# Patient Record
Sex: MALE | Race: BLACK OR AFRICAN AMERICAN | NOT HISPANIC OR LATINO | Employment: OTHER | URBAN - METROPOLITAN AREA
[De-identification: names, ages, dates, MRNs, and addresses within clinical notes are randomized per-mention and may not be internally consistent; named-entity substitution may affect disease eponyms.]

---

## 2023-03-31 LAB
ALBUMIN (G/DL) IN SER/PLAS: 3.5 G/DL (ref 3.4–5)
ANION GAP IN SER/PLAS: 13 MMOL/L (ref 10–20)
CALCIUM (MG/DL) IN SER/PLAS: 8.7 MG/DL (ref 8.6–10.6)
CARBON DIOXIDE, TOTAL (MMOL/L) IN SER/PLAS: 24 MMOL/L (ref 21–32)
CHLORIDE (MMOL/L) IN SER/PLAS: 112 MMOL/L (ref 98–107)
CREATININE (MG/DL) IN SER/PLAS: 2.95 MG/DL (ref 0.5–1.3)
CREATININE (MG/DL) IN URINE: NORMAL
GFR MALE: 21 ML/MIN/1.73M2
GLUCOSE (MG/DL) IN SER/PLAS: 97 MG/DL (ref 74–99)
PARATHYRIN INTACT (PG/ML) IN SER/PLAS: 214.4 PG/ML (ref 18.5–88)
PHOSPHATE (MG/DL) IN SER/PLAS: 3.4 MG/DL (ref 2.5–4.9)
POTASSIUM (MMOL/L) IN SER/PLAS: 4.7 MMOL/L (ref 3.5–5.3)
PROTEIN (MG/DL) IN URINE: NORMAL
PROTEIN/CREATININE (MG/MG) IN URINE: NORMAL
SODIUM (MMOL/L) IN SER/PLAS: 144 MMOL/L (ref 136–145)
UREA NITROGEN (MG/DL) IN SER/PLAS: 31 MG/DL (ref 6–23)

## 2023-04-12 DIAGNOSIS — I10 PRIMARY HYPERTENSION: Primary | ICD-10-CM

## 2023-04-12 RX ORDER — METOPROLOL TARTRATE 25 MG/1
1 TABLET, FILM COATED ORAL DAILY
COMMUNITY
Start: 2020-06-01 | End: 2023-04-12 | Stop reason: SDUPTHER

## 2023-04-13 RX ORDER — METOPROLOL TARTRATE 25 MG/1
25 TABLET, FILM COATED ORAL DAILY
Qty: 90 TABLET | Refills: 3 | Status: ON HOLD | OUTPATIENT
Start: 2023-04-13 | End: 2024-05-01

## 2023-04-25 ENCOUNTER — OFFICE VISIT (OUTPATIENT)
Dept: PRIMARY CARE | Facility: CLINIC | Age: 78
End: 2023-04-25
Payer: MEDICARE

## 2023-04-25 VITALS
RESPIRATION RATE: 16 BRPM | DIASTOLIC BLOOD PRESSURE: 70 MMHG | BODY MASS INDEX: 21.09 KG/M2 | WEIGHT: 147 LBS | HEART RATE: 70 BPM | SYSTOLIC BLOOD PRESSURE: 120 MMHG | TEMPERATURE: 98.1 F

## 2023-04-25 DIAGNOSIS — Z95.1 S/P CABG X 3: ICD-10-CM

## 2023-04-25 DIAGNOSIS — I10 PRIMARY HYPERTENSION: ICD-10-CM

## 2023-04-25 DIAGNOSIS — N18.4 CHRONIC KIDNEY DISEASE, STAGE 4 (SEVERE) (MULTI): ICD-10-CM

## 2023-04-25 DIAGNOSIS — M1A.0790 CHRONIC GOUT OF ANKLE, UNSPECIFIED CAUSE, UNSPECIFIED LATERALITY: ICD-10-CM

## 2023-04-25 DIAGNOSIS — N40.1 BENIGN PROSTATIC HYPERPLASIA WITH URINARY FREQUENCY: ICD-10-CM

## 2023-04-25 DIAGNOSIS — E78.49 OTHER HYPERLIPIDEMIA: ICD-10-CM

## 2023-04-25 DIAGNOSIS — R41.3 MEMORY LOSS: ICD-10-CM

## 2023-04-25 DIAGNOSIS — R26.89 BALANCE PROBLEMS: ICD-10-CM

## 2023-04-25 DIAGNOSIS — N25.81 HYPERPARATHYROIDISM, SECONDARY (MULTI): ICD-10-CM

## 2023-04-25 DIAGNOSIS — R35.0 BENIGN PROSTATIC HYPERPLASIA WITH URINARY FREQUENCY: ICD-10-CM

## 2023-04-25 DIAGNOSIS — G20.A1 PARKINSON'S DISEASE (MULTI): Primary | ICD-10-CM

## 2023-04-25 DIAGNOSIS — E13.69 OTHER SPECIFIED DIABETES MELLITUS WITH OTHER SPECIFIED COMPLICATION, UNSPECIFIED WHETHER LONG TERM INSULIN USE (MULTI): ICD-10-CM

## 2023-04-25 PROBLEM — R13.11 ORAL PHASE DYSPHAGIA: Status: ACTIVE | Noted: 2023-04-25

## 2023-04-25 PROBLEM — R13.10 DYSPHAGIA: Status: ACTIVE | Noted: 2023-04-25

## 2023-04-25 PROBLEM — R06.83 SNORING: Status: ACTIVE | Noted: 2023-04-25

## 2023-04-25 PROBLEM — G20.C PARKINSONISM (MULTI): Status: ACTIVE | Noted: 2023-04-25

## 2023-04-25 PROBLEM — R40.0 INTERMITTENT DROWSINESS: Status: ACTIVE | Noted: 2023-04-25

## 2023-04-25 PROBLEM — D12.6 TUBULAR ADENOMA OF COLON: Status: ACTIVE | Noted: 2023-04-25

## 2023-04-25 PROBLEM — M77.41 METATARSALGIA OF BOTH FEET: Status: ACTIVE | Noted: 2023-04-25

## 2023-04-25 PROBLEM — R53.83 FATIGUE: Status: ACTIVE | Noted: 2023-04-25

## 2023-04-25 PROBLEM — H53.8 BLURRY VISION: Status: ACTIVE | Noted: 2023-04-25

## 2023-04-25 PROBLEM — M10.9 GOUT: Status: ACTIVE | Noted: 2023-04-25

## 2023-04-25 PROBLEM — F09 COGNITIVE DISORDER: Status: ACTIVE | Noted: 2023-04-25

## 2023-04-25 PROBLEM — R63.4 UNEXPLAINED WEIGHT LOSS: Status: ACTIVE | Noted: 2023-04-25

## 2023-04-25 PROBLEM — M54.12 CERVICAL RADICULOPATHY: Status: ACTIVE | Noted: 2023-04-25

## 2023-04-25 PROBLEM — R26.2 UNABLE TO WALK: Status: ACTIVE | Noted: 2023-04-25

## 2023-04-25 PROBLEM — R19.09 INGUINAL MASS: Status: ACTIVE | Noted: 2023-04-25

## 2023-04-25 PROBLEM — G47.10 ACUTE HYPERSOMNOLENCE DISORDER: Status: ACTIVE | Noted: 2023-04-25

## 2023-04-25 PROBLEM — L89.152 SACRAL DECUBITUS ULCER, STAGE II (MULTI): Status: ACTIVE | Noted: 2023-04-25

## 2023-04-25 PROBLEM — L05.91 INFECTED PILONIDAL CYST: Status: ACTIVE | Noted: 2023-04-25

## 2023-04-25 PROBLEM — R41.81 AGE-RELATED COGNITIVE DECLINE: Status: ACTIVE | Noted: 2023-04-25

## 2023-04-25 PROBLEM — M72.2 PLANTAR FASCIITIS: Status: ACTIVE | Noted: 2023-04-25

## 2023-04-25 PROBLEM — N39.0 UTI (URINARY TRACT INFECTION): Status: ACTIVE | Noted: 2023-04-25

## 2023-04-25 PROBLEM — E61.1 IRON DEFICIENCY: Status: ACTIVE | Noted: 2023-04-25

## 2023-04-25 PROBLEM — R79.89 LOW VITAMIN D LEVEL: Status: ACTIVE | Noted: 2023-04-25

## 2023-04-25 PROBLEM — N28.1 RENAL CYSTS, ACQUIRED, BILATERAL: Status: ACTIVE | Noted: 2023-04-25

## 2023-04-25 PROBLEM — E11.9 DIABETES MELLITUS (MULTI): Status: ACTIVE | Noted: 2023-04-25

## 2023-04-25 PROBLEM — G62.9 PERIPHERAL NEUROPATHY: Status: ACTIVE | Noted: 2023-04-25

## 2023-04-25 PROBLEM — E78.5 HYPERLIPIDEMIA: Status: ACTIVE | Noted: 2023-04-25

## 2023-04-25 PROBLEM — Z95.5 S/P DRUG ELUTING CORONARY STENT PLACEMENT: Status: ACTIVE | Noted: 2023-04-25

## 2023-04-25 PROBLEM — Q53.9 UNDESCENDED TESTICLE: Status: ACTIVE | Noted: 2023-04-25

## 2023-04-25 PROBLEM — R55 SYNCOPE: Status: ACTIVE | Noted: 2023-04-25

## 2023-04-25 PROBLEM — R51.9 CHRONIC HEADACHE: Status: ACTIVE | Noted: 2023-04-25

## 2023-04-25 PROBLEM — E66.9 OBESITY: Status: ACTIVE | Noted: 2023-04-25

## 2023-04-25 PROBLEM — I25.10 CAD (CORONARY ARTERY DISEASE): Status: ACTIVE | Noted: 2023-04-25

## 2023-04-25 PROBLEM — M77.42 METATARSALGIA OF BOTH FEET: Status: ACTIVE | Noted: 2023-04-25

## 2023-04-25 PROBLEM — R29.818 SUSPECTED SLEEP APNEA: Status: ACTIVE | Noted: 2023-04-25

## 2023-04-25 PROBLEM — R32 INCONTINENCE OF URINE: Status: ACTIVE | Noted: 2023-04-25

## 2023-04-25 PROBLEM — R97.20 ELEVATED PROSTATE SPECIFIC ANTIGEN (PSA): Status: ACTIVE | Noted: 2023-04-25

## 2023-04-25 PROBLEM — R40.4 EPISODES OF STARING: Status: ACTIVE | Noted: 2023-04-25

## 2023-04-25 PROBLEM — M54.9 CHRONIC BACK PAIN: Status: ACTIVE | Noted: 2023-04-25

## 2023-04-25 PROBLEM — H53.2 DIPLOPIA: Status: ACTIVE | Noted: 2023-04-25

## 2023-04-25 PROBLEM — N52.9 MALE ERECTILE DISORDER: Status: ACTIVE | Noted: 2023-04-25

## 2023-04-25 PROBLEM — N40.0 BPH (BENIGN PROSTATIC HYPERPLASIA): Status: ACTIVE | Noted: 2023-04-25

## 2023-04-25 PROBLEM — I21.9 MYOCARDIAL INFARCTION (MULTI): Status: ACTIVE | Noted: 2023-04-25

## 2023-04-25 PROBLEM — R77.8 ABNORMAL SPEP: Status: ACTIVE | Noted: 2023-04-25

## 2023-04-25 PROBLEM — R29.3 POSTURAL IMBALANCE: Status: ACTIVE | Noted: 2023-04-25

## 2023-04-25 PROBLEM — R11.10 VOMITING: Status: ACTIVE | Noted: 2023-04-25

## 2023-04-25 PROBLEM — R30.0 DYSURIA: Status: ACTIVE | Noted: 2023-04-25

## 2023-04-25 PROBLEM — G89.29 CHRONIC BACK PAIN: Status: ACTIVE | Noted: 2023-04-25

## 2023-04-25 PROBLEM — G89.29 CHRONIC HEADACHE: Status: ACTIVE | Noted: 2023-04-25

## 2023-04-25 LAB
POC FINGERSTICK BLOOD GLUCOSE: 155 MG/DL (ref 70–100)
POC HEMOGLOBIN A1C: 6 % (ref 4.2–6.5)

## 2023-04-25 PROCEDURE — 82962 GLUCOSE BLOOD TEST: CPT | Performed by: INTERNAL MEDICINE

## 2023-04-25 PROCEDURE — 1036F TOBACCO NON-USER: CPT | Performed by: INTERNAL MEDICINE

## 2023-04-25 PROCEDURE — 83036 HEMOGLOBIN GLYCOSYLATED A1C: CPT | Performed by: INTERNAL MEDICINE

## 2023-04-25 PROCEDURE — 99214 OFFICE O/P EST MOD 30 MIN: CPT | Performed by: INTERNAL MEDICINE

## 2023-04-25 PROCEDURE — 1160F RVW MEDS BY RX/DR IN RCRD: CPT | Performed by: INTERNAL MEDICINE

## 2023-04-25 PROCEDURE — 3078F DIAST BP <80 MM HG: CPT | Performed by: INTERNAL MEDICINE

## 2023-04-25 PROCEDURE — 3074F SYST BP LT 130 MM HG: CPT | Performed by: INTERNAL MEDICINE

## 2023-04-25 PROCEDURE — 1159F MED LIST DOCD IN RCRD: CPT | Performed by: INTERNAL MEDICINE

## 2023-04-25 RX ORDER — COLCHICINE 0.6 MG/1
1 TABLET ORAL DAILY PRN
COMMUNITY
Start: 2016-07-26 | End: 2023-04-25 | Stop reason: SDUPTHER

## 2023-04-25 RX ORDER — ATORVASTATIN CALCIUM 80 MG/1
1 TABLET, FILM COATED ORAL DAILY
COMMUNITY
Start: 2016-05-19 | End: 2023-10-18 | Stop reason: SDUPTHER

## 2023-04-25 RX ORDER — COLCHICINE 0.6 MG/1
0.6 TABLET ORAL DAILY PRN
Qty: 30 TABLET | Refills: 0 | Status: SHIPPED | OUTPATIENT
Start: 2023-04-25 | End: 2023-05-16

## 2023-04-25 RX ORDER — TAMSULOSIN HYDROCHLORIDE 0.4 MG/1
1 CAPSULE ORAL DAILY
COMMUNITY
Start: 2022-07-12 | End: 2023-09-01

## 2023-04-25 RX ORDER — AMOXICILLIN AND CLAVULANATE POTASSIUM 500; 125 MG/1; MG/1
1 TABLET, FILM COATED ORAL 2 TIMES DAILY
COMMUNITY
Start: 2023-04-13 | End: 2024-01-24 | Stop reason: ALTCHOICE

## 2023-04-25 RX ORDER — SENNOSIDES 8.6 MG/1
TABLET ORAL 2 TIMES DAILY PRN
Status: ON HOLD | COMMUNITY
Start: 2020-09-15 | End: 2024-04-24 | Stop reason: ALTCHOICE

## 2023-04-25 RX ORDER — NITROGLYCERIN 0.4 MG/1
0.4 TABLET SUBLINGUAL EVERY 5 MIN PRN
COMMUNITY
Start: 2017-08-10

## 2023-04-25 RX ORDER — CARBIDOPA AND LEVODOPA 25; 100 MG/1; MG/1
1.5 TABLET ORAL 4 TIMES DAILY
COMMUNITY
Start: 2024-03-22

## 2023-04-25 RX ORDER — ACETAMINOPHEN 500 MG
1000 TABLET ORAL EVERY 8 HOURS PRN
COMMUNITY
Start: 2020-11-19

## 2023-04-25 RX ORDER — CLOPIDOGREL BISULFATE 75 MG/1
1 TABLET ORAL DAILY
COMMUNITY
Start: 2019-09-05 | End: 2023-05-17 | Stop reason: SDUPTHER

## 2023-04-25 RX ORDER — LIDOCAINE 50 MG/G
PATCH TOPICAL
Status: ON HOLD | COMMUNITY
Start: 2020-06-01 | End: 2024-04-25 | Stop reason: ENTERED-IN-ERROR

## 2023-04-25 RX ORDER — FINASTERIDE 5 MG/1
1 TABLET, FILM COATED ORAL DAILY
COMMUNITY
Start: 2019-02-07

## 2023-04-25 RX ORDER — ASPIRIN 81 MG
1 TABLET, DELAYED RELEASE (ENTERIC COATED) ORAL DAILY
Status: ON HOLD | COMMUNITY
Start: 2023-02-14 | End: 2024-04-25 | Stop reason: ENTERED-IN-ERROR

## 2023-04-25 RX ORDER — ACETAMINOPHEN AND CODEINE PHOSPHATE 300; 30 MG/1; MG/1
TABLET ORAL
COMMUNITY
Start: 2023-02-18 | End: 2024-01-24 | Stop reason: ALTCHOICE

## 2023-04-25 RX ORDER — LANOLIN ALCOHOL/MO/W.PET/CERES
CREAM (GRAM) TOPICAL
Status: ON HOLD | COMMUNITY
End: 2024-04-24 | Stop reason: ALTCHOICE

## 2023-04-25 RX ORDER — METOPROLOL SUCCINATE 25 MG/1
1 TABLET, EXTENDED RELEASE ORAL DAILY
COMMUNITY
Start: 2023-02-02 | End: 2024-01-24 | Stop reason: ALTCHOICE

## 2023-04-25 RX ORDER — ACETAMINOPHEN 500 MG
1 TABLET ORAL DAILY
COMMUNITY
Start: 2022-10-10 | End: 2023-12-04

## 2023-04-25 NOTE — PROGRESS NOTES
Markie Nobles is a 78 y.o. male   Patient with a past medical history of MGUS, CAD,s/p CABG, HTN, CKD III, Anemia, ABEBE, hyperlipidemia, obesity, BPH,and gout, DM, HLD, Parkinsons with dementia, Tubular Adenoma due in 2024    Had a gout flare  Doing well otherwise    Feels fine  No chest pain/  SOB/ dizziness  BM OK  Energy level ok  Appetite OK             Review of Systems     Constitutional: no fever, no chills, not feeling poorly, not feeling tired and no recent weight gain, no recent weight loss.   ENT: no earache, no hearing loss, no nosebleeds, no nasal discharge, no sore throat and no hoarseness.   Cardiovascular: the heart rate was not slow, the heart rate was not fast, no chest pain, no palpitations, no intermittent leg claudication and no lower extremity edema.   Respiratory: no cough, wheezing or shortness of breath at rest or exertion  Gastrointestinal: no abdominal pain, no constipation, no melena, no nausea, no diarrhea, no vomiting and no blood in stools.   Musculoskeletal: extremity weakness , no swelling.   Integumentary: no skin rashes, no skin lesions, no itching, no skin wound and no dry skin.   Neurological: no headache, no confusion, no numbness, no dizziness, no tingling and no fainting.   All other systems have been reviewed and are negative for complaint.       Vitals:    04/25/23 1411   BP: 120/70   Pulse: 70   Resp: 16   Temp: 36.7 °C (98.1 °F)        Physical Exam     Constitutional   General appearance: Alert and in no acute distress.     Pulmonary   Respiratory assessment: No respiratory distress, normal respiratory rhythm and effort.    Auscultation of Lungs: Clear bilateral breath sounds.   Cardiovascular   Auscultation of heart: Apical pulse normal, heart rate and rhythm normal, normal S1 and S2, no murmurs and no pericardial rub.    Exam for edema: No peripheral edema.   Abdomen   Abdominal Exam: No bruits, normal bowel sounds, soft, non-tender, no abdominal mass palpated.     Liver and Spleen exam: No hepato-splenomegaly.   Musculoskeletal   Examination of gait: Normal.    Inspection of digits and nails: No clubbing or cyanosis of the fingernails.    Inspection/palpation of joints, bones and muscles: No joint swelling. Normal movement of all extremities.   Skin   Skin inspection: Normal skin color and pigmentation, normal skin turgor and no visible rash.   Neurologic   Cranial nerves: Nerves 2-12 were intact, no focal neuro defects.     Assessment/Plan          Patient with a past medical history of MGUS, CAD,s/p CABG, HTN, CKD III, Anemia, ABEBE, hyperlipidemia, obesity, BPH,and gout, DM, HLD, Parkinsons with dementia, Tubular Adenoma due in 2024      # Gout  Start Colchicine PRN    # DM  diet controlled    # HTN  condition is stable  continue current medications      # CKD III b  stable    # HLD  condition is stable  continue current medications      # Parkinsons  exercise everyday  going to [facility]    # Urine Incontinence  stable on Flomax

## 2023-05-02 ENCOUNTER — TELEPHONE (OUTPATIENT)
Dept: PRIMARY CARE | Facility: CLINIC | Age: 78
End: 2023-05-02
Payer: MEDICARE

## 2023-05-02 NOTE — TELEPHONE ENCOUNTER
Patient's wife called: patient having UTI sx. Again.   Wife would like something called in the pharmacy. [facility] on [address].     Thank you!

## 2023-05-04 DIAGNOSIS — N30.00 ACUTE CYSTITIS WITHOUT HEMATURIA: Primary | ICD-10-CM

## 2023-05-04 RX ORDER — NITROFURANTOIN 25; 75 MG/1; MG/1
100 CAPSULE ORAL 2 TIMES DAILY
Qty: 14 CAPSULE | Refills: 0 | Status: SHIPPED | OUTPATIENT
Start: 2023-05-04 | End: 2023-05-11

## 2023-05-09 ENCOUNTER — TELEPHONE (OUTPATIENT)
Dept: PRIMARY CARE | Facility: CLINIC | Age: 78
End: 2023-05-09
Payer: MEDICARE

## 2023-05-09 DIAGNOSIS — I25.112: ICD-10-CM

## 2023-05-09 NOTE — TELEPHONE ENCOUNTER
3 days PT has had extreme gas. PT is straining with bowel movement. PT does not want to go to the hospital. PT has no blood in stool. No temperature. PT was taking GAS X  is what the pharmacist recommended he take. PT sister just wanted to give an update      [phone]

## 2023-05-15 DIAGNOSIS — M1A.0790 CHRONIC GOUT OF ANKLE, UNSPECIFIED CAUSE, UNSPECIFIED LATERALITY: ICD-10-CM

## 2023-05-16 RX ORDER — COLCHICINE 0.6 MG/1
TABLET ORAL
Qty: 30 TABLET | Refills: 11 | Status: SHIPPED | OUTPATIENT
Start: 2023-05-16 | End: 2024-01-24 | Stop reason: ALTCHOICE

## 2023-05-18 RX ORDER — CLOPIDOGREL BISULFATE 75 MG/1
75 TABLET ORAL DAILY
Qty: 90 TABLET | Refills: 0 | Status: SHIPPED | OUTPATIENT
Start: 2023-05-18 | End: 2023-10-03

## 2023-05-26 ENCOUNTER — TELEPHONE (OUTPATIENT)
Dept: PRIMARY CARE | Facility: CLINIC | Age: 78
End: 2023-05-26
Payer: MEDICARE

## 2023-05-26 NOTE — TELEPHONE ENCOUNTER
PT wife called in and stated that the patient not eating too well and has sore in the crack of his buttock for two weeks now. Not sure if he needs an antibiotic or a cream for the sore please advise if he should be seen

## 2023-07-15 PROBLEM — G93.40 ENCEPHALOPATHY, UNSPECIFIED: Status: ACTIVE | Noted: 2020-09-18

## 2023-07-15 PROBLEM — R53.81 OTHER MALAISE: Status: ACTIVE | Noted: 2020-09-18

## 2023-07-15 PROBLEM — F03.90 UNSPECIFIED DEMENTIA, UNSPECIFIED SEVERITY, WITHOUT BEHAVIORAL DISTURBANCE, PSYCHOTIC DISTURBANCE, MOOD DISTURBANCE, AND ANXIETY (MULTI): Status: ACTIVE | Noted: 2020-09-18

## 2023-07-15 PROBLEM — I12.0 HYPERTENSIVE CHRONIC KIDNEY DISEASE WITH STAGE 5 CHRONIC KIDNEY DISEASE OR END STAGE RENAL DISEASE (MULTI): Status: ACTIVE | Noted: 2020-09-18

## 2023-07-15 PROBLEM — N18.30 CKD (CHRONIC KIDNEY DISEASE), STAGE III (MULTI): Status: ACTIVE | Noted: 2020-10-01

## 2023-07-15 PROBLEM — L92.1 NECROBIOSIS LIPOIDICA, NOT ELSEWHERE CLASSIFIED: Status: ACTIVE | Noted: 2020-09-18

## 2023-07-15 PROBLEM — K75.81 NONALCOHOLIC STEATOHEPATITIS (NASH): Status: ACTIVE | Noted: 2020-09-18

## 2023-07-15 PROBLEM — I25.10 ATHEROSCLEROTIC HEART DISEASE OF NATIVE CORONARY ARTERY WITHOUT ANGINA PECTORIS: Status: ACTIVE | Noted: 2020-09-18

## 2023-07-15 PROBLEM — E78.5 HYPERLIPIDEMIA, UNSPECIFIED: Status: ACTIVE | Noted: 2020-09-18

## 2023-07-15 PROBLEM — E11.620: Status: ACTIVE | Noted: 2020-09-18

## 2023-07-15 PROBLEM — N40.0 BENIGN PROSTATIC HYPERPLASIA WITHOUT LOWER URINARY TRACT SYMPTOMS: Status: ACTIVE | Noted: 2020-09-18

## 2023-07-15 PROBLEM — M62.81 MUSCLE WEAKNESS (GENERALIZED): Status: ACTIVE | Noted: 2020-09-18

## 2023-07-15 PROBLEM — M25.78 OSTEOPHYTE, VERTEBRAE: Status: ACTIVE | Noted: 2020-09-18

## 2023-07-15 PROBLEM — M10.9 GOUT, UNSPECIFIED: Status: ACTIVE | Noted: 2020-09-18

## 2023-07-15 PROBLEM — R26.2 DIFFICULTY IN WALKING, NOT ELSEWHERE CLASSIFIED: Status: ACTIVE | Noted: 2020-09-18

## 2023-07-15 PROBLEM — D47.2 MONOCLONAL GAMMOPATHY: Status: ACTIVE | Noted: 2020-09-18

## 2023-07-15 PROBLEM — R13.12 DYSPHAGIA, OROPHARYNGEAL PHASE: Status: ACTIVE | Noted: 2020-09-18

## 2023-07-15 PROBLEM — I25.2 OLD MYOCARDIAL INFARCTION: Status: ACTIVE | Noted: 2020-09-18

## 2023-07-15 RX ORDER — DOCUSATE SODIUM 100 MG/1
100 CAPSULE, LIQUID FILLED ORAL DAILY
COMMUNITY
Start: 2023-02-14 | End: 2023-11-28

## 2023-07-15 RX ORDER — LISINOPRIL 20 MG/1
20 TABLET ORAL DAILY
COMMUNITY
Start: 2009-10-11 | End: 2023-10-18 | Stop reason: DRUGHIGH

## 2023-07-15 RX ORDER — NAPROXEN SODIUM 220 MG/1
81 TABLET, FILM COATED ORAL DAILY
COMMUNITY
Start: 2009-10-11 | End: 2024-01-24 | Stop reason: ALTCHOICE

## 2023-07-15 RX ORDER — PROMETHAZINE HYDROCHLORIDE 25 MG/1
25 TABLET ORAL
COMMUNITY
Start: 2009-10-11 | End: 2024-01-24 | Stop reason: WASHOUT

## 2023-07-15 RX ORDER — GLIPIZIDE 5 MG/1
5 TABLET ORAL
COMMUNITY
Start: 2019-12-11 | End: 2023-07-17 | Stop reason: ALTCHOICE

## 2023-07-15 RX ORDER — IRON PS COMPLEX/B12/FOLIC ACID 150-25-1
1 CAPSULE ORAL DAILY
COMMUNITY
End: 2024-01-24 | Stop reason: ALTCHOICE

## 2023-07-15 RX ORDER — AMOXICILLIN 500 MG/1
TABLET, FILM COATED ORAL
COMMUNITY
Start: 2022-11-14 | End: 2024-01-24 | Stop reason: ALTCHOICE

## 2023-07-15 RX ORDER — TRAMADOL HYDROCHLORIDE 50 MG/1
TABLET ORAL
COMMUNITY
Start: 2009-10-11 | End: 2024-01-24 | Stop reason: WASHOUT

## 2023-07-15 RX ORDER — PRAVASTATIN SODIUM 40 MG/1
40 TABLET ORAL NIGHTLY
COMMUNITY
Start: 2009-10-11 | End: 2024-01-24 | Stop reason: ALTCHOICE

## 2023-07-15 RX ORDER — AMOXICILLIN 500 MG/1
500 CAPSULE ORAL 3 TIMES DAILY
COMMUNITY
End: 2024-01-24 | Stop reason: ALTCHOICE

## 2023-07-17 ENCOUNTER — OFFICE VISIT (OUTPATIENT)
Dept: PRIMARY CARE | Facility: CLINIC | Age: 78
End: 2023-07-17
Payer: MEDICARE

## 2023-07-17 VITALS
WEIGHT: 147 LBS | DIASTOLIC BLOOD PRESSURE: 73 MMHG | HEART RATE: 74 BPM | BODY MASS INDEX: 21.09 KG/M2 | SYSTOLIC BLOOD PRESSURE: 134 MMHG | OXYGEN SATURATION: 99 %

## 2023-07-17 DIAGNOSIS — I10 PRIMARY HYPERTENSION: Primary | ICD-10-CM

## 2023-07-17 DIAGNOSIS — G20.A1 PARKINSON'S DISEASE (MULTI): ICD-10-CM

## 2023-07-17 DIAGNOSIS — K59.00 CONSTIPATION, UNSPECIFIED CONSTIPATION TYPE: ICD-10-CM

## 2023-07-17 DIAGNOSIS — E11.22 TYPE 2 DIABETES MELLITUS WITH DIABETIC CHRONIC KIDNEY DISEASE, UNSPECIFIED CKD STAGE, UNSPECIFIED WHETHER LONG TERM INSULIN USE (MULTI): ICD-10-CM

## 2023-07-17 DIAGNOSIS — E78.49 OTHER HYPERLIPIDEMIA: ICD-10-CM

## 2023-07-17 LAB
ALBUMIN (G/DL) IN SER/PLAS: 3.3 G/DL (ref 3.4–5)
ANION GAP IN SER/PLAS: 14 MMOL/L (ref 10–20)
CALCIDIOL (25 OH VITAMIN D3) (NG/ML) IN SER/PLAS: 47 NG/ML
CALCIUM (MG/DL) IN SER/PLAS: 8.7 MG/DL (ref 8.6–10.3)
CARBON DIOXIDE, TOTAL (MMOL/L) IN SER/PLAS: 24 MMOL/L (ref 21–32)
CHLORIDE (MMOL/L) IN SER/PLAS: 109 MMOL/L (ref 98–107)
CREATININE (MG/DL) IN SER/PLAS: 2.86 MG/DL (ref 0.5–1.3)
ERYTHROCYTE DISTRIBUTION WIDTH (RATIO) BY AUTOMATED COUNT: 15.7 % (ref 11.5–14.5)
ERYTHROCYTE MEAN CORPUSCULAR HEMOGLOBIN CONCENTRATION (G/DL) BY AUTOMATED: 31.9 G/DL (ref 32–36)
ERYTHROCYTE MEAN CORPUSCULAR VOLUME (FL) BY AUTOMATED COUNT: 90 FL (ref 80–100)
ERYTHROCYTES (10*6/UL) IN BLOOD BY AUTOMATED COUNT: 3.3 X10E12/L (ref 4.5–5.9)
GFR MALE: 22 ML/MIN/1.73M2
GLUCOSE (MG/DL) IN SER/PLAS: 132 MG/DL (ref 74–99)
HEMATOCRIT (%) IN BLOOD BY AUTOMATED COUNT: 29.8 % (ref 41–52)
HEMOGLOBIN (G/DL) IN BLOOD: 9.5 G/DL (ref 13.5–17.5)
LEUKOCYTES (10*3/UL) IN BLOOD BY AUTOMATED COUNT: 4.5 X10E9/L (ref 4.4–11.3)
PARATHYRIN INTACT (PG/ML) IN SER/PLAS: 169.3 PG/ML (ref 18.5–88)
PHOSPHATE (MG/DL) IN SER/PLAS: 3 MG/DL (ref 2.5–4.9)
PLATELETS (10*3/UL) IN BLOOD AUTOMATED COUNT: 161 X10E9/L (ref 150–450)
POC FINGERSTICK BLOOD GLUCOSE: 180 MG/DL (ref 70–100)
POC HDL CHOLESTEROL: 58 MG/DL (ref 0–40)
POC HEMOGLOBIN A1C: 5.9 % (ref 4.2–6.5)
POC LDL CHOLESTEROL: 48 MG/DL (ref 0–100)
POC NON-HDL CHOLESTEROL: 65 MG/DL (ref 0–130)
POC TOTAL CHOLESTEROL/HDL RATIO: 2.1 (ref 0–4.5)
POC TOTAL CHOLESTEROL: 123 MG/DL (ref 0–199)
POC TRIGLYCERIDES: 83 MG/DL (ref 0–150)
POTASSIUM (MMOL/L) IN SER/PLAS: 5 MMOL/L (ref 3.5–5.3)
SODIUM (MMOL/L) IN SER/PLAS: 142 MMOL/L (ref 136–145)
UREA NITROGEN (MG/DL) IN SER/PLAS: 33 MG/DL (ref 6–23)

## 2023-07-17 PROCEDURE — 1160F RVW MEDS BY RX/DR IN RCRD: CPT | Performed by: INTERNAL MEDICINE

## 2023-07-17 PROCEDURE — 83036 HEMOGLOBIN GLYCOSYLATED A1C: CPT | Performed by: INTERNAL MEDICINE

## 2023-07-17 PROCEDURE — 3078F DIAST BP <80 MM HG: CPT | Performed by: INTERNAL MEDICINE

## 2023-07-17 PROCEDURE — 1126F AMNT PAIN NOTED NONE PRSNT: CPT | Performed by: INTERNAL MEDICINE

## 2023-07-17 PROCEDURE — 1159F MED LIST DOCD IN RCRD: CPT | Performed by: INTERNAL MEDICINE

## 2023-07-17 PROCEDURE — 80061 LIPID PANEL: CPT | Performed by: INTERNAL MEDICINE

## 2023-07-17 PROCEDURE — 82962 GLUCOSE BLOOD TEST: CPT | Performed by: INTERNAL MEDICINE

## 2023-07-17 PROCEDURE — 99214 OFFICE O/P EST MOD 30 MIN: CPT | Performed by: INTERNAL MEDICINE

## 2023-07-17 PROCEDURE — 3075F SYST BP GE 130 - 139MM HG: CPT | Performed by: INTERNAL MEDICINE

## 2023-07-17 PROCEDURE — 1036F TOBACCO NON-USER: CPT | Performed by: INTERNAL MEDICINE

## 2023-07-17 ASSESSMENT — ENCOUNTER SYMPTOMS
OCCASIONAL FEELINGS OF UNSTEADINESS: 0
DEPRESSION: 0
LOSS OF SENSATION IN FEET: 0

## 2023-07-17 NOTE — PROGRESS NOTES
Markie Nobles is a 78 y.o. male   Markie Nobles is a 78 y.o. male with a past medical history of MGUS, CAD,s/p CABG, HTN, CKD III, Anemia, ABEBE, hyperlipidemia, obesity, BPH,and gout, DM, HLD, Parkinsons with dementia, Tubular Adenoma due in 2024    Has had bowel changes x 1 week  Constipated  Not drinking water    Feels fine  No chest pain/  SOB/ dizziness  BM OK  Energy level ok  Appetite OK    Using walker / wheelchair         Review of Systems     Constitutional: no fever, no chills, not feeling poorly, not feeling tired and no recent weight gain, no recent weight loss.   ENT: no earache, no hearing loss, no nosebleeds, no nasal discharge, no sore throat and no hoarseness.   Cardiovascular: the heart rate was not slow, the heart rate was not fast, no chest pain, no palpitations, no intermittent leg claudication and no lower extremity edema.   Respiratory: no cough, wheezing or shortness of breath at rest or exertion  Gastrointestinal: no abdominal pain, no constipation, no melena, no nausea, no diarrhea, no vomiting and no blood in stools.   Musculoskeletal: no arthralgias, no myalgias, no back pain, no joint swelling, no joint stiffness, no limb pain and no limb swelling.   Integumentary: no skin rashes, no skin lesions, no itching, no skin wound and no dry skin.   Neurological: no headache, no confusion, no numbness, no dizziness, no tingling and no fainting.   All other systems have been reviewed and are negative for complaint.       Vitals:    07/17/23 1030   BP: 134/73   Pulse: 74   SpO2: 99%        Physical Exam     Constitutional   General appearance: Alert and in no acute distress.     Pulmonary   Respiratory assessment: No respiratory distress, normal respiratory rhythm and effort.    Auscultation of Lungs: Clear bilateral breath sounds.   Cardiovascular   Auscultation of heart: Apical pulse normal, heart rate and rhythm normal, normal S1 and S2, no murmurs and no pericardial rub.    Exam for edema:  No peripheral edema.   Abdomen   Abdominal Exam: No bruits, normal bowel sounds, soft, non-tender, no abdominal mass palpated.    Liver and Spleen exam: No hepato-splenomegaly.   Musculoskeletal   Examination of gait: Normal.    Inspection of digits and nails: No clubbing or cyanosis of the fingernails.    Inspection/palpation of joints, bones and muscles: No joint swelling. Normal movement of all extremities.   Skin   Skin inspection: Normal skin color and pigmentation, normal skin turgor and no visible rash.   Neurologic   Cranial nerves: Nerves 2-12 were intact, no focal neuro defects.     Assessment/Plan          Markie Nobles is a 78 y.o. male with a past medical history of MGUS, CAD,s/p CABG, HTN, CKD III, Anemia, ABEBE, hyperlipidemia, obesity, BPH,and gout, DM, HLD, Parkinsons with dementia, Tubular Adenoma due in 2024    # Constipation  Increase water intake    # Malnutrition  Increase boost to BID  Has lost weight because teeth removed and dentures not fitting well    # DM  Diet controlled    # HTN  Stable  Continue current medications    # HLD  Stable  Continue current medications    # Parkinson  [facility]  Is helping  Rx for an Up Walker    Total appt time today was 35  minutes. Time included preparing to see the pt, obtaining the hx, performing the medically necessary appropriate physical exam, counseling & educating the pt, ordering tests & procedures, referring & communicating w/other providers, independently interpreting results & communicating the results to the pt, care, coordination & documenting clinical information in the medical record.

## 2023-07-31 ENCOUNTER — TELEPHONE (OUTPATIENT)
Dept: PRIMARY CARE | Facility: CLINIC | Age: 78
End: 2023-07-31
Payer: MEDICARE

## 2023-07-31 NOTE — TELEPHONE ENCOUNTER
PT wife called in and stated that the PT has been unable to pass a stool. PT wife stated that PT tried to have bowel movement yesterday but it got stuck in his rectum. PT is not currently taking any OTC supplements or medication. PT wife stated that she gave the PT water and some soda and he was groaning in pain. Please advise this PT on what to do to relieve

## 2023-08-03 ENCOUNTER — APPOINTMENT (OUTPATIENT)
Dept: PRIMARY CARE | Facility: CLINIC | Age: 78
End: 2023-08-03
Payer: MEDICARE

## 2023-08-15 ENCOUNTER — TELEPHONE (OUTPATIENT)
Dept: PRIMARY CARE | Facility: CLINIC | Age: 78
End: 2023-08-15
Payer: MEDICARE

## 2023-08-15 DIAGNOSIS — M54.50 CHRONIC LOW BACK PAIN WITHOUT SCIATICA, UNSPECIFIED BACK PAIN LATERALITY: Primary | ICD-10-CM

## 2023-08-15 DIAGNOSIS — G89.29 CHRONIC LOW BACK PAIN WITHOUT SCIATICA, UNSPECIFIED BACK PAIN LATERALITY: Primary | ICD-10-CM

## 2023-08-15 RX ORDER — LIDOCAINE 50 MG/G
1 PATCH TOPICAL DAILY
Qty: 30 PATCH | Refills: 11 | Status: SHIPPED | OUTPATIENT
Start: 2023-08-15 | End: 2024-01-29 | Stop reason: ALTCHOICE

## 2023-08-15 NOTE — TELEPHONE ENCOUNTER
PT spouse called in and stated that the PT tailbone has been hurting for 1 week now and extra strength tylenol is not working. Can Dr. Watson recommend something non addictive. Please advise

## 2023-08-24 RX ORDER — ALLOPURINOL 100 MG/1
100 TABLET ORAL DAILY PRN
Status: ON HOLD | COMMUNITY
End: 2024-05-01

## 2023-08-31 DIAGNOSIS — N40.0 BENIGN PROSTATIC HYPERPLASIA, UNSPECIFIED WHETHER LOWER URINARY TRACT SYMPTOMS PRESENT: ICD-10-CM

## 2023-09-01 RX ORDER — TAMSULOSIN HYDROCHLORIDE 0.4 MG/1
CAPSULE ORAL
Qty: 180 CAPSULE | Refills: 0 | Status: SHIPPED | OUTPATIENT
Start: 2023-09-01 | End: 2024-06-07

## 2023-09-28 DIAGNOSIS — I25.112: ICD-10-CM

## 2023-09-30 LAB — URINE CULTURE: ABNORMAL

## 2023-10-03 ENCOUNTER — OFFICE VISIT (OUTPATIENT)
Dept: HEMATOLOGY/ONCOLOGY | Facility: HOSPITAL | Age: 78
End: 2023-10-03
Payer: MEDICARE

## 2023-10-03 ENCOUNTER — DOCUMENTATION (OUTPATIENT)
Dept: HEMATOLOGY/ONCOLOGY | Facility: HOSPITAL | Age: 78
End: 2023-10-03

## 2023-10-03 VITALS
RESPIRATION RATE: 16 BRPM | DIASTOLIC BLOOD PRESSURE: 70 MMHG | BODY MASS INDEX: 23.15 KG/M2 | OXYGEN SATURATION: 99 % | WEIGHT: 147.49 LBS | TEMPERATURE: 97.5 F | HEIGHT: 67 IN | SYSTOLIC BLOOD PRESSURE: 128 MMHG | HEART RATE: 78 BPM

## 2023-10-03 DIAGNOSIS — N18.4 CKD (CHRONIC KIDNEY DISEASE) STAGE 4, GFR 15-29 ML/MIN (MULTI): Primary | ICD-10-CM

## 2023-10-03 PROCEDURE — 1160F RVW MEDS BY RX/DR IN RCRD: CPT | Performed by: NURSE PRACTITIONER

## 2023-10-03 PROCEDURE — 99214 OFFICE O/P EST MOD 30 MIN: CPT | Performed by: NURSE PRACTITIONER

## 2023-10-03 PROCEDURE — 84156 ASSAY OF PROTEIN URINE: CPT | Performed by: NURSE PRACTITIONER

## 2023-10-03 PROCEDURE — 3078F DIAST BP <80 MM HG: CPT | Performed by: NURSE PRACTITIONER

## 2023-10-03 PROCEDURE — 1036F TOBACCO NON-USER: CPT | Performed by: NURSE PRACTITIONER

## 2023-10-03 PROCEDURE — 1159F MED LIST DOCD IN RCRD: CPT | Performed by: NURSE PRACTITIONER

## 2023-10-03 PROCEDURE — 3074F SYST BP LT 130 MM HG: CPT | Performed by: NURSE PRACTITIONER

## 2023-10-03 PROCEDURE — 84166 PROTEIN E-PHORESIS/URINE/CSF: CPT | Performed by: NURSE PRACTITIONER

## 2023-10-03 PROCEDURE — 1126F AMNT PAIN NOTED NONE PRSNT: CPT | Performed by: NURSE PRACTITIONER

## 2023-10-03 RX ORDER — EPINEPHRINE 0.3 MG/.3ML
0.3 INJECTION SUBCUTANEOUS EVERY 5 MIN PRN
Status: CANCELLED | OUTPATIENT
Start: 2023-10-17

## 2023-10-03 RX ORDER — CLOPIDOGREL BISULFATE 75 MG/1
75 TABLET ORAL DAILY
Qty: 90 TABLET | Refills: 3 | Status: SHIPPED | OUTPATIENT
Start: 2023-10-03 | End: 2024-06-07

## 2023-10-03 RX ORDER — DIPHENHYDRAMINE HYDROCHLORIDE 50 MG/ML
50 INJECTION INTRAMUSCULAR; INTRAVENOUS AS NEEDED
Status: CANCELLED | OUTPATIENT
Start: 2023-10-17

## 2023-10-03 RX ORDER — ALBUTEROL SULFATE 0.83 MG/ML
3 SOLUTION RESPIRATORY (INHALATION) AS NEEDED
Status: CANCELLED | OUTPATIENT
Start: 2023-10-17

## 2023-10-03 RX ORDER — FAMOTIDINE 10 MG/ML
20 INJECTION INTRAVENOUS ONCE AS NEEDED
Status: CANCELLED | OUTPATIENT
Start: 2023-10-17

## 2023-10-03 ASSESSMENT — PAIN SCALES - GENERAL: PAINLEVEL: 0-NO PAIN

## 2023-10-03 NOTE — PROGRESS NOTES
Patient ID: Markie Nobles is a 78 y.o. male.  Referring Physician: No referring provider defined for this encounter.  Primary Care Provider: Leslie Watson MD  Visit Type: Follow Up      Subjective    Mr. Nobles is a 79 y/o  male presenting with his wife today for follow-up for positive SPEP/MGUS. He has been noted for having a monoclonal band for free lambda chain on previous SPEP. He is currently being monitored periodically without need for intervention at this time.    Today he reports that he has intermittent early satiety. He denies abdominal pain, nausea, vomiting or any other associated symptom. Patient states he fell a few weeks ago when trying to reach something on the floor while sitting and fell out of the chair. He hit his head and his wife took him to ED. CT scan was normal.     Patient denies weight loss, abnormal bruising and bleeding, hematuria, blood in stool, dark/black stools, epistaxis, oral/gingival bleeding, lymphadenopathy, recurrent infections, recurrent fevers, night sweats, abdominal pain, bone pain, chest pain, palpitations, SOB, ANDREA, fatigue, dizziness, lightheadedness, PICA.    Relevant Hx  Patient was initially referred by Dr. Mercy Plunkett for positive SPEP. He was seen by Dr Castrejon and has transferred care to me this past year.     The patient has multiple medical issues, including diabetes, diabetic neuropathy, CKD, h/o CABG, NSTEMI and hyperlipidemia. He was found to have a positive SPEP on workup of his CKD which showed a monoclonal band for free lambda chain.     He has been diagnosed with CKD for over a decade now, which is slowly worsening. He also has active microalbuminuria which has been attributed t his diabetes and cardiac condition.            Review of Systems   All other systems reviewed and are negative.       Objective   BSA: There is no height or weight on file to calculate BSA.  There were no vitals taken for this visit.     has a past medical  history of Encounter for immunization (09/29/2016), Encounter for screening, unspecified (04/28/2014), Gout, unspecified (10/26/2017), Idiopathic gout, right hand (01/17/2020), Localized edema (07/06/2015), Other conditions influencing health status (08/03/2015), Other symptoms and signs involving cognitive functions and awareness (06/09/2016), Pain in unspecified foot (08/03/2015), Personal history of other diseases of the circulatory system, Personal history of other diseases of the digestive system (08/13/2019), Personal history of other drug therapy, Personal history of other specified conditions (03/17/2016), Personal history of other specified conditions (02/19/2015), and Strain of muscle and tendon of unspecified wall of thorax, initial encounter (12/05/2016).   has a past surgical history that includes Coronary artery bypass graft (12/16/2022); Cholecystectomy (04/20/2018); CT angio head w and wo IV contrast (7/29/2019); and CT angio neck w and wo IV contrast (7/29/2019).  Family History   Problem Relation Name Age of Onset    Other (ESRD) Mother          on dialysis    Hypertension Father      Dementia Other      Diabetes Other      Liver cancer Other      Kidney disease Other          Reported prior     Oncology History    No history exists.       Markie Nobles  reports that he has quit smoking. His smoking use included cigarettes. He has never been exposed to tobacco smoke. He has never used smokeless tobacco.  He  reports that he does not currently use alcohol.  He  reports that he does not currently use drugs.    Physical Exam  HENT:      Head: Normocephalic.      Nose: Nose normal.      Mouth/Throat:      Mouth: Mucous membranes are moist.   Eyes:      Pupils: Pupils are equal, round, and reactive to light.   Cardiovascular:      Rate and Rhythm: Normal rate and regular rhythm.      Pulses: Normal pulses.      Heart sounds: Normal heart sounds.   Pulmonary:      Effort: Pulmonary effort is normal.     "  Breath sounds: Normal breath sounds.   Abdominal:      General: Bowel sounds are normal.      Palpations: Abdomen is soft.   Musculoskeletal:         General: Normal range of motion.   Skin:     General: Skin is warm and dry.   Neurological:      General: No focal deficit present.      Mental Status: He is alert and oriented to person, place, and time.   Psychiatric:         Mood and Affect: Mood normal.         Behavior: Behavior normal.         WBC   Date/Time Value Ref Range Status   09/29/2023 03:59 PM 4.8 4.4 - 11.3 x10E9/L Final   07/17/2023 12:09 PM 4.5 4.4 - 11.3 x10E9/L Final   03/31/2023 04:14 PM 5.4 4.4 - 11.3 x10E9/L Final     nRBC   Date Value Ref Range Status   01/23/2023 0.0 0.0 - 0.0 /100 WBC Final   01/22/2023 0.0 0.0 - 0.0 /100 WBC Final   01/21/2023 0.0 0.0 - 0.0 /100 WBC Final     RBC   Date Value Ref Range Status   09/29/2023 3.44 (L) 4.50 - 5.90 x10E12/L Final   07/17/2023 3.30 (L) 4.50 - 5.90 x10E12/L Final   03/31/2023 3.39 (L) 4.50 - 5.90 x10E12/L Final     Hemoglobin   Date Value Ref Range Status   09/29/2023 9.7 (L) 13.5 - 17.5 g/dL Final   07/17/2023 9.5 (L) 13.5 - 17.5 g/dL Final   03/31/2023 9.9 (L) 13.5 - 17.5 g/dL Final     Hematocrit   Date Value Ref Range Status   09/29/2023 31.2 (L) 41.0 - 52.0 % Final   07/17/2023 29.8 (L) 41.0 - 52.0 % Final   03/31/2023 30.0 (L) 41.0 - 52.0 % Final     MCV   Date/Time Value Ref Range Status   09/29/2023 03:59 PM 91 80 - 100 fL Final   07/17/2023 12:09 PM 90 80 - 100 fL Final   03/31/2023 04:14 PM 88 80 - 100 fL Final     No results found for: \"MCH\"  MCHC   Date/Time Value Ref Range Status   09/29/2023 03:59 PM 31.1 (L) 32.0 - 36.0 g/dL Final   07/17/2023 12:09 PM 31.9 (L) 32.0 - 36.0 g/dL Final   03/31/2023 04:14 PM 33.0 32.0 - 36.0 g/dL Final     RDW   Date/Time Value Ref Range Status   09/29/2023 03:59 PM 15.2 (H) 11.5 - 14.5 % Final   07/17/2023 12:09 PM 15.7 (H) 11.5 - 14.5 % Final   03/31/2023 04:14 PM 14.6 (H) 11.5 - 14.5 % Final " "    Platelets   Date/Time Value Ref Range Status   09/29/2023 03:59  (L) 150 - 450 x10E9/L Final   07/17/2023 12:09  150 - 450 x10E9/L Final   03/31/2023 04:14  (L) 150 - 450 x10E9/L Final     No results found for: \"MPV\"  Neutrophils %   Date/Time Value Ref Range Status   09/29/2023 03:59 PM 54.6 40.0 - 80.0 % Final   03/31/2023 04:14 PM 52.1 40.0 - 80.0 % Final   01/23/2023 11:11 AM 60.2 40.0 - 80.0 % Final     Immature Granulocytes %, Automated   Date/Time Value Ref Range Status   09/29/2023 03:59 PM 0.4 0.0 - 0.9 % Final     Comment:      Immature Granulocyte Count (IG) includes promyelocytes,    myelocytes and metamyelocytes but does not include bands.   Percent differential counts (%) should be interpreted in the   context of the absolute cell counts (cells/L).     03/31/2023 04:14 PM 0.2 0.0 - 0.9 % Final     Comment:      Immature Granulocyte Count (IG) includes promyelocytes,    myelocytes and metamyelocytes but does not include bands.   Percent differential counts (%) should be interpreted in the   context of the absolute cell counts (cells/L).     01/23/2023 11:11 AM 0.4 0.0 - 0.9 % Final     Comment:      Immature Granulocyte Count (IG) includes promyelocytes,    myelocytes and metamyelocytes but does not include bands.   Percent differential counts (%) should be interpreted in the   context of the absolute cell counts (cells/L).       Lymphocytes %   Date/Time Value Ref Range Status   09/29/2023 03:59 PM 32.8 13.0 - 44.0 % Final   03/31/2023 04:14 PM 33.0 13.0 - 44.0 % Final   01/23/2023 11:11 AM 24.1 13.0 - 44.0 % Final     Monocytes %   Date/Time Value Ref Range Status   09/29/2023 03:59 PM 7.6 2.0 - 10.0 % Final   03/31/2023 04:14 PM 8.7 2.0 - 10.0 % Final   01/23/2023 11:11 AM 10.1 2.0 - 10.0 % Final     Eosinophils %   Date/Time Value Ref Range Status   09/29/2023 03:59 PM 4.2 0.0 - 6.0 % Final   03/31/2023 04:14 PM 5.4 0.0 - 6.0 % Final   01/23/2023 11:11 AM 4.7 0.0 - 6.0 % Final " "    Basophils %   Date/Time Value Ref Range Status   09/29/2023 03:59 PM 0.4 0.0 - 2.0 % Final   03/31/2023 04:14 PM 0.6 0.0 - 2.0 % Final   01/23/2023 11:11 AM 0.5 0.0 - 2.0 % Final     Neutrophils Absolute   Date/Time Value Ref Range Status   09/29/2023 03:59 PM 2.60 1.60 - 5.50 x10E9/L Final   03/31/2023 04:14 PM 2.83 1.60 - 5.50 x10E9/L Final   01/23/2023 11:11 AM 3.34 1.60 - 5.50 x10E9/L Final     No results found for: \"IGABSOL\"  Lymphocytes Absolute   Date/Time Value Ref Range Status   09/29/2023 03:59 PM 1.56 0.80 - 3.00 x10E9/L Final   03/31/2023 04:14 PM 1.79 0.80 - 3.00 x10E9/L Final   01/23/2023 11:11 AM 1.34 0.80 - 3.00 x10E9/L Final     Monocytes Absolute   Date/Time Value Ref Range Status   09/29/2023 03:59 PM 0.36 0.05 - 0.80 x10E9/L Final   03/31/2023 04:14 PM 0.47 0.05 - 0.80 x10E9/L Final   01/23/2023 11:11 AM 0.56 0.05 - 0.80 x10E9/L Final     Eosinophils Absolute   Date/Time Value Ref Range Status   09/29/2023 03:59 PM 0.20 0.00 - 0.40 x10E9/L Final   03/31/2023 04:14 PM 0.29 0.00 - 0.40 x10E9/L Final   01/23/2023 11:11 AM 0.26 0.00 - 0.40 x10E9/L Final     Basophils Absolute   Date/Time Value Ref Range Status   09/29/2023 03:59 PM 0.02 0.00 - 0.10 x10E9/L Final   03/31/2023 04:14 PM 0.03 0.00 - 0.10 x10E9/L Final   01/23/2023 11:11 AM 0.03 0.00 - 0.10 x10E9/L Final       No components found for: \"PT\"  aPTT   Date/Time Value Ref Range Status   07/30/2019 02:25 AM 17 (L) 28 - 38 sec Final     Comment:       THE APTT IS NO LONGER USED FOR MONITORING     UNFRACTIONATED HEPARIN THERAPY.    FOR MONITORING HEPARIN THERAPY,     USE THE HEPARIN ASSAY.     05/03/2019 03:20 PM 18 (L) 28 - 38 sec Final     Comment:       THE APTT IS NO LONGER USED FOR MONITORING     UNFRACTIONATED HEPARIN THERAPY.    FOR MONITORING HEPARIN THERAPY,     USE THE HEPARIN ASSAY.     09/10/2018 03:41 PM 28 25 - 36 sec Final     Comment:       THE APTT IS NO LONGER USED FOR MONITORING     UNFRACTIONATED HEPARIN THERAPY.    FOR " MONITORING HEPARIN THERAPY,     USE THE HEPARIN ASSAY.         Assessment/Plan      1) MGUS  -SPEP and INDER are negative. But does have [place] and light chain elevation, Ratio is mildly elevated but stable. 24 hour urine for UPEP results pending.   -RTC in 6 months - will repeat labs and 24 hour UPEP 1 week prior to visit. F/U sooner if needed/urgent    2) Abnormal CMP - F/U w/ PCP and/or nephrology regarding CKD and elevated chloride. Stave 4 to 5 CKD. Discussed EPO support and the patient will start Procrit 66073 units every 2 weeks on 10/17/23. Order submitted for PA. Will return for 3 month visit and see [facility] for follow up for anemia of chronic disease.     3) Sickle cell trait  -Diagnosed in 2007      I had an extensive discussion with the patient regarding the diagnosis and discussed the plan of therapy, including general considerations regarding side effects and outcomes. Pt understood and gave appropriate teach back about the plan of care. All questions were answered to the patient's satisfaction. The patient is instructed to contact us at any time if questions or problems arise. Thank you for the opportunity to participate in the care of this very pleasant patient.    Total time = 30 minutes. 50% or more of this time was spent in counseling and/or coordination of care including reviewing medical history/radiology/labs, examining patient, formulating outlined plan with team, and discussing plan with patient/family.       Rosanne M Casal, APRN-CNP

## 2023-10-04 LAB — PROT UR-ACNC: 99 MG/DL (ref 5–25)

## 2023-10-06 DIAGNOSIS — E78.49 OTHER HYPERLIPIDEMIA: Primary | ICD-10-CM

## 2023-10-06 LAB
ALBUMIN MFR UR ELPH: 75.5 %
ALPHA1 GLOB MFR UR ELPH: 2.6 %
ALPHA2 GLOB MFR UR ELPH: 3.6 %
B-GLOBULIN MFR UR ELPH: 7.8 %
GAMMA GLOB MFR UR ELPH: 10.5 %
PATH REVIEW-URINE PROTEIN ELECTROPHORESIS: NORMAL
URINE ELECTROPHORESIS COMMENT: NORMAL

## 2023-10-06 NOTE — TELEPHONE ENCOUNTER
PATIENT ONCOLOGIST WANT HIM TO GET SHOTS BECAUSE HE'S ANEMIC SHE WANT TO TALK TO YOU ABOUT THAT AND ALSO HE'S STILL HAVING ISSUES WITH MAKING BOWEL MOVEMENTS PLEASE GIVE HER A CALL

## 2023-10-16 ENCOUNTER — TREATMENT (OUTPATIENT)
Dept: PHYSICAL THERAPY | Facility: CLINIC | Age: 78
End: 2023-10-16
Payer: MEDICARE

## 2023-10-16 DIAGNOSIS — G20.C PRIMARY PARKINSONISM (MULTI): ICD-10-CM

## 2023-10-16 DIAGNOSIS — R26.89 IMPAIRED GAIT AND MOBILITY: Primary | ICD-10-CM

## 2023-10-16 DIAGNOSIS — R29.3 POSTURAL IMBALANCE: ICD-10-CM

## 2023-10-16 PROCEDURE — 97750 PHYSICAL PERFORMANCE TEST: CPT | Mod: GP | Performed by: PHYSICAL THERAPIST

## 2023-10-16 PROCEDURE — 97530 THERAPEUTIC ACTIVITIES: CPT | Mod: GP | Performed by: PHYSICAL THERAPIST

## 2023-10-16 NOTE — PROGRESS NOTES
Physical Therapy    Physical Therapy Treatment    Patient Name: Markie Nobles  MRN: [id number]  Today's Date: 10/16/2023  Time Calculation  Start Time: 1500  Stop Time: 1545  Time Calculation (min): 45 min  Visit # : 4    Assessment:  Patient was Re-assessed due to lack of PT attendance for 2 months and expiring Medicare Certification. See updated goals for details. Patient is at very high risk to decline functionally,  become more dependent for mobility/transfers, and increased fall risk if he does not receive skilled PT.  His advanced parkinsonism leads to severe fall risk and immobility.     Plan:  Continue skilled PT to achieve updated goals.     Current Problem  1. Impaired gait and mobility  Follow Up In Physical Therapy      2. Postural imbalance  Follow Up In Physical Therapy      3. Primary parkinsonism  Follow Up In Physical Therapy          Subjective   Pt. Hasn't attended PT since 8/10/23. He missed multiple visits but his wife states that they didn't know they had appts scheduled.   General  Severe postural flexion, kyphosis and forward head in sitting. During gait he becomes severely flexed forward over the walker.   Precautions  High fall risk    Pain  No pain reported  Objective   Sit to stand from arm chair with Mod assist. With multiple attempts and cues transfers improve   Sit to supine with supervision. Supine to sit with mod assist to bring trunk into sitting.   Cognition  Cognition is appropriate and intact in the context of our visit today.   Posture  Severely stooped posture sitting and standing  Outcome Measures:  20 foot walk = 2 minutes 18 seconds with FWW and CGA to min A  TUG = 4:55 minutes      Treatments:  Therapeutic Activities: Sit to stand training from w/c to FWW. Cues for setup, flexing at hips, and pushing forward rather than up to reduce retropulsion.   Ambulated 35 ft with FWW, gait belt and CGA. Cues for posture and bigger steps.   Test and measures performed for Re-Assessment  today. See objective section and outcome measures.     OP EDUCATION:  With patient and his wife: Discussed importance of daily exercise to manage PD. Reviewed attendance policy and need to attend consistently to achieve goals.     Goals:  Balance: Patient will demonstrate 8/16 on Tinetti Balance Test as evidence of improved safety, independence and decreased falls risk during transitions and standing activities at home   Flexibility: Patient will demonstrate independence and compliance with HEP as assigned as evidence of investment in improvement of tight hamstrings, and rigid trunk/head forward posture to maximize safety during functional mobility.   Gait/Locomotion: Patient will demonstrate > 8/12 on Tinetti gait with rolling walker and supervision as evidence of improved safety, independence and decreased risk of falls during gait in the home and community   Posture: Patient will demonstrate self correction of head forward and stooped posture in standing as evidence of improved awareness of connection between good posture, good balance and improved safety during standing and walking.   Transfers: Patient will demonstrate TUG time of 40 seconds with rolling walker and CGA as evidence of improved safety and balance during functional mobility.   Active       PT Problem       Pt. will perform sit to stand with CGA or less consistently with decreased retropulsion and reduced fall risk.        Start:  10/16/23    Expected End:  01/14/24            20 foot ambulation with FWW < 105 seconds (1:45 mins), TUG w/ FWW in < 2.5 minutes (initially 5 mins)       Start:  10/16/23    Expected End:  01/14/24            Pt. will perform supine <> sit without assistance consistently       Start:  10/16/23    Expected End:  01/14/24            Pt. will improve self correction of posture to reduce kyphosis, trunk flexion, and forward head in sitting and standing       Start:  10/16/23    Expected End:  01/14/24            Pt. will  begin exercising daily at home via HEP and peddler device to delay progression of PD symptoms       Start:  10/16/23    Expected End:  01/14/24

## 2023-10-17 ENCOUNTER — INFUSION (OUTPATIENT)
Dept: HEMATOLOGY/ONCOLOGY | Facility: HOSPITAL | Age: 78
End: 2023-10-17
Payer: MEDICARE

## 2023-10-17 VITALS
HEART RATE: 77 BPM | SYSTOLIC BLOOD PRESSURE: 126 MMHG | TEMPERATURE: 97.7 F | BODY MASS INDEX: 22.29 KG/M2 | OXYGEN SATURATION: 100 % | RESPIRATION RATE: 18 BRPM | DIASTOLIC BLOOD PRESSURE: 68 MMHG | WEIGHT: 142.86 LBS

## 2023-10-17 DIAGNOSIS — N18.4 CKD (CHRONIC KIDNEY DISEASE) STAGE 4, GFR 15-29 ML/MIN (MULTI): ICD-10-CM

## 2023-10-17 LAB
BASOPHILS # BLD AUTO: 0.02 X10*3/UL (ref 0–0.1)
BASOPHILS NFR BLD AUTO: 0.5 %
EOSINOPHIL # BLD AUTO: 0.26 X10*3/UL (ref 0–0.4)
EOSINOPHIL NFR BLD AUTO: 6.4 %
ERYTHROCYTE [DISTWIDTH] IN BLOOD BY AUTOMATED COUNT: 14.9 % (ref 11.5–14.5)
FERRITIN SERPL-MCNC: 361 NG/ML (ref 20–300)
HCT VFR BLD AUTO: 31.4 % (ref 41–52)
HGB BLD-MCNC: 9.9 G/DL (ref 13.5–17.5)
IMM GRANULOCYTES # BLD AUTO: 0.01 X10*3/UL (ref 0–0.5)
IMM GRANULOCYTES NFR BLD AUTO: 0.2 % (ref 0–0.9)
IRON SATN MFR SERPL: 34 % (ref 25–45)
IRON SERPL-MCNC: 74 UG/DL (ref 35–150)
LYMPHOCYTES # BLD AUTO: 1.54 X10*3/UL (ref 0.8–3)
LYMPHOCYTES NFR BLD AUTO: 37.8 %
MCH RBC QN AUTO: 28.6 PG (ref 26–34)
MCHC RBC AUTO-ENTMCNC: 31.5 G/DL (ref 32–36)
MCV RBC AUTO: 91 FL (ref 80–100)
MONOCYTES # BLD AUTO: 0.5 X10*3/UL (ref 0.05–0.8)
MONOCYTES NFR BLD AUTO: 12.3 %
NEUTROPHILS # BLD AUTO: 1.74 X10*3/UL (ref 1.6–5.5)
NEUTROPHILS NFR BLD AUTO: 42.8 %
NRBC BLD-RTO: 0 /100 WBCS (ref 0–0)
PLATELET # BLD AUTO: 130 X10*3/UL (ref 150–450)
PMV BLD AUTO: 11.1 FL (ref 7.5–11.5)
RBC # BLD AUTO: 3.46 X10*6/UL (ref 4.5–5.9)
TIBC SERPL-MCNC: 220 UG/DL (ref 240–445)
UIBC SERPL-MCNC: 146 UG/DL (ref 110–370)
WBC # BLD AUTO: 4.1 X10*3/UL (ref 4.4–11.3)

## 2023-10-17 PROCEDURE — 85025 COMPLETE CBC W/AUTO DIFF WBC: CPT

## 2023-10-17 PROCEDURE — 96372 THER/PROPH/DIAG INJ SC/IM: CPT

## 2023-10-17 PROCEDURE — 82728 ASSAY OF FERRITIN: CPT | Mod: CMCLAB

## 2023-10-17 PROCEDURE — 36415 COLL VENOUS BLD VENIPUNCTURE: CPT

## 2023-10-17 PROCEDURE — 6350000001 HC RX 635 EPOETIN >10,000 UNITS: Mod: JZ | Performed by: NURSE PRACTITIONER

## 2023-10-17 PROCEDURE — 83540 ASSAY OF IRON: CPT

## 2023-10-17 PROCEDURE — 83550 IRON BINDING TEST: CPT | Mod: CMCLAB

## 2023-10-17 RX ORDER — ALBUTEROL SULFATE 0.83 MG/ML
3 SOLUTION RESPIRATORY (INHALATION) AS NEEDED
Status: CANCELLED | OUTPATIENT
Start: 2023-10-31

## 2023-10-17 RX ORDER — FAMOTIDINE 10 MG/ML
20 INJECTION INTRAVENOUS ONCE AS NEEDED
Status: CANCELLED | OUTPATIENT
Start: 2023-10-31

## 2023-10-17 RX ORDER — EPINEPHRINE 0.3 MG/.3ML
0.3 INJECTION SUBCUTANEOUS EVERY 5 MIN PRN
Status: CANCELLED | OUTPATIENT
Start: 2023-10-31

## 2023-10-17 RX ORDER — DIPHENHYDRAMINE HYDROCHLORIDE 50 MG/ML
50 INJECTION INTRAMUSCULAR; INTRAVENOUS AS NEEDED
Status: CANCELLED | OUTPATIENT
Start: 2023-10-31

## 2023-10-17 RX ADMIN — EPOETIN ALFA-EPBX 10000 UNITS: 10000 INJECTION, SOLUTION INTRAVENOUS; SUBCUTANEOUS at 12:20

## 2023-10-17 ASSESSMENT — PAIN SCALES - GENERAL: PAINLEVEL: 0-NO PAIN

## 2023-10-18 ENCOUNTER — OFFICE VISIT (OUTPATIENT)
Dept: PRIMARY CARE | Facility: CLINIC | Age: 78
End: 2023-10-18
Payer: MEDICARE

## 2023-10-18 VITALS
SYSTOLIC BLOOD PRESSURE: 120 MMHG | TEMPERATURE: 98.3 F | DIASTOLIC BLOOD PRESSURE: 70 MMHG | WEIGHT: 145.4 LBS | RESPIRATION RATE: 16 BRPM | HEART RATE: 68 BPM | BODY MASS INDEX: 22.69 KG/M2

## 2023-10-18 DIAGNOSIS — E78.49 OTHER HYPERLIPIDEMIA: Primary | ICD-10-CM

## 2023-10-18 DIAGNOSIS — E61.1 IRON DEFICIENCY: ICD-10-CM

## 2023-10-18 DIAGNOSIS — N18.4 CKD (CHRONIC KIDNEY DISEASE) STAGE 4, GFR 15-29 ML/MIN (MULTI): ICD-10-CM

## 2023-10-18 DIAGNOSIS — I10 PRIMARY HYPERTENSION: ICD-10-CM

## 2023-10-18 DIAGNOSIS — D47.2 MONOCLONAL GAMMOPATHY: ICD-10-CM

## 2023-10-18 PROCEDURE — 1126F AMNT PAIN NOTED NONE PRSNT: CPT | Performed by: INTERNAL MEDICINE

## 2023-10-18 PROCEDURE — 1036F TOBACCO NON-USER: CPT | Performed by: INTERNAL MEDICINE

## 2023-10-18 PROCEDURE — 99214 OFFICE O/P EST MOD 30 MIN: CPT | Performed by: INTERNAL MEDICINE

## 2023-10-18 PROCEDURE — 1160F RVW MEDS BY RX/DR IN RCRD: CPT | Performed by: INTERNAL MEDICINE

## 2023-10-18 PROCEDURE — 3078F DIAST BP <80 MM HG: CPT | Performed by: INTERNAL MEDICINE

## 2023-10-18 PROCEDURE — 3074F SYST BP LT 130 MM HG: CPT | Performed by: INTERNAL MEDICINE

## 2023-10-18 PROCEDURE — 1159F MED LIST DOCD IN RCRD: CPT | Performed by: INTERNAL MEDICINE

## 2023-10-18 RX ORDER — ATORVASTATIN CALCIUM 80 MG/1
80 TABLET, FILM COATED ORAL DAILY
Qty: 90 TABLET | Refills: 0 | Status: SHIPPED | OUTPATIENT
Start: 2023-10-18 | End: 2023-11-28

## 2023-10-18 RX ORDER — LISINOPRIL 20 MG/1
10 TABLET ORAL DAILY
Qty: 15 TABLET | Refills: 2 | Status: SHIPPED | OUTPATIENT
Start: 2023-10-18 | End: 2024-01-29 | Stop reason: ALTCHOICE

## 2023-10-18 NOTE — PROGRESS NOTES
Markie Nobles is a 78 y.o. male   Markie Nobles is a 78 y.o. male with a past medical history of MGUS, CAD,s/p CABG, HTN, CKD IV, Anemia, ABEBE, hyperlipidemia, obesity, BPH,and gout, DM, HLD, Parkinsons with dementia, Tubular Adenoma due in 2024    Remains constipated  Drinks water  Add more fiber    Using walker / wheelchair      No chest pain/  SOB/ dizziness  Energy level fair             Review of Systems     Constitutional: no fever, no chills, not feeling poorly, not feeling tired and no recent weight gain, no recent weight loss.   ENT: no earache, no hearing loss, no nosebleeds, no nasal discharge, no sore throat and no hoarseness.   Cardiovascular: the heart rate was not slow, the heart rate was not fast, no chest pain, no palpitations, no intermittent leg claudication and no lower extremity edema.   Respiratory: no cough, wheezing or shortness of breath at rest or exertion  Gastrointestinal: constipation,  no melena, no nausea, no diarrhea, no vomiting and no blood in stools.   Musculoskeletal: no arthralgias, no myalgias, no back pain, no joint swelling, no joint stiffness, no limb pain and no limb swelling.   Integumentary: no skin rashes, no skin lesions, no itching, no skin wound and no dry skin.   Neurological: no headache, no confusion, no numbness, no dizziness, no tingling and no fainting.   All other systems have been reviewed and are negative for complaint.       Vitals:    10/18/23 1505   BP: 120/70   Pulse: 68   Resp: 16   Temp: 36.8 °C (98.3 °F)        Physical Exam     Constitutional   General appearance: Alert and in no acute distress.     Pulmonary   Respiratory assessment: No respiratory distress, normal respiratory rhythm and effort.    Auscultation of Lungs: Clear bilateral breath sounds.   Cardiovascular   Auscultation of heart: Apical pulse normal, heart rate and rhythm normal, normal S1 and S2, no murmurs and no pericardial rub.    Exam for edema: No peripheral edema.   Abdomen    Abdominal Exam: No bruits, normal bowel sounds, soft, non-tender, no abdominal mass palpated.    Liver and Spleen exam: No hepato-splenomegaly.   Musculoskeletal   Examination of gait: wheelchair  Inspection of digits and nails: No clubbing or cyanosis of the fingernails.    Inspection/palpation of joints, bones and muscles: No joint swelling. Normal movement of all extremities.   Skin   Skin inspection: Normal skin color and pigmentation, normal skin turgor and no visible rash.   Neurologic   Cranial nerves: Nerves 2-12 were intact, motor weakness  Alert x 2-3    Assessment/Plan          Markie Nobles is a 78 y.o. male with a past medical history of MGUS, CAD,s/p CABG, HTN, CKD IV, Anemia, ABEBE, hyperlipidemia, obesity, BPH,and gout, DM, HLD, Parkinsons with dementia, Tubular Adenoma due in 2024    # Constipation  Increase water intake  Increase Fiber  Metamucil    # Malnutrition  Boost BID    # DM  Diet controlled    # HTN/ CKD IV  On low end  Decrease Lisinopril to 10 mg    # HLD  Stable  Continue current medications    # Parkinson  [facility] to [facility]  Is helping  Rx for an Up Walker    Total appt time today was 35  minutes. Time included preparing to see the pt, obtaining the hx, performing the medically necessary appropriate physical exam, counseling & educating the pt, ordering tests & procedures, referring & communicating w/other providers, independently interpreting results & communicating the results to the pt, care, coordination & documenting clinical information in the medical record.

## 2023-10-19 ENCOUNTER — APPOINTMENT (OUTPATIENT)
Dept: PRIMARY CARE | Facility: CLINIC | Age: 78
End: 2023-10-19
Payer: MEDICARE

## 2023-10-26 ENCOUNTER — APPOINTMENT (OUTPATIENT)
Dept: NEPHROLOGY | Facility: CLINIC | Age: 78
End: 2023-10-26
Payer: MEDICARE

## 2023-10-30 ENCOUNTER — TREATMENT (OUTPATIENT)
Dept: PHYSICAL THERAPY | Facility: CLINIC | Age: 78
End: 2023-10-30
Payer: MEDICARE

## 2023-10-30 DIAGNOSIS — R26.89 IMPAIRED GAIT AND MOBILITY: ICD-10-CM

## 2023-10-30 DIAGNOSIS — R29.3 POSTURAL IMBALANCE: ICD-10-CM

## 2023-10-30 DIAGNOSIS — G20.C PRIMARY PARKINSONISM (MULTI): ICD-10-CM

## 2023-10-30 PROCEDURE — 97112 NEUROMUSCULAR REEDUCATION: CPT | Mod: GP | Performed by: PHYSICAL THERAPIST

## 2023-10-30 PROCEDURE — 97530 THERAPEUTIC ACTIVITIES: CPT | Mod: GP | Performed by: PHYSICAL THERAPIST

## 2023-10-30 NOTE — PROGRESS NOTES
Physical Therapy    Physical Therapy Treatment    Patient Name: Markie Nobles  MRN: [id number]  Today's Date: 10/30/2023  Time Calculation  Start Time: 1700  Stop Time: 1755  Time Calculation (min): 55 min  Visit # : 5    Assessment:  Pt. fell over onto his right side when he first sat down on the edge of the mat table. He has poor awareness of body position and inability to self correct postural alignment. He is unable to self monitor his movement and positioning in order to self correct. Mirror is very helpful for visual feedback to help him correct his alignment in sitting. Needs encouragement to increase amplitude of all movements. After 10 feet of ambulation he became severely flexed over the walker and could not actively extend his trunk.   His advanced parkinsonism leads to severe fall risk and immobility.     Plan:  Continue with current Plan of Care.     Current Problem  1. Impaired gait and mobility  Follow Up In Physical Therapy      2. Postural imbalance  Follow Up In Physical Therapy      3. Primary parkinsonism  Follow Up In Physical Therapy          Subjective   Pt. Hasn't attended PT since 8/10/23. He missed multiple visits but his wife states that they didn't know they had appts scheduled.   General  Severe postural flexion, kyphosis and forward head in sitting. During gait he becomes severely flexed forward over the walker.   Precautions  High fall risk    Pain  No pain reported  Objective   Sit to stand from arm chair with Mod assist. With multiple attempts and cues transfers improve   Sit to supine with supervision. Supine to sit with mod assist to bring trunk into sitting.   Cognition  Cognition is appropriate and intact in the context of our visit today.   Posture  Severely stooped posture sitting and standing  Outcome Measures:  From 10/16/23:   20 foot walk = 2 minutes 18 seconds with FWW and CGA to min A  TUG = 4:55 minutes      Treatments:  NMR Activities:  - seated postural control  training. Using mirror, verbal and tactile cues to achieve max midline   - Holding max achievable erect posture 1 min x 3 seated  - Supine shoulder flexion with wand for postural stretch  - hooklying opposite knee touches  - Brief anterior hip/trunk stretch supine with legs hanging off mat (stopped due to sacral pain).     Therapeutic Activities:   - Sit to stand training from w/c to FWW and from raised mat to FWW. Cues for setup, flexing at hips, and pushing forward rather than up to reduce retropulsion.   - Bed mobility training, rolling supine to sidelying. Supine > sidelying > sitting  Ambulated 15 ft with FWW, gait belt and min to Mod A for posture and balance. Cues for posture and bigger steps.   Educated patient and wife on positioning to reduce sacral wound pressure as well as which positions cause more sacral pressure.       OP EDUCATION:  With patient and his wife: Discussed importance of daily exercise to manage PD. Reviewed attendance policy and need to attend consistently to achieve goals.     Goals:  Balance: Patient will demonstrate 8/16 on Tinetti Balance Test as evidence of improved safety, independence and decreased falls risk during transitions and standing activities at home   Flexibility: Patient will demonstrate independence and compliance with HEP as assigned as evidence of investment in improvement of tight hamstrings, and rigid trunk/head forward posture to maximize safety during functional mobility.   Gait/Locomotion: Patient will demonstrate > 8/12 on Tinetti gait with rolling walker and supervision as evidence of improved safety, independence and decreased risk of falls during gait in the home and community   Posture: Patient will demonstrate self correction of head forward and stooped posture in standing as evidence of improved awareness of connection between good posture, good balance and improved safety during standing and walking.   Transfers: Patient will demonstrate TUG time of 40  seconds with rolling walker and CGA as evidence of improved safety and balance during functional mobility.   Active       PT Problem       Pt. will perform sit to stand with CGA or less consistently with decreased retropulsion and reduced fall risk.        Start:  10/16/23    Expected End:  01/14/24            20 foot ambulation with FWW < 105 seconds (1:45 mins), TUG w/ FWW in < 2.5 minutes (initially 5 mins)       Start:  10/16/23    Expected End:  01/14/24            Pt. will perform supine <> sit without assistance consistently       Start:  10/16/23    Expected End:  01/14/24            Pt. will improve self correction of posture to reduce kyphosis, trunk flexion, and forward head in sitting and standing       Start:  10/16/23    Expected End:  01/14/24            Pt. will begin exercising daily at home via HEP and peddler device to delay progression of PD symptoms       Start:  10/16/23    Expected End:  01/14/24

## 2023-10-31 ENCOUNTER — INFUSION (OUTPATIENT)
Dept: HEMATOLOGY/ONCOLOGY | Facility: HOSPITAL | Age: 78
End: 2023-10-31
Payer: MEDICARE

## 2023-10-31 VITALS
HEART RATE: 86 BPM | BODY MASS INDEX: 20.71 KG/M2 | WEIGHT: 132.72 LBS | DIASTOLIC BLOOD PRESSURE: 68 MMHG | SYSTOLIC BLOOD PRESSURE: 129 MMHG | RESPIRATION RATE: 18 BRPM | OXYGEN SATURATION: 99 %

## 2023-10-31 DIAGNOSIS — N18.4 CKD (CHRONIC KIDNEY DISEASE) STAGE 4, GFR 15-29 ML/MIN (MULTI): ICD-10-CM

## 2023-10-31 LAB
BASOPHILS # BLD AUTO: 0.03 X10*3/UL (ref 0–0.1)
BASOPHILS NFR BLD AUTO: 0.5 %
EOSINOPHIL # BLD AUTO: 0.3 X10*3/UL (ref 0–0.4)
EOSINOPHIL NFR BLD AUTO: 5.3 %
ERYTHROCYTE [DISTWIDTH] IN BLOOD BY AUTOMATED COUNT: 15.4 % (ref 11.5–14.5)
HCT VFR BLD AUTO: 31.1 % (ref 41–52)
HGB BLD-MCNC: 9.9 G/DL (ref 13.5–17.5)
IMM GRANULOCYTES # BLD AUTO: 0.01 X10*3/UL (ref 0–0.5)
IMM GRANULOCYTES NFR BLD AUTO: 0.2 % (ref 0–0.9)
LYMPHOCYTES # BLD AUTO: 1.34 X10*3/UL (ref 0.8–3)
LYMPHOCYTES NFR BLD AUTO: 23.6 %
MCH RBC QN AUTO: 28.2 PG (ref 26–34)
MCHC RBC AUTO-ENTMCNC: 31.8 G/DL (ref 32–36)
MCV RBC AUTO: 89 FL (ref 80–100)
MONOCYTES # BLD AUTO: 0.48 X10*3/UL (ref 0.05–0.8)
MONOCYTES NFR BLD AUTO: 8.4 %
NEUTROPHILS # BLD AUTO: 3.53 X10*3/UL (ref 1.6–5.5)
NEUTROPHILS NFR BLD AUTO: 62 %
NRBC BLD-RTO: 0 /100 WBCS (ref 0–0)
PLATELET # BLD AUTO: 189 X10*3/UL (ref 150–450)
PMV BLD AUTO: 9.9 FL (ref 7.5–11.5)
RBC # BLD AUTO: 3.51 X10*6/UL (ref 4.5–5.9)
WBC # BLD AUTO: 5.7 X10*3/UL (ref 4.4–11.3)

## 2023-10-31 PROCEDURE — 36415 COLL VENOUS BLD VENIPUNCTURE: CPT

## 2023-10-31 PROCEDURE — 85025 COMPLETE CBC W/AUTO DIFF WBC: CPT

## 2023-10-31 PROCEDURE — 6350000001 HC RX 635 EPOETIN >10,000 UNITS: Mod: JZ | Performed by: NURSE PRACTITIONER

## 2023-10-31 PROCEDURE — 96372 THER/PROPH/DIAG INJ SC/IM: CPT

## 2023-10-31 RX ORDER — ALBUTEROL SULFATE 0.83 MG/ML
3 SOLUTION RESPIRATORY (INHALATION) AS NEEDED
Status: CANCELLED | OUTPATIENT
Start: 2023-11-14

## 2023-10-31 RX ORDER — FAMOTIDINE 10 MG/ML
20 INJECTION INTRAVENOUS ONCE AS NEEDED
Status: CANCELLED | OUTPATIENT
Start: 2023-11-14

## 2023-10-31 RX ORDER — EPINEPHRINE 0.3 MG/.3ML
0.3 INJECTION SUBCUTANEOUS EVERY 5 MIN PRN
Status: CANCELLED | OUTPATIENT
Start: 2023-11-14

## 2023-10-31 RX ORDER — DIPHENHYDRAMINE HYDROCHLORIDE 50 MG/ML
50 INJECTION INTRAMUSCULAR; INTRAVENOUS AS NEEDED
Status: CANCELLED | OUTPATIENT
Start: 2023-11-14

## 2023-10-31 RX ADMIN — ERYTHROPOIETIN 10000 UNITS: 10000 INJECTION, SOLUTION INTRAVENOUS; SUBCUTANEOUS at 12:57

## 2023-10-31 ASSESSMENT — PAIN SCALES - GENERAL: PAINLEVEL: 0-NO PAIN

## 2023-10-31 NOTE — PROGRESS NOTES
Patient presented for labs/ epoetin injection. Labs reviewed, hgb 9.9 and . Patient tolerated Epoetin Apha 66064 units subcutaneous well. Confirmed next apt and given AVS. Discharged with family via wheelchair in stable condition.

## 2023-11-06 ENCOUNTER — APPOINTMENT (OUTPATIENT)
Dept: PHYSICAL THERAPY | Facility: CLINIC | Age: 78
End: 2023-11-06
Payer: MEDICARE

## 2023-11-13 ENCOUNTER — APPOINTMENT (OUTPATIENT)
Dept: PHYSICAL THERAPY | Facility: CLINIC | Age: 78
End: 2023-11-13
Payer: MEDICARE

## 2023-11-13 DIAGNOSIS — E55.9 VITAMIN D DEFICIENCY, UNSPECIFIED: ICD-10-CM

## 2023-11-14 ENCOUNTER — INFUSION (OUTPATIENT)
Dept: HEMATOLOGY/ONCOLOGY | Facility: HOSPITAL | Age: 78
End: 2023-11-14
Payer: MEDICARE

## 2023-11-14 DIAGNOSIS — N18.4 CKD (CHRONIC KIDNEY DISEASE) STAGE 4, GFR 15-29 ML/MIN (MULTI): ICD-10-CM

## 2023-11-14 LAB
BASOPHILS # BLD AUTO: 0.04 X10*3/UL (ref 0–0.1)
BASOPHILS NFR BLD AUTO: 0.9 %
EOSINOPHIL # BLD AUTO: 0.2 X10*3/UL (ref 0–0.4)
EOSINOPHIL NFR BLD AUTO: 4.4 %
ERYTHROCYTE [DISTWIDTH] IN BLOOD BY AUTOMATED COUNT: 16.1 % (ref 11.5–14.5)
HCT VFR BLD AUTO: 35.3 % (ref 41–52)
HGB BLD-MCNC: 10.8 G/DL (ref 13.5–17.5)
IMM GRANULOCYTES # BLD AUTO: 0.01 X10*3/UL (ref 0–0.5)
IMM GRANULOCYTES NFR BLD AUTO: 0.2 % (ref 0–0.9)
LYMPHOCYTES # BLD AUTO: 1.72 X10*3/UL (ref 0.8–3)
LYMPHOCYTES NFR BLD AUTO: 37.5 %
MCH RBC QN AUTO: 28.2 PG (ref 26–34)
MCHC RBC AUTO-ENTMCNC: 30.6 G/DL (ref 32–36)
MCV RBC AUTO: 92 FL (ref 80–100)
MONOCYTES # BLD AUTO: 0.57 X10*3/UL (ref 0.05–0.8)
MONOCYTES NFR BLD AUTO: 12.4 %
NEUTROPHILS # BLD AUTO: 2.05 X10*3/UL (ref 1.6–5.5)
NEUTROPHILS NFR BLD AUTO: 44.6 %
NRBC BLD-RTO: 0 /100 WBCS (ref 0–0)
PLATELET # BLD AUTO: 145 X10*3/UL (ref 150–450)
RBC # BLD AUTO: 3.83 X10*6/UL (ref 4.5–5.9)
WBC # BLD AUTO: 4.6 X10*3/UL (ref 4.4–11.3)

## 2023-11-14 PROCEDURE — 36415 COLL VENOUS BLD VENIPUNCTURE: CPT

## 2023-11-14 PROCEDURE — 85025 COMPLETE CBC W/AUTO DIFF WBC: CPT

## 2023-11-14 PROCEDURE — 96372 THER/PROPH/DIAG INJ SC/IM: CPT

## 2023-11-14 PROCEDURE — 6350000001 HC RX 635 EPOETIN >10,000 UNITS: Mod: JZ | Performed by: NURSE PRACTITIONER

## 2023-11-14 RX ORDER — EPINEPHRINE 0.3 MG/.3ML
0.3 INJECTION SUBCUTANEOUS EVERY 5 MIN PRN
Status: CANCELLED | OUTPATIENT
Start: 2023-11-28

## 2023-11-14 RX ORDER — DIPHENHYDRAMINE HYDROCHLORIDE 50 MG/ML
50 INJECTION INTRAMUSCULAR; INTRAVENOUS AS NEEDED
Status: CANCELLED | OUTPATIENT
Start: 2023-11-28

## 2023-11-14 RX ORDER — ALBUTEROL SULFATE 0.83 MG/ML
3 SOLUTION RESPIRATORY (INHALATION) AS NEEDED
Status: CANCELLED | OUTPATIENT
Start: 2023-11-28

## 2023-11-14 RX ORDER — FAMOTIDINE 10 MG/ML
20 INJECTION INTRAVENOUS ONCE AS NEEDED
Status: CANCELLED | OUTPATIENT
Start: 2023-11-28

## 2023-11-14 RX ADMIN — EPOETIN ALFA-EPBX 10000 UNITS: 10000 INJECTION, SOLUTION INTRAVENOUS; SUBCUTANEOUS at 13:44

## 2023-11-15 ENCOUNTER — APPOINTMENT (OUTPATIENT)
Dept: RADIOLOGY | Facility: HOSPITAL | Age: 78
End: 2023-11-15
Payer: MEDICARE

## 2023-11-15 ENCOUNTER — HOSPITAL ENCOUNTER (EMERGENCY)
Facility: HOSPITAL | Age: 78
Discharge: HOME | End: 2023-11-15
Payer: MEDICARE

## 2023-11-15 VITALS
SYSTOLIC BLOOD PRESSURE: 146 MMHG | HEART RATE: 81 BPM | BODY MASS INDEX: 18.9 KG/M2 | OXYGEN SATURATION: 95 % | DIASTOLIC BLOOD PRESSURE: 89 MMHG | TEMPERATURE: 98 F | HEIGHT: 70 IN | WEIGHT: 132 LBS | RESPIRATION RATE: 16 BRPM

## 2023-11-15 DIAGNOSIS — T14.8XXA HEMATOMA: ICD-10-CM

## 2023-11-15 DIAGNOSIS — S09.90XA HEAD INJURY, INITIAL ENCOUNTER: Primary | ICD-10-CM

## 2023-11-15 DIAGNOSIS — Z79.02 PLATELET INHIBITION DUE TO PLAVIX: ICD-10-CM

## 2023-11-15 PROCEDURE — 70450 CT HEAD/BRAIN W/O DYE: CPT | Performed by: RADIOLOGY

## 2023-11-15 PROCEDURE — 99285 EMERGENCY DEPT VISIT HI MDM: CPT | Mod: 25

## 2023-11-15 PROCEDURE — 99284 EMERGENCY DEPT VISIT MOD MDM: CPT | Mod: 25

## 2023-11-15 PROCEDURE — 70450 CT HEAD/BRAIN W/O DYE: CPT

## 2023-11-15 ASSESSMENT — COLUMBIA-SUICIDE SEVERITY RATING SCALE - C-SSRS
6. HAVE YOU EVER DONE ANYTHING, STARTED TO DO ANYTHING, OR PREPARED TO DO ANYTHING TO END YOUR LIFE?: NO
2. HAVE YOU ACTUALLY HAD ANY THOUGHTS OF KILLING YOURSELF?: NO
1. IN THE PAST MONTH, HAVE YOU WISHED YOU WERE DEAD OR WISHED YOU COULD GO TO SLEEP AND NOT WAKE UP?: NO

## 2023-11-15 NOTE — ED PROVIDER NOTES
HPI   Chief Complaint   Patient presents with    Fall       78y old male presents with a complaint of head injury that occurred pta. He was getting his coat removed and was pulled forward off of his wheelchair striking his head on the ground. + takes plavix. Nothing makes him better or worse. No nausea or vomiting.                           [place] Coma Scale Score: 15                  Patient History   Past Medical History:   Diagnosis Date    Encounter for immunization 09/29/2016    Encounter for administration of vaccine    Encounter for screening, unspecified 04/28/2014    Screening    Gout, unspecified 10/26/2017    Acute gout    Idiopathic gout, right hand 01/17/2020    Acute idiopathic gout of right hand    Localized edema 07/06/2015    Pedal edema    Other conditions influencing health status 08/03/2015    Paronychia    Other symptoms and signs involving cognitive functions and awareness 06/09/2016    Cognitive changes    Pain in unspecified foot 08/03/2015    Foot pain    Personal history of other diseases of the circulatory system     History of hypertension    Personal history of other diseases of the digestive system 08/13/2019    History of acute gastritis    Personal history of other drug therapy     History of influenza vaccination    Personal history of other specified conditions 03/17/2016    History of diarrhea    Personal history of other specified conditions 02/19/2015    History of elevated prostate specific antigen (PSA)    Strain of muscle and tendon of unspecified wall of thorax, initial encounter 12/05/2016    Strain of thoracic region, initial encounter     Past Surgical History:   Procedure Laterality Date    CHOLECYSTECTOMY  04/20/2018    Cholecystectomy    CORONARY ARTERY BYPASS GRAFT  12/16/2022    CABG    CT ANGIO NECK W  7/29/2019    CT NECK ANGIO W AND WO IV CONTRAST 7/29/2019 [facility]ICAL [facility]GAC[facility]    CT HEAD ANGIO W AND WO IV CONTRAST  7/29/2019    CT HEAD ANGIO W AND WO IV CONTRAST  7/29/2019 [facility] CLINICAL LEGAC[facility]     Family History   Problem Relation Name Age of Onset    Other (ESRD) Mother          on dialysis    Hypertension Father      Dementia Other      Diabetes Other      Liver cancer Other      Kidney disease Other          Reported prior     Social History     Tobacco Use    Smoking status: Former     Types: Cigarettes     Passive exposure: Never    Smokeless tobacco: Never   Vaping Use    Vaping Use: Never used   Substance Use Topics    Alcohol use: Not Currently    Drug use: Not Currently       Physical Exam   ED Triage Vitals [11/15/23 1330]   Temp Heart Rate Resp BP   36.7 °C (98 °F) 81 16 146/89      SpO2 Temp src Heart Rate Source Patient Position   95 % -- -- --      BP Location FiO2 (%)     -- --       Physical Exam  Vitals reviewed.   Constitutional:       General: He is not in acute distress.     Appearance: Normal appearance. He is normal weight. He is not ill-appearing, toxic-appearing or diaphoretic.   HENT:      Head: Normocephalic and atraumatic.        Right Ear: Tympanic membrane and external ear normal.      Left Ear: Tympanic membrane and external ear normal.      Nose: Nose normal.      Mouth/Throat:      Mouth: Mucous membranes are moist.   Eyes:      Extraocular Movements: Extraocular movements intact.      Conjunctiva/sclera: Conjunctivae normal.      Pupils: Pupils are equal, round, and reactive to light.   Pulmonary:      Effort: Pulmonary effort is normal. No respiratory distress.      Breath sounds: No stridor.   Abdominal:      General: There is no distension.   Musculoskeletal:         General: No swelling, tenderness or deformity. Normal range of motion.      Cervical back: Normal range of motion. No signs of trauma or torticollis. No pain with movement, spinous process tenderness or muscular tenderness. Normal range of motion.   Skin:     Capillary Refill: Capillary refill takes less than 2 seconds.      Coloration: Skin is not jaundiced.      Findings: No  bruising, erythema or rash.   Neurological:      General: No focal deficit present.      Mental Status: He is alert and oriented to person, place, and time. Mental status is at baseline.   Psychiatric:         Mood and Affect: Mood normal.         Behavior: Behavior normal.         Thought Content: Thought content normal.         Judgment: Judgment normal.         ED Course & MDM   Diagnoses as of 11/15/23 1633   Head injury, initial encounter   Hematoma   Platelet inhibition due to Plavix       Medical Decision Making  Ddx: ich, contusion, skull fracture,   Discharged home after ct negative.    Discussed head injury and return precautions         Procedure  Procedures     Charlie Alcaraz PA-C  11/15/23 6263

## 2023-11-15 NOTE — ED TRIAGE NOTES
Pt arrives to ed with complaints of fall. Pt fell out of chair and hit right side of head with hematoma noted. denying loc/blood thinners. Denies vision changes/n/v

## 2023-11-20 ENCOUNTER — OFFICE VISIT (OUTPATIENT)
Dept: PRIMARY CARE | Facility: CLINIC | Age: 78
End: 2023-11-20
Payer: MEDICARE

## 2023-11-20 VITALS — TEMPERATURE: 97.9 F | BODY MASS INDEX: 19.6 KG/M2 | WEIGHT: 136.6 LBS

## 2023-11-20 DIAGNOSIS — R63.0 ANOREXIA: ICD-10-CM

## 2023-11-20 DIAGNOSIS — E13.69 OTHER SPECIFIED DIABETES MELLITUS WITH OTHER SPECIFIED COMPLICATION, UNSPECIFIED WHETHER LONG TERM INSULIN USE (MULTI): ICD-10-CM

## 2023-11-20 DIAGNOSIS — K59.00 CONSTIPATION, UNSPECIFIED CONSTIPATION TYPE: Primary | ICD-10-CM

## 2023-11-20 DIAGNOSIS — R63.4 WEIGHT LOSS: ICD-10-CM

## 2023-11-20 DIAGNOSIS — K40.90 NON-RECURRENT UNILATERAL INGUINAL HERNIA WITHOUT OBSTRUCTION OR GANGRENE: ICD-10-CM

## 2023-11-20 LAB
POC FINGERSTICK BLOOD GLUCOSE: 210 MG/DL (ref 70–100)
POC HEMOGLOBIN A1C: 5.7 % (ref 4.2–6.5)

## 2023-11-20 PROCEDURE — 1126F AMNT PAIN NOTED NONE PRSNT: CPT | Performed by: INTERNAL MEDICINE

## 2023-11-20 PROCEDURE — 82962 GLUCOSE BLOOD TEST: CPT | Performed by: INTERNAL MEDICINE

## 2023-11-20 PROCEDURE — 1160F RVW MEDS BY RX/DR IN RCRD: CPT | Performed by: INTERNAL MEDICINE

## 2023-11-20 PROCEDURE — 83036 HEMOGLOBIN GLYCOSYLATED A1C: CPT | Performed by: INTERNAL MEDICINE

## 2023-11-20 PROCEDURE — 99496 TRANSJ CARE MGMT HIGH F2F 7D: CPT | Performed by: INTERNAL MEDICINE

## 2023-11-20 PROCEDURE — 1036F TOBACCO NON-USER: CPT | Performed by: INTERNAL MEDICINE

## 2023-11-20 PROCEDURE — 1159F MED LIST DOCD IN RCRD: CPT | Performed by: INTERNAL MEDICINE

## 2023-11-20 RX ORDER — MIRTAZAPINE 7.5 MG/1
7.5 TABLET, FILM COATED ORAL NIGHTLY
Qty: 30 TABLET | Refills: 2 | Status: SHIPPED | OUTPATIENT
Start: 2023-11-20 | End: 2024-01-29 | Stop reason: SINTOL

## 2023-11-20 NOTE — PROGRESS NOTES
Markie Nobles is a 78 y.o. male   Markie Nobles is a 78 y.o. male with a past medical history of MGUS, CAD,s/p CABG, HTN, CKD IV, Anemia, ABEBE, hyperlipidemia, obesity, BPH,and gout, DM, HLD, Parkinsons with dementia, Tubular Adenoma due in 2024    Was seen in the emergency room on November 15 after a fall with a head injury   head injury that occurred pta. He was getting his coat removed and was pulled forward off of his wheelchair striking his head on the ground. + takes plavix. Nothing makes him better or worse. No nausea or vomiting.      CAT scan of the head was negative for any bleeds    Has been losing weight  Appetite is poor  Constipated, having difficulty moving bowels  Might have a hernia         Review of Systems     Constitutional: no fever, no chills, not feeling poorly, not feeling tired and no recent weight gain, no recent weight loss.   ENT: no earache, no hearing loss, no nosebleeds, no nasal discharge, no sore throat and no hoarseness.   Cardiovascular: the heart rate was not slow, the heart rate was not fast, no chest pain, no palpitations, no intermittent leg claudication and no lower extremity edema.   Respiratory: no cough, wheezing or shortness of breath at rest or exertion  Gastrointestinal: constipation,  no melena, no nausea, no diarrhea, no vomiting and no blood in stools.   Musculoskeletal: no arthralgias, no myalgias, no back pain, no joint swelling, no joint stiffness, no limb pain and no limb swelling.   Integumentary: no skin rashes, no skin lesions, no itching, no skin wound and no dry skin.   Neurological: no headache, no confusion, no numbness, no dizziness, no tingling and no fainting.   All other systems have been reviewed and are negative for complaint.       Vitals:    11/20/23 1644   Temp: 36.6 °C (97.9 °F)        Physical Exam     Constitutional   General appearance: Alert and in no acute distress.     Pulmonary   Respiratory assessment: No respiratory distress, normal  respiratory rhythm and effort.    Auscultation of Lungs: Clear bilateral breath sounds.   Cardiovascular   Auscultation of heart: Apical pulse normal, heart rate and rhythm normal, normal S1 and S2, no murmurs and no pericardial rub.    Exam for edema: No peripheral edema.   Abdomen   Abdominal Exam: No bruits, normal bowel sounds, soft, non-tender, no abdominal mass palpated.    Liver and Spleen exam: No hepato-splenomegaly.   Tender right inguinal hernia  Musculoskeletal   Examination of gait: wheelchair  Inspection of digits and nails: No clubbing or cyanosis of the fingernails.    Inspection/palpation of joints, bones and muscles: No joint swelling. Normal movement of all extremities.   Skin   Skin inspection: Normal skin color and pigmentation, normal skin turgor and no visible rash.   Neurologic   Cranial nerves: Nerves 2-12 were intact, motor weakness  Alert x 2-3    Assessment/Plan          Markie Nobles is a 78 y.o. male with a past medical history of MGUS, CAD,s/p CABG, HTN, CKD IV, Anemia, ABEBE, hyperlipidemia, obesity, BPH,and gout, DM, HLD, Parkinsons with dementia, Tubular Adenoma due in 2024    # Constipation  # tender right inguinal hernia reducible  Refer to general surgery    # Malnutrition  Boost BID  Start Remeron    # DM  Diet controlled    # HTN/ CKD IV  On low end  Decrease Lisinopril to 10 mg    # HLD  Stable  Continue current medications    # Parkinson  [facility]  Is helping  Rx for an Up Walker    Total appt time today was 35  minutes. Time included preparing to see the pt, obtaining the hx, performing the medically necessary appropriate physical exam, counseling & educating the pt, ordering tests & procedures, referring & communicating w/other providers, independently interpreting results & communicating the results to the pt, care, coordination & documenting clinical information in the medical record.

## 2023-11-25 DIAGNOSIS — E78.49 OTHER HYPERLIPIDEMIA: ICD-10-CM

## 2023-11-25 DIAGNOSIS — M19.91 PRIMARY OSTEOARTHRITIS, UNSPECIFIED SITE: Primary | ICD-10-CM

## 2023-11-25 DIAGNOSIS — R41.81 AGE-RELATED COGNITIVE DECLINE: ICD-10-CM

## 2023-11-28 ENCOUNTER — TELEPHONE (OUTPATIENT)
Dept: HEMATOLOGY/ONCOLOGY | Facility: HOSPITAL | Age: 78
End: 2023-11-28

## 2023-11-28 ENCOUNTER — APPOINTMENT (OUTPATIENT)
Dept: SURGERY | Facility: CLINIC | Age: 78
End: 2023-11-28
Payer: MEDICARE

## 2023-11-28 ENCOUNTER — APPOINTMENT (OUTPATIENT)
Dept: HEMATOLOGY/ONCOLOGY | Facility: HOSPITAL | Age: 78
End: 2023-11-28
Payer: MEDICARE

## 2023-11-28 RX ORDER — DOCUSATE SODIUM 100 MG/1
100 CAPSULE, LIQUID FILLED ORAL DAILY
Qty: 30 CAPSULE | Refills: 2 | Status: SHIPPED | OUTPATIENT
Start: 2023-11-28 | End: 2024-06-07

## 2023-11-28 RX ORDER — LIDOCAINE 50 MG/G
PATCH TOPICAL
Qty: 30 PATCH | Refills: 3 | Status: SHIPPED | OUTPATIENT
Start: 2023-11-28 | End: 2024-05-01 | Stop reason: HOSPADM

## 2023-11-28 RX ORDER — ATORVASTATIN CALCIUM 80 MG/1
80 TABLET, FILM COATED ORAL DAILY
Qty: 90 TABLET | Refills: 3 | Status: SHIPPED | OUTPATIENT
Start: 2023-11-28 | End: 2023-12-14

## 2023-11-30 ENCOUNTER — TELEPHONE (OUTPATIENT)
Dept: HOME HEALTH SERVICES | Facility: HOME HEALTH | Age: 78
End: 2023-11-30
Payer: MEDICARE

## 2023-11-30 DIAGNOSIS — R26.89 BALANCE PROBLEMS: Primary | ICD-10-CM

## 2023-11-30 NOTE — TELEPHONE ENCOUNTER
Hello, I'm with  Home Care. Please note that due to staffing issues, [facility] is not able to accept referrals which require RN in this zip code area at this time.   If they don't have immediate nursing needs, We can start care with Physical therapy, and  if the identify nursing needs,we can have therapy add nursing after they do a start of care. If you are ok with PT start of care, please remove nursing from the referral. If no response or updates are received by EOB 12/3, we will close the referral. Thank you.

## 2023-12-01 ENCOUNTER — INFUSION (OUTPATIENT)
Dept: HEMATOLOGY/ONCOLOGY | Facility: HOSPITAL | Age: 78
End: 2023-12-01
Payer: MEDICARE

## 2023-12-01 VITALS
DIASTOLIC BLOOD PRESSURE: 79 MMHG | SYSTOLIC BLOOD PRESSURE: 147 MMHG | HEIGHT: 67 IN | OXYGEN SATURATION: 100 % | RESPIRATION RATE: 18 BRPM | TEMPERATURE: 98.4 F | HEART RATE: 86 BPM | BODY MASS INDEX: 21.31 KG/M2

## 2023-12-01 DIAGNOSIS — N18.4 CKD (CHRONIC KIDNEY DISEASE) STAGE 4, GFR 15-29 ML/MIN (MULTI): ICD-10-CM

## 2023-12-01 DIAGNOSIS — G20.C PARKINSONISM, UNSPECIFIED PARKINSONISM TYPE (MULTI): Primary | ICD-10-CM

## 2023-12-01 LAB
BASOPHILS # BLD AUTO: 0.03 X10*3/UL (ref 0–0.1)
BASOPHILS NFR BLD AUTO: 0.7 %
EOSINOPHIL # BLD AUTO: 0.2 X10*3/UL (ref 0–0.4)
EOSINOPHIL NFR BLD AUTO: 4.6 %
ERYTHROCYTE [DISTWIDTH] IN BLOOD BY AUTOMATED COUNT: 16.4 % (ref 11.5–14.5)
HCT VFR BLD AUTO: 32.2 % (ref 41–52)
HGB BLD-MCNC: 10.3 G/DL (ref 13.5–17.5)
IMM GRANULOCYTES # BLD AUTO: 0 X10*3/UL (ref 0–0.5)
IMM GRANULOCYTES NFR BLD AUTO: 0 % (ref 0–0.9)
LYMPHOCYTES # BLD AUTO: 1.55 X10*3/UL (ref 0.8–3)
LYMPHOCYTES NFR BLD AUTO: 35.9 %
MCH RBC QN AUTO: 28.8 PG (ref 26–34)
MCHC RBC AUTO-ENTMCNC: 32 G/DL (ref 32–36)
MCV RBC AUTO: 90 FL (ref 80–100)
MONOCYTES # BLD AUTO: 0.45 X10*3/UL (ref 0.05–0.8)
MONOCYTES NFR BLD AUTO: 10.4 %
NEUTROPHILS # BLD AUTO: 2.09 X10*3/UL (ref 1.6–5.5)
NEUTROPHILS NFR BLD AUTO: 48.4 %
NRBC BLD-RTO: 0 /100 WBCS (ref 0–0)
PLATELET # BLD AUTO: 132 X10*3/UL (ref 150–450)
RBC # BLD AUTO: 3.58 X10*6/UL (ref 4.5–5.9)
WBC # BLD AUTO: 4.3 X10*3/UL (ref 4.4–11.3)

## 2023-12-01 PROCEDURE — 96372 THER/PROPH/DIAG INJ SC/IM: CPT

## 2023-12-01 PROCEDURE — 85025 COMPLETE CBC W/AUTO DIFF WBC: CPT

## 2023-12-01 PROCEDURE — 36415 COLL VENOUS BLD VENIPUNCTURE: CPT

## 2023-12-01 PROCEDURE — 6350000001 HC RX 635 EPOETIN >10,000 UNITS: Mod: JZ | Performed by: NURSE PRACTITIONER

## 2023-12-01 RX ORDER — FAMOTIDINE 10 MG/ML
20 INJECTION INTRAVENOUS ONCE AS NEEDED
Status: CANCELLED | OUTPATIENT
Start: 2023-12-12

## 2023-12-01 RX ORDER — ALBUTEROL SULFATE 0.83 MG/ML
3 SOLUTION RESPIRATORY (INHALATION) AS NEEDED
Status: CANCELLED | OUTPATIENT
Start: 2023-12-12

## 2023-12-01 RX ORDER — EPINEPHRINE 0.3 MG/.3ML
0.3 INJECTION SUBCUTANEOUS EVERY 5 MIN PRN
Status: CANCELLED | OUTPATIENT
Start: 2023-12-12

## 2023-12-01 RX ORDER — DIPHENHYDRAMINE HYDROCHLORIDE 50 MG/ML
50 INJECTION INTRAMUSCULAR; INTRAVENOUS AS NEEDED
Status: CANCELLED | OUTPATIENT
Start: 2023-12-12

## 2023-12-01 RX ADMIN — EPOETIN ALFA-EPBX 10000 UNITS: 10000 INJECTION, SOLUTION INTRAVENOUS; SUBCUTANEOUS at 14:54

## 2023-12-04 DIAGNOSIS — E55.9 VITAMIN D DEFICIENCY, UNSPECIFIED: ICD-10-CM

## 2023-12-04 RX ORDER — NICOTINE 11MG/24HR
PATCH, TRANSDERMAL 24 HOURS TRANSDERMAL DAILY
Qty: 90 CAPSULE | Refills: 3 | Status: SHIPPED | OUTPATIENT
Start: 2023-12-04 | End: 2023-12-04 | Stop reason: SDUPTHER

## 2023-12-04 RX ORDER — ACETAMINOPHEN 500 MG
2000 TABLET ORAL DAILY
Qty: 90 CAPSULE | Refills: 3 | Status: SHIPPED | OUTPATIENT
Start: 2023-12-04 | End: 2024-01-29 | Stop reason: ALTCHOICE

## 2023-12-07 ENCOUNTER — APPOINTMENT (OUTPATIENT)
Dept: SURGERY | Facility: CLINIC | Age: 78
End: 2023-12-07
Payer: MEDICARE

## 2023-12-11 ENCOUNTER — APPOINTMENT (OUTPATIENT)
Dept: SURGERY | Facility: CLINIC | Age: 78
End: 2023-12-11
Payer: MEDICARE

## 2023-12-13 DIAGNOSIS — E78.49 OTHER HYPERLIPIDEMIA: ICD-10-CM

## 2023-12-14 ENCOUNTER — OFFICE VISIT (OUTPATIENT)
Dept: SURGERY | Facility: CLINIC | Age: 78
End: 2023-12-14
Payer: MEDICARE

## 2023-12-14 VITALS
HEIGHT: 68 IN | SYSTOLIC BLOOD PRESSURE: 151 MMHG | TEMPERATURE: 97.1 F | BODY MASS INDEX: 19.9 KG/M2 | DIASTOLIC BLOOD PRESSURE: 89 MMHG | HEART RATE: 88 BPM | WEIGHT: 131.3 LBS

## 2023-12-14 DIAGNOSIS — R10.31 CHRONIC RIGHT LOWER QUADRANT PAIN: Primary | ICD-10-CM

## 2023-12-14 DIAGNOSIS — G89.29 CHRONIC RIGHT LOWER QUADRANT PAIN: Primary | ICD-10-CM

## 2023-12-14 DIAGNOSIS — K40.90 NON-RECURRENT UNILATERAL INGUINAL HERNIA WITHOUT OBSTRUCTION OR GANGRENE: ICD-10-CM

## 2023-12-14 PROCEDURE — 3077F SYST BP >= 140 MM HG: CPT | Performed by: SURGERY

## 2023-12-14 PROCEDURE — 99203 OFFICE O/P NEW LOW 30 MIN: CPT | Performed by: SURGERY

## 2023-12-14 PROCEDURE — 3079F DIAST BP 80-89 MM HG: CPT | Performed by: SURGERY

## 2023-12-14 PROCEDURE — 1159F MED LIST DOCD IN RCRD: CPT | Performed by: SURGERY

## 2023-12-14 PROCEDURE — 1160F RVW MEDS BY RX/DR IN RCRD: CPT | Performed by: SURGERY

## 2023-12-14 PROCEDURE — 1125F AMNT PAIN NOTED PAIN PRSNT: CPT | Performed by: SURGERY

## 2023-12-14 PROCEDURE — 1036F TOBACCO NON-USER: CPT | Performed by: SURGERY

## 2023-12-14 RX ORDER — ATORVASTATIN CALCIUM 80 MG/1
80 TABLET, FILM COATED ORAL DAILY
Qty: 90 TABLET | Refills: 3 | Status: SHIPPED | OUTPATIENT
Start: 2023-12-14 | End: 2024-06-07

## 2023-12-14 ASSESSMENT — PAIN SCALES - GENERAL: PAINLEVEL: 2

## 2023-12-15 NOTE — PROGRESS NOTES
"History Of Present Illness  Markie Nobles is a 78 y.o. male presenting with the possibility of right inguinal hernia.  He is here with his son caregiver and his wife.  Patient had an evaluation for this also in 2021.  He had a CT scan that showed a very high right testicle.  He subsequent ultrasound that showed his testicle just retracts up to his right inguinal canal at times.  Patient does have some significant history of constipation most likely secondary to his very poor intake.  Patient is lost over 70 pounds secondary to his Parkinson's.  I was able to review all of his medical problems in the chart.        Last Recorded Vitals  Blood pressure 151/89, pulse 88, temperature 36.2 °C (97.1 °F), height 1.727 m (5' 8\"), weight 59.6 kg (131 lb 4.8 oz).  Physical Examination  No evidence of any hernia on exam.  He is very thin patient.  He does have a testicle that does retract up at times.  His testicle slightly tender.  He does have some incontinence problems.      Relevant Results reviewed the CT scan of his abdomen pelvis along with ultrasound.      Assessment/Plan patient has no evidence of an inguinal hernia he does have a testicle that does retract up into his canal at times.  I reviewed this problem with his family.  Differentiate what he is having versus a true hernia.  I discussed with the many options to try to increase his fluid intake that is leading to his constipation.  He will follow-up with me as needed.    Rich Mixon MD FACS  Professor of Surgery  Rosey Melchor Chair in Surgical [place]  [employer]  [address] [place] [address], [id number]  Phone [phone]  email: [email or web address]        "

## 2023-12-20 ENCOUNTER — TELEPHONE (OUTPATIENT)
Dept: PRIMARY CARE | Facility: CLINIC | Age: 78
End: 2023-12-20
Payer: MEDICARE

## 2023-12-20 NOTE — TELEPHONE ENCOUNTER
PT went to hernia doctor and was told he does not have a hernia his testicles are moving around. PT would like a referral for homecare therapy please advise

## 2023-12-21 ENCOUNTER — APPOINTMENT (OUTPATIENT)
Dept: SURGERY | Facility: CLINIC | Age: 78
End: 2023-12-21
Payer: MEDICARE

## 2023-12-26 ENCOUNTER — APPOINTMENT (OUTPATIENT)
Dept: HEMATOLOGY/ONCOLOGY | Facility: HOSPITAL | Age: 78
End: 2023-12-26
Payer: MEDICARE

## 2023-12-29 ENCOUNTER — INFUSION (OUTPATIENT)
Dept: HEMATOLOGY/ONCOLOGY | Facility: HOSPITAL | Age: 78
End: 2023-12-29
Payer: MEDICARE

## 2023-12-29 VITALS
TEMPERATURE: 98.6 F | DIASTOLIC BLOOD PRESSURE: 74 MMHG | SYSTOLIC BLOOD PRESSURE: 132 MMHG | RESPIRATION RATE: 18 BRPM | OXYGEN SATURATION: 99 % | HEART RATE: 69 BPM

## 2023-12-29 DIAGNOSIS — N18.4 CKD (CHRONIC KIDNEY DISEASE) STAGE 4, GFR 15-29 ML/MIN (MULTI): ICD-10-CM

## 2023-12-29 LAB
BASOPHILS # BLD AUTO: 0.02 X10*3/UL (ref 0–0.1)
BASOPHILS NFR BLD AUTO: 0.4 %
EOSINOPHIL # BLD AUTO: 0.45 X10*3/UL (ref 0–0.4)
EOSINOPHIL NFR BLD AUTO: 8 %
ERYTHROCYTE [DISTWIDTH] IN BLOOD BY AUTOMATED COUNT: 15.9 % (ref 11.5–14.5)
HCT VFR BLD AUTO: 34.7 % (ref 41–52)
HGB BLD-MCNC: 11 G/DL (ref 13.5–17.5)
IMM GRANULOCYTES # BLD AUTO: 0.02 X10*3/UL (ref 0–0.5)
IMM GRANULOCYTES NFR BLD AUTO: 0.4 % (ref 0–0.9)
LYMPHOCYTES # BLD AUTO: 1.27 X10*3/UL (ref 0.8–3)
LYMPHOCYTES NFR BLD AUTO: 22.4 %
MCH RBC QN AUTO: 28.7 PG (ref 26–34)
MCHC RBC AUTO-ENTMCNC: 31.7 G/DL (ref 32–36)
MCV RBC AUTO: 91 FL (ref 80–100)
MONOCYTES # BLD AUTO: 0.69 X10*3/UL (ref 0.05–0.8)
MONOCYTES NFR BLD AUTO: 12.2 %
NEUTROPHILS # BLD AUTO: 3.21 X10*3/UL (ref 1.6–5.5)
NEUTROPHILS NFR BLD AUTO: 56.6 %
NRBC BLD-RTO: 0 /100 WBCS (ref 0–0)
PLATELET # BLD AUTO: 122 X10*3/UL (ref 150–450)
RBC # BLD AUTO: 3.83 X10*6/UL (ref 4.5–5.9)
WBC # BLD AUTO: 5.7 X10*3/UL (ref 4.4–11.3)

## 2023-12-29 PROCEDURE — 36415 COLL VENOUS BLD VENIPUNCTURE: CPT

## 2023-12-29 PROCEDURE — 85025 COMPLETE CBC W/AUTO DIFF WBC: CPT

## 2023-12-29 PROCEDURE — 6350000001 HC RX 635 EPOETIN >10,000 UNITS: Mod: JZ | Performed by: NURSE PRACTITIONER

## 2023-12-29 PROCEDURE — 96372 THER/PROPH/DIAG INJ SC/IM: CPT

## 2023-12-29 RX ORDER — FAMOTIDINE 10 MG/ML
20 INJECTION INTRAVENOUS ONCE AS NEEDED
Status: CANCELLED | OUTPATIENT
Start: 2024-01-09

## 2023-12-29 RX ORDER — DIPHENHYDRAMINE HYDROCHLORIDE 50 MG/ML
50 INJECTION INTRAMUSCULAR; INTRAVENOUS AS NEEDED
Status: CANCELLED | OUTPATIENT
Start: 2024-01-09

## 2023-12-29 RX ORDER — EPINEPHRINE 0.3 MG/.3ML
0.3 INJECTION SUBCUTANEOUS EVERY 5 MIN PRN
Status: CANCELLED | OUTPATIENT
Start: 2024-01-09

## 2023-12-29 RX ORDER — ALBUTEROL SULFATE 0.83 MG/ML
3 SOLUTION RESPIRATORY (INHALATION) AS NEEDED
Status: CANCELLED | OUTPATIENT
Start: 2024-01-09

## 2023-12-29 RX ADMIN — EPOETIN ALFA-EPBX 10000 UNITS: 10000 INJECTION, SOLUTION INTRAVENOUS; SUBCUTANEOUS at 13:29

## 2023-12-29 ASSESSMENT — PAIN SCALES - GENERAL: PAINLEVEL: 0-NO PAIN

## 2024-01-12 ENCOUNTER — INFUSION (OUTPATIENT)
Dept: HEMATOLOGY/ONCOLOGY | Facility: HOSPITAL | Age: 79
End: 2024-01-12
Payer: MEDICARE

## 2024-01-12 VITALS
HEART RATE: 95 BPM | RESPIRATION RATE: 18 BRPM | SYSTOLIC BLOOD PRESSURE: 119 MMHG | BODY MASS INDEX: 20.1 KG/M2 | DIASTOLIC BLOOD PRESSURE: 84 MMHG | OXYGEN SATURATION: 100 % | TEMPERATURE: 97.7 F | WEIGHT: 132.2 LBS

## 2024-01-12 DIAGNOSIS — N18.4 CKD (CHRONIC KIDNEY DISEASE) STAGE 4, GFR 15-29 ML/MIN (MULTI): ICD-10-CM

## 2024-01-12 LAB
BASOPHILS # BLD AUTO: 0.03 X10*3/UL (ref 0–0.1)
BASOPHILS NFR BLD AUTO: 0.5 %
EOSINOPHIL # BLD AUTO: 0.4 X10*3/UL (ref 0–0.4)
EOSINOPHIL NFR BLD AUTO: 6.3 %
ERYTHROCYTE [DISTWIDTH] IN BLOOD BY AUTOMATED COUNT: 16.1 % (ref 11.5–14.5)
HCT VFR BLD AUTO: 36 % (ref 41–52)
HGB BLD-MCNC: 11.3 G/DL (ref 13.5–17.5)
IMM GRANULOCYTES # BLD AUTO: 0.02 X10*3/UL (ref 0–0.5)
IMM GRANULOCYTES NFR BLD AUTO: 0.3 % (ref 0–0.9)
LYMPHOCYTES # BLD AUTO: 1.83 X10*3/UL (ref 0.8–3)
LYMPHOCYTES NFR BLD AUTO: 28.6 %
MCH RBC QN AUTO: 28 PG (ref 26–34)
MCHC RBC AUTO-ENTMCNC: 31.4 G/DL (ref 32–36)
MCV RBC AUTO: 89 FL (ref 80–100)
MONOCYTES # BLD AUTO: 0.45 X10*3/UL (ref 0.05–0.8)
MONOCYTES NFR BLD AUTO: 7 %
NEUTROPHILS # BLD AUTO: 3.66 X10*3/UL (ref 1.6–5.5)
NEUTROPHILS NFR BLD AUTO: 57.3 %
NRBC BLD-RTO: 0 /100 WBCS (ref 0–0)
PLATELET # BLD AUTO: 155 X10*3/UL (ref 150–450)
RBC # BLD AUTO: 4.03 X10*6/UL (ref 4.5–5.9)
WBC # BLD AUTO: 6.4 X10*3/UL (ref 4.4–11.3)

## 2024-01-12 PROCEDURE — 85025 COMPLETE CBC W/AUTO DIFF WBC: CPT

## 2024-01-12 PROCEDURE — 36415 COLL VENOUS BLD VENIPUNCTURE: CPT

## 2024-01-12 ASSESSMENT — PAIN SCALES - GENERAL: PAINLEVEL: 0-NO PAIN

## 2024-01-12 NOTE — PROGRESS NOTES
Patient arrived in the facility per wheelchair accompanied by wife. Patient is conscious and coherent and not in cp distress. No complains/discomfort verbalized. Labs drawn and results reviewed. Patient's hgb is 11.3. Patient did not meet the treament parameter. Ramona WELLS NP and Airam SALCEDO NP updated thru secure message. Patient is discharged in stable condition.

## 2024-01-15 ENCOUNTER — TELEPHONE (OUTPATIENT)
Dept: PRIMARY CARE | Facility: CLINIC | Age: 79
End: 2024-01-15

## 2024-01-18 DIAGNOSIS — N18.4 CKD (CHRONIC KIDNEY DISEASE) STAGE 4, GFR 15-29 ML/MIN (MULTI): Primary | ICD-10-CM

## 2024-01-18 RX ORDER — FAMOTIDINE 10 MG/ML
20 INJECTION INTRAVENOUS ONCE AS NEEDED
Status: CANCELLED | OUTPATIENT
Start: 2024-01-26

## 2024-01-18 RX ORDER — DIPHENHYDRAMINE HYDROCHLORIDE 50 MG/ML
50 INJECTION INTRAMUSCULAR; INTRAVENOUS AS NEEDED
Status: CANCELLED | OUTPATIENT
Start: 2024-01-26

## 2024-01-18 RX ORDER — ALBUTEROL SULFATE 0.83 MG/ML
3 SOLUTION RESPIRATORY (INHALATION) AS NEEDED
Status: CANCELLED | OUTPATIENT
Start: 2024-01-26

## 2024-01-18 RX ORDER — EPINEPHRINE 0.3 MG/.3ML
0.3 INJECTION SUBCUTANEOUS EVERY 5 MIN PRN
Status: CANCELLED | OUTPATIENT
Start: 2024-01-26

## 2024-01-22 ENCOUNTER — APPOINTMENT (OUTPATIENT)
Dept: PRIMARY CARE | Facility: CLINIC | Age: 79
End: 2024-01-22
Payer: MEDICARE

## 2024-01-23 ENCOUNTER — APPOINTMENT (OUTPATIENT)
Dept: HEMATOLOGY/ONCOLOGY | Facility: HOSPITAL | Age: 79
End: 2024-01-23
Payer: MEDICARE

## 2024-01-23 ENCOUNTER — HOME HEALTH ADMISSION (OUTPATIENT)
Dept: HOME HEALTH SERVICES | Facility: HOME HEALTH | Age: 79
End: 2024-01-23
Payer: MEDICARE

## 2024-01-23 ENCOUNTER — OFFICE VISIT (OUTPATIENT)
Dept: NEUROLOGY | Facility: HOSPITAL | Age: 79
End: 2024-01-23
Payer: MEDICARE

## 2024-01-23 VITALS
TEMPERATURE: 97.5 F | SYSTOLIC BLOOD PRESSURE: 120 MMHG | WEIGHT: 132 LBS | HEART RATE: 62 BPM | DIASTOLIC BLOOD PRESSURE: 71 MMHG | BODY MASS INDEX: 20 KG/M2 | RESPIRATION RATE: 18 BRPM | HEIGHT: 68 IN

## 2024-01-23 DIAGNOSIS — G20.C PRIMARY PARKINSONISM (MULTI): Primary | ICD-10-CM

## 2024-01-23 PROCEDURE — 1159F MED LIST DOCD IN RCRD: CPT | Performed by: PSYCHIATRY & NEUROLOGY

## 2024-01-23 PROCEDURE — 1036F TOBACCO NON-USER: CPT | Performed by: PSYCHIATRY & NEUROLOGY

## 2024-01-23 PROCEDURE — 99213 OFFICE O/P EST LOW 20 MIN: CPT | Performed by: PSYCHIATRY & NEUROLOGY

## 2024-01-23 PROCEDURE — 1160F RVW MEDS BY RX/DR IN RCRD: CPT | Performed by: PSYCHIATRY & NEUROLOGY

## 2024-01-23 PROCEDURE — 1126F AMNT PAIN NOTED NONE PRSNT: CPT | Performed by: PSYCHIATRY & NEUROLOGY

## 2024-01-23 PROCEDURE — 3074F SYST BP LT 130 MM HG: CPT | Performed by: PSYCHIATRY & NEUROLOGY

## 2024-01-23 PROCEDURE — 3078F DIAST BP <80 MM HG: CPT | Performed by: PSYCHIATRY & NEUROLOGY

## 2024-01-23 ASSESSMENT — PAIN SCALES - GENERAL: PAINLEVEL: 0-NO PAIN

## 2024-01-23 NOTE — PROGRESS NOTES
Diagnoses/Problems     · Parkinsonism (332.0) (G20)       Chief Complaint  multifactorial gait disorder, seizure-like activity.      History of Present Illness  75 year old right handed AAM with PMH of CAD s/p CABG; CKD III; HLD; DM; HTN ; Fronto- temporal atrophy who is here for gait disorder follow up.      Interval hx:  He has no complaints and feels stable. Wife thinks he's  not eating well and hasn't been exercising since he had a fall at [employer]. Cognition remains intact. He wants to keep sinemet the same. Will order home PT.      PD symptom review:           No anosmia.   This problem has not been previously treated with medications.         He reports balance problems and occasional falls. He reports 1 falls every 4-5 months.  He reports no speech problems and dysphagia.   He reports no urinary urgency, no urinary incontinence, no urinary retention, constipation and no orthostatic symptoms.   He reports he has no symptoms of depression and no anxiety.   He reports no difficulty falling asleep, no acting out of dreams and no restless legs. but has vivid dreams and nighttime confusion.  He reports concern about memory, but no hallucinations. He drives and there is no concern about his driving.      He reports going to physical therapy regularly.            Review of Systems  All systems reviewed and are negative except as mentioned in the HPI.        Active Problems  Problems    · Abnormal SPEP (790.99) (R77.8)   · Acute hypersomnolence disorder (780.54) (G47.10)   · Age-related cognitive decline (294.9) (R41.81)   · Antibiotic prophylaxis for dental procedure indicated due to prior joint replacement   · At risk for falls (V15.88) (Z91.81)   · Balance problems (781.99) (R26.89)   · Blurry vision (368.8) (H53.8)   · BPH (benign prostatic hyperplasia) (600.00) (N40.0)   · History of CABG   · January, 2009: Saphenous vein to right PDA, saphenous vein to obtuse marginal,      saphenous vein to diagonal.   ·  CAD (coronary artery disease) (414.00) (I25.10)   · Cervical radiculopathy (723.4) (M54.12)   · Chronic back pain (724.5,338.29) (M54.9,G89.29)   · Chronic headache (784.0) (R51.9,G89.29)   · Chronic kidney disease, stage 4 (severe) (585.4) (N18.4)   · Cognitive disorder (294.9) (F09)   · Controlled diabetes mellitus with diabetic nephropathy (250.40,583.81) (E11.21)   · Diabetes mellitus (250.00) (E11.9)   · Diplopia (368.2) (H53.2)   · Dysphagia (787.20) (R13.10)   · Dysuria (788.1) (R30.0)   · Elevated prostate specific antigen (PSA) (790.93) (R97.20)   · Added by Problem List Migration; 2013-7-26; Moved to Suppressed Nov 23 2013 9:02PM   · Encounter for immunization (V03.89) (Z23)   · Enlarged prostate with lower urinary tract symptoms (LUTS) (600.01) (N40.1)   · Episodes of staring (780.02) (R40.4)   · Fatigue (780.79) (R53.83)   · Added by Problem List Migration; 2013-7-26; Moved to Suppressed Nov 23 2013 9:02PM   · Gout (274.9) (M10.9)   · Hyperlipidemia (272.4) (E78.5)   · Hyperparathyroidism, secondary (588.81) (N25.81)   · Hypertension (401.9) (I10)   · Added by Problem List Migration; 2013-7-26; Moved to Suppressed Nov 23 2013 9:02PM   · Impaired gait and mobility (781.2) (R26.89)   · Incontinence of urine (788.30) (R32)   · Infected pilonidal cyst (685.1) (L05.91)   · Inguinal mass (789.39) (R19.09)   · Intermittent drowsiness (780.09) (R40.0)   · Iron deficiency (280.9) (E61.1)   · Low vitamin D level (790.6) (R79.89)   · Male erectile disorder (607.84) (N52.9)   · Memory loss (780.93) (R41.3)   · Metatarsalgia of both feet (726.70) (M77.41,M77.42)   · Multifactorial gait disorder (781.2) (R26.89)   · Non-STEMI (non-ST elevated myocardial infarction) (410.70) (I21.4)   · July 27, 2017: [facility] admission.   · Obesity (278.00) (E66.9)   · Oral phase dysphagia (787.21) (R13.11)   · Other cognitive disorder due to general medical condition (294.9) (F09)   · Parkinsonism (332.0) (G20)   · Peripheral neuropathy  (356.9) (G62.9)   · Plantar fasciitis (728.71) (M72.2)   · Postural imbalance (729.90) (R29.3)   · Renal cysts, acquired, bilateral (593.2) (N28.1)   · Research study patient (V70.7) (Z00.6)   · S/P CABG x 3 (V45.81) (Z95.1)   · S/P drug eluting coronary stent placement (V45.82) (Z95.5)   · July 28, 2017: [facility] to 90% calcific mid LAD stenosis.   · Sacral decubitus ulcer, stage II (707.03,707.22) (L89.152)   · Snoring (786.09) (R06.83)   · Suspected sleep apnea (781.99) (R29.818)   · Syncope (780.2) (R55)   · Tubular adenoma of colon (211.3) (D12.6)   · Type 2 myocardial infarction without ST elevation (410.70) (I21.A1)   · Unable to walk (719.7) (R26.2)   · Undescended testicle (752.51) (Q53.9)   · Unexplained weight loss (783.21) (R63.4)   · UTI (urinary tract infection) (599.0) (N39.0)   · Vomiting (787.03) (R11.10)     Past Medical History  Problems    · History of Acute gout (274.01) (M10.9)   · Resolved Date: 20 Apr 2018   · History of Acute idiopathic gout of right hand (274.01) (M10.041)   · Resolved Date: 02 Jun 2022   · History of Cognitive changes (799.59) (R41.89)   · Resolved Date: 02 Jun 2022   · History of Encounter for administration of vaccine (V05.9) (Z23)   · Resolved Date: 20 Apr 2018   · History of Foot pain (729.5) (M79.673)   · History of acute gastritis (V12.70) (Z87.19)   · Resolved Date: 02 Jun 2022   · History of diarrhea (V12.79) (Z87.898)   · Resolved Date: 20 Apr 2018   · Added by Problem List Migration; 2013-7-26; Moved to Suppressed Nov 23 2013 9:02PM   · History of elevated prostate specific antigen (PSA) (V13.89) (Z87.898)   · Resolved Date: 02 Jun 2022   · History of hypertension (V12.59) (Z86.79)   · History of influenza vaccination (V49.89) (Z92.29)   · Resolved Date: 26 Jun 2020   · History of Paronychia (681.9)   · Resolved Date: 20 Apr 2018   · History of Pedal edema (782.3) (R60.0)   · Resolved Date: 21 Feb 2020   · History of Screening (V82.9) (Z13.9)   · Resolved Date: 20 Apr     · History of Strain of thoracic region, initial encounter (847.1) (S29.019A)   · Resolved Date: 2020     Surgical History  Problems    · History of CABG   · : Saphenous vein to right PDA, saphenous vein to obtuse marginal,      saphenous vein to diagonal.   · History of Cholecystectomy     Family History  Mother    · Family history of ESRD on dialysis  Father    · Family history of hypertension (V17.49) (Z82.49)  Family History    · Family history of Dementia   · Family history of Diabetes Mellitus (V18.0)   · Family history of Liver Cancer   · Family history of Reported Prior Kidney Disease     Social History  Problems    · Advance healthcare directive requested   · cannot find Advance Directives. New forms given.   · Education history   · 2 years college   · Former smoker (V15.82) (Z87.891)   · Full-time employment   · drives a van. [employer]   ·    · [place],  37 years   · No alcohol use   · every two to three months   · Number of children   · four children. Jacky--[place], step dtr [place]--[place], [place]--[place], [facility]--local   · Occupation   · post office   · Parents   · mother  in her 70s--esrd. father decesased at age 82--dementia   · [employer] status   · Christain   · Sedentary lifestyle (V15.89) (Z91.89)   · Sibling   · one brother --liver (etoh)   · Single-family home   · [facility] cod. feels safe in home   · [place]   · [place]     Allergies  Medication    · No Known Drug Allergies   Recorded By: Olamide Holland; 2013 11:45:18 AM     Physical Exam  Constitutional: General appearance: no acute distress   Mental status: The patient was in no distress, alert, interactive and cooperative. Affect is appropriate.   Eyes: The opthalmoscopic exam was not testable. Secondary to marked miosis, the fundi and posterior segments could not be visualized   Cranial nerve II: Visual fields full to confrontation.   Cranial nerves III,  IV, and VI: Pupils round, equally reactive to light; no ptosis. EOMs intact. No nystagmus.   Cranial Nerve V: Facial sensation intact bilaterally.   Cranial nerve VII: Normal and symmetric facial strength.   Cranial nerve VIII: Hearing is intact bilaterally to finger rub / whisper.   Cranial nerves IX and X: Palate elevates symmetrically.   Cranial nerve XI: Shoulder shrug and neck rotation strength are intact.   Cranial nerve XII: Tongue midline with normal strength.   Motor: Motor exam was normal.~Muscle bulk was normal in both upper and lower extremities.~Muscle strength was 5/5 throughout.~no abnormal or adventitious movements were present.   Deep Tendon Reflexes: left biceps 2+ ,~right biceps 2+,~left triceps 2+,~right triceps 2+,~left brachioradialis 2+,~right brachioradialis 2+,~left patella 2+,~right patella 2+,~left ankle jerk 1+,~right ankle jerk 1+   Sensory Exam:. stocking hyposthesia otherwise normal.   Coordination: There is no limb dystaxia. Has mild bradykinesia bilaterally with finger tapping and hand movement more on the right. mild slowing with pronation supination and moderate slowness bilaterally with leg agility and toe tapping.   Gait:. moderate stooping, gait very slow with shuffling using a walker but no festination, or freezing. Pull test positive for retropulsion. en block turning noted.      UPDRS Examination   Time of exam: 4 pm. The patient is currently on medication. Last medication time: 1:30 pm.   Speech: 0 Facial Expression: 0   Rest Tremor: Head: 0 RUE: 0 LUE: 0 RLE: 0 LLE: 0   Action Tremor: RUE: 0 LUE: 0   Rigidity: Neck: 0 RUE: 0 LUE: 0 RLE: 0 LLE: 0   Finger Taps: RUE: 1 LUE: 1   Hand Movements: RUE: 1 LUE: 1   Rapid Alternating Movements: RUE: 1 LUE: 1   Leg Agility: RLE: 3 LLE: 3   Arising from chair: 4 Posture: 2 Gait: 3 Postural Stability: 2 Body Bradykinsia: 3   Total Scores   Total UPDRS III On Medication: 26 down from 28.   Tirso and Yahr Stage: Tirso and Yahr Stage 4.      Provider Impressions  74 yo AAM with hx of DM, HTN, HPL, CKD, CAD, and MCI who presents with several years of unsteadiness without significant falls. He denied PD prodromal symptoms but admitted to dexterity impairment and propulsion. Exam positive for evidence of stocking hyposthesia, bilateral bradykinesia with finger tapping and leg agility, mild stooping, and retropulsion on the pull test. I personally reviewed his brain MRI which showed diffuse atrophy and advanced small vessel disease. Most likely multifactorial balance disorders with contributions from diabetic neuropathy and early vascular or idiopathic parkinsonism. We recommended fast-paced exercise and PT. Will avoid sinemet for now since it does not usually work well for balance but will monitor him clinically for PD evolution and will introduce sinemet when necessary.      07/06/2020: Was doing well with fast paced exercise and PT but then had a severe gouty attack that put him in bed for one month. He is now recovering and started walking with a walker again. Will continue to hold off on sinemet for now. will re-evaluate in-person after six months.      01/19/2021: Reports some night time confusion related to vivid dreaming. He has notable progression of limb and body bradykinesia on repeat exam. Will start sinemet. The benefits, side effects, risks, and alternatives to treatment were discussed with the patient in detail.     07/27/2021: He does not think that he responded much to sinemet but wife thinks that he is now walking faster and able to stand up from a seated position much better than before. He occasionally gets nausea and vomiting from the medication but these are rare events. He is definitely much slower later in the day when sinemet is out of his system. will increase dose to 4 tabs a day.      03/08/2022: continues to respond well to sinemet and is no longer nauseated with it. exam somewhat better. Will continue same.      11/08/2022:  reports doing well but wife thinks he's a bit slower with walking. they sometimes miss a dose or two of sinemet. He reports vivid dreaming but no hallucinations. Is regular with in motion now. exam stable. will keep dose the same but educated about dose compliance.      01/16/2023: they came back sooner than originally planned because Mr. Nobles had a 2-minute episode during which he became poorly responsive, staring, and slumped over while on the toiled. He came back to normal after. wife recalls having had similar episodes in the past stating that these episodes occur - on average - once every three months. His therapist told them it could be seizures and advised them to follow up with me regarding this. His brain MRI shows diffuse cortical atrophy but no old strokes or cortical encephalomalacia. He had CTA head and neck in 2019 that was unremarkable. will order EEG. PD stable.      07/10/2023: no longer having staring episodes and EEG came back normal. wife thinks he's getting somewhat slower especially on days when he skips exercise but overall manageable. we agreed to keep sinemet dose the same for now and renew PT.     1/23/2024: He has no complaints and feels stable. Wife thinks he's  not eating well and hasn't been exercising since he had a fall at [employer]. Cognition remains intact. He wants to keep sinemet the same. Will order home PT.      PLAN:  please continue carbidopa/levodopa 25/100 mg one tablet four times daily at 8 AM, 11 AM, 2 PM, and 5 pm. we may consider increasing the dose in the future if needed.      Please try to do fast paced exercise.      please continue physical therapy. I ordered home therapy for you.      Follow up after eight months.      The impression and plan were discussed with the patient and their family (if present) in detail and all questions answered. They agreed to the plan as outlined above.        I spent 20 minutes with this patient. Greater than 50% of this time was  spent in counseling and/or coordination of care.        Jose L Chiu MD, PhD   of Neurology  [facility] [employer]  [facility]

## 2024-01-23 NOTE — PATIENT INSTRUCTIONS
please continue carbidopa/levodopa 25/100 mg one tablet four times daily at 8 AM, 11 AM, 2 PM, and 5 pm. we may consider increasing the dose in the future if needed.      Please try to do fast paced exercise.      please continue physical therapy. I ordered home therapy for you.      Follow up after eight months.      Thank you very much for coming to see me today.     Jose L Chiu MD, PhD   of Neurology  [facility]  Director of Neuroimmunology and staff neurologist at the [facility]  [facility]

## 2024-01-23 NOTE — PROGRESS NOTES
Patient ID: Markie Nobles is a 79 y.o. male.  Referring Physician: Rosanne M Casal, APRN-CNP, DNP  [address] [place] [address]  Primary Care Provider: Leslie Watson MD  Visit Type: Follow Up - Transfer of Care    Location: [facility] - Main  Diagnosis/Reason: MGUS    Interval Hx  1/23/24  Markie Nobles is a 79 y.o. male following up today for MGUS    Patient is primarily followed by my colleague R. Casal, DNP but is following up w/ me today as I am covering    Patient denies weight loss, abnormal bruising and bleeding, hematuria, blood in stool, dark/black stools, epistaxis, oral/gingival bleeding, lymphadenopathy, recurrent infections, recurrent fevers, night sweats, early satiety, abdominal pain, bone pain, chest pain, palpitations, SOB, ANDREA, fatigue, dizziness, lightheadedness, PICA.    Relevant Hx  10/3/23 - Last Visit w/ R. Casal, DNP  Mr. Nobles is a 77 y/o  male presenting with his wife today for follow-up for positive SPEP/MGUS. He has been noted for having a monoclonal band for free lambda chain on previous SPEP. He is currently being monitored periodically without need for intervention at this time.     Today he reports that he has intermittent early satiety. He denies abdominal pain, nausea, vomiting or any other associated symptom. Patient states he fell a few weeks ago when trying to reach something on the floor while sitting and fell out of the chair. He hit his head and his wife took him to ED. CT scan was normal.      Patient denies weight loss, abnormal bruising and bleeding, hematuria, blood in stool, dark/black stools, epistaxis, oral/gingival bleeding, lymphadenopathy, recurrent infections, recurrent fevers, night sweats, abdominal pain, bone pain, chest pain, palpitations, SOB, ANDREA, fatigue, dizziness, lightheadedness, PICA.    Patient was initially referred by Dr. Mercy Plunkett for positive SPEP. He was seen by Dr Castrejon and has transferred care to me this past year.       The patient has multiple medical issues, including diabetes, diabetic neuropathy, CKD, h/o CABG, NSTEMI and hyperlipidemia. He was found to have a positive SPEP on workup of his CKD which showed a monoclonal band for free lambda chain.      He has been diagnosed with CKD for over a decade now, which is slowly worsening. He also has active microalbuminuria which has been attributed t his diabetes and cardiac condition.    PMHx:  Active Ambulatory Problems     Diagnosis Date Noted    Abnormal SPEP 04/25/2023    Acute hypersomnolence disorder 04/25/2023    Balance problems 04/25/2023    Blurry vision 04/25/2023    CAD (coronary artery disease) 04/25/2023    Cervical radiculopathy 04/25/2023    Chronic back pain 04/25/2023    Chronic headache 04/25/2023    Age-related cognitive decline 04/25/2023    Cognitive disorder 04/25/2023    Diabetes mellitus (CMS/[facility]) 10/11/2009    Diplopia 04/25/2023    Dysphagia, oropharyngeal phase 09/18/2020    Oral phase dysphagia 04/25/2023    Elevated prostate specific antigen (PSA) 04/25/2023    BPH (benign prostatic hyperplasia) 04/25/2023    Enlarged prostate with lower urinary tract symptoms (LUTS) 04/25/2023    Episodes of staring 04/25/2023    Fatigue 04/25/2023    Gout, unspecified 09/18/2020    Hyperlipidemia, unspecified 09/18/2020    Hyperparathyroidism, secondary (CMS/[facility]) 04/25/2023    Hypertension 04/25/2023    Dysuria 04/25/2023    Incontinence of urine 04/25/2023    Infected pilonidal cyst 04/25/2023    Inguinal mass 04/25/2023    Intermittent drowsiness 04/25/2023    Iron deficiency 04/25/2023    Low vitamin D level 04/25/2023    Male erectile disorder 04/25/2023    Memory loss 04/25/2023    Metatarsalgia of both feet 04/25/2023    Impaired gait and mobility 04/25/2023    Old myocardial infarction 09/18/2020    Obesity 04/25/2023    Parkinsonism 04/25/2023    Peripheral neuropathy 04/25/2023    Plantar fasciitis 04/25/2023    Postural imbalance 04/25/2023    Renal  cysts, acquired, bilateral 04/25/2023    S/P CABG x 3 04/25/2023    S/P drug eluting coronary stent placement 04/25/2023    Sacral decubitus ulcer, stage II (CMS/[facility]) 04/25/2023    Snoring 04/25/2023    Suspected sleep apnea 04/25/2023    Syncope 04/25/2023    Tubular adenoma of colon 04/25/2023    Difficulty in walking, not elsewhere classified 09/18/2020    Undescended testicle 04/25/2023    Unexplained weight loss 04/25/2023    UTI (urinary tract infection) 04/25/2023    Vomiting 04/25/2023    Elevated serum creatinine 10/11/2009    Hypertensive chronic kidney disease with stage 5 chronic kidney disease or end stage renal disease (CMS/[facility]) 09/18/2020    Monoclonal gammopathy 09/18/2020    Muscle weakness (generalized) 09/18/2020    Necrobiosis lipoidica, not elsewhere classified 09/18/2020    Nonalcoholic steatohepatitis (ABEBE) 09/18/2020    Osteophyte, vertebrae 09/18/2020    Lightheadedness 10/11/2009    Other malaise 09/18/2020    Atherosclerotic heart disease of native coronary artery without angina pectoris 09/18/2020    Encephalopathy, unspecified 09/18/2020    Unspecified dementia, unspecified severity, without behavioral disturbance, psychotic disturbance, mood disturbance, and anxiety (CMS/[facility]) 09/18/2020    Type 2 diabetes mellitus with diabetic dermatitis (CMS/[facility]) 09/18/2020    Benign prostatic hyperplasia without lower urinary tract symptoms 09/18/2020    CKD (chronic kidney disease) stage 4, GFR 15-29 ml/min (CMS/[facility]) 10/03/2023     Resolved Ambulatory Problems     Diagnosis Date Noted    No Resolved Ambulatory Problems     Past Medical History:   Diagnosis Date    Encounter for immunization 09/29/2016    Encounter for screening, unspecified 04/28/2014    Idiopathic gout, right hand 01/17/2020    Localized edema 07/06/2015    Other conditions influencing health status 08/03/2015    Other symptoms and signs involving cognitive functions and awareness 06/09/2016    Pain in unspecified foot 08/03/2015     Personal history of other diseases of the circulatory system     Personal history of other diseases of the digestive system 08/13/2019    Personal history of other drug therapy     Personal history of other specified conditions 03/17/2016    Personal history of other specified conditions 02/19/2015    Strain of muscle and tendon of unspecified wall of thorax, initial encounter 12/05/2016       PSHx:  Past Surgical History:   Procedure Laterality Date    CHOLECYSTECTOMY  04/20/2018    Cholecystectomy    CORONARY ARTERY BYPASS GRAFT  12/16/2022    CABG    CT ANGIO NECK  7/29/2019    CT NECK ANGIO W AND WO IV CONTRAST 7/29/2019 [facility]    CT HEAD ANGIO W AND WO IV CONTRAST  7/29/2019    CT HEAD ANGIO W AND WO IV CONTRAST 7/29/2019 [facility]       FHx:  Family History   Problem Relation Name Age of Onset    Other (ESRD) Mother          on dialysis    Hypertension Father      Dementia Other      Diabetes Other      Liver cancer Other      Kidney disease Other          Reported prior       Social Hx:  Markie Nobles    reports that he has quit smoking. His smoking use included cigarettes. He has never been exposed to tobacco smoke. He has never used smokeless tobacco.  He  reports that he does not currently use alcohol.  He  reports that he does not currently use drugs.  Social History     Socioeconomic History    Marital status:      Spouse name: Not on file    Number of children: Not on file    Years of education: Not on file    Highest education level: Not on file   Occupational History    Not on file   Tobacco Use    Smoking status: Former     Types: Cigarettes     Passive exposure: Never    Smokeless tobacco: Never   Vaping Use    Vaping Use: Never used   Substance and Sexual Activity    Alcohol use: Not Currently    Drug use: Not Currently    Sexual activity: Not on file   Other Topics Concern    Not on file   Social History Narrative    Not on file     Social Determinants of  "Health     Financial Resource Strain: Not on file   Food Insecurity: Not on file   Transportation Needs: Not on file   Physical Activity: Not on file   Stress: Not on file   Social Connections: Not on file   Intimate Partner Violence: Not on file   Housing Stability: Not on file     Medications and allergies reviewed in EMR.    ROS:  Review of Systems - Oncology   10 point review of systems negative except as state in HPI.    Vitals & Statistics:  Objective   BSA: There is no height or weight on file to calculate BSA.  There were no vitals taken for this visit.    Physical Exam:  Physical Exam      Results:  Lab Results   Component Value Date    WBC 6.4 01/12/2024    NEUTROABS 3.66 01/12/2024    IGABSOL 0.02 01/12/2024    LYMPHSABS 1.83 01/12/2024    MONOSABS 0.45 01/12/2024    EOSABS 0.40 01/12/2024    BASOSABS 0.03 01/12/2024    RBC 4.03 (L) 01/12/2024    MCV 89 01/12/2024    MCHC 31.4 (L) 01/12/2024    HGB 11.3 (L) 01/12/2024    HCT 36.0 (L) 01/12/2024     01/12/2024     Lab Results   Component Value Date    RETICCTPCT 0.8 09/29/2023      Lab Results   Component Value Date    CREATININE 3.47 (H) 09/29/2023    BUN 43 (H) 09/29/2023     09/29/2023    K 4.5 09/29/2023     (H) 09/29/2023    CO2 25 09/29/2023      Lab Results   Component Value Date    ALT 6 (L) 09/29/2023    AST 20 09/29/2023    ALKPHOS 100 09/29/2023    BILITOT 0.5 09/29/2023      Lab Results   Component Value Date    TSH 1.61 01/14/2021     Lab Results   Component Value Date    TSH 1.61 01/14/2021    N3OBIHU 64 07/30/2019     Lab Results   Component Value Date    IRON 74 10/17/2023    TIBC 220 (L) 10/17/2023    FERRITIN 361 (H) 10/17/2023      Lab Results   Component Value Date    UDUYYJZL72 481 08/30/2022      Lab Results   Component Value Date    FOLATE 6.6 05/10/2022     Lab Results   Component Value Date    LORETA NEGATIVE 04/19/2018    SEDRATE 37 (H) 10/07/2019      No results found for: \"CRP\"   No results found for: \"MONICA\"  Lab " "Results   Component Value Date     10/07/2019     No results found for: \"HAPTOGLOBIN\"  Lab Results   Component Value Date    SPEP ABNORMAL 09/29/2023     Lab Results   Component Value Date    IGG 1,310 09/29/2023    IGM 83 09/29/2023     09/29/2023     Lab Results   Component Value Date    HEPBCAB NONREACTIVE 09/10/2018    HEPBSAG NONREACTIVE 09/10/2018    HEPCAB NON-REACTIVE 09/10/2018     Lab Results   Component Value Date    HIV1X2 NON REACTIVE 09/10/2018       Assessment:  Markie Nobles is a 79 y.o. male following up today for MGUS    I reviewed patient's chart including but not limited to labs, imaging, surgical/procedure notes, pathology, hospital notes, doctor's notes.    1/12/24 results:  WBC count & differential WNL  Normocytic, hypochromic anemia w/ Hb improved to 11.3 - Rbc count & Hct low, Rdw elevated  Platelets WNL    Plan:  ***  Lab results pending - Will discuss at next visit unless urgent  ***  RTC in *** via {BDFUVAP3:81853} - F/U sooner if needed/urgent    I had an extensive discussion with the patient regarding the diagnosis and discussed the plan of therapy, including general considerations regarding side effects and outcomes. Pt understood and gave appropriate teach back about the plan of care. All questions were answered to the patient's satisfaction. The patient is instructed to contact us at any time if questions or problems arise. Thank you for the opportunity to participate in the care of this very pleasant patient.    Total time = {BDTIME:51092} minutes. 50% or more of this time was spent in counseling and/or coordination of care including reviewing medical history/radiology/labs, examining patient, formulating outlined plan with team, and discussing plan with patient/family.      Epi Manning PA-C     "

## 2024-01-24 ENCOUNTER — OFFICE VISIT (OUTPATIENT)
Dept: NEPHROLOGY | Facility: CLINIC | Age: 79
End: 2024-01-24
Payer: MEDICARE

## 2024-01-24 ENCOUNTER — LAB (OUTPATIENT)
Dept: LAB | Facility: HOSPITAL | Age: 79
End: 2024-01-24
Payer: MEDICARE

## 2024-01-24 VITALS
WEIGHT: 132 LBS | SYSTOLIC BLOOD PRESSURE: 125 MMHG | HEIGHT: 68 IN | HEART RATE: 69 BPM | BODY MASS INDEX: 20 KG/M2 | DIASTOLIC BLOOD PRESSURE: 56 MMHG

## 2024-01-24 DIAGNOSIS — I10 ESSENTIAL (PRIMARY) HYPERTENSION: ICD-10-CM

## 2024-01-24 DIAGNOSIS — E46 MALNUTRITION, UNSPECIFIED TYPE (MULTI): ICD-10-CM

## 2024-01-24 DIAGNOSIS — N18.4 CKD (CHRONIC KIDNEY DISEASE) STAGE 4, GFR 15-29 ML/MIN (MULTI): ICD-10-CM

## 2024-01-24 DIAGNOSIS — N40.0 PROSTATE ENLARGEMENT: ICD-10-CM

## 2024-01-24 DIAGNOSIS — N18.4 CHRONIC KIDNEY DISEASE, STAGE 4 (SEVERE) (MULTI): ICD-10-CM

## 2024-01-24 DIAGNOSIS — N18.4 CHRONIC KIDNEY DISEASE, STAGE 4 (SEVERE) (MULTI): Primary | ICD-10-CM

## 2024-01-24 DIAGNOSIS — N25.81 SECONDARY HYPERPARATHYROIDISM (MULTI): ICD-10-CM

## 2024-01-24 LAB
25(OH)D3 SERPL-MCNC: 63 NG/ML (ref 30–100)
ALBUMIN SERPL BCP-MCNC: 3.6 G/DL (ref 3.4–5)
ALP SERPL-CCNC: 113 U/L (ref 33–136)
ALT SERPL W P-5'-P-CCNC: 5 U/L (ref 10–52)
ANION GAP SERPL CALC-SCNC: 13 MMOL/L (ref 10–20)
AST SERPL W P-5'-P-CCNC: 22 U/L (ref 9–39)
BASOPHILS # BLD AUTO: 0.03 X10*3/UL (ref 0–0.1)
BASOPHILS NFR BLD AUTO: 0.6 %
BILIRUB SERPL-MCNC: 0.5 MG/DL (ref 0–1.2)
BUN SERPL-MCNC: 42 MG/DL (ref 6–23)
CALCIUM SERPL-MCNC: 8.9 MG/DL (ref 8.6–10.3)
CHLORIDE SERPL-SCNC: 110 MMOL/L (ref 98–107)
CO2 SERPL-SCNC: 27 MMOL/L (ref 21–32)
CREAT SERPL-MCNC: 3.54 MG/DL (ref 0.5–1.3)
EGFRCR SERPLBLD CKD-EPI 2021: 17 ML/MIN/1.73M*2
EOSINOPHIL # BLD AUTO: 0.42 X10*3/UL (ref 0–0.4)
EOSINOPHIL NFR BLD AUTO: 8.5 %
ERYTHROCYTE [DISTWIDTH] IN BLOOD BY AUTOMATED COUNT: 16.3 % (ref 11.5–14.5)
FERRITIN SERPL-MCNC: 345 NG/ML (ref 20–300)
GLUCOSE SERPL-MCNC: 117 MG/DL (ref 74–99)
HCT VFR BLD AUTO: 36.4 % (ref 41–52)
HGB BLD-MCNC: 11.3 G/DL (ref 13.5–17.5)
IMM GRANULOCYTES # BLD AUTO: 0 X10*3/UL (ref 0–0.5)
IMM GRANULOCYTES NFR BLD AUTO: 0 % (ref 0–0.9)
IRON SATN MFR SERPL: 46 % (ref 25–45)
IRON SERPL-MCNC: 104 UG/DL (ref 35–150)
LYMPHOCYTES # BLD AUTO: 2.07 X10*3/UL (ref 0.8–3)
LYMPHOCYTES NFR BLD AUTO: 41.7 %
MCH RBC QN AUTO: 27.9 PG (ref 26–34)
MCHC RBC AUTO-ENTMCNC: 31 G/DL (ref 32–36)
MCV RBC AUTO: 90 FL (ref 80–100)
MONOCYTES # BLD AUTO: 0.36 X10*3/UL (ref 0.05–0.8)
MONOCYTES NFR BLD AUTO: 7.3 %
NEUTROPHILS # BLD AUTO: 2.08 X10*3/UL (ref 1.6–5.5)
NEUTROPHILS NFR BLD AUTO: 41.9 %
NRBC BLD-RTO: 0 /100 WBCS (ref 0–0)
PLATELET # BLD AUTO: 116 X10*3/UL (ref 150–450)
POTASSIUM SERPL-SCNC: 5.1 MMOL/L (ref 3.5–5.3)
PROT SERPL-MCNC: 6.5 G/DL (ref 6.4–8.2)
PTH-INTACT SERPL-MCNC: 210.1 PG/ML (ref 18.5–88)
RBC # BLD AUTO: 4.05 X10*6/UL (ref 4.5–5.9)
SODIUM SERPL-SCNC: 145 MMOL/L (ref 136–145)
TIBC SERPL-MCNC: 227 UG/DL (ref 240–445)
UIBC SERPL-MCNC: 123 UG/DL (ref 110–370)
WBC # BLD AUTO: 5 X10*3/UL (ref 4.4–11.3)

## 2024-01-24 PROCEDURE — 99214 OFFICE O/P EST MOD 30 MIN: CPT | Performed by: INTERNAL MEDICINE

## 2024-01-24 PROCEDURE — 83540 ASSAY OF IRON: CPT

## 2024-01-24 PROCEDURE — 82668 ASSAY OF ERYTHROPOIETIN: CPT

## 2024-01-24 PROCEDURE — 85025 COMPLETE CBC W/AUTO DIFF WBC: CPT

## 2024-01-24 PROCEDURE — 80053 COMPREHEN METABOLIC PANEL: CPT

## 2024-01-24 PROCEDURE — 3078F DIAST BP <80 MM HG: CPT | Performed by: INTERNAL MEDICINE

## 2024-01-24 PROCEDURE — 82306 VITAMIN D 25 HYDROXY: CPT

## 2024-01-24 PROCEDURE — 82610 CYSTATIN C: CPT

## 2024-01-24 PROCEDURE — 1160F RVW MEDS BY RX/DR IN RCRD: CPT | Performed by: INTERNAL MEDICINE

## 2024-01-24 PROCEDURE — 3074F SYST BP LT 130 MM HG: CPT | Performed by: INTERNAL MEDICINE

## 2024-01-24 PROCEDURE — 36415 COLL VENOUS BLD VENIPUNCTURE: CPT

## 2024-01-24 PROCEDURE — 83970 ASSAY OF PARATHORMONE: CPT

## 2024-01-24 PROCEDURE — 82728 ASSAY OF FERRITIN: CPT

## 2024-01-24 PROCEDURE — 1126F AMNT PAIN NOTED NONE PRSNT: CPT | Performed by: INTERNAL MEDICINE

## 2024-01-24 PROCEDURE — 1036F TOBACCO NON-USER: CPT | Performed by: INTERNAL MEDICINE

## 2024-01-24 PROCEDURE — 1159F MED LIST DOCD IN RCRD: CPT | Performed by: INTERNAL MEDICINE

## 2024-01-24 ASSESSMENT — PAIN SCALES - GENERAL: PAINLEVEL: 0-NO PAIN

## 2024-01-24 NOTE — PATIENT INSTRUCTIONS
Kidney function in September, when last checked did not look as good as before, so we have to recheck.  Repeat blood work and urine test today. I will inform you of the results.  No changes in the medications.  See you back in 4-6 months, if kidney function better.

## 2024-01-24 NOTE — PROGRESS NOTES
"Markie Nobles is a 79 y.o. male here in follow up for CKD    Subjective   No complaints. Most of the history provided by wife who accompanies him at the visit. Had an episode of fall at [place], so since then has not been going there for exercise. Hopefully he will start/restart home PT soon.   He is now enrolled in the Anemia clinic at [facility] and receives ANISA in their infusion center. Saw urology in the fall and had the finasteride and tamsulosin refilled. Had labs done in November that indicated worsening kidney function, but that was not communicated with me, so we will repeat labs today. Medications were reviewed with wife and updated in EMR. Prescribed mirtazapine by PCP to help with appetite; will start taking the medication soon.   Patient denies CP, SOB, leg swelling, dysuria or hematuria.        ROS  All the rest reviewed and negative    Objective     Vital signs    /56 (BP Location: Right arm, Patient Position: Sitting, BP Cuff Size: Adult)   Pulse 69   Ht 1.727 m (5' 8\")   Wt 59.9 kg (132 lb)   BMI 20.07 kg/m²     Physical Exam  Constitutional:  think elderly, in NAD  Psychiatric:  answers questions appropriately  HEENT: clear sclerae, moist mucous membranes  Cardiovascular: normal S1, S2, RRR,  no murmurs, gallop or rubs  Pulmonary:  Normal breath sounds bilateral  Extremities: no edema  Skin:  warm and dry    Meds    Current Outpatient Medications:     acetaminophen (Tylenol Extra Strength) 500 mg tablet, Take by mouth., Disp: , Rfl:     ALLOPURINOL ORAL, Take by mouth., Disp: , Rfl:     atorvastatin (Lipitor) 80 mg tablet, TAKE 1 TABLET BY MOUTH ONCE  DAILY, Disp: 90 tablet, Rfl: 3    carbidopa-levodopa (Sinemet)  mg tablet, Take by mouth., Disp: , Rfl:     clopidogrel (Plavix) 75 mg tablet, TAKE 1 TABLET BY MOUTH ONCE  DAILY, Disp: 90 tablet, Rfl: 3    docusate sodium (Colace) 100 mg capsule, TAKE 1 CAPSULE BY MOUTH EVERY DAY AS DIRECTED, Disp: 30 capsule, Rfl: 2    finasteride " (Proscar) 5 mg tablet, Take 1 tablet (5 mg) by mouth once daily., Disp: , Rfl:     lidocaine (Lidoderm) 5 % patch, APPLY 1 PATCH DAILY & REMOVE AFTER 12 HOURS, Disp: 30 patch, Rfl: 3    metoprolol tartrate (Lopressor) 25 mg tablet, Take 1 tablet (25 mg) by mouth once daily., Disp: 90 tablet, Rfl: 3    nitroglycerin (Nitrostat) 0.4 mg SL tablet, Place under the tongue., Disp: , Rfl:     sennosides (Senokot) 8.6 mg tablet, Take by mouth 2 times a day as needed., Disp: , Rfl:     tamsulosin (Flomax) 0.4 mg 24 hr capsule, TAKE 2 CAPSULES BY MOUTH  DAILY 1/2 HOUR AFTER SAME  MEAL OR AT BEDTIME WITH A  SNACK, Disp: 180 capsule, Rfl: 0    docusate sodium (Colace) 100 mg tablet, Take 1 tablet (100 mg) by mouth once daily., Disp: , Rfl:     lidocaine (Lidoderm) 5 % patch, APPLY 1 PATCH DAILY & REMOVE AFTER 12 HOURS, Disp: , Rfl:     lidocaine (Lidoderm) 5 % patch, Place 1 patch over 12 hours on the skin once daily. Apply to painful area 12 hours per day, remove for 12 hours. (Patient not taking: Reported on 1/24/2024), Disp: 30 patch, Rfl: 11    lisinopril 20 mg tablet, Take 0.5 tablets (10 mg) by mouth once daily. (Patient not taking: Reported on 12/14/2023), Disp: 15 tablet, Rfl: 2    magnesium oxide (Mag-Ox) 400 mg (241.3 mg magnesium) tablet, Take by mouth., Disp: , Rfl:     mirtazapine (Remeron) 7.5 mg tablet, Take 1 tablet (7.5 mg) by mouth once daily at bedtime. (Patient not taking: Reported on 1/24/2024), Disp: 30 tablet, Rfl: 2    naphazoline-glycerin 0.012-0.2 % drops, Administer 1 drop into both eyes every 8 hours if needed., Disp: , Rfl:      Allergies  No Known Allergies     Results  Recent Results (from the past 672 hour(s))   CBC and Auto Differential    Collection Time: 12/29/23 12:40 PM   Result Value Ref Range    WBC 5.7 4.4 - 11.3 x10*3/uL    nRBC 0.0 0.0 - 0.0 /100 WBCs    RBC 3.83 (L) 4.50 - 5.90 x10*6/uL    Hemoglobin 11.0 (L) 13.5 - 17.5 g/dL    Hematocrit 34.7 (L) 41.0 - 52.0 %    MCV 91 80 - 100 fL     MCH 28.7 26.0 - 34.0 pg    MCHC 31.7 (L) 32.0 - 36.0 g/dL    RDW 15.9 (H) 11.5 - 14.5 %    Platelets 122 (L) 150 - 450 x10*3/uL    Neutrophils % 56.6 40.0 - 80.0 %    Immature Granulocytes %, Automated 0.4 0.0 - 0.9 %    Lymphocytes % 22.4 13.0 - 44.0 %    Monocytes % 12.2 2.0 - 10.0 %    Eosinophils % 8.0 0.0 - 6.0 %    Basophils % 0.4 0.0 - 2.0 %    Neutrophils Absolute 3.21 1.60 - 5.50 x10*3/uL    Immature Granulocytes Absolute, Automated 0.02 0.00 - 0.50 x10*3/uL    Lymphocytes Absolute 1.27 0.80 - 3.00 x10*3/uL    Monocytes Absolute 0.69 0.05 - 0.80 x10*3/uL    Eosinophils Absolute 0.45 (H) 0.00 - 0.40 x10*3/uL    Basophils Absolute 0.02 0.00 - 0.10 x10*3/uL   CBC and Auto Differential    Collection Time: 01/12/24 10:49 AM   Result Value Ref Range    WBC 6.4 4.4 - 11.3 x10*3/uL    nRBC 0.0 0.0 - 0.0 /100 WBCs    RBC 4.03 (L) 4.50 - 5.90 x10*6/uL    Hemoglobin 11.3 (L) 13.5 - 17.5 g/dL    Hematocrit 36.0 (L) 41.0 - 52.0 %    MCV 89 80 - 100 fL    MCH 28.0 26.0 - 34.0 pg    MCHC 31.4 (L) 32.0 - 36.0 g/dL    RDW 16.1 (H) 11.5 - 14.5 %    Platelets 155 150 - 450 x10*3/uL    Neutrophils % 57.3 40.0 - 80.0 %    Immature Granulocytes %, Automated 0.3 0.0 - 0.9 %    Lymphocytes % 28.6 13.0 - 44.0 %    Monocytes % 7.0 2.0 - 10.0 %    Eosinophils % 6.3 0.0 - 6.0 %    Basophils % 0.5 0.0 - 2.0 %    Neutrophils Absolute 3.66 1.60 - 5.50 x10*3/uL    Immature Granulocytes Absolute, Automated 0.02 0.00 - 0.50 x10*3/uL    Lymphocytes Absolute 1.83 0.80 - 3.00 x10*3/uL    Monocytes Absolute 0.45 0.05 - 0.80 x10*3/uL    Eosinophils Absolute 0.40 0.00 - 0.40 x10*3/uL    Basophils Absolute 0.03 0.00 - 0.10 x10*3/uL   Iron and TIBC    Collection Time: 01/24/24 12:02 PM   Result Value Ref Range    Iron 104 35 - 150 ug/dL    UIBC 123 110 - 370 ug/dL    TIBC 227 (L) 240 - 445 ug/dL    % Saturation 46 (H) 25 - 45 %   Comprehensive Metabolic Panel    Collection Time: 01/24/24 12:02 PM   Result Value Ref Range    Glucose 117 (H) 74 -  99 mg/dL    Sodium 145 136 - 145 mmol/L    Potassium 5.1 3.5 - 5.3 mmol/L    Chloride 110 (H) 98 - 107 mmol/L    Bicarbonate 27 21 - 32 mmol/L    Anion Gap 13 10 - 20 mmol/L    Urea Nitrogen 42 (H) 6 - 23 mg/dL    Creatinine 3.54 (H) 0.50 - 1.30 mg/dL    eGFR 17 (L) >60 mL/min/1.73m*2    Calcium 8.9 8.6 - 10.3 mg/dL    Albumin 3.6 3.4 - 5.0 g/dL    Alkaline Phosphatase 113 33 - 136 U/L    Total Protein 6.5 6.4 - 8.2 g/dL    AST 22 9 - 39 U/L    Bilirubin, Total 0.5 0.0 - 1.2 mg/dL    ALT 5 (L) 10 - 52 U/L   Ferritin    Collection Time: 01/24/24 12:02 PM   Result Value Ref Range    Ferritin 345 (H) 20 - 300 ng/mL   CBC and Auto Differential    Collection Time: 01/24/24 12:02 PM   Result Value Ref Range    WBC 5.0 4.4 - 11.3 x10*3/uL    nRBC 0.0 0.0 - 0.0 /100 WBCs    RBC 4.05 (L) 4.50 - 5.90 x10*6/uL    Hemoglobin 11.3 (L) 13.5 - 17.5 g/dL    Hematocrit 36.4 (L) 41.0 - 52.0 %    MCV 90 80 - 100 fL    MCH 27.9 26.0 - 34.0 pg    MCHC 31.0 (L) 32.0 - 36.0 g/dL    RDW 16.3 (H) 11.5 - 14.5 %    Platelets 116 (L) 150 - 450 x10*3/uL    Neutrophils % 41.9 40.0 - 80.0 %    Immature Granulocytes %, Automated 0.0 0.0 - 0.9 %    Lymphocytes % 41.7 13.0 - 44.0 %    Monocytes % 7.3 2.0 - 10.0 %    Eosinophils % 8.5 0.0 - 6.0 %    Basophils % 0.6 0.0 - 2.0 %    Neutrophils Absolute 2.08 1.60 - 5.50 x10*3/uL    Immature Granulocytes Absolute, Automated 0.00 0.00 - 0.50 x10*3/uL    Lymphocytes Absolute 2.07 0.80 - 3.00 x10*3/uL    Monocytes Absolute 0.36 0.05 - 0.80 x10*3/uL    Eosinophils Absolute 0.42 (H) 0.00 - 0.40 x10*3/uL    Basophils Absolute 0.03 0.00 - 0.10 x10*3/uL   Parathyroid Hormone, Intact    Collection Time: 01/24/24 12:02 PM   Result Value Ref Range    Parathyroid Hormone, Intact 210.1 (H) 18.5 - 88.0 pg/mL   Vitamin D 25-Hydroxy,Total (for eval of Vitamin D levels)    Collection Time: 01/24/24 12:02 PM   Result Value Ref Range    Vitamin D, 25-Hydroxy, Total 63 30 - 100 ng/mL      Imaging results  No results found.      Assessment and Plan  Impression:  1. CKD stage G4/A3, likely representing hypertensive and/or diabetic nephropathy with possible contribution from sickle cell trait. Serum creatinine  today at 3.54 (eGFR of ~ 17 ml/min/1.73 m2); This is essentially unchanged from September, but clearly worse when compared with labs prior to September. Need to ensure partial obstruction not present.    Sub nephrotic proteinuria per last spot studies.   2. HTN. BP on target, on Metoprolol per above list. Consider decrease Metoprolol to 12.5 mg daily to allow introducing low dose ARB.   3. B renal cysts. Stable per last imaging studies 2021.  4. Secondary hyperparathyroidism of CKD. PTH up today at 210. 25D level acceptable     Plan/recommendations:  - continue current medications as prescribed   - consider decrease Metoprolol to 12.5 mg daily to allow introducing low dose ARB   - repeat renal US with pre and post void bladder images  - repeat RFP in ~ 1 month  - avoid all NSAIDs and Knowles 2 inhibitors   - continue to hold off taking  nutritional vitamin D  - start low dose calcitriol 3 x week    RTC in follow up in 4-5 months.     Mercy Plunkett MD

## 2024-01-25 ENCOUNTER — TELEPHONE (OUTPATIENT)
Dept: HEMATOLOGY/ONCOLOGY | Facility: HOSPITAL | Age: 79
End: 2024-01-25
Payer: MEDICARE

## 2024-01-25 LAB — EPO SERPL-ACNC: 5 MU/ML (ref 4–27)

## 2024-01-25 NOTE — TELEPHONE ENCOUNTER
Contacted Rosanne Casal regarding patient's lab results from 1/24. Patient's hemoglobin 11.3, per Rosanne Casal, patient to be canceled for appointment on 1/26 and to come to infusion appointment on 2/9. Spoke to patient's spouse regarding his infusion appointment on 1/26. Patient's spouse verbalized understanding that patient should not come to infusion appointment on 1/26 and should plan to come for appointment on 2/9.

## 2024-01-26 LAB
CYSTATIN C SERPL-MCNC: 3.4 MG/L (ref 0.5–1.2)
GFR/BSA.PRED SERPLBLD CYS-BASED-ARV: 14 ML/MIN/BSA

## 2024-01-28 ENCOUNTER — HOME CARE VISIT (OUTPATIENT)
Dept: HOME HEALTH SERVICES | Facility: HOME HEALTH | Age: 79
End: 2024-01-28
Payer: MEDICARE

## 2024-01-28 VITALS
DIASTOLIC BLOOD PRESSURE: 68 MMHG | SYSTOLIC BLOOD PRESSURE: 140 MMHG | HEART RATE: 70 BPM | RESPIRATION RATE: 13 BRPM | TEMPERATURE: 98.3 F

## 2024-01-28 PROCEDURE — 0023 HH SOC

## 2024-01-28 PROCEDURE — G0151 HHCP-SERV OF PT,EA 15 MIN: HCPCS

## 2024-01-28 ASSESSMENT — ACTIVITIES OF DAILY LIVING (ADL)
ENTERING_EXITING_HOME: MODERATE ASSIST
AMBULATION ASSISTANCE ON FLAT SURFACES: 1
OASIS_M1830: 03

## 2024-01-28 ASSESSMENT — ENCOUNTER SYMPTOMS
MUSCLE WEAKNESS: 1
PERSON REPORTING PAIN: PATIENT
DENIES PAIN: 1

## 2024-01-29 ENCOUNTER — OFFICE VISIT (OUTPATIENT)
Dept: PRIMARY CARE | Facility: CLINIC | Age: 79
End: 2024-01-29
Payer: MEDICARE

## 2024-01-29 VITALS
WEIGHT: 139.2 LBS | HEART RATE: 68 BPM | TEMPERATURE: 97.5 F | DIASTOLIC BLOOD PRESSURE: 70 MMHG | SYSTOLIC BLOOD PRESSURE: 120 MMHG | BODY MASS INDEX: 21.17 KG/M2 | RESPIRATION RATE: 16 BRPM

## 2024-01-29 DIAGNOSIS — I10 PRIMARY HYPERTENSION: ICD-10-CM

## 2024-01-29 DIAGNOSIS — D57.3 SICKLE-CELL TRAIT (CMS-HCC): Primary | ICD-10-CM

## 2024-01-29 DIAGNOSIS — E78.49 OTHER HYPERLIPIDEMIA: ICD-10-CM

## 2024-01-29 DIAGNOSIS — E13.69 OTHER SPECIFIED DIABETES MELLITUS WITH OTHER SPECIFIED COMPLICATION, UNSPECIFIED WHETHER LONG TERM INSULIN USE (MULTI): ICD-10-CM

## 2024-01-29 DIAGNOSIS — F02.80 DEMENTIA IN OTHER DISEASES CLASSIFIED ELSEWHERE, UNSPECIFIED SEVERITY, WITHOUT BEHAVIORAL DISTURBANCE, PSYCHOTIC DISTURBANCE, MOOD DISTURBANCE, AND ANXIETY (MULTI): ICD-10-CM

## 2024-01-29 DIAGNOSIS — G20.A1 PARKINSON'S DISEASE WITHOUT DYSKINESIA, UNSPECIFIED WHETHER MANIFESTATIONS FLUCTUATE (MULTI): ICD-10-CM

## 2024-01-29 PROBLEM — G40.209 LOCALIZATION-RELATED (FOCAL) (PARTIAL) SYMPTOMATIC EPILEPSY AND EPILEPTIC SYNDROMES WITH COMPLEX PARTIAL SEIZURES, NOT INTRACTABLE, WITHOUT STATUS EPILEPTICUS (MULTI): Status: ACTIVE | Noted: 2024-01-29

## 2024-01-29 PROBLEM — G20.B1 PARKINSON'S DISEASE WITH DYSKINESIA WITHOUT FLUCTUATING MANIFESTATIONS (MULTI): Status: RESOLVED | Noted: 2024-01-29 | Resolved: 2024-01-29

## 2024-01-29 PROBLEM — R63.4 WEIGHT LOSS: Status: ACTIVE | Noted: 2024-01-29

## 2024-01-29 PROBLEM — R10.31 RIGHT LOWER QUADRANT ABDOMINAL PAIN: Status: ACTIVE | Noted: 2024-01-29

## 2024-01-29 PROBLEM — M79.673 PAIN OF FOOT: Status: ACTIVE | Noted: 2024-01-29

## 2024-01-29 PROBLEM — I12.0 HYPERTENSIVE CHRONIC KIDNEY DISEASE WITH STAGE 5 CHRONIC KIDNEY DISEASE OR END STAGE RENAL DISEASE (MULTI): Status: RESOLVED | Noted: 2020-09-18 | Resolved: 2024-01-29

## 2024-01-29 PROBLEM — E86.0 MILD DEHYDRATION: Status: ACTIVE | Noted: 2023-01-23

## 2024-01-29 PROBLEM — E87.20 ACIDOSIS, UNSPECIFIED: Status: ACTIVE | Noted: 2023-01-23

## 2024-01-29 PROBLEM — N17.9 ACUTE RENAL FAILURE SUPERIMPOSED ON CHRONIC KIDNEY DISEASE (CMS-HCC): Status: ACTIVE | Noted: 2023-01-23

## 2024-01-29 PROBLEM — K40.90 INGUINAL HERNIA: Status: ACTIVE | Noted: 2024-01-29

## 2024-01-29 PROBLEM — N18.9 ACUTE RENAL FAILURE SUPERIMPOSED ON CHRONIC KIDNEY DISEASE (CMS-HCC): Status: ACTIVE | Noted: 2023-01-23

## 2024-01-29 PROBLEM — Z86.79 HISTORY OF HYPERTENSION: Status: ACTIVE | Noted: 2024-01-29
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
## 2024-01-29 PROBLEM — K29.00 ACUTE GASTRITIS: Status: ACTIVE | Noted: 2024-01-29

## 2024-01-29 PROBLEM — G40.209 LOCALIZATION-RELATED (FOCAL) (PARTIAL) SYMPTOMATIC EPILEPSY AND EPILEPTIC SYNDROMES WITH COMPLEX PARTIAL SEIZURES, NOT INTRACTABLE, WITHOUT STATUS EPILEPTICUS (MULTI): Status: RESOLVED | Noted: 2024-01-29 | Resolved: 2024-01-29

## 2024-01-29 PROBLEM — M77.40 METATARSALGIA: Status: ACTIVE | Noted: 2024-01-29

## 2024-01-29 PROBLEM — K59.00 CONSTIPATION: Status: ACTIVE | Noted: 2023-01-23

## 2024-01-29 PROBLEM — G20.B1 PARKINSON'S DISEASE WITH DYSKINESIA WITHOUT FLUCTUATING MANIFESTATIONS (MULTI): Status: ACTIVE | Noted: 2024-01-29

## 2024-01-29 PROBLEM — R63.0 LOSS OF APPETITE: Status: ACTIVE | Noted: 2024-01-29

## 2024-01-29 PROBLEM — Z20.822 CONTACT WITH AND (SUSPECTED) EXPOSURE TO COVID-19: Status: ACTIVE | Noted: 2023-01-23

## 2024-01-29 PROBLEM — G90.9 DISORDER OF AUTONOMIC NERVOUS SYSTEM: Status: ACTIVE | Noted: 2024-01-29

## 2024-01-29 PROBLEM — R27.0 ATAXIA: Status: ACTIVE | Noted: 2024-01-29

## 2024-01-29 PROBLEM — L89.152 SACRAL DECUBITUS ULCER, STAGE II (MULTI): Status: RESOLVED | Noted: 2023-04-25 | Resolved: 2024-01-29

## 2024-01-29 PROCEDURE — 1126F AMNT PAIN NOTED NONE PRSNT: CPT | Performed by: INTERNAL MEDICINE

## 2024-01-29 PROCEDURE — 1160F RVW MEDS BY RX/DR IN RCRD: CPT | Performed by: INTERNAL MEDICINE

## 2024-01-29 PROCEDURE — 3074F SYST BP LT 130 MM HG: CPT | Performed by: INTERNAL MEDICINE

## 2024-01-29 PROCEDURE — 1159F MED LIST DOCD IN RCRD: CPT | Performed by: INTERNAL MEDICINE

## 2024-01-29 PROCEDURE — 3078F DIAST BP <80 MM HG: CPT | Performed by: INTERNAL MEDICINE

## 2024-01-29 PROCEDURE — 1036F TOBACCO NON-USER: CPT | Performed by: INTERNAL MEDICINE

## 2024-01-29 PROCEDURE — 99214 OFFICE O/P EST MOD 30 MIN: CPT | Performed by: INTERNAL MEDICINE

## 2024-01-29 NOTE — PROGRESS NOTES
Markie Nobles is a 79 y.o. male   Markie Nobles is a 78 y.o. male with a past medical history of MGUS, CAD,s/p CABG, HTN, CKD IV, Anemia, ABEBE, hyperlipidemia, obesity, BPH,and gout, DM, HLD, Parkinsons with dementia, Tubular Adenoma due in 2024         Doing well overall  Celebrated 79th birthday  Has not had any more falls  Getting PT at home now  Memory stable  Renal functions stable overall    Saw surgery , hernia ruled out    Feels fine  No chest pain/  SOB/ dizziness  BM OK  Energy level ok  Appetite OK                 Review of Systems     Constitutional: no fever, no chills, not feeling poorly, not feeling tired and no recent weight gain, no recent weight loss.   ENT: no earache, no hearing loss, no nosebleeds, no nasal discharge, no sore throat and no hoarseness.   Cardiovascular: the heart rate was not slow, the heart rate was not fast, no chest pain, no palpitations, no intermittent leg claudication and no lower extremity edema.   Respiratory: no cough, wheezing or shortness of breath at rest or exertion  Gastrointestinal: constipation,  no melena, no nausea, no diarrhea, no vomiting and no blood in stools.   Musculoskeletal: no arthralgias, no myalgias, no back pain, no joint swelling, no joint stiffness, no limb pain and no limb swelling.   Integumentary: no skin rashes, no skin lesions, no itching, no skin wound and no dry skin.   Neurological: no headache, no confusion, no numbness, no dizziness, no tingling and no fainting.   All other systems have been reviewed and are negative for complaint.       Vitals:    01/29/24 1409   BP: 120/70   Pulse: 68   Resp: 16   Temp: 36.4 °C (97.5 °F)        Physical Exam     Constitutional   General appearance: Alert and in no acute distress.     Pulmonary   Respiratory assessment: No respiratory distress, normal respiratory rhythm and effort.    Auscultation of Lungs: Clear bilateral breath sounds.   Cardiovascular   Auscultation of heart: Apical pulse normal,  heart rate and rhythm normal, normal S1 and S2, no murmurs and no pericardial rub.    Exam for edema: No peripheral edema.   Abdomen   Abdominal Exam: No bruits, normal bowel sounds, soft, non-tender, no abdominal mass palpated.    Liver and Spleen exam: No hepato-splenomegaly.   Tender right inguinal hernia  Musculoskeletal   Examination of gait: wheelchair  Inspection of digits and nails: No clubbing or cyanosis of the fingernails.    Inspection/palpation of joints, bones and muscles: No joint swelling. Normal movement of all extremities.   Skin   Skin inspection: Normal skin color and pigmentation, normal skin turgor and no visible rash.   Neurologic   Cranial nerves: Nerves 2-12 were intact, motor weakness  Alert x 2-3    Assessment/Plan          Markie Nobles is a 78 y.o. male with a past medical history of MGUS, CAD,s/p CABG, HTN, CKD IV, Anemia, ABEBE, hyperlipidemia, obesity, BPH,and gout, DM, HLD, Parkinsons with dementia, Tubular Adenoma due in 2024    # Constipation  Increase fluid intake/Metamucil/ Benefiber    # Malnutrition  Boost BID  Did not take the remeron as concerned about side effects      # DM  Diet controlled    # HTN/ CKD IV  Stable    # HLD  Stable  Continue current medications    # Parkinson  Continue carbidopa levodopa  [facility]    Total appt time today was 35  minutes. Time included preparing to see the pt, obtaining the hx, performing the medically necessary appropriate physical exam, counseling & educating the pt, ordering tests & procedures, referring & communicating w/other providers, independently interpreting results & communicating the results to the pt, care, coordination & documenting clinical information in the medical record.

## 2024-01-30 ENCOUNTER — HOME CARE VISIT (OUTPATIENT)
Dept: HOME HEALTH SERVICES | Facility: HOME HEALTH | Age: 79
End: 2024-01-30
Payer: MEDICARE

## 2024-01-30 ENCOUNTER — LAB (OUTPATIENT)
Dept: LAB | Facility: LAB | Age: 79
End: 2024-01-30
Payer: MEDICARE

## 2024-01-30 DIAGNOSIS — N18.4 CHRONIC KIDNEY DISEASE, STAGE 4 (SEVERE) (MULTI): ICD-10-CM

## 2024-01-30 LAB
APPEARANCE UR: ABNORMAL
BACTERIA #/AREA URNS AUTO: ABNORMAL /HPF
BILIRUB UR STRIP.AUTO-MCNC: NEGATIVE MG/DL
COLOR UR: YELLOW
CREAT UR-MCNC: 65.9 MG/DL (ref 20–370)
GLUCOSE UR STRIP.AUTO-MCNC: NEGATIVE MG/DL
KETONES UR STRIP.AUTO-MCNC: ABNORMAL MG/DL
LEUKOCYTE ESTERASE UR QL STRIP.AUTO: ABNORMAL
MICROALBUMIN UR-MCNC: 936.3 MG/L
MICROALBUMIN/CREAT UR: 1420.8 UG/MG CREAT
NITRITE UR QL STRIP.AUTO: NEGATIVE
PH UR STRIP.AUTO: 6 [PH]
PROT UR STRIP.AUTO-MCNC: ABNORMAL MG/DL
RBC # UR STRIP.AUTO: NEGATIVE /UL
RBC #/AREA URNS AUTO: ABNORMAL /HPF
SP GR UR STRIP.AUTO: 1.01
SQUAMOUS #/AREA URNS AUTO: ABNORMAL /HPF
UROBILINOGEN UR STRIP.AUTO-MCNC: <2 MG/DL
WBC #/AREA URNS AUTO: >50 /HPF
WBC CLUMPS #/AREA URNS AUTO: ABNORMAL /HPF

## 2024-01-30 PROCEDURE — G0152 HHCP-SERV OF OT,EA 15 MIN: HCPCS

## 2024-01-30 PROCEDURE — 82570 ASSAY OF URINE CREATININE: CPT

## 2024-01-30 PROCEDURE — 82043 UR ALBUMIN QUANTITATIVE: CPT

## 2024-01-30 PROCEDURE — 81001 URINALYSIS AUTO W/SCOPE: CPT

## 2024-01-30 ASSESSMENT — ENCOUNTER SYMPTOMS
PAIN LOCATION: LEFT BUTTOCK
PAIN LOCATION - PAIN SEVERITY: 3/10
PAIN LOCATION: LEFT BUTTOCK
PAIN LOCATION - PAIN QUALITY: ACHE
PAIN LOCATION - EXACERBATING FACTORS: SITTING
LOWEST PAIN SEVERITY IN PAST 24 HOURS: 2/10
PAIN LOCATION - EXACERBATING FACTORS: SITTING
PAIN LOCATION - PAIN QUALITY: ACHE
PERSON REPORTING PAIN: PATIENT
PAIN SEVERITY GOAL: 0/10
HIGHEST PAIN SEVERITY IN PAST 24 HOURS: 8/10
PAIN LOCATION - RELIEVING FACTORS: WEIGHT SHIFT
PAIN LOCATION - PAIN SEVERITY: 3/10
PAIN: 1
PAIN LOCATION - PAIN FREQUENCY: CONSTANT
PAIN LOCATION - PAIN FREQUENCY: CONSTANT
SUBJECTIVE PAIN PROGRESSION: WAXING AND WANING

## 2024-01-30 ASSESSMENT — ACTIVITIES OF DAILY LIVING (ADL)
ORAL_CARE_CURRENT_FUNCTION: NEEDS ASSISTANCE
AMBULATION ASSISTANCE: 1
DRESSING_UB_CURRENT_FUNCTION: MINIMUM ASSIST
TELEPHONE USE ASSESSED: 1
FEEDING: STAND BY ASSIST
PHYSICAL TRANSFERS ASSESSED: 1
GROOMING ASSESSED: 1
SHOPPING: DEPENDENT
LAUNDRY ASSESSED: 1
LAUNDRY: DEPENDENT
AMBULATION ASSISTANCE: MAXIMUM ASSIST
ORAL_CARE_ASSESSED: 1
CURRENT_FUNCTION: MAXIMUM ASSIST
TOILETING: 1
GROOMING_CURRENT_FUNCTION: MODERATE ASSIST
PREPARING MEALS: DEPENDENT
BATHING ASSESSED: 1
SHOPPING ASSESSED: 1
TOILETING: MODERATE ASSIST
HOUSEKEEPING ASSESSED: 1
USING THE TELPHONE: NEEDS ASSISTANCE
LIGHT HOUSEKEEPING: DEPENDENT
FEEDING ASSESSED: 1
TRANSPORTATION ASSESSED: 1
DRESSING_LB_CURRENT_FUNCTION: MODERATE ASSIST
BATHING_CURRENT_FUNCTION: MAXIMUM ASSIST
TRANSPORTATION: DEPENDENT

## 2024-02-01 ENCOUNTER — HOME CARE VISIT (OUTPATIENT)
Dept: HOME HEALTH SERVICES | Facility: HOME HEALTH | Age: 79
End: 2024-02-01
Payer: MEDICARE

## 2024-02-01 VITALS — HEART RATE: 72 BPM | RESPIRATION RATE: 16 BRPM | OXYGEN SATURATION: 98 % | TEMPERATURE: 98.6 F

## 2024-02-01 PROCEDURE — G0151 HHCP-SERV OF PT,EA 15 MIN: HCPCS

## 2024-02-01 SDOH — HEALTH STABILITY: PHYSICAL HEALTH: EXERCISE TYPE: INSTRUCTED PATIENT AND CG

## 2024-02-01 ASSESSMENT — ENCOUNTER SYMPTOMS
LOSS OF SENSATION IN FEET: 0
OCCASIONAL FEELINGS OF UNSTEADINESS: 1
SUBJECTIVE PAIN PROGRESSION: RESOLVED
MUSCLE WEAKNESS: 1
DENIES PAIN: 1
PERSON REPORTING PAIN: PATIENT
DEPRESSION: 0
TREMORS: 1

## 2024-02-01 ASSESSMENT — PAIN SCALES - PAIN ASSESSMENT IN ADVANCED DEMENTIA (PAINAD)
TOTALSCORE: 2
BREATHING: 1
NEGVOCALIZATION: 1 - OCCASIONAL MOAN OR GROAN. LOW-LEVEL SPEECH WITH A NEGATIVE OR DISAPPROVING QUALITY.
FACIALEXPRESSION: 0
CONSOLABILITY: 0
BODYLANGUAGE: 0
FACIALEXPRESSION: 0 - SMILING OR INEXPRESSIVE.
CONSOLABILITY: 0 - NO NEED TO CONSOLE.
NEGVOCALIZATION: 1
BODYLANGUAGE: 0 - RELAXED.

## 2024-02-01 ASSESSMENT — ACTIVITIES OF DAILY LIVING (ADL)
CURRENT_FUNCTION: ONE PERSON
AMBULATION ASSISTANCE ON FLAT SURFACES: 1
AMBULATION ASSISTANCE: ONE PERSON

## 2024-02-02 ENCOUNTER — HOME CARE VISIT (OUTPATIENT)
Dept: HOME HEALTH SERVICES | Facility: HOME HEALTH | Age: 79
End: 2024-02-02
Payer: MEDICARE

## 2024-02-02 VITALS
TEMPERATURE: 98.3 F | HEART RATE: 74 BPM | RESPIRATION RATE: 16 BRPM | SYSTOLIC BLOOD PRESSURE: 152 MMHG | DIASTOLIC BLOOD PRESSURE: 77 MMHG

## 2024-02-02 PROCEDURE — G0158 HHC OT ASSISTANT EA 15: HCPCS

## 2024-02-06 ENCOUNTER — HOME CARE VISIT (OUTPATIENT)
Dept: HOME HEALTH SERVICES | Facility: HOME HEALTH | Age: 79
End: 2024-02-06
Payer: MEDICARE

## 2024-02-06 VITALS — OXYGEN SATURATION: 99 % | TEMPERATURE: 98.6 F | HEART RATE: 50 BPM | RESPIRATION RATE: 16 BRPM

## 2024-02-06 PROCEDURE — G0152 HHCP-SERV OF OT,EA 15 MIN: HCPCS

## 2024-02-06 PROCEDURE — G0151 HHCP-SERV OF PT,EA 15 MIN: HCPCS

## 2024-02-06 SDOH — HEALTH STABILITY: PHYSICAL HEALTH: EXERCISE TYPE: HEP UPDATED WITH SPOUSE

## 2024-02-06 ASSESSMENT — ENCOUNTER SYMPTOMS
PAIN: 1
PAIN LOCATION - PAIN SEVERITY: 3/10
PAIN SEVERITY GOAL: 0/10
PAIN LOCATION: NECK
PAIN LOCATION - PAIN SEVERITY: 4/10
PAIN LOCATION - PAIN FREQUENCY: CONSTANT
PERSON REPORTING PAIN: PATIENT
PAIN LOCATION - PAIN QUALITY: ACHE
LOWEST PAIN SEVERITY IN PAST 24 HOURS: 2/10
PAIN LOCATION - PAIN QUALITY: SORE
HIGHEST PAIN SEVERITY IN PAST 24 HOURS: 6/10
PAIN: 1
SUBJECTIVE PAIN PROGRESSION: WAXING AND WANING
SUBJECTIVE PAIN PROGRESSION: WAXING AND WANING
HIGHEST PAIN SEVERITY IN PAST 24 HOURS: 3/10
OCCASIONAL FEELINGS OF UNSTEADINESS: 1
PAIN LOCATION: COCCYX
PERSON REPORTING PAIN: PATIENT
LOWEST PAIN SEVERITY IN PAST 24 HOURS: 0/10
MUSCLE WEAKNESS: 1
PAIN LOCATION - EXACERBATING FACTORS: SITTING

## 2024-02-06 ASSESSMENT — PAIN SCALES - PAIN ASSESSMENT IN ADVANCED DEMENTIA (PAINAD)
FACIALEXPRESSION: 0
NEGVOCALIZATION: 1
BREATHING: 1
TOTALSCORE: 2
CONSOLABILITY: 0
NEGVOCALIZATION: 1 - OCCASIONAL MOAN OR GROAN. LOW-LEVEL SPEECH WITH A NEGATIVE OR DISAPPROVING QUALITY.
BODYLANGUAGE: 0
BODYLANGUAGE: 0 - RELAXED.
FACIALEXPRESSION: 0 - SMILING OR INEXPRESSIVE.
CONSOLABILITY: 0 - NO NEED TO CONSOLE.

## 2024-02-06 ASSESSMENT — ACTIVITIES OF DAILY LIVING (ADL)
AMBULATION ASSISTANCE: TWO PERSON
AMBULATION ASSISTANCE ON FLAT SURFACES: 1
CURRENT_FUNCTION: ONE PERSON

## 2024-02-07 PROCEDURE — G0180 MD CERTIFICATION HHA PATIENT: HCPCS | Performed by: PSYCHIATRY & NEUROLOGY

## 2024-02-08 ENCOUNTER — HOME CARE VISIT (OUTPATIENT)
Dept: HOME HEALTH SERVICES | Facility: HOME HEALTH | Age: 79
End: 2024-02-08

## 2024-02-08 PROCEDURE — G0153 HHCP-SVS OF S/L PATH,EA 15MN: HCPCS

## 2024-02-08 RX ORDER — ALBUTEROL SULFATE 0.83 MG/ML
3 SOLUTION RESPIRATORY (INHALATION) AS NEEDED
Status: CANCELLED | OUTPATIENT
Start: 2024-02-09

## 2024-02-08 RX ORDER — FAMOTIDINE 10 MG/ML
20 INJECTION INTRAVENOUS ONCE AS NEEDED
Status: CANCELLED | OUTPATIENT
Start: 2024-02-09

## 2024-02-08 RX ORDER — DIPHENHYDRAMINE HYDROCHLORIDE 50 MG/ML
50 INJECTION INTRAMUSCULAR; INTRAVENOUS AS NEEDED
Status: CANCELLED | OUTPATIENT
Start: 2024-02-09

## 2024-02-08 RX ORDER — EPINEPHRINE 0.3 MG/.3ML
0.3 INJECTION SUBCUTANEOUS EVERY 5 MIN PRN
Status: CANCELLED | OUTPATIENT
Start: 2024-02-09

## 2024-02-08 ASSESSMENT — PAIN SCALES - PAIN ASSESSMENT IN ADVANCED DEMENTIA (PAINAD)
FACIALEXPRESSION: 0
CONSOLABILITY: 0 - NO NEED TO CONSOLE.
NEGVOCALIZATION: 0
TOTALSCORE: 0
NEGVOCALIZATION: 0 - NONE.
CONSOLABILITY: 0
BREATHING: 0
BODYLANGUAGE: 0
BODYLANGUAGE: 0 - RELAXED.
FACIALEXPRESSION: 0 - SMILING OR INEXPRESSIVE.

## 2024-02-08 ASSESSMENT — ENCOUNTER SYMPTOMS: PAIN: 1

## 2024-02-09 ENCOUNTER — HOME CARE VISIT (OUTPATIENT)
Dept: HOME HEALTH SERVICES | Facility: HOME HEALTH | Age: 79
End: 2024-02-09
Payer: MEDICARE

## 2024-02-09 ENCOUNTER — APPOINTMENT (OUTPATIENT)
Dept: HEMATOLOGY/ONCOLOGY | Facility: HOSPITAL | Age: 79
End: 2024-02-09
Payer: MEDICARE

## 2024-02-09 ENCOUNTER — TELEPHONE (OUTPATIENT)
Dept: HEMATOLOGY/ONCOLOGY | Facility: HOSPITAL | Age: 79
End: 2024-02-09
Payer: MEDICARE

## 2024-02-09 VITALS — HEART RATE: 66 BPM | TEMPERATURE: 98.6 F | RESPIRATION RATE: 14 BRPM | OXYGEN SATURATION: 100 %

## 2024-02-09 VITALS
RESPIRATION RATE: 17 BRPM | DIASTOLIC BLOOD PRESSURE: 73 MMHG | HEART RATE: 63 BPM | SYSTOLIC BLOOD PRESSURE: 125 MMHG | TEMPERATURE: 98.3 F

## 2024-02-09 DIAGNOSIS — N18.4 CHRONIC KIDNEY DISEASE, STAGE 4 (SEVERE) (MULTI): Primary | ICD-10-CM

## 2024-02-09 PROCEDURE — G0158 HHC OT ASSISTANT EA 15: HCPCS

## 2024-02-09 PROCEDURE — G0151 HHCP-SERV OF PT,EA 15 MIN: HCPCS

## 2024-02-09 SDOH — HEALTH STABILITY: PHYSICAL HEALTH: EXERCISE TYPE: UPDATED HEP,WALKING PROGRAM

## 2024-02-09 ASSESSMENT — ENCOUNTER SYMPTOMS
DEPRESSION: 0
DENIES PAIN: 1
LOSS OF SENSATION IN FEET: 1
PERSON REPORTING PAIN: PATIENT
MUSCLE WEAKNESS: 1
OCCASIONAL FEELINGS OF UNSTEADINESS: 1

## 2024-02-09 ASSESSMENT — PAIN SCALES - PAIN ASSESSMENT IN ADVANCED DEMENTIA (PAINAD)
BODYLANGUAGE: 0 - RELAXED.
FACIALEXPRESSION: 0
NEGVOCALIZATION: 0 - NONE.
FACIALEXPRESSION: 0 - SMILING OR INEXPRESSIVE.
BREATHING: 0
NEGVOCALIZATION: 0
BODYLANGUAGE: 0
CONSOLABILITY: 0
TOTALSCORE: 0
CONSOLABILITY: 0 - NO NEED TO CONSOLE.

## 2024-02-09 ASSESSMENT — ACTIVITIES OF DAILY LIVING (ADL)
AMBULATION ASSISTANCE: ONE PERSON
AMBULATION ASSISTANCE ON FLAT SURFACES: 1

## 2024-02-12 ENCOUNTER — INFUSION (OUTPATIENT)
Dept: HEMATOLOGY/ONCOLOGY | Facility: HOSPITAL | Age: 79
End: 2024-02-12
Payer: MEDICARE

## 2024-02-12 ENCOUNTER — HOME CARE VISIT (OUTPATIENT)
Dept: HOME HEALTH SERVICES | Facility: HOME HEALTH | Age: 79
End: 2024-02-12
Payer: MEDICARE

## 2024-02-12 VITALS
RESPIRATION RATE: 18 BRPM | DIASTOLIC BLOOD PRESSURE: 82 MMHG | HEART RATE: 76 BPM | WEIGHT: 132 LBS | SYSTOLIC BLOOD PRESSURE: 148 MMHG | OXYGEN SATURATION: 99 % | TEMPERATURE: 98.6 F | BODY MASS INDEX: 20.07 KG/M2

## 2024-02-12 VITALS
SYSTOLIC BLOOD PRESSURE: 149 MMHG | HEART RATE: 67 BPM | TEMPERATURE: 98.4 F | DIASTOLIC BLOOD PRESSURE: 92 MMHG | RESPIRATION RATE: 16 BRPM

## 2024-02-12 DIAGNOSIS — N18.4 CKD (CHRONIC KIDNEY DISEASE) STAGE 4, GFR 15-29 ML/MIN (MULTI): Primary | ICD-10-CM

## 2024-02-12 DIAGNOSIS — N18.4 CKD (CHRONIC KIDNEY DISEASE) STAGE 4, GFR 15-29 ML/MIN (MULTI): ICD-10-CM

## 2024-02-12 LAB
BASOPHILS # BLD AUTO: 0.05 X10*3/UL (ref 0–0.1)
BASOPHILS NFR BLD AUTO: 0.9 %
EOSINOPHIL # BLD AUTO: 0.32 X10*3/UL (ref 0–0.4)
EOSINOPHIL NFR BLD AUTO: 6 %
ERYTHROCYTE [DISTWIDTH] IN BLOOD BY AUTOMATED COUNT: 15.8 % (ref 11.5–14.5)
HCT VFR BLD AUTO: 37.2 % (ref 41–52)
HGB BLD-MCNC: 11.8 G/DL (ref 13.5–17.5)
IMM GRANULOCYTES # BLD AUTO: 0.01 X10*3/UL (ref 0–0.5)
IMM GRANULOCYTES NFR BLD AUTO: 0.2 % (ref 0–0.9)
LYMPHOCYTES # BLD AUTO: 1.89 X10*3/UL (ref 0.8–3)
LYMPHOCYTES NFR BLD AUTO: 35.3 %
MCH RBC QN AUTO: 28.4 PG (ref 26–34)
MCHC RBC AUTO-ENTMCNC: 31.7 G/DL (ref 32–36)
MCV RBC AUTO: 89 FL (ref 80–100)
MONOCYTES # BLD AUTO: 0.39 X10*3/UL (ref 0.05–0.8)
MONOCYTES NFR BLD AUTO: 7.3 %
NEUTROPHILS # BLD AUTO: 2.69 X10*3/UL (ref 1.6–5.5)
NEUTROPHILS NFR BLD AUTO: 50.3 %
NRBC BLD-RTO: 0 /100 WBCS (ref 0–0)
PLATELET # BLD AUTO: 156 X10*3/UL (ref 150–450)
RBC # BLD AUTO: 4.16 X10*6/UL (ref 4.5–5.9)
WBC # BLD AUTO: 5.4 X10*3/UL (ref 4.4–11.3)

## 2024-02-12 PROCEDURE — 36415 COLL VENOUS BLD VENIPUNCTURE: CPT

## 2024-02-12 PROCEDURE — 85025 COMPLETE CBC W/AUTO DIFF WBC: CPT

## 2024-02-12 PROCEDURE — G0158 HHC OT ASSISTANT EA 15: HCPCS

## 2024-02-12 RX ORDER — EPINEPHRINE 0.3 MG/.3ML
0.3 INJECTION SUBCUTANEOUS EVERY 5 MIN PRN
Status: CANCELLED | OUTPATIENT
Start: 2024-02-12

## 2024-02-12 RX ORDER — DIPHENHYDRAMINE HYDROCHLORIDE 50 MG/ML
50 INJECTION INTRAMUSCULAR; INTRAVENOUS AS NEEDED
Status: CANCELLED | OUTPATIENT
Start: 2024-02-12

## 2024-02-12 RX ORDER — FAMOTIDINE 10 MG/ML
20 INJECTION INTRAVENOUS ONCE AS NEEDED
Status: CANCELLED | OUTPATIENT
Start: 2024-02-12

## 2024-02-12 RX ORDER — ALBUTEROL SULFATE 0.83 MG/ML
3 SOLUTION RESPIRATORY (INHALATION) AS NEEDED
Status: CANCELLED | OUTPATIENT
Start: 2024-02-12

## 2024-02-12 ASSESSMENT — PAIN SCALES - GENERAL: PAINLEVEL: 0-NO PAIN

## 2024-02-13 ENCOUNTER — HOSPITAL ENCOUNTER (OUTPATIENT)
Dept: RADIOLOGY | Facility: CLINIC | Age: 79
Discharge: HOME | End: 2024-02-13
Payer: MEDICARE

## 2024-02-13 ENCOUNTER — LAB (OUTPATIENT)
Dept: LAB | Facility: LAB | Age: 79
End: 2024-02-13
Payer: MEDICARE

## 2024-02-13 DIAGNOSIS — N18.4 CHRONIC KIDNEY DISEASE, STAGE 4 (SEVERE) (MULTI): ICD-10-CM

## 2024-02-13 PROCEDURE — 76770 US EXAM ABDO BACK WALL COMP: CPT | Performed by: RADIOLOGY

## 2024-02-13 PROCEDURE — 36415 COLL VENOUS BLD VENIPUNCTURE: CPT

## 2024-02-13 PROCEDURE — 80069 RENAL FUNCTION PANEL: CPT

## 2024-02-13 PROCEDURE — 76770 US EXAM ABDO BACK WALL COMP: CPT

## 2024-02-14 LAB
ALBUMIN SERPL BCP-MCNC: 3.4 G/DL (ref 3.4–5)
ANION GAP SERPL CALC-SCNC: 14 MMOL/L (ref 10–20)
BUN SERPL-MCNC: 35 MG/DL (ref 6–23)
CALCIUM SERPL-MCNC: 8.8 MG/DL (ref 8.6–10.6)
CHLORIDE SERPL-SCNC: 108 MMOL/L (ref 98–107)
CO2 SERPL-SCNC: 25 MMOL/L (ref 21–32)
CREAT SERPL-MCNC: 3.44 MG/DL (ref 0.5–1.3)
EGFRCR SERPLBLD CKD-EPI 2021: 17 ML/MIN/1.73M*2
GLUCOSE SERPL-MCNC: 119 MG/DL (ref 74–99)
PHOSPHATE SERPL-MCNC: 3.3 MG/DL (ref 2.5–4.9)
POTASSIUM SERPL-SCNC: 4.8 MMOL/L (ref 3.5–5.3)
SODIUM SERPL-SCNC: 142 MMOL/L (ref 136–145)

## 2024-02-15 ENCOUNTER — HOME CARE VISIT (OUTPATIENT)
Dept: HOME HEALTH SERVICES | Facility: HOME HEALTH | Age: 79
End: 2024-02-15
Payer: MEDICARE

## 2024-02-15 VITALS — RESPIRATION RATE: 16 BRPM | OXYGEN SATURATION: 99 % | HEART RATE: 68 BPM | TEMPERATURE: 98.6 F

## 2024-02-15 PROCEDURE — G0151 HHCP-SERV OF PT,EA 15 MIN: HCPCS

## 2024-02-15 PROCEDURE — G0153 HHCP-SVS OF S/L PATH,EA 15MN: HCPCS

## 2024-02-15 SDOH — HEALTH STABILITY: PHYSICAL HEALTH: EXERCISE TYPE: GENERAL STRENGTHENING,BALANCE TRAINING

## 2024-02-15 ASSESSMENT — ENCOUNTER SYMPTOMS
PERSON REPORTING PAIN: PATIENT
PRODUCTIVE COUGH: 1
LOSS OF SENSATION IN FEET: 0
SUBJECTIVE PAIN PROGRESSION: UNCHANGED
DENIES PAIN: 1
OCCASIONAL FEELINGS OF UNSTEADINESS: 1
DEPRESSION: 0
PERSON REPORTING PAIN: PATIENT
DENIES PAIN: 1
MUSCLE WEAKNESS: 1

## 2024-02-15 ASSESSMENT — PAIN SCALES - PAIN ASSESSMENT IN ADVANCED DEMENTIA (PAINAD)
FACIALEXPRESSION: 0 - SMILING OR INEXPRESSIVE.
CONSOLABILITY: 0
BREATHING: 0
FACIALEXPRESSION: 0
NEGVOCALIZATION: 0 - NONE.
NEGVOCALIZATION: 0
BODYLANGUAGE: 0 - RELAXED.
BODYLANGUAGE: 0
TOTALSCORE: 0
CONSOLABILITY: 0 - NO NEED TO CONSOLE.

## 2024-02-16 ENCOUNTER — HOME CARE VISIT (OUTPATIENT)
Dept: HOME HEALTH SERVICES | Facility: HOME HEALTH | Age: 79
End: 2024-02-16
Payer: MEDICARE

## 2024-02-16 PROCEDURE — G0152 HHCP-SERV OF OT,EA 15 MIN: HCPCS

## 2024-02-16 ASSESSMENT — ENCOUNTER SYMPTOMS
SUBJECTIVE PAIN PROGRESSION: WAXING AND WANING
HIGHEST PAIN SEVERITY IN PAST 24 HOURS: 5/10
PAIN LOCATION: RIGHT BUTTOCK
LOWEST PAIN SEVERITY IN PAST 24 HOURS: 2/10
PAIN: 1
PAIN LOCATION - PAIN QUALITY: BURNING
PERSON REPORTING PAIN: PATIENT
PAIN LOCATION - PAIN SEVERITY: 2/10
PAIN SEVERITY GOAL: 0/10

## 2024-02-17 ASSESSMENT — ACTIVITIES OF DAILY LIVING (ADL)
BATHING ASSESSED: 1
BATHING_CURRENT_FUNCTION: MODERATE ASSIST

## 2024-02-17 ASSESSMENT — ENCOUNTER SYMPTOMS: PAIN LOCATION - PAIN FREQUENCY: CONSTANT

## 2024-02-19 ENCOUNTER — HOME CARE VISIT (OUTPATIENT)
Dept: HOME HEALTH SERVICES | Facility: HOME HEALTH | Age: 79
End: 2024-02-19
Payer: MEDICARE

## 2024-02-19 VITALS
SYSTOLIC BLOOD PRESSURE: 156 MMHG | DIASTOLIC BLOOD PRESSURE: 94 MMHG | RESPIRATION RATE: 16 BRPM | HEART RATE: 84 BPM | TEMPERATURE: 97.6 F

## 2024-02-19 PROCEDURE — G0158 HHC OT ASSISTANT EA 15: HCPCS

## 2024-02-20 ENCOUNTER — HOME CARE VISIT (OUTPATIENT)
Dept: HOME HEALTH SERVICES | Facility: HOME HEALTH | Age: 79
End: 2024-02-20
Payer: MEDICARE

## 2024-02-20 VITALS — RESPIRATION RATE: 14 BRPM | TEMPERATURE: 98.6 F | OXYGEN SATURATION: 98 %

## 2024-02-20 PROCEDURE — G0151 HHCP-SERV OF PT,EA 15 MIN: HCPCS

## 2024-02-20 SDOH — HEALTH STABILITY: PHYSICAL HEALTH: EXERCISE TYPE: GENERAL STRENGTHENING,AMBULATION

## 2024-02-20 ASSESSMENT — ENCOUNTER SYMPTOMS
LIMITED RANGE OF MOTION: 1
PERSON REPORTING PAIN: PATIENT
DEPRESSION: 0
LOSS OF SENSATION IN FEET: 1
OCCASIONAL FEELINGS OF UNSTEADINESS: 1
DENIES PAIN: 1
MUSCLE WEAKNESS: 1

## 2024-02-20 ASSESSMENT — ACTIVITIES OF DAILY LIVING (ADL)
CURRENT_FUNCTION: ONE PERSON
AMBULATION ASSISTANCE: ONE PERSON

## 2024-02-21 ENCOUNTER — TELEPHONE (OUTPATIENT)
Dept: PRIMARY CARE | Facility: CLINIC | Age: 79
End: 2024-02-21
Payer: MEDICARE

## 2024-02-21 ENCOUNTER — HOME CARE VISIT (OUTPATIENT)
Dept: HOME HEALTH SERVICES | Facility: HOME HEALTH | Age: 79
End: 2024-02-21
Payer: MEDICARE

## 2024-02-21 DIAGNOSIS — R63.4 WEIGHT LOSS: Primary | ICD-10-CM

## 2024-02-21 PROCEDURE — G0152 HHCP-SERV OF OT,EA 15 MIN: HCPCS

## 2024-02-22 ENCOUNTER — HOME CARE VISIT (OUTPATIENT)
Dept: HOME HEALTH SERVICES | Facility: HOME HEALTH | Age: 79
End: 2024-02-22
Payer: MEDICARE

## 2024-02-22 PROCEDURE — G0153 HHCP-SVS OF S/L PATH,EA 15MN: HCPCS

## 2024-02-22 ASSESSMENT — ENCOUNTER SYMPTOMS
SUBJECTIVE PAIN PROGRESSION: WAXING AND WANING
LOWEST PAIN SEVERITY IN PAST 24 HOURS: 4/10
PAIN LOCATION - PAIN SEVERITY: 4/10
PERSON REPORTING PAIN: PATIENT
PAIN LOCATION - PAIN SEVERITY: 4/10
PAIN: 1
PAIN LOCATION: RIGHT BUTTOCK
HIGHEST PAIN SEVERITY IN PAST 24 HOURS: 8/10
PAIN LOCATION: LEFT BUTTOCK
PAIN SEVERITY GOAL: 0/10

## 2024-02-23 ENCOUNTER — HOME CARE VISIT (OUTPATIENT)
Dept: HOME HEALTH SERVICES | Facility: HOME HEALTH | Age: 79
End: 2024-02-23
Payer: MEDICARE

## 2024-02-23 ENCOUNTER — APPOINTMENT (OUTPATIENT)
Dept: HEMATOLOGY/ONCOLOGY | Facility: HOSPITAL | Age: 79
End: 2024-02-23
Payer: MEDICARE

## 2024-02-23 VITALS — HEART RATE: 66 BPM | TEMPERATURE: 98.6 F | OXYGEN SATURATION: 98 %

## 2024-02-23 PROCEDURE — G0151 HHCP-SERV OF PT,EA 15 MIN: HCPCS

## 2024-02-23 SDOH — HEALTH STABILITY: PHYSICAL HEALTH: EXERCISE TYPE: THROUGH FUNCTIONAL ACTIVITIES

## 2024-02-23 ASSESSMENT — PAIN SCALES - PAIN ASSESSMENT IN ADVANCED DEMENTIA (PAINAD)
CONSOLABILITY: 2
NEGVOCALIZATION: 1 - OCCASIONAL MOAN OR GROAN. LOW-LEVEL SPEECH WITH A NEGATIVE OR DISAPPROVING QUALITY.
BREATHING: 1
BODYLANGUAGE: 1 - TENSE. DISTRESSED PACING. FIDGETING.
FACIALEXPRESSION: 2
BODYLANGUAGE: 1
FACIALEXPRESSION: 2 - FACIAL GRIMACING.
TOTALSCORE: 7
NEGVOCALIZATION: 1
CONSOLABILITY: 2 - UNABLE TO CONSOLE, DISTRACT OR REASSURE.

## 2024-02-23 ASSESSMENT — ENCOUNTER SYMPTOMS
MUSCLE WEAKNESS: 1
SUBJECTIVE PAIN PROGRESSION: WAXING AND WANING
TREMORS: 1
OCCASIONAL FEELINGS OF UNSTEADINESS: 1
PAIN LOCATION - PAIN QUALITY: SORE
LOWEST PAIN SEVERITY IN PAST 24 HOURS: 4/10
PERSON REPORTING PAIN: PATIENT
PAIN: 1
PAIN LOCATION: BACK
PAIN LOCATION - PAIN SEVERITY: 6/10
HIGHEST PAIN SEVERITY IN PAST 24 HOURS: 6/10

## 2024-02-23 ASSESSMENT — ACTIVITIES OF DAILY LIVING (ADL)
AMBULATION ASSISTANCE ON FLAT SURFACES: 1
AMBULATION ASSISTANCE: ONE PERSON
CURRENT_FUNCTION: ONE PERSON

## 2024-02-23 NOTE — HOME HEALTH
CGreports due to patient choking,she performed [place] manuever,yesterday.Today patient has Lowback pain

## 2024-02-23 NOTE — CASE COMMUNICATION
Requesting PT extension to continue Balance training,transfer training ,gait training and home safety teaching

## 2024-02-23 NOTE — PROGRESS NOTES
Markie Nobles is a 79 y.o. male with past medical history of HTN, hx of CABG on Plavix, chronic kidney disease stage 4, and Parkinsons with dementia who is referred by PCP Dr. Leslie Watson for weight loss. Patient presents today with wife who provides assistance with HPI. His baseline weight when he was well was about 200 pounds. Since diagnosed with Parkinson's he has lost about 30-40 pounds and is 131 pounds today. Since January weight has been stable and he is no longer losing. He is pretty much wheelchair bound. He has coffee and donut for breakfast. Boost for lunch. Then will have dinner (fish, vegetables, potato). He denies dysphagia but wife states she has to put thickener in his liquids. Appetite stimulant medication was prescribed by PCP but they have not started this yet.    He has chronic constipation, now with rectal discomfort. Typically goes 5 days without bowel movement. His wife has had to manually disimpact in the past. In the past tried Colace (did not work) and fiber supplement (helped some). Currently not taking anything but his wife has used enemas as needed. Patient does not like these. Stools are very large and hard. No rectal bleeding. He denies abdominal pain, nausea, vomiting, heartburn, and bloating.     He has anemia and receives iron infusions. He had EGD and colonoscopy 2019. There was non-obstructing Schatzki ring, gastric mucosal atrophy with mildly friable mucosa (No H, pylori), and erythematous duodenopathy. There was one small tubular adenoma as well as diverticulosis.    Family history: There is no known family history of colon cancer or other GI disorders or malignancy.     Past Medical History:   Diagnosis Date    Encounter for immunization 09/29/2016    Encounter for administration of vaccine    Encounter for screening, unspecified 04/28/2014    Screening    Gout, unspecified 10/26/2017    Acute gout    Idiopathic gout, right hand 01/17/2020    Acute idiopathic gout of  right hand    Localized edema 07/06/2015    Pedal edema    Other conditions influencing health status 08/03/2015    Paronychia    Other symptoms and signs involving cognitive functions and awareness 06/09/2016    Cognitive changes    Pain in unspecified foot 08/03/2015    Foot pain    Personal history of other diseases of the circulatory system     History of hypertension    Personal history of other diseases of the digestive system 08/13/2019    History of acute gastritis    Personal history of other drug therapy     History of influenza vaccination    Personal history of other specified conditions 03/17/2016    History of diarrhea    Personal history of other specified conditions 02/19/2015    History of elevated prostate specific antigen (PSA)    Strain of muscle and tendon of unspecified wall of thorax, initial encounter 12/05/2016    Strain of thoracic region, initial encounter     Past Surgical History:   Procedure Laterality Date    CHOLECYSTECTOMY  04/20/2018    Cholecystectomy    CORONARY ARTERY BYPASS GRAFT  12/16/2022    CABG    CT ANGIO NECK  7/29/2019    CT NECK ANGIO W AND WO IV CONTRAST 7/29/2019 [facility] CL[facility]ICAL LEGACY    CT HEAD ANGIO W AND WO IV CONTRAST  7/29/2019    CT HEAD ANGIO W AND WO IV CONTRAST 7/29/2019 [facility] CLINICAL LEGACY     Current Outpatient Medications   Medication Sig Dispense Refill    acetaminophen (Tylenol Extra Strength) 500 mg tablet Take by mouth.      ALLOPURINOL ORAL Take by mouth.      atorvastatin (Lipitor) 80 mg tablet TAKE 1 TABLET BY MOUTH ONCE  DAILY 90 tablet 3    carbidopa-levodopa (Sinemet)  mg tablet Take by mouth.      clopidogrel (Plavix) 75 mg tablet TAKE 1 TABLET BY MOUTH ONCE  DAILY 90 tablet 3    docusate sodium (Colace) 100 mg capsule TAKE 1 CAPSULE BY MOUTH EVERY DAY AS DIRECTED 30 capsule 2    docusate sodium (Colace) 100 mg tablet Take 1 tablet (100 mg) by mouth once daily.      finasteride (Proscar) 5 mg tablet Take 1 tablet (5 mg) by mouth once  "daily.      lidocaine (Lidoderm) 5 % patch APPLY 1 PATCH DAILY & REMOVE AFTER 12 HOURS      lidocaine (Lidoderm) 5 % patch APPLY 1 PATCH DAILY & REMOVE AFTER 12 HOURS 30 patch 3    magnesium oxide (Mag-Ox) 400 mg (241.3 mg magnesium) tablet Take by mouth.      metoprolol tartrate (Lopressor) 25 mg tablet Take 1 tablet (25 mg) by mouth once daily. 90 tablet 3    naphazoline-glycerin 0.012-0.2 % drops Administer 1 drop into both eyes every 8 hours if needed.      nitroglycerin (Nitrostat) 0.4 mg SL tablet Place under the tongue.      sennosides (Senokot) 8.6 mg tablet Take by mouth 2 times a day as needed.      tamsulosin (Flomax) 0.4 mg 24 hr capsule TAKE 2 CAPSULES BY MOUTH  DAILY 1/2 HOUR AFTER SAME  MEAL OR AT BEDTIME WITH A  SNACK 180 capsule 0     No current facility-administered medications for this visit.     No Known Allergies    Family History   Problem Relation Name Age of Onset    Other (ESRD) Mother          on dialysis    Hypertension Father      Dementia Other      Diabetes Other      Liver cancer Other      Kidney disease Other          Reported prior       Review of Systems  Review of Systems negative except as noted in HPI.    Objective     /90   Pulse 82   Temp 36.6 °C (97.8 °F)   Ht 1.727 m (5' 8\")   Wt 59.4 kg (131 lb)   SpO2 98%   BMI 19.92 kg/m²      Physical Exam  Constitutional:  No acute distress. Normal appearance. Chronically ill-appearing.  HENT:  Head normocephalic and atraumatic. Conjunctivae normal.  Cardiovascular:  Normal rate. Regular rhythm.  Pulmonary:  Pulmonary effort normal. No respiratory distress. Breath sounds clear.  Abdominal: He has bowel sounds. Abdomen is soft. There is no distension. No tenderness or guarding.  Skin: Dry.  Neurological:  Alert.    Assessment/Plan     79 y.o. male with history of HTN, hx of CABG on Plavix, chronic kidney disease stage 4, and Parkinsons with dementia who presents today for initial clinic visit for weight loss and constipation, " worsening since Parkinson's diagnosis a few years ago. He had EGD and colonoscopy 2019. There was non-obstructing Schatzki ring, gastric mucosal atrophy with mildly friable mucosa (No H. pylori), and erythematous duodenopathy. There was one small tubular adenoma as well as diverticulosis. He is not interested in repeat procedures.    We discussed adequately treating his constipation which will hopefully improve both his appetite and his rectal discomfort. He will use Boost supplement as an add-on and not a meal replacement.     Recommendations  Encouraged to eat 3 meals per day. Boost should not replace a meal but should be an extra.  Offered PPI but deferred as he has no heartburn/reflux.  Recommend capful Miralax daily. Your appetite may improve once we treat your constipation. Call me if no improvement in 2-3 weeks.  If rectal discomfort persists let me know and we can order some additional testing.   Please call the office at [phone] with any questions or concerns.     Electronically signed by: Cassandra Santiago CNP on 2/26/2024 at 2:05 PM

## 2024-02-26 ENCOUNTER — OFFICE VISIT (OUTPATIENT)
Dept: GASTROENTEROLOGY | Facility: HOSPITAL | Age: 79
End: 2024-02-26
Payer: MEDICARE

## 2024-02-26 ENCOUNTER — INFUSION (OUTPATIENT)
Dept: HEMATOLOGY/ONCOLOGY | Facility: HOSPITAL | Age: 79
End: 2024-02-26
Payer: MEDICARE

## 2024-02-26 VITALS
HEART RATE: 82 BPM | OXYGEN SATURATION: 98 % | HEIGHT: 68 IN | TEMPERATURE: 97.8 F | BODY MASS INDEX: 19.85 KG/M2 | WEIGHT: 131 LBS | DIASTOLIC BLOOD PRESSURE: 90 MMHG | SYSTOLIC BLOOD PRESSURE: 138 MMHG

## 2024-02-26 VITALS
BODY MASS INDEX: 20.18 KG/M2 | OXYGEN SATURATION: 100 % | DIASTOLIC BLOOD PRESSURE: 86 MMHG | TEMPERATURE: 98.2 F | RESPIRATION RATE: 18 BRPM | WEIGHT: 132.72 LBS | SYSTOLIC BLOOD PRESSURE: 142 MMHG | HEART RATE: 93 BPM

## 2024-02-26 DIAGNOSIS — K62.89 RECTAL DISCOMFORT: ICD-10-CM

## 2024-02-26 DIAGNOSIS — R63.4 WEIGHT LOSS: ICD-10-CM

## 2024-02-26 DIAGNOSIS — K59.09 CHRONIC CONSTIPATION: Primary | ICD-10-CM

## 2024-02-26 DIAGNOSIS — N18.4 CKD (CHRONIC KIDNEY DISEASE) STAGE 4, GFR 15-29 ML/MIN (MULTI): ICD-10-CM

## 2024-02-26 LAB
BASOPHILS # BLD AUTO: 0.04 X10*3/UL (ref 0–0.1)
BASOPHILS NFR BLD AUTO: 0.6 %
EOSINOPHIL # BLD AUTO: 0.38 X10*3/UL (ref 0–0.4)
EOSINOPHIL NFR BLD AUTO: 5.5 %
ERYTHROCYTE [DISTWIDTH] IN BLOOD BY AUTOMATED COUNT: 15.9 % (ref 11.5–14.5)
HCT VFR BLD AUTO: 35.4 % (ref 41–52)
HGB BLD-MCNC: 10.9 G/DL (ref 13.5–17.5)
IMM GRANULOCYTES # BLD AUTO: 0.02 X10*3/UL (ref 0–0.5)
IMM GRANULOCYTES NFR BLD AUTO: 0.3 % (ref 0–0.9)
LYMPHOCYTES # BLD AUTO: 2.47 X10*3/UL (ref 0.8–3)
LYMPHOCYTES NFR BLD AUTO: 35.7 %
MCH RBC QN AUTO: 27.9 PG (ref 26–34)
MCHC RBC AUTO-ENTMCNC: 30.8 G/DL (ref 32–36)
MCV RBC AUTO: 91 FL (ref 80–100)
MONOCYTES # BLD AUTO: 0.48 X10*3/UL (ref 0.05–0.8)
MONOCYTES NFR BLD AUTO: 6.9 %
NEUTROPHILS # BLD AUTO: 3.52 X10*3/UL (ref 1.6–5.5)
NEUTROPHILS NFR BLD AUTO: 51 %
NRBC BLD-RTO: 0 /100 WBCS (ref 0–0)
PLATELET # BLD AUTO: 155 X10*3/UL (ref 150–450)
RBC # BLD AUTO: 3.91 X10*6/UL (ref 4.5–5.9)
WBC # BLD AUTO: 6.9 X10*3/UL (ref 4.4–11.3)

## 2024-02-26 PROCEDURE — 3075F SYST BP GE 130 - 139MM HG: CPT | Performed by: NURSE PRACTITIONER

## 2024-02-26 PROCEDURE — 1125F AMNT PAIN NOTED PAIN PRSNT: CPT | Performed by: NURSE PRACTITIONER

## 2024-02-26 PROCEDURE — 1159F MED LIST DOCD IN RCRD: CPT | Performed by: NURSE PRACTITIONER

## 2024-02-26 PROCEDURE — 1160F RVW MEDS BY RX/DR IN RCRD: CPT | Performed by: NURSE PRACTITIONER

## 2024-02-26 PROCEDURE — 99214 OFFICE O/P EST MOD 30 MIN: CPT | Performed by: NURSE PRACTITIONER

## 2024-02-26 PROCEDURE — 96372 THER/PROPH/DIAG INJ SC/IM: CPT

## 2024-02-26 PROCEDURE — 6350000001 HC RX 635 EPOETIN >10,000 UNITS: Mod: JZ | Performed by: NURSE PRACTITIONER

## 2024-02-26 PROCEDURE — 1036F TOBACCO NON-USER: CPT | Performed by: NURSE PRACTITIONER

## 2024-02-26 PROCEDURE — 36415 COLL VENOUS BLD VENIPUNCTURE: CPT

## 2024-02-26 PROCEDURE — 3080F DIAST BP >= 90 MM HG: CPT | Performed by: NURSE PRACTITIONER

## 2024-02-26 PROCEDURE — 85025 COMPLETE CBC W/AUTO DIFF WBC: CPT

## 2024-02-26 PROCEDURE — 96372 THER/PROPH/DIAG INJ SC/IM: CPT | Performed by: NURSE PRACTITIONER

## 2024-02-26 RX ORDER — ALBUTEROL SULFATE 0.83 MG/ML
3 SOLUTION RESPIRATORY (INHALATION) AS NEEDED
Status: CANCELLED | OUTPATIENT
Start: 2024-03-11

## 2024-02-26 RX ORDER — EPINEPHRINE 0.3 MG/.3ML
0.3 INJECTION SUBCUTANEOUS EVERY 5 MIN PRN
Status: CANCELLED | OUTPATIENT
Start: 2024-03-11

## 2024-02-26 RX ORDER — DIPHENHYDRAMINE HYDROCHLORIDE 50 MG/ML
50 INJECTION INTRAMUSCULAR; INTRAVENOUS AS NEEDED
Status: DISCONTINUED | OUTPATIENT
Start: 2024-02-26 | End: 2024-02-26 | Stop reason: HOSPADM

## 2024-02-26 RX ORDER — ALBUTEROL SULFATE 0.83 MG/ML
3 SOLUTION RESPIRATORY (INHALATION) AS NEEDED
Status: DISCONTINUED | OUTPATIENT
Start: 2024-02-26 | End: 2024-02-26 | Stop reason: HOSPADM

## 2024-02-26 RX ORDER — EPINEPHRINE 0.3 MG/.3ML
0.3 INJECTION SUBCUTANEOUS EVERY 5 MIN PRN
Status: DISCONTINUED | OUTPATIENT
Start: 2024-02-26 | End: 2024-02-26 | Stop reason: HOSPADM

## 2024-02-26 RX ORDER — FAMOTIDINE 10 MG/ML
20 INJECTION INTRAVENOUS ONCE AS NEEDED
Status: CANCELLED | OUTPATIENT
Start: 2024-03-11

## 2024-02-26 RX ORDER — DIPHENHYDRAMINE HYDROCHLORIDE 50 MG/ML
50 INJECTION INTRAMUSCULAR; INTRAVENOUS AS NEEDED
Status: CANCELLED | OUTPATIENT
Start: 2024-03-11

## 2024-02-26 RX ORDER — FAMOTIDINE 10 MG/ML
20 INJECTION INTRAVENOUS ONCE AS NEEDED
Status: DISCONTINUED | OUTPATIENT
Start: 2024-02-26 | End: 2024-02-26 | Stop reason: HOSPADM

## 2024-02-26 RX ADMIN — EPOETIN ALFA-EPBX 10000 UNITS: 10000 INJECTION, SOLUTION INTRAVENOUS; SUBCUTANEOUS at 11:07

## 2024-02-26 ASSESSMENT — PAIN SCALES - GENERAL: PAINLEVEL: 6

## 2024-02-26 NOTE — PROGRESS NOTES
Pt arrived ambulatory to infusion for treatment of epo.  Hgb 10.9.  Denies any new or worsening symptoms. Tolerated injection without issue. Discharged in stable condition.

## 2024-02-26 NOTE — PATIENT INSTRUCTIONS
Recommendations  Recommend 3 meals per day. Boost should not replace a meal but should be an extra.  Recommend capful Miralax daily. Your appetite may improve once we treat your constipation. Call me if no improvement in 2-3 weeks.  If rectal discomfort persists let me know and we can order some additional testing.   Please call the office at [phone] with any questions or concerns.

## 2024-02-27 ENCOUNTER — HOME CARE VISIT (OUTPATIENT)
Dept: HOME HEALTH SERVICES | Facility: HOME HEALTH | Age: 79
End: 2024-02-27
Payer: MEDICARE

## 2024-02-27 VITALS
DIASTOLIC BLOOD PRESSURE: 78 MMHG | RESPIRATION RATE: 16 BRPM | HEART RATE: 71 BPM | TEMPERATURE: 97.3 F | SYSTOLIC BLOOD PRESSURE: 153 MMHG

## 2024-02-27 PROCEDURE — 0023 HH SOC

## 2024-02-27 PROCEDURE — G0158 HHC OT ASSISTANT EA 15: HCPCS

## 2024-02-28 ENCOUNTER — HOME CARE VISIT (OUTPATIENT)
Dept: HOME HEALTH SERVICES | Facility: HOME HEALTH | Age: 79
End: 2024-02-28
Payer: MEDICARE

## 2024-02-29 ENCOUNTER — HOME CARE VISIT (OUTPATIENT)
Dept: HOME HEALTH SERVICES | Facility: HOME HEALTH | Age: 79
End: 2024-02-29
Payer: MEDICARE

## 2024-02-29 PROCEDURE — G0151 HHCP-SERV OF PT,EA 15 MIN: HCPCS

## 2024-02-29 PROCEDURE — G0153 HHCP-SVS OF S/L PATH,EA 15MN: HCPCS

## 2024-02-29 ASSESSMENT — PAIN SCALES - PAIN ASSESSMENT IN ADVANCED DEMENTIA (PAINAD)
NEGVOCALIZATION: 0
BODYLANGUAGE: 0 - RELAXED.
TOTALSCORE: 0
CONSOLABILITY: 0 - NO NEED TO CONSOLE.
BREATHING: 0
NEGVOCALIZATION: 0 - NONE.
BODYLANGUAGE: 0
FACIALEXPRESSION: 0
CONSOLABILITY: 0
FACIALEXPRESSION: 0 - SMILING OR INEXPRESSIVE.

## 2024-02-29 ASSESSMENT — ENCOUNTER SYMPTOMS
PERSON REPORTING PAIN: PATIENT
SUBJECTIVE PAIN PROGRESSION: UNCHANGED
DENIES PAIN: 1
PROTECTIVE COUGH: 1

## 2024-03-01 ENCOUNTER — HOME CARE VISIT (OUTPATIENT)
Dept: HOME HEALTH SERVICES | Facility: HOME HEALTH | Age: 79
End: 2024-03-01
Payer: MEDICARE

## 2024-03-01 VITALS
HEART RATE: 63 BPM | DIASTOLIC BLOOD PRESSURE: 68 MMHG | TEMPERATURE: 97.3 F | SYSTOLIC BLOOD PRESSURE: 149 MMHG | RESPIRATION RATE: 16 BRPM

## 2024-03-01 VITALS — OXYGEN SATURATION: 98 % | HEART RATE: 62 BPM

## 2024-03-01 PROCEDURE — G0158 HHC OT ASSISTANT EA 15: HCPCS

## 2024-03-01 ASSESSMENT — PAIN DESCRIPTION - PAIN TYPE: TYPE: CHRONIC PAIN

## 2024-03-01 ASSESSMENT — ENCOUNTER SYMPTOMS
PERSON REPORTING PAIN: PATIENT
DENIES PAIN: 1

## 2024-03-02 SDOH — HEALTH STABILITY: PHYSICAL HEALTH: EXERCISE TYPE: STRENGTHENING PROGRAM

## 2024-03-02 ASSESSMENT — ACTIVITIES OF DAILY LIVING (ADL)
CURRENT_FUNCTION: ONE PERSON
CURRENT_FUNCTION: STAND BY ASSIST
AMBULATION ASSISTANCE ON FLAT SURFACES: 1
AMBULATION ASSISTANCE: ONE PERSON
AMBULATION ASSISTANCE: STAND BY ASSIST

## 2024-03-02 ASSESSMENT — ENCOUNTER SYMPTOMS
OCCASIONAL FEELINGS OF UNSTEADINESS: 1
MUSCLE WEAKNESS: 1
DEPRESSION: 0
LOSS OF SENSATION IN FEET: 1

## 2024-03-05 ENCOUNTER — HOME CARE VISIT (OUTPATIENT)
Dept: HOME HEALTH SERVICES | Facility: HOME HEALTH | Age: 79
End: 2024-03-05
Payer: MEDICARE

## 2024-03-05 ENCOUNTER — TELEPHONE (OUTPATIENT)
Dept: NEUROLOGY | Facility: HOSPITAL | Age: 79
End: 2024-03-05
Payer: MEDICARE

## 2024-03-05 VITALS
HEART RATE: 100 BPM | DIASTOLIC BLOOD PRESSURE: 77 MMHG | SYSTOLIC BLOOD PRESSURE: 128 MMHG | TEMPERATURE: 97.3 F | RESPIRATION RATE: 16 BRPM

## 2024-03-05 PROCEDURE — G0158 HHC OT ASSISTANT EA 15: HCPCS

## 2024-03-05 PROCEDURE — G0151 HHCP-SERV OF PT,EA 15 MIN: HCPCS

## 2024-03-05 ASSESSMENT — ACTIVITIES OF DAILY LIVING (ADL): EFFECT OF PAIN ON DAILY ACTIVITIES: LIMITED FUNCTIONAL ACTIVITY

## 2024-03-05 ASSESSMENT — PAIN DESCRIPTION - PAIN TYPE: TYPE: CHRONIC PAIN

## 2024-03-05 NOTE — TELEPHONE ENCOUNTER
Kyle Giron's wife called. He sees Dr Chiu for his PD. At the last visit Dr Chiu invited her to call if she felt that he was slowing down and that we could increase his Carbidopa/ Levodopa 25/100. She is calling today because he is mentally & cognitively slowing down. His response to questions is delayed. He falls asleep/ zones out when the OT today asked him to participate in exercise/ therapy. Can you call her and explain mental slowness/ physical slowness in relation to Carbidopa/ Levodopa 25/100 ? She was hoping that the c/l would perk him up mentally.

## 2024-03-05 NOTE — TELEPHONE ENCOUNTER
I spoke with wife.     He naps during the day usually. She tells him to move in the bed and he just looks at her, taking a long time to move respond. This is ongoing for a few days. Usually able to do what is asked of him/moves when asked.     Sinemet 1 tab 4 times a day, she gave him 1.5 tabs twice but thinks made him worse.     No SE like hallucinations, dizziness, dyskinesia. +Constipation but giving him Miralax and helps.    He also has an ulcer on his buttock that is not healing    PLAN   Discussed d/t sudden worsening sx the past few days to r/o infection/dehydration (UTI, infection in ulcer) w/ PCP, she agrees    If everything checks out, we can increase Sinemet to 1.5tabs QID.

## 2024-03-06 VITALS — OXYGEN SATURATION: 98 % | RESPIRATION RATE: 14 BRPM | TEMPERATURE: 98.6 F | HEART RATE: 88 BPM

## 2024-03-06 ASSESSMENT — ENCOUNTER SYMPTOMS
DENIES PAIN: 1
MUSCLE WEAKNESS: 1
OCCASIONAL FEELINGS OF UNSTEADINESS: 1
PERSON REPORTING PAIN: PATIENT

## 2024-03-06 ASSESSMENT — ACTIVITIES OF DAILY LIVING (ADL)
CURRENT_FUNCTION: ONE PERSON
AMBULATION ASSISTANCE ON FLAT SURFACES: 1

## 2024-03-07 ENCOUNTER — APPOINTMENT (OUTPATIENT)
Dept: HOME HEALTH SERVICES | Facility: HOME HEALTH | Age: 79
End: 2024-03-07
Payer: MEDICARE

## 2024-03-07 ENCOUNTER — HOME CARE VISIT (OUTPATIENT)
Dept: HOME HEALTH SERVICES | Facility: HOME HEALTH | Age: 79
End: 2024-03-07
Payer: MEDICARE

## 2024-03-07 ENCOUNTER — TELEPHONE (OUTPATIENT)
Dept: PRIMARY CARE | Facility: CLINIC | Age: 79
End: 2024-03-07
Payer: MEDICARE

## 2024-03-07 ENCOUNTER — HOSPITAL ENCOUNTER (EMERGENCY)
Facility: HOSPITAL | Age: 79
Discharge: HOME | End: 2024-03-07
Attending: EMERGENCY MEDICINE
Payer: MEDICARE

## 2024-03-07 ENCOUNTER — CLINICAL SUPPORT (OUTPATIENT)
Dept: EMERGENCY MEDICINE | Facility: HOSPITAL | Age: 79
End: 2024-03-07
Payer: MEDICARE

## 2024-03-07 VITALS
HEIGHT: 69 IN | RESPIRATION RATE: 18 BRPM | TEMPERATURE: 98.2 F | WEIGHT: 132 LBS | SYSTOLIC BLOOD PRESSURE: 181 MMHG | DIASTOLIC BLOOD PRESSURE: 81 MMHG | BODY MASS INDEX: 19.55 KG/M2 | OXYGEN SATURATION: 96 % | HEART RATE: 72 BPM

## 2024-03-07 DIAGNOSIS — L89.152 PRESSURE ULCER OF SACRAL REGION, STAGE 2 (MULTI): Primary | ICD-10-CM

## 2024-03-07 LAB
ALBUMIN SERPL BCP-MCNC: 3.4 G/DL (ref 3.4–5)
ALP SERPL-CCNC: 86 U/L (ref 33–136)
ALT SERPL W P-5'-P-CCNC: <3 U/L (ref 10–52)
ANION GAP BLDV CALCULATED.4IONS-SCNC: 12 MMOL/L (ref 10–25)
ANION GAP SERPL CALC-SCNC: 17 MMOL/L (ref 10–20)
AST SERPL W P-5'-P-CCNC: 22 U/L (ref 9–39)
ATRIAL RATE: 71 BPM
BASE EXCESS BLDV CALC-SCNC: -1.9 MMOL/L (ref -2–3)
BASOPHILS # BLD AUTO: 0.03 X10*3/UL (ref 0–0.1)
BASOPHILS NFR BLD AUTO: 0.4 %
BILIRUB SERPL-MCNC: 0.5 MG/DL (ref 0–1.2)
BODY TEMPERATURE: 37 DEGREES CELSIUS
BUN SERPL-MCNC: 51 MG/DL (ref 6–23)
CA-I BLDV-SCNC: 1.18 MMOL/L (ref 1.1–1.33)
CALCIUM SERPL-MCNC: 8.9 MG/DL (ref 8.6–10.6)
CHLORIDE BLDV-SCNC: 112 MMOL/L (ref 98–107)
CHLORIDE SERPL-SCNC: 111 MMOL/L (ref 98–107)
CO2 SERPL-SCNC: 22 MMOL/L (ref 21–32)
CREAT SERPL-MCNC: 4.26 MG/DL (ref 0.5–1.3)
EGFRCR SERPLBLD CKD-EPI 2021: 13 ML/MIN/1.73M*2
EOSINOPHIL # BLD AUTO: 0.31 X10*3/UL (ref 0–0.4)
EOSINOPHIL NFR BLD AUTO: 4.2 %
ERYTHROCYTE [DISTWIDTH] IN BLOOD BY AUTOMATED COUNT: 15.9 % (ref 11.5–14.5)
GLUCOSE BLDV-MCNC: 90 MG/DL (ref 74–99)
GLUCOSE SERPL-MCNC: 107 MG/DL (ref 74–99)
HCO3 BLDV-SCNC: 24.7 MMOL/L (ref 22–26)
HCT VFR BLD AUTO: 34.1 % (ref 41–52)
HCT VFR BLD EST: 34 % (ref 41–52)
HGB BLD-MCNC: 11 G/DL (ref 13.5–17.5)
HGB BLDV-MCNC: 11.3 G/DL (ref 13.5–17.5)
IMM GRANULOCYTES # BLD AUTO: 0.02 X10*3/UL (ref 0–0.5)
IMM GRANULOCYTES NFR BLD AUTO: 0.3 % (ref 0–0.9)
INHALED O2 CONCENTRATION: 21 %
LACTATE BLDV-SCNC: 1.2 MMOL/L (ref 0.4–2)
LYMPHOCYTES # BLD AUTO: 1.64 X10*3/UL (ref 0.8–3)
LYMPHOCYTES NFR BLD AUTO: 22.5 %
MCH RBC QN AUTO: 27.6 PG (ref 26–34)
MCHC RBC AUTO-ENTMCNC: 32.3 G/DL (ref 32–36)
MCV RBC AUTO: 86 FL (ref 80–100)
MONOCYTES # BLD AUTO: 0.91 X10*3/UL (ref 0.05–0.8)
MONOCYTES NFR BLD AUTO: 12.5 %
NEUTROPHILS # BLD AUTO: 4.39 X10*3/UL (ref 1.6–5.5)
NEUTROPHILS NFR BLD AUTO: 60.1 %
NRBC BLD-RTO: 0 /100 WBCS (ref 0–0)
OXYHGB MFR BLDV: 32.8 % (ref 45–75)
P AXIS: 78 DEGREES
P OFFSET: 187 MS
P ONSET: 132 MS
PCO2 BLDV: 49 MM HG (ref 41–51)
PH BLDV: 7.31 PH (ref 7.33–7.43)
PLATELET # BLD AUTO: 189 X10*3/UL (ref 150–450)
PO2 BLDV: 31 MM HG (ref 35–45)
POTASSIUM BLDV-SCNC: 4.9 MMOL/L (ref 3.5–5.3)
POTASSIUM SERPL-SCNC: 4.8 MMOL/L (ref 3.5–5.3)
POTASSIUM SERPL-SCNC: 6.7 MMOL/L (ref 3.5–5.3)
PR INTERVAL: 176 MS
PROT SERPL-MCNC: 7.7 G/DL (ref 6.4–8.2)
Q ONSET: 220 MS
QRS COUNT: 12 BEATS
QRS DURATION: 68 MS
QT INTERVAL: 390 MS
QTC CALCULATION(BAZETT): 423 MS
QTC FREDERICIA: 412 MS
R AXIS: 38 DEGREES
RBC # BLD AUTO: 3.98 X10*6/UL (ref 4.5–5.9)
SAO2 % BLDV: 33 % (ref 45–75)
SODIUM BLDV-SCNC: 144 MMOL/L (ref 136–145)
SODIUM SERPL-SCNC: 143 MMOL/L (ref 136–145)
T AXIS: 113 DEGREES
T OFFSET: 415 MS
VENTRICULAR RATE: 71 BPM
WBC # BLD AUTO: 7.3 X10*3/UL (ref 4.4–11.3)

## 2024-03-07 PROCEDURE — G0153 HHCP-SVS OF S/L PATH,EA 15MN: HCPCS

## 2024-03-07 PROCEDURE — 99283 EMERGENCY DEPT VISIT LOW MDM: CPT | Mod: 25

## 2024-03-07 PROCEDURE — 99285 EMERGENCY DEPT VISIT HI MDM: CPT | Performed by: EMERGENCY MEDICINE

## 2024-03-07 PROCEDURE — 85025 COMPLETE CBC W/AUTO DIFF WBC: CPT | Performed by: EMERGENCY MEDICINE

## 2024-03-07 PROCEDURE — 36415 COLL VENOUS BLD VENIPUNCTURE: CPT | Performed by: EMERGENCY MEDICINE

## 2024-03-07 PROCEDURE — 84132 ASSAY OF SERUM POTASSIUM: CPT | Mod: 59

## 2024-03-07 PROCEDURE — 36415 COLL VENOUS BLD VENIPUNCTURE: CPT

## 2024-03-07 PROCEDURE — 80053 COMPREHEN METABOLIC PANEL: CPT | Performed by: EMERGENCY MEDICINE

## 2024-03-07 PROCEDURE — 84132 ASSAY OF SERUM POTASSIUM: CPT

## 2024-03-07 PROCEDURE — 93005 ELECTROCARDIOGRAM TRACING: CPT

## 2024-03-07 RX ORDER — ACETAMINOPHEN 325 MG/1
650 TABLET ORAL ONCE
Status: COMPLETED | OUTPATIENT
Start: 2024-03-07 | End: 2024-03-07

## 2024-03-07 RX ADMIN — ACETAMINOPHEN 650 MG: 325 TABLET ORAL at 21:58

## 2024-03-07 ASSESSMENT — PAIN DESCRIPTION - LOCATION: LOCATION: SACRUM

## 2024-03-07 ASSESSMENT — ENCOUNTER SYMPTOMS
PERSON REPORTING PAIN: PATIENT
SUBJECTIVE PAIN PROGRESSION: UNCHANGED
DENIES PAIN: 1

## 2024-03-07 ASSESSMENT — COLUMBIA-SUICIDE SEVERITY RATING SCALE - C-SSRS
2. HAVE YOU ACTUALLY HAD ANY THOUGHTS OF KILLING YOURSELF?: NO
1. IN THE PAST MONTH, HAVE YOU WISHED YOU WERE DEAD OR WISHED YOU COULD GO TO SLEEP AND NOT WAKE UP?: NO
6. HAVE YOU EVER DONE ANYTHING, STARTED TO DO ANYTHING, OR PREPARED TO DO ANYTHING TO END YOUR LIFE?: NO

## 2024-03-07 ASSESSMENT — PAIN DESCRIPTION - PAIN TYPE: TYPE: ACUTE PAIN

## 2024-03-07 ASSESSMENT — LIFESTYLE VARIABLES
EVER FELT BAD OR GUILTY ABOUT YOUR DRINKING: NO
EVER HAD A DRINK FIRST THING IN THE MORNING TO STEADY YOUR NERVES TO GET RID OF A HANGOVER: NO
HAVE PEOPLE ANNOYED YOU BY CRITICIZING YOUR DRINKING: NO
HAVE YOU EVER FELT YOU SHOULD CUT DOWN ON YOUR DRINKING: NO

## 2024-03-07 ASSESSMENT — PAIN - FUNCTIONAL ASSESSMENT: PAIN_FUNCTIONAL_ASSESSMENT: 0-10

## 2024-03-07 ASSESSMENT — PAIN SCALES - GENERAL: PAINLEVEL_OUTOF10: 5 - MODERATE PAIN

## 2024-03-07 NOTE — TELEPHONE ENCOUNTER
PT spouse called in and stated he is still experiencing pain in his buttocks and would like to know if he should go back to his gastro doctor

## 2024-03-07 NOTE — ED TRIAGE NOTES
Patient comes in with concern for a sacral pressure ulcer; states he was recently seen and treated by a wound specialist for the same issue and is concerned that there may be another one; endorses 5/10 pain to this site; patient's wife describes the wound as being open and red, but denies any purulent drainage or any malodor

## 2024-03-08 ENCOUNTER — APPOINTMENT (OUTPATIENT)
Dept: HEMATOLOGY/ONCOLOGY | Facility: HOSPITAL | Age: 79
End: 2024-03-08
Payer: MEDICARE

## 2024-03-08 ENCOUNTER — TELEPHONE (OUTPATIENT)
Dept: GASTROENTEROLOGY | Facility: HOSPITAL | Age: 79
End: 2024-03-08
Payer: MEDICARE

## 2024-03-08 ENCOUNTER — HOME CARE VISIT (OUTPATIENT)
Dept: HOME HEALTH SERVICES | Facility: HOME HEALTH | Age: 79
End: 2024-03-08
Payer: MEDICARE

## 2024-03-08 VITALS
TEMPERATURE: 97.3 F | RESPIRATION RATE: 16 BRPM | SYSTOLIC BLOOD PRESSURE: 149 MMHG | DIASTOLIC BLOOD PRESSURE: 77 MMHG | HEART RATE: 69 BPM

## 2024-03-08 PROCEDURE — G0158 HHC OT ASSISTANT EA 15: HCPCS

## 2024-03-08 ASSESSMENT — PAIN DESCRIPTION - PAIN TYPE: TYPE: ACUTE PAIN

## 2024-03-08 ASSESSMENT — ACTIVITIES OF DAILY LIVING (ADL): EFFECT OF PAIN ON DAILY ACTIVITIES: LIMITED FUNCTIONAL ACTIVITY

## 2024-03-08 NOTE — ED PROVIDER NOTES
HPI   Chief Complaint   Patient presents with    Wound Check       HPI  Patient is a 79-year-old male with significant past medical history of diabetes, MUGS, CKD, h/o CABG, NSTEMI, HTN, HLD, Parkinson's disease the presents to the emergency room for a sacral pressure ulcer.  Patient states that he was recently seen by a wound specialist for the same concern.  Patient currently endorses 2 out of 10 pain at the site.  Patient denies erythema, purulent drainage, malodor to the site.  Patient states that he took Tylenol earlier at 2 PM.  Patient denies chest pain, shortness of breath, fever, nausea, vomiting.                    [place] Coma Scale Score: 15                     Patient History   Past Medical History:   Diagnosis Date    Encounter for immunization 09/29/2016    Encounter for administration of vaccine    Encounter for screening, unspecified 04/28/2014    Screening    Gout, unspecified 10/26/2017    Acute gout    Idiopathic gout, right hand 01/17/2020    Acute idiopathic gout of right hand    Localized edema 07/06/2015    Pedal edema    Other conditions influencing health status 08/03/2015    Paronychia    Other symptoms and signs involving cognitive functions and awareness 06/09/2016    Cognitive changes    Pain in unspecified foot 08/03/2015    Foot pain    Personal history of other diseases of the circulatory system     History of hypertension    Personal history of other diseases of the digestive system 08/13/2019    History of acute gastritis    Personal history of other drug therapy     History of influenza vaccination    Personal history of other specified conditions 03/17/2016    History of diarrhea    Personal history of other specified conditions 02/19/2015    History of elevated prostate specific antigen (PSA)    Strain of muscle and tendon of unspecified wall of thorax, initial encounter 12/05/2016    Strain of thoracic region, initial encounter     Past Surgical History:   Procedure Laterality  Date    CHOLECYSTECTOMY  04/20/2018    Cholecystectomy    CORONARY ARTERY BYPASS GRAFT  12/16/2022    CABG    CT ANGIO NECK  7/29/2019    CT NECK ANGIO W AND WO IV CONTRAST 7/29/2019 [facility] CL[facility]ICAL LEGACY    CT HEAD ANGIO W AND WO IV CONTRAST  7/29/2019    CT HEAD ANGIO W AND WO IV CONTRAST 7/29/2019 [facility] CL[facility]ICAL LEGACY     Family History   Problem Relation Name Age of Onset    Other (ESRD) Mother          on dialysis    Hypertension Father      Dementia Other      Diabetes Other      Liver cancer Other      Kidney disease Other          Reported prior     Social History     Tobacco Use    Smoking status: Former     Types: Cigarettes     Passive exposure: Never    Smokeless tobacco: Never   Vaping Use    Vaping Use: Never used   Substance Use Topics    Alcohol use: Not Currently    Drug use: Not Currently       Physical Exam   ED Triage Vitals [03/07/24 1637]   Temperature Heart Rate Respirations BP   36.8 °C (98.2 °F) 73 16 129/75      Pulse Ox Temp Source Heart Rate Source Patient Position   99 % Temporal -- --      BP Location FiO2 (%)     -- --       Physical Exam  Constitutional:       Appearance: Normal appearance. He is normal weight.   HENT:      Head: Normocephalic and atraumatic.      Right Ear: External ear normal.      Left Ear: External ear normal.      Nose: Nose normal.   Eyes:      Conjunctiva/sclera: Conjunctivae normal.      Pupils: Pupils are equal, round, and reactive to light.   Cardiovascular:      Rate and Rhythm: Normal rate and regular rhythm.   Pulmonary:      Effort: Pulmonary effort is normal.   Musculoskeletal:         General: Normal range of motion.      Cervical back: Normal range of motion and neck supple.   Skin:     General: Skin is warm.      Capillary Refill: Capillary refill takes less than 2 seconds.      Comments: Stage II sacral pressure ulcer wound, slight erythema around the site, no purulent drainage noted, no warmth around the area, no fluctuance or induration noted    Neurological:      General: No focal deficit present.      Mental Status: He is alert and oriented to person, place, and time. Mental status is at baseline.         ED Course & MDM   ED Course as of 03/08/24 1426   u Mar 07, 2024   1726 EKG shows normal sinus rhythm, no axis deviation, heart rate of 71, , QRS of 68, QTc of 423 no ST elevations, no ST depressions, T wave versions in aVL, lead I however this is similar to previous EKG on 3/10/2023. [YG]   1947 POTASSIUM(!!): 6.7 [YG]   1947 Comprehensive metabolic panel(!!) [YG]   2048 POTASSIUM: 4.8 [YG]      ED Course User Index  [YG] Viri Celis MD         Diagnoses as of 03/08/24 1426   Pressure ulcer of sacral region, stage 2 (CMS/HCC)       Medical Decision Making  Patient is a 79-year-old male with significant past medical history of diabetes, Parkinson's disease, MUGS, CKD, h/o CABG, NSTEMI, HTN, HLD the presents to the emergency room for a sacral pressure ulcer.  Patient states that he was recently seen by a wound specialist for the same concern.  Patient states that there is slight pain 2 out of 10 around the sacral wound.  Patient denies fevers, nausea, vomiting, chest pain, abdominal pain, shortness of breath.  Patient denies purulent drainage, warmth around the site.  Patient states that he took Tylenol earlier around 10 AM.      Patient's wife is with him and states that she currently is seeing a wound care specialist and is set up for an appointment in the coming week.  Patient twice was concerned that patient is having increasing pain around the site.  On physical exam, sacral wound is stage II, slight erythema seen, no purulent drainage or warmth to touch noted on exam.  Patient denies fevers, chest pain, shortness of breath, nausea, vomiting.  Given physical exam and the review of systems, this is less concerning for cellulitis or necrotizing fasciitis.  Likely patient is experiencing moderate amount of pain due to the increased  pressure around the wound.  Patient was given Tylenol which helped relieve some of the pain.  Patient was given follow-up for a wound care specialist although patient already has a scheduled appointment.  Patient was informed to follow-up with their primary care provider.  Patient was informed to return to the emergency room if he is having any new or worsening symptoms.  Prior to discharge patient is vitally stable and not having any other symptoms.    Procedure  Procedures     Viri Celis MD  Resident  03/08/24 1243       Viri Celis MD  Resident  03/08/24 1421       Viri Celis MD  Resident  03/08/24 1422

## 2024-03-11 ENCOUNTER — HOME CARE VISIT (OUTPATIENT)
Dept: HOME HEALTH SERVICES | Facility: HOME HEALTH | Age: 79
End: 2024-03-11
Payer: MEDICARE

## 2024-03-11 ENCOUNTER — APPOINTMENT (OUTPATIENT)
Dept: HEMATOLOGY/ONCOLOGY | Facility: HOSPITAL | Age: 79
End: 2024-03-11
Payer: MEDICARE

## 2024-03-11 VITALS — TEMPERATURE: 98.6 F | HEART RATE: 72 BPM | OXYGEN SATURATION: 99 % | RESPIRATION RATE: 14 BRPM

## 2024-03-11 VITALS
HEART RATE: 94 BPM | TEMPERATURE: 97.6 F | DIASTOLIC BLOOD PRESSURE: 96 MMHG | SYSTOLIC BLOOD PRESSURE: 148 MMHG | RESPIRATION RATE: 16 BRPM

## 2024-03-11 PROCEDURE — G0158 HHC OT ASSISTANT EA 15: HCPCS

## 2024-03-11 PROCEDURE — G0151 HHCP-SERV OF PT,EA 15 MIN: HCPCS

## 2024-03-11 SDOH — HEALTH STABILITY: PHYSICAL HEALTH: EXERCISE TYPE: WALKING PROGRAM

## 2024-03-11 ASSESSMENT — ENCOUNTER SYMPTOMS
TREMORS: 1
OCCASIONAL FEELINGS OF UNSTEADINESS: 1
LOSS OF SENSATION IN FEET: 1
PERSON REPORTING PAIN: PATIENT
DENIES PAIN: 1
MUSCLE WEAKNESS: 1
DEPRESSION: 0

## 2024-03-11 ASSESSMENT — PAIN DESCRIPTION - PAIN TYPE: TYPE: ACUTE PAIN

## 2024-03-11 ASSESSMENT — ACTIVITIES OF DAILY LIVING (ADL)
CURRENT_FUNCTION: ONE PERSON
AMBULATION ASSISTANCE: ONE PERSON
AMBULATION ASSISTANCE ON FLAT SURFACES: 1
EFFECT OF PAIN ON DAILY ACTIVITIES: LIMITED FUNCTIONAL ACTIVITY

## 2024-03-12 DIAGNOSIS — K62.89 RECTAL DISCOMFORT: Primary | ICD-10-CM

## 2024-03-13 ENCOUNTER — HOME CARE VISIT (OUTPATIENT)
Dept: HOME HEALTH SERVICES | Facility: HOME HEALTH | Age: 79
End: 2024-03-13
Payer: MEDICARE

## 2024-03-13 PROCEDURE — G0152 HHCP-SERV OF OT,EA 15 MIN: HCPCS

## 2024-03-14 ENCOUNTER — HOME CARE VISIT (OUTPATIENT)
Dept: HOME HEALTH SERVICES | Facility: HOME HEALTH | Age: 79
End: 2024-03-14
Payer: MEDICARE

## 2024-03-14 PROCEDURE — G0153 HHCP-SVS OF S/L PATH,EA 15MN: HCPCS

## 2024-03-14 ASSESSMENT — ENCOUNTER SYMPTOMS: DENIES PAIN: 1

## 2024-03-15 ENCOUNTER — OFFICE VISIT (OUTPATIENT)
Dept: SURGERY | Facility: CLINIC | Age: 79
End: 2024-03-15
Payer: MEDICARE

## 2024-03-15 ENCOUNTER — HOME CARE VISIT (OUTPATIENT)
Dept: HOME HEALTH SERVICES | Facility: HOME HEALTH | Age: 79
End: 2024-03-15
Payer: MEDICARE

## 2024-03-15 VITALS — OXYGEN SATURATION: 99 % | HEART RATE: 68 BPM | RESPIRATION RATE: 14 BRPM | TEMPERATURE: 98.6 F

## 2024-03-15 VITALS
HEIGHT: 69 IN | BODY MASS INDEX: 19.49 KG/M2 | SYSTOLIC BLOOD PRESSURE: 138 MMHG | HEART RATE: 87 BPM | DIASTOLIC BLOOD PRESSURE: 86 MMHG

## 2024-03-15 DIAGNOSIS — K62.89 RECTAL DISCOMFORT: ICD-10-CM

## 2024-03-15 PROCEDURE — 99213 OFFICE O/P EST LOW 20 MIN: CPT | Performed by: NURSE PRACTITIONER

## 2024-03-15 PROCEDURE — G0151 HHCP-SERV OF PT,EA 15 MIN: HCPCS

## 2024-03-15 PROCEDURE — 1159F MED LIST DOCD IN RCRD: CPT | Performed by: NURSE PRACTITIONER

## 2024-03-15 PROCEDURE — 46600 DIAGNOSTIC ANOSCOPY SPX: CPT | Performed by: NURSE PRACTITIONER

## 2024-03-15 PROCEDURE — 1160F RVW MEDS BY RX/DR IN RCRD: CPT | Performed by: NURSE PRACTITIONER

## 2024-03-15 PROCEDURE — 1036F TOBACCO NON-USER: CPT | Performed by: NURSE PRACTITIONER

## 2024-03-15 PROCEDURE — 3075F SYST BP GE 130 - 139MM HG: CPT | Performed by: NURSE PRACTITIONER

## 2024-03-15 PROCEDURE — 3079F DIAST BP 80-89 MM HG: CPT | Performed by: NURSE PRACTITIONER

## 2024-03-15 SDOH — HEALTH STABILITY: PHYSICAL HEALTH: EXERCISE TYPE: THROUGH FUNCTIONAL MOBILITY TASKS

## 2024-03-15 ASSESSMENT — ACTIVITIES OF DAILY LIVING (ADL)
OASIS_M1830: 03
HOME_HEALTH_OASIS: 01
CURRENT_FUNCTION: ONE PERSON
AMBULATION ASSISTANCE: ONE PERSON
AMBULATION ASSISTANCE ON FLAT SURFACES: 1

## 2024-03-15 ASSESSMENT — PAIN SCALES - PAIN ASSESSMENT IN ADVANCED DEMENTIA (PAINAD)
CONSOLABILITY: 0 - NO NEED TO CONSOLE.
CONSOLABILITY: 0
NEGVOCALIZATION: 0
BODYLANGUAGE: 0
TOTALSCORE: 0
NEGVOCALIZATION: 0 - NONE.
FACIALEXPRESSION: 0 - SMILING OR INEXPRESSIVE.
BODYLANGUAGE: 0 - RELAXED.
BREATHING: 0
FACIALEXPRESSION: 0

## 2024-03-15 ASSESSMENT — ENCOUNTER SYMPTOMS
DENIES PAIN: 1
DEPRESSION: 0
PERSON REPORTING PAIN: PATIENT
OCCASIONAL FEELINGS OF UNSTEADINESS: 1
MUSCLE WEAKNESS: 1
LOSS OF SENSATION IN FEET: 1

## 2024-03-15 NOTE — PROGRESS NOTES
Subjective   Patient ID: Markie Nobles is a 79 y.o. male who is on Plavix for A.Fib and has HTN, who has constipation.    HPI  Colonoscopy 2019    He will have hard BM every 4-5 days with straining.  He will have soreness to the anus after his BM's.  He can sit long on the toilet to have a Bm.  He just started taking Miralax and that was working but a little too much so she stopped for a few days.  Now she will start taking 1/2 dose daily.  He is not taking any fiber supplements.  He does not drink much water daily.      No hx of any perianl surgeries.  NO c/o any accidents or leakage of stool.        Review of Systems   All other systems reviewed and are negative.      Objective   Physical Exam  Constitutional:       Appearance: Normal appearance.   HENT:      Head: Normocephalic and atraumatic.   Pulmonary:      Effort: Pulmonary effort is normal.   Musculoskeletal:         General: Normal range of motion.   Skin:     General: Skin is warm and dry.   Neurological:      General: No focal deficit present.      Mental Status: He is alert and oriented to person, place, and time.   Psychiatric:         Mood and Affect: Mood normal.         Behavior: Behavior normal.       Anoscopy    Date/Time: 3/15/2024 10:41 AM    Performed by: SILVIANO Day  Authorized by: SILVIANO Day    Consent:     Consent obtained:  Verbal    Consent given by:  Patient    Risks, benefits, and alternatives were discussed: yes    Universal protocol:     Procedure explained and questions answered to patient or proxy's satisfaction: yes      Patient identity confirmed:  Verbally with patient  Post-procedure details:     Procedure completion:  Tolerated  Comments:      No external hemorrhoids.  Slight weak tone on ZEE.  No pain.  On anoscopy, he has no irritation of the internal hemorrhoids.  No active bleeding or pain.      Assessment/Plan   Markie has constipation and will start taking 1/2 dose of Miralax daily.   He  will increase his fiber and water intake as well.   His pressure ulcer wound on the sacrum has almost completely healed.  He will call with any issues and will follow up in 6 weeks if not better.         KIESHA Day-MODESTO 03/15/24 1:53 PM

## 2024-03-16 ASSESSMENT — ACTIVITIES OF DAILY LIVING (ADL)
PHYSICAL TRANSFERS ASSESSED: 1
TRANSPORTATION: DEPENDENT
CURRENT_FUNCTION: MODERATE ASSIST
USING THE TELPHONE: DEPENDENT
TOILETING: 1
LAUNDRY ASSESSED: 1
ORAL_CARE_CURRENT_FUNCTION: DEPENDENT
GROOMING_CURRENT_FUNCTION: STAND BY ASSIST
FEEDING ASSESSED: 1
ORAL_CARE_ASSESSED: 1
DRESSING_LB_CURRENT_FUNCTION: MINIMUM ASSIST
HOUSEKEEPING ASSESSED: 1
FEEDING: INDEPENDENT
LIGHT HOUSEKEEPING: DEPENDENT
PREPARING MEALS: DEPENDENT
BATHING ASSESSED: 1
LAUNDRY: DEPENDENT
TRANSPORTATION ASSESSED: 1
SHOPPING: DEPENDENT
TOILETING: MINIMUM ASSIST
BATHING_CURRENT_FUNCTION: MODERATE ASSIST
SHOPPING ASSESSED: 1
TELEPHONE USE ASSESSED: 1
DRESSING_UB_CURRENT_FUNCTION: STAND BY ASSIST
GROOMING ASSESSED: 1

## 2024-03-16 ASSESSMENT — ENCOUNTER SYMPTOMS
DENIES PAIN: 1
PERSON REPORTING PAIN: PATIENT

## 2024-03-18 PROCEDURE — 46600 DIAGNOSTIC ANOSCOPY SPX: CPT | Performed by: NURSE PRACTITIONER

## 2024-03-22 ENCOUNTER — HOME CARE VISIT (OUTPATIENT)
Dept: HOME HEALTH SERVICES | Facility: HOME HEALTH | Age: 79
End: 2024-03-22
Payer: MEDICARE

## 2024-03-22 PROCEDURE — G0152 HHCP-SERV OF OT,EA 15 MIN: HCPCS

## 2024-03-22 ASSESSMENT — ENCOUNTER SYMPTOMS
PERSON REPORTING PAIN: PATIENT
HIGHEST PAIN SEVERITY IN PAST 24 HOURS: 5/10
PAIN LOCATION - PAIN FREQUENCY: CONSTANT
PAIN LOCATION - PAIN QUALITY: ACHING/BURNING
PAIN: 1
PAIN LOCATION: COCCYX
PAIN LOCATION - PAIN SEVERITY: 4/10
SUBJECTIVE PAIN PROGRESSION: WAXING AND WANING
LOWEST PAIN SEVERITY IN PAST 24 HOURS: 3/10

## 2024-03-22 ASSESSMENT — ACTIVITIES OF DAILY LIVING (ADL)
PHYSICAL TRANSFERS ASSESSED: 1
CURRENT_FUNCTION: MAXIMUM ASSIST

## 2024-03-23 ASSESSMENT — ENCOUNTER SYMPTOMS
DENIES PAIN: 1
PERSON REPORTING PAIN: PATIENT

## 2024-03-27 ENCOUNTER — HOME CARE VISIT (OUTPATIENT)
Dept: HOME HEALTH SERVICES | Facility: HOME HEALTH | Age: 79
End: 2024-03-27
Payer: MEDICARE

## 2024-03-27 PROCEDURE — G0152 HHCP-SERV OF OT,EA 15 MIN: HCPCS

## 2024-03-27 ASSESSMENT — ACTIVITIES OF DAILY LIVING (ADL)
OASIS_M1830: 03
HOME_HEALTH_OASIS: 01

## 2024-03-27 ASSESSMENT — ENCOUNTER SYMPTOMS
PERSON REPORTING PAIN: PATIENT
DENIES PAIN: 1

## 2024-04-15 ENCOUNTER — TELEPHONE (OUTPATIENT)
Dept: NEUROLOGY | Facility: CLINIC | Age: 79
End: 2024-04-15
Payer: MEDICARE

## 2024-04-15 NOTE — TELEPHONE ENCOUNTER
Wife called stated that PD Medication was increases but appetite decreased ( patient has lose significant amount of weight)... PCP has prescribed an appetite stimulant but would like to know is there something else that can be done

## 2024-04-16 ENCOUNTER — TELEMEDICINE (OUTPATIENT)
Dept: HEMATOLOGY/ONCOLOGY | Facility: HOSPITAL | Age: 79
End: 2024-04-16
Payer: MEDICARE

## 2024-04-16 DIAGNOSIS — N18.4 CKD (CHRONIC KIDNEY DISEASE) STAGE 4, GFR 15-29 ML/MIN (MULTI): Primary | ICD-10-CM

## 2024-04-16 PROCEDURE — 1036F TOBACCO NON-USER: CPT | Performed by: NURSE PRACTITIONER

## 2024-04-16 PROCEDURE — 99213 OFFICE O/P EST LOW 20 MIN: CPT | Performed by: NURSE PRACTITIONER

## 2024-04-16 PROCEDURE — 1160F RVW MEDS BY RX/DR IN RCRD: CPT | Performed by: NURSE PRACTITIONER

## 2024-04-16 PROCEDURE — 1159F MED LIST DOCD IN RCRD: CPT | Performed by: NURSE PRACTITIONER

## 2024-04-16 RX ORDER — ALBUTEROL SULFATE 0.83 MG/ML
3 SOLUTION RESPIRATORY (INHALATION) AS NEEDED
Status: CANCELLED | OUTPATIENT
Start: 2024-04-22

## 2024-04-16 RX ORDER — FAMOTIDINE 10 MG/ML
20 INJECTION INTRAVENOUS ONCE AS NEEDED
Status: CANCELLED | OUTPATIENT
Start: 2024-04-22

## 2024-04-16 RX ORDER — DIPHENHYDRAMINE HYDROCHLORIDE 50 MG/ML
50 INJECTION INTRAMUSCULAR; INTRAVENOUS AS NEEDED
Status: CANCELLED | OUTPATIENT
Start: 2024-04-22

## 2024-04-16 RX ORDER — EPINEPHRINE 0.3 MG/.3ML
0.3 INJECTION SUBCUTANEOUS EVERY 5 MIN PRN
Status: CANCELLED | OUTPATIENT
Start: 2024-04-22

## 2024-04-17 ENCOUNTER — LAB (OUTPATIENT)
Dept: LAB | Facility: LAB | Age: 79
End: 2024-04-17
Payer: MEDICARE

## 2024-04-17 DIAGNOSIS — D64.9 ANEMIA, UNSPECIFIED TYPE: ICD-10-CM

## 2024-04-17 DIAGNOSIS — N18.4 CKD (CHRONIC KIDNEY DISEASE) STAGE 4, GFR 15-29 ML/MIN (MULTI): ICD-10-CM

## 2024-04-17 LAB
ALBUMIN SERPL BCP-MCNC: 3.5 G/DL (ref 3.4–5)
ALP SERPL-CCNC: 113 U/L (ref 33–136)
ALT SERPL W P-5'-P-CCNC: 6 U/L (ref 10–52)
ANION GAP SERPL CALC-SCNC: 19 MMOL/L (ref 10–20)
AST SERPL W P-5'-P-CCNC: 15 U/L (ref 9–39)
BASOPHILS # BLD AUTO: 0.05 X10*3/UL (ref 0–0.1)
BASOPHILS NFR BLD AUTO: 0.7 %
BILIRUB SERPL-MCNC: 0.6 MG/DL (ref 0–1.2)
BUN SERPL-MCNC: 49 MG/DL (ref 6–23)
CALCIUM SERPL-MCNC: 8.9 MG/DL (ref 8.6–10.6)
CHLORIDE SERPL-SCNC: 115 MMOL/L (ref 98–107)
CO2 SERPL-SCNC: 20 MMOL/L (ref 21–32)
CREAT SERPL-MCNC: 4.29 MG/DL (ref 0.5–1.3)
EGFRCR SERPLBLD CKD-EPI 2021: 13 ML/MIN/1.73M*2
EOSINOPHIL # BLD AUTO: 0.36 X10*3/UL (ref 0–0.4)
EOSINOPHIL NFR BLD AUTO: 4.7 %
ERYTHROCYTE [DISTWIDTH] IN BLOOD BY AUTOMATED COUNT: 16.5 % (ref 11.5–14.5)
FERRITIN SERPL-MCNC: 514 NG/ML (ref 20–300)
GLUCOSE SERPL-MCNC: 97 MG/DL (ref 74–99)
HCT VFR BLD AUTO: 33 % (ref 41–52)
HGB BLD-MCNC: 10.1 G/DL (ref 13.5–17.5)
IGA SERPL-MCNC: 365 MG/DL (ref 70–400)
IGG SERPL-MCNC: 1190 MG/DL (ref 700–1600)
IGM SERPL-MCNC: 97 MG/DL (ref 40–230)
IMM GRANULOCYTES # BLD AUTO: 0.01 X10*3/UL (ref 0–0.5)
IMM GRANULOCYTES NFR BLD AUTO: 0.1 % (ref 0–0.9)
LYMPHOCYTES # BLD AUTO: 2.87 X10*3/UL (ref 0.8–3)
LYMPHOCYTES NFR BLD AUTO: 37.7 %
MCH RBC QN AUTO: 27.7 PG (ref 26–34)
MCHC RBC AUTO-ENTMCNC: 30.6 G/DL (ref 32–36)
MCV RBC AUTO: 90 FL (ref 80–100)
MONOCYTES # BLD AUTO: 0.56 X10*3/UL (ref 0.05–0.8)
MONOCYTES NFR BLD AUTO: 7.3 %
NEUTROPHILS # BLD AUTO: 3.77 X10*3/UL (ref 1.6–5.5)
NEUTROPHILS NFR BLD AUTO: 49.5 %
NRBC BLD-RTO: 0 /100 WBCS (ref 0–0)
PLATELET # BLD AUTO: 154 X10*3/UL (ref 150–450)
POTASSIUM SERPL-SCNC: 4.8 MMOL/L (ref 3.5–5.3)
PROT SERPL-MCNC: 6.8 G/DL (ref 6.4–8.2)
PROT SERPL-MCNC: 6.8 G/DL (ref 6.4–8.2)
RBC # BLD AUTO: 3.65 X10*6/UL (ref 4.5–5.9)
SODIUM SERPL-SCNC: 149 MMOL/L (ref 136–145)
WBC # BLD AUTO: 7.6 X10*3/UL (ref 4.4–11.3)

## 2024-04-17 PROCEDURE — 82728 ASSAY OF FERRITIN: CPT

## 2024-04-17 PROCEDURE — 85025 COMPLETE CBC W/AUTO DIFF WBC: CPT

## 2024-04-17 PROCEDURE — 86334 IMMUNOFIX E-PHORESIS SERUM: CPT

## 2024-04-17 PROCEDURE — 84155 ASSAY OF PROTEIN SERUM: CPT

## 2024-04-17 PROCEDURE — 83521 IG LIGHT CHAINS FREE EACH: CPT

## 2024-04-17 PROCEDURE — 36415 COLL VENOUS BLD VENIPUNCTURE: CPT

## 2024-04-17 PROCEDURE — 86320 SERUM IMMUNOELECTROPHORESIS: CPT | Performed by: PHYSICIAN ASSISTANT

## 2024-04-17 PROCEDURE — 82784 ASSAY IGA/IGD/IGG/IGM EACH: CPT

## 2024-04-17 PROCEDURE — 84165 PROTEIN E-PHORESIS SERUM: CPT

## 2024-04-17 PROCEDURE — 84165 PROTEIN E-PHORESIS SERUM: CPT | Performed by: PHYSICIAN ASSISTANT

## 2024-04-17 PROCEDURE — 80053 COMPREHEN METABOLIC PANEL: CPT

## 2024-04-17 NOTE — PROGRESS NOTES
Patient ID: Markie Nobles is a 79 y.o. male.  Referring Physician: No referring provider defined for this encounter.  Primary Care Provider: Leslie Watson MD  Visit Type: Follow Up      Subjective    Mr. Nobles is a 80 y/o  male presenting with his wife today for follow-up for positive SPEP/MGUS. He has been noted for having a monoclonal band for free lambda chain on previous SPEP. He is currently being monitored periodically without need for intervention at this time.     Today he reports that he has intermittent early satiety. He denies abdominal pain, nausea, vomiting or any other associated symptom. Patient states he fell a few weeks ago when trying to reach something on the floor while sitting and fell out of the chair. He hit his head and his wife took him to ED. CT scan was normal.      Patient denies weight loss, abnormal bruising and bleeding, hematuria, blood in stool, dark/black stools, epistaxis, oral/gingival bleeding, lymphadenopathy, recurrent infections, recurrent fevers, night sweats, abdominal pain, bone pain, chest pain, palpitations, SOB, ANDREA, fatigue, dizziness, lightheadedness, PICA.     Relevant Hx  Patient was initially referred by Dr. Mercy Plunkett for positive SPEP. He was seen by Dr Castrejon and has transferred care to me this past year.      The patient has multiple medical issues, including diabetes, diabetic neuropathy, CKD, h/o CABG, NSTEMI and hyperlipidemia. He was found to have a positive SPEP on workup of his CKD which showed a monoclonal band for free lambda chain.      He has been diagnosed with CKD for over a decade now, which is slowly worsening. He also has active microalbuminuria which has been attributed t his diabetes and cardiac condition.        Review of Systems   All other systems reviewed and are negative.       Objective   BSA: There is no height or weight on file to calculate BSA.  There were no vitals taken for this visit.     has a past medical  history of Encounter for immunization (09/29/2016), Encounter for screening, unspecified (04/28/2014), Gout, unspecified (10/26/2017), Idiopathic gout, right hand (01/17/2020), Localized edema (07/06/2015), Other conditions influencing health status (08/03/2015), Other symptoms and signs involving cognitive functions and awareness (06/09/2016), Pain in unspecified foot (08/03/2015), Personal history of other diseases of the circulatory system, Personal history of other diseases of the digestive system (08/13/2019), Personal history of other drug therapy, Personal history of other specified conditions (03/17/2016), Personal history of other specified conditions (02/19/2015), and Strain of muscle and tendon of unspecified wall of thorax, initial encounter (12/05/2016).   has a past surgical history that includes Coronary artery bypass graft (12/16/2022); Cholecystectomy (04/20/2018); CT angio head w and wo IV contrast (7/29/2019); and CT angio neck (7/29/2019).  Family History   Problem Relation Name Age of Onset    Other (ESRD) Mother          on dialysis    Hypertension Father      Dementia Other      Diabetes Other      Liver cancer Other      Kidney disease Other          Reported prior       Markie Nobles  reports that he has quit smoking. His smoking use included cigarettes. He has never been exposed to tobacco smoke. He has never used smokeless tobacco.  He  reports that he does not currently use alcohol.  He  reports that he does not currently use drugs.    Physical Exam  HENT:      Head: Normocephalic.      Nose: Nose normal.      Mouth/Throat:      Mouth: Mucous membranes are moist.   Eyes:      Pupils: Pupils are equal, round, and reactive to light.   Cardiovascular:      Rate and Rhythm: Normal rate and regular rhythm.      Pulses: Normal pulses.      Heart sounds: Normal heart sounds.   Pulmonary:      Effort: Pulmonary effort is normal.      Breath sounds: Normal breath sounds.   Abdominal:       General: Bowel sounds are normal.      Palpations: Abdomen is soft.   Musculoskeletal:         General: Normal range of motion.   Skin:     General: Skin is warm and dry.   Neurological:      General: No focal deficit present.      Mental Status: He is alert and oriented to person, place, and time.   Psychiatric:         Mood and Affect: Mood normal.         Behavior: Behavior normal.         WBC   Date/Time Value Ref Range Status   03/07/2024 04:58 PM 7.3 4.4 - 11.3 x10*3/uL Final   02/26/2024 10:30 AM 6.9 4.4 - 11.3 x10*3/uL Final   02/12/2024 02:34 PM 5.4 4.4 - 11.3 x10*3/uL Final     nRBC   Date Value Ref Range Status   03/07/2024 0.0 0.0 - 0.0 /100 WBCs Final   02/26/2024 0.0 0.0 - 0.0 /100 WBCs Final   02/12/2024 0.0 0.0 - 0.0 /100 WBCs Final     RBC   Date Value Ref Range Status   03/07/2024 3.98 (L) 4.50 - 5.90 x10*6/uL Final   02/26/2024 3.91 (L) 4.50 - 5.90 x10*6/uL Final   02/12/2024 4.16 (L) 4.50 - 5.90 x10*6/uL Final     Hemoglobin   Date Value Ref Range Status   03/07/2024 11.0 (L) 13.5 - 17.5 g/dL Final   02/26/2024 10.9 (L) 13.5 - 17.5 g/dL Final   02/12/2024 11.8 (L) 13.5 - 17.5 g/dL Final     Hematocrit   Date Value Ref Range Status   03/07/2024 34.1 (L) 41.0 - 52.0 % Final   02/26/2024 35.4 (L) 41.0 - 52.0 % Final   02/12/2024 37.2 (L) 41.0 - 52.0 % Final     MCV   Date/Time Value Ref Range Status   03/07/2024 04:58 PM 86 80 - 100 fL Final   02/26/2024 10:30 AM 91 80 - 100 fL Final   02/12/2024 02:34 PM 89 80 - 100 fL Final     MCH   Date/Time Value Ref Range Status   03/07/2024 04:58 PM 27.6 26.0 - 34.0 pg Final   02/26/2024 10:30 AM 27.9 26.0 - 34.0 pg Final   02/12/2024 02:34 PM 28.4 26.0 - 34.0 pg Final     MCHC   Date/Time Value Ref Range Status   03/07/2024 04:58 PM 32.3 32.0 - 36.0 g/dL Final   02/26/2024 10:30 AM 30.8 (L) 32.0 - 36.0 g/dL Final   02/12/2024 02:34 PM 31.7 (L) 32.0 - 36.0 g/dL Final     RDW   Date/Time Value Ref Range Status   03/07/2024 04:58 PM 15.9 (H) 11.5 - 14.5 % Final    02/26/2024 10:30 AM 15.9 (H) 11.5 - 14.5 % Final   02/12/2024 02:34 PM 15.8 (H) 11.5 - 14.5 % Final     Platelets   Date/Time Value Ref Range Status   03/07/2024 04:58  150 - 450 x10*3/uL Final   02/26/2024 10:30  150 - 450 x10*3/uL Final   02/12/2024 02:34  150 - 450 x10*3/uL Final     MPV   Date/Time Value Ref Range Status   10/31/2023 12:07 PM 9.9 7.5 - 11.5 fL Final   10/17/2023 11:44 AM 11.1 7.5 - 11.5 fL Final     Neutrophils %   Date/Time Value Ref Range Status   03/07/2024 04:58 PM 60.1 40.0 - 80.0 % Final   02/26/2024 10:30 AM 51.0 40.0 - 80.0 % Final   02/12/2024 02:34 PM 50.3 40.0 - 80.0 % Final     Immature Granulocytes %, Automated   Date/Time Value Ref Range Status   03/07/2024 04:58 PM 0.3 0.0 - 0.9 % Final     Comment:     Immature Granulocyte Count (IG) includes promyelocytes, myelocytes and metamyelocytes but does not include bands. Percent differential counts (%) should be interpreted in the context of the absolute cell counts (cells/UL).   02/26/2024 10:30 AM 0.3 0.0 - 0.9 % Final     Comment:     Immature Granulocyte Count (IG) includes promyelocytes, myelocytes and metamyelocytes but does not include bands. Percent differential counts (%) should be interpreted in the context of the absolute cell counts (cells/UL).   02/12/2024 02:34 PM 0.2 0.0 - 0.9 % Final     Comment:     Immature Granulocyte Count (IG) includes promyelocytes, myelocytes and metamyelocytes but does not include bands. Percent differential counts (%) should be interpreted in the context of the absolute cell counts (cells/UL).     Lymphocytes %   Date/Time Value Ref Range Status   03/07/2024 04:58 PM 22.5 13.0 - 44.0 % Final   02/26/2024 10:30 AM 35.7 13.0 - 44.0 % Final   02/12/2024 02:34 PM 35.3 13.0 - 44.0 % Final     Monocytes %   Date/Time Value Ref Range Status   03/07/2024 04:58 PM 12.5 2.0 - 10.0 % Final   02/26/2024 10:30 AM 6.9 2.0 - 10.0 % Final   02/12/2024 02:34 PM 7.3 2.0 - 10.0 % Final      Eosinophils %   Date/Time Value Ref Range Status   03/07/2024 04:58 PM 4.2 0.0 - 6.0 % Final   02/26/2024 10:30 AM 5.5 0.0 - 6.0 % Final   02/12/2024 02:34 PM 6.0 0.0 - 6.0 % Final     Basophils %   Date/Time Value Ref Range Status   03/07/2024 04:58 PM 0.4 0.0 - 2.0 % Final   02/26/2024 10:30 AM 0.6 0.0 - 2.0 % Final   02/12/2024 02:34 PM 0.9 0.0 - 2.0 % Final     Neutrophils Absolute   Date/Time Value Ref Range Status   03/07/2024 04:58 PM 4.39 1.60 - 5.50 x10*3/uL Final     Comment:     Percent differential counts (%) should be interpreted in the context of the absolute cell counts (cells/uL).   02/26/2024 10:30 AM 3.52 1.60 - 5.50 x10*3/uL Final     Comment:     Percent differential counts (%) should be interpreted in the context of the absolute cell counts (cells/uL).   02/12/2024 02:34 PM 2.69 1.60 - 5.50 x10*3/uL Final     Comment:     Percent differential counts (%) should be interpreted in the context of the absolute cell counts (cells/uL).     Immature Granulocytes Absolute, Automated   Date/Time Value Ref Range Status   03/07/2024 04:58 PM 0.02 0.00 - 0.50 x10*3/uL Final   02/26/2024 10:30 AM 0.02 0.00 - 0.50 x10*3/uL Final   02/12/2024 02:34 PM 0.01 0.00 - 0.50 x10*3/uL Final     Lymphocytes Absolute   Date/Time Value Ref Range Status   03/07/2024 04:58 PM 1.64 0.80 - 3.00 x10*3/uL Final   02/26/2024 10:30 AM 2.47 0.80 - 3.00 x10*3/uL Final   02/12/2024 02:34 PM 1.89 0.80 - 3.00 x10*3/uL Final     Monocytes Absolute   Date/Time Value Ref Range Status   03/07/2024 04:58 PM 0.91 (H) 0.05 - 0.80 x10*3/uL Final   02/26/2024 10:30 AM 0.48 0.05 - 0.80 x10*3/uL Final   02/12/2024 02:34 PM 0.39 0.05 - 0.80 x10*3/uL Final     Eosinophils Absolute   Date/Time Value Ref Range Status   03/07/2024 04:58 PM 0.31 0.00 - 0.40 x10*3/uL Final   02/26/2024 10:30 AM 0.38 0.00 - 0.40 x10*3/uL Final   02/12/2024 02:34 PM 0.32 0.00 - 0.40 x10*3/uL Final     Basophils Absolute   Date/Time Value Ref Range Status   03/07/2024  04:58 PM 0.03 0.00 - 0.10 x10*3/uL Final   02/26/2024 10:30 AM 0.04 0.00 - 0.10 x10*3/uL Final   02/12/2024 02:34 PM 0.05 0.00 - 0.10 x10*3/uL Final     Assessment/Plan    1) MGUS  -SPEP and INDER are negative. But does have [place] and light chain elevation, Ratio is mildly elevated but stable.     -RTC in 6 months - will repeat labs and 24 hour UPEP 1 week prior to visit. F/U sooner if needed/urgent     2) Abnormal CMP - F/U w/ PCP and/or nephrology regarding CKD and elevated chloride. Stave 4 to 5 CKD. Discussed EPO support and  started Procrit 11960 units every 2 weeks on 10/17/23. Patient has done well with significant improvement and we may be able to increase the interval time.      3) Sickle cell trait  -Diagnosed in 2007        I had an extensive discussion with the patient regarding the diagnosis and discussed the plan of therapy, including general considerations regarding side effects and outcomes. Pt understood and gave appropriate teach back about the plan of care. All questions were answered to the patient's satisfaction. The patient is instructed to contact us at any time if questions or problems arise. Thank you for the opportunity to participate in the care of this very pleasant patient.     Total time = 30 minutes. 50% or more of this time was spent in counseling and/or coordination of care including reviewing medical history/radiology/labs, examining patient, formulating outlined plan with team, and discussing plan with patient/family.                 Rosanne M Casal, APRN-CNP, DNP

## 2024-04-18 LAB
KAPPA LC SERPL-MCNC: 18.92 MG/DL (ref 0.33–1.94)
KAPPA LC/LAMBDA SER: 1.33 {RATIO} (ref 0.26–1.65)
LAMBDA LC SERPL-MCNC: 14.24 MG/DL (ref 0.57–2.63)

## 2024-04-22 ENCOUNTER — APPOINTMENT (OUTPATIENT)
Dept: HEMATOLOGY/ONCOLOGY | Facility: HOSPITAL | Age: 79
End: 2024-04-22
Payer: MEDICARE

## 2024-04-22 LAB
ALBUMIN: 3.4 G/DL (ref 3.4–5)
ALPHA 1 GLOBULIN: 0.3 G/DL (ref 0.2–0.6)
ALPHA 2 GLOBULIN: 1 G/DL (ref 0.4–1.1)
BETA GLOBULIN: 0.8 G/DL (ref 0.5–1.2)
GAMMA GLOBULIN: 1.2 G/DL (ref 0.5–1.4)
IMMUNOFIXATION COMMENT: NORMAL
PATH REVIEW - SERUM IMMUNOFIXATION: NORMAL
PATH REVIEW-SERUM PROTEIN ELECTROPHORESIS: NORMAL
PROTEIN ELECTROPHORESIS COMMENT: NORMAL

## 2024-04-24 ENCOUNTER — HOSPITAL ENCOUNTER (INPATIENT)
Facility: HOSPITAL | Age: 79
LOS: 7 days | Discharge: NURSING, REHAB OR LONG-TERM CARE | DRG: 177 | End: 2024-05-01
Attending: EMERGENCY MEDICINE | Admitting: INTERNAL MEDICINE
Payer: MEDICARE

## 2024-04-24 ENCOUNTER — APPOINTMENT (OUTPATIENT)
Dept: RADIOLOGY | Facility: HOSPITAL | Age: 79
DRG: 177 | End: 2024-04-24
Payer: MEDICARE

## 2024-04-24 ENCOUNTER — APPOINTMENT (OUTPATIENT)
Dept: CARDIOLOGY | Facility: HOSPITAL | Age: 79
DRG: 177 | End: 2024-04-24
Payer: MEDICARE

## 2024-04-24 ENCOUNTER — TELEPHONE (OUTPATIENT)
Dept: PRIMARY CARE | Facility: CLINIC | Age: 79
End: 2024-04-24

## 2024-04-24 DIAGNOSIS — K59.00 CONSTIPATION, UNSPECIFIED CONSTIPATION TYPE: ICD-10-CM

## 2024-04-24 DIAGNOSIS — J18.9 PNEUMONIA, UNSPECIFIED ORGANISM: Primary | ICD-10-CM

## 2024-04-24 DIAGNOSIS — M79.671 RIGHT FOOT PAIN: ICD-10-CM

## 2024-04-24 DIAGNOSIS — J18.9 PNEUMONIA DUE TO INFECTIOUS ORGANISM, UNSPECIFIED LATERALITY, UNSPECIFIED PART OF LUNG: ICD-10-CM

## 2024-04-24 DIAGNOSIS — N17.0: ICD-10-CM

## 2024-04-24 DIAGNOSIS — R13.12 OROPHARYNGEAL DYSPHAGIA: ICD-10-CM

## 2024-04-24 DIAGNOSIS — N18.1: ICD-10-CM

## 2024-04-24 DIAGNOSIS — I10 PRIMARY HYPERTENSION: ICD-10-CM

## 2024-04-24 LAB
ALBUMIN SERPL BCP-MCNC: 3.3 G/DL (ref 3.4–5)
ALP SERPL-CCNC: 78 U/L (ref 33–136)
ALT SERPL W P-5'-P-CCNC: 3 U/L (ref 10–52)
ANION GAP SERPL CALC-SCNC: 17 MMOL/L (ref 10–20)
APPEARANCE UR: ABNORMAL
AST SERPL W P-5'-P-CCNC: 15 U/L (ref 9–39)
BACTERIA #/AREA URNS AUTO: ABNORMAL /HPF
BASOPHILS # BLD AUTO: 0.03 X10*3/UL (ref 0–0.1)
BASOPHILS NFR BLD AUTO: 0.4 %
BILIRUB SERPL-MCNC: 0.7 MG/DL (ref 0–1.2)
BILIRUB UR STRIP.AUTO-MCNC: NEGATIVE MG/DL
BUN SERPL-MCNC: 60 MG/DL (ref 6–23)
CALCIUM SERPL-MCNC: 8.5 MG/DL (ref 8.6–10.3)
CARDIAC TROPONIN I PNL SERPL HS: 14 NG/L (ref 0–20)
CHLORIDE SERPL-SCNC: 113 MMOL/L (ref 98–107)
CO2 SERPL-SCNC: 22 MMOL/L (ref 21–32)
COLOR UR: ABNORMAL
CREAT SERPL-MCNC: 4.33 MG/DL (ref 0.5–1.3)
EGFRCR SERPLBLD CKD-EPI 2021: 13 ML/MIN/1.73M*2
EOSINOPHIL # BLD AUTO: 0.21 X10*3/UL (ref 0–0.4)
EOSINOPHIL NFR BLD AUTO: 2.7 %
ERYTHROCYTE [DISTWIDTH] IN BLOOD BY AUTOMATED COUNT: 16.5 % (ref 11.5–14.5)
FLUAV RNA RESP QL NAA+PROBE: NOT DETECTED
FLUBV RNA RESP QL NAA+PROBE: NOT DETECTED
GLUCOSE SERPL-MCNC: 83 MG/DL (ref 74–99)
GLUCOSE UR STRIP.AUTO-MCNC: NORMAL MG/DL
HCT VFR BLD AUTO: 29.2 % (ref 41–52)
HGB BLD-MCNC: 9.2 G/DL (ref 13.5–17.5)
IMM GRANULOCYTES # BLD AUTO: 0.03 X10*3/UL (ref 0–0.5)
IMM GRANULOCYTES NFR BLD AUTO: 0.4 % (ref 0–0.9)
KETONES UR STRIP.AUTO-MCNC: ABNORMAL MG/DL
LEUKOCYTE ESTERASE UR QL STRIP.AUTO: ABNORMAL
LIPASE SERPL-CCNC: 16 U/L (ref 9–82)
LYMPHOCYTES # BLD AUTO: 1.27 X10*3/UL (ref 0.8–3)
LYMPHOCYTES NFR BLD AUTO: 16.2 %
MAGNESIUM SERPL-MCNC: 1.8 MG/DL (ref 1.6–2.4)
MCH RBC QN AUTO: 28.6 PG (ref 26–34)
MCHC RBC AUTO-ENTMCNC: 31.5 G/DL (ref 32–36)
MCV RBC AUTO: 91 FL (ref 80–100)
MONOCYTES # BLD AUTO: 0.43 X10*3/UL (ref 0.05–0.8)
MONOCYTES NFR BLD AUTO: 5.5 %
MUCOUS THREADS #/AREA URNS AUTO: ABNORMAL /LPF
NEUTROPHILS # BLD AUTO: 5.86 X10*3/UL (ref 1.6–5.5)
NEUTROPHILS NFR BLD AUTO: 74.8 %
NITRITE UR QL STRIP.AUTO: NEGATIVE
NRBC BLD-RTO: 0 /100 WBCS (ref 0–0)
PH UR STRIP.AUTO: 5.5 [PH]
PHOSPHATE SERPL-MCNC: 4 MG/DL (ref 2.5–4.9)
PLATELET # BLD AUTO: 213 X10*3/UL (ref 150–450)
POTASSIUM SERPL-SCNC: 5.2 MMOL/L (ref 3.5–5.3)
PROT SERPL-MCNC: 6.9 G/DL (ref 6.4–8.2)
PROT UR STRIP.AUTO-MCNC: ABNORMAL MG/DL
RBC # BLD AUTO: 3.22 X10*6/UL (ref 4.5–5.9)
RBC # UR STRIP.AUTO: ABNORMAL /UL
RBC #/AREA URNS AUTO: ABNORMAL /HPF
SARS-COV-2 RNA RESP QL NAA+PROBE: NOT DETECTED
SODIUM SERPL-SCNC: 147 MMOL/L (ref 136–145)
SP GR UR STRIP.AUTO: 1.01
UROBILINOGEN UR STRIP.AUTO-MCNC: NORMAL MG/DL
WBC # BLD AUTO: 7.8 X10*3/UL (ref 4.4–11.3)
WBC #/AREA URNS AUTO: >50 /HPF

## 2024-04-24 PROCEDURE — 84484 ASSAY OF TROPONIN QUANT: CPT | Performed by: EMERGENCY MEDICINE

## 2024-04-24 PROCEDURE — 84100 ASSAY OF PHOSPHORUS: CPT | Performed by: EMERGENCY MEDICINE

## 2024-04-24 PROCEDURE — 80053 COMPREHEN METABOLIC PANEL: CPT | Performed by: EMERGENCY MEDICINE

## 2024-04-24 PROCEDURE — 85025 COMPLETE CBC W/AUTO DIFF WBC: CPT | Performed by: EMERGENCY MEDICINE

## 2024-04-24 PROCEDURE — 93005 ELECTROCARDIOGRAM TRACING: CPT

## 2024-04-24 PROCEDURE — 71045 X-RAY EXAM CHEST 1 VIEW: CPT

## 2024-04-24 PROCEDURE — 87636 SARSCOV2 & INF A&B AMP PRB: CPT | Performed by: EMERGENCY MEDICINE

## 2024-04-24 PROCEDURE — 96374 THER/PROPH/DIAG INJ IV PUSH: CPT

## 2024-04-24 PROCEDURE — 87086 URINE CULTURE/COLONY COUNT: CPT | Mod: AHULAB | Performed by: EMERGENCY MEDICINE

## 2024-04-24 PROCEDURE — 70450 CT HEAD/BRAIN W/O DYE: CPT

## 2024-04-24 PROCEDURE — 70450 CT HEAD/BRAIN W/O DYE: CPT | Performed by: RADIOLOGY

## 2024-04-24 PROCEDURE — 96361 HYDRATE IV INFUSION ADD-ON: CPT

## 2024-04-24 PROCEDURE — 81001 URINALYSIS AUTO W/SCOPE: CPT | Performed by: EMERGENCY MEDICINE

## 2024-04-24 PROCEDURE — 83690 ASSAY OF LIPASE: CPT | Performed by: EMERGENCY MEDICINE

## 2024-04-24 PROCEDURE — 83735 ASSAY OF MAGNESIUM: CPT | Performed by: EMERGENCY MEDICINE

## 2024-04-24 PROCEDURE — 1200000002 HC GENERAL ROOM WITH TELEMETRY DAILY

## 2024-04-24 PROCEDURE — 99285 EMERGENCY DEPT VISIT HI MDM: CPT | Mod: 25

## 2024-04-24 PROCEDURE — 2500000004 HC RX 250 GENERAL PHARMACY W/ HCPCS (ALT 636 FOR OP/ED): Performed by: EMERGENCY MEDICINE

## 2024-04-24 PROCEDURE — 36415 COLL VENOUS BLD VENIPUNCTURE: CPT | Performed by: EMERGENCY MEDICINE

## 2024-04-24 PROCEDURE — 71045 X-RAY EXAM CHEST 1 VIEW: CPT | Performed by: RADIOLOGY

## 2024-04-24 RX ORDER — TRAMADOL HYDROCHLORIDE 50 MG/1
50 TABLET ORAL EVERY 12 HOURS PRN
Status: DISCONTINUED | OUTPATIENT
Start: 2024-04-24 | End: 2024-05-01 | Stop reason: HOSPADM

## 2024-04-24 RX ORDER — ACETAMINOPHEN 325 MG/1
650 TABLET ORAL EVERY 6 HOURS PRN
Status: DISCONTINUED | OUTPATIENT
Start: 2024-04-24 | End: 2024-05-01 | Stop reason: HOSPADM

## 2024-04-24 RX ORDER — CEFTRIAXONE 1 G/50ML
1 INJECTION, SOLUTION INTRAVENOUS ONCE
Status: COMPLETED | OUTPATIENT
Start: 2024-04-24 | End: 2024-04-24

## 2024-04-24 RX ORDER — IPRATROPIUM BROMIDE AND ALBUTEROL SULFATE 2.5; .5 MG/3ML; MG/3ML
3 SOLUTION RESPIRATORY (INHALATION) EVERY 4 HOURS PRN
Status: DISCONTINUED | OUTPATIENT
Start: 2024-04-24 | End: 2024-05-01 | Stop reason: HOSPADM

## 2024-04-24 RX ORDER — CEFTRIAXONE 1 G/50ML
1 INJECTION, SOLUTION INTRAVENOUS EVERY 24 HOURS
Status: DISCONTINUED | OUTPATIENT
Start: 2024-04-25 | End: 2024-05-01 | Stop reason: HOSPADM

## 2024-04-24 RX ORDER — TALC
3 POWDER (GRAM) TOPICAL NIGHTLY PRN
Status: DISCONTINUED | OUTPATIENT
Start: 2024-04-24 | End: 2024-05-01 | Stop reason: HOSPADM

## 2024-04-24 RX ORDER — BENZONATATE 100 MG/1
100 CAPSULE ORAL 3 TIMES DAILY PRN
Status: DISCONTINUED | OUTPATIENT
Start: 2024-04-24 | End: 2024-05-01 | Stop reason: HOSPADM

## 2024-04-24 RX ORDER — ONDANSETRON HYDROCHLORIDE 2 MG/ML
4 INJECTION, SOLUTION INTRAVENOUS EVERY 6 HOURS PRN
Status: DISCONTINUED | OUTPATIENT
Start: 2024-04-24 | End: 2024-05-01 | Stop reason: HOSPADM

## 2024-04-24 RX ORDER — POLYETHYLENE GLYCOL 3350 17 G/17G
17 POWDER, FOR SOLUTION ORAL DAILY
Status: DISCONTINUED | OUTPATIENT
Start: 2024-04-24 | End: 2024-05-01 | Stop reason: HOSPADM

## 2024-04-24 RX ORDER — PANTOPRAZOLE SODIUM 40 MG/1
40 TABLET, DELAYED RELEASE ORAL
Status: DISCONTINUED | OUTPATIENT
Start: 2024-04-25 | End: 2024-05-01 | Stop reason: HOSPADM

## 2024-04-24 RX ORDER — IPRATROPIUM BROMIDE AND ALBUTEROL SULFATE 2.5; .5 MG/3ML; MG/3ML
3 SOLUTION RESPIRATORY (INHALATION)
Status: DISCONTINUED | OUTPATIENT
Start: 2024-04-24 | End: 2024-04-24

## 2024-04-24 RX ADMIN — CEFTRIAXONE SODIUM 1 G: 1 INJECTION, SOLUTION INTRAVENOUS at 17:55

## 2024-04-24 RX ADMIN — SODIUM CHLORIDE, POTASSIUM CHLORIDE, SODIUM LACTATE AND CALCIUM CHLORIDE 500 ML: 600; 310; 30; 20 INJECTION, SOLUTION INTRAVENOUS at 15:21

## 2024-04-24 RX ADMIN — AZITHROMYCIN MONOHYDRATE 500 MG: 500 INJECTION, POWDER, LYOPHILIZED, FOR SOLUTION INTRAVENOUS at 18:23

## 2024-04-24 SDOH — SOCIAL STABILITY: SOCIAL INSECURITY: ARE THERE ANY APPARENT SIGNS OF INJURIES/BEHAVIORS THAT COULD BE RELATED TO ABUSE/NEGLECT?: NO

## 2024-04-24 SDOH — SOCIAL STABILITY: SOCIAL INSECURITY: HAVE YOU HAD THOUGHTS OF HARMING ANYONE ELSE?: NO

## 2024-04-24 SDOH — SOCIAL STABILITY: SOCIAL INSECURITY: HAS ANYONE EVER THREATENED TO HURT YOUR FAMILY OR YOUR PETS?: NO

## 2024-04-24 SDOH — SOCIAL STABILITY: SOCIAL INSECURITY: DO YOU FEEL ANYONE HAS EXPLOITED OR TAKEN ADVANTAGE OF YOU FINANCIALLY OR OF YOUR PERSONAL PROPERTY?: NO

## 2024-04-24 SDOH — SOCIAL STABILITY: SOCIAL INSECURITY: ABUSE: ADULT

## 2024-04-24 SDOH — SOCIAL STABILITY: SOCIAL INSECURITY: WERE YOU ABLE TO COMPLETE ALL THE BEHAVIORAL HEALTH SCREENINGS?: YES

## 2024-04-24 SDOH — SOCIAL STABILITY: SOCIAL INSECURITY: ARE YOU OR HAVE YOU BEEN THREATENED OR ABUSED PHYSICALLY, EMOTIONALLY, OR SEXUALLY BY ANYONE?: NO

## 2024-04-24 SDOH — SOCIAL STABILITY: SOCIAL INSECURITY: DOES ANYONE TRY TO KEEP YOU FROM HAVING/CONTACTING OTHER FRIENDS OR DOING THINGS OUTSIDE YOUR HOME?: NO

## 2024-04-24 SDOH — SOCIAL STABILITY: SOCIAL INSECURITY: DO YOU FEEL UNSAFE GOING BACK TO THE PLACE WHERE YOU ARE LIVING?: NO

## 2024-04-24 ASSESSMENT — PATIENT HEALTH QUESTIONNAIRE - PHQ9
SUM OF ALL RESPONSES TO PHQ9 QUESTIONS 1 & 2: 0
2. FEELING DOWN, DEPRESSED OR HOPELESS: NOT AT ALL
1. LITTLE INTEREST OR PLEASURE IN DOING THINGS: NOT AT ALL

## 2024-04-24 ASSESSMENT — PAIN SCALES - GENERAL
PAINLEVEL_OUTOF10: 0 - NO PAIN
PAINLEVEL_OUTOF10: 0 - NO PAIN

## 2024-04-24 ASSESSMENT — PAIN - FUNCTIONAL ASSESSMENT
PAIN_FUNCTIONAL_ASSESSMENT: 0-10
PAIN_FUNCTIONAL_ASSESSMENT: 0-10

## 2024-04-24 ASSESSMENT — COGNITIVE AND FUNCTIONAL STATUS - GENERAL
MOBILITY SCORE: 13
CLIMB 3 TO 5 STEPS WITH RAILING: TOTAL
TOILETING: A LITTLE
MOVING TO AND FROM BED TO CHAIR: A LOT
WALKING IN HOSPITAL ROOM: A LOT
TURNING FROM BACK TO SIDE WHILE IN FLAT BAD: A LITTLE
DAILY ACTIVITIY SCORE: 18
MOVING FROM LYING ON BACK TO SITTING ON SIDE OF FLAT BED WITH BEDRAILS: A LITTLE
PATIENT BASELINE BEDBOUND: NO
PERSONAL GROOMING: A LITTLE
STANDING UP FROM CHAIR USING ARMS: A LOT
HELP NEEDED FOR BATHING: A LOT
DRESSING REGULAR UPPER BODY CLOTHING: A LITTLE
DRESSING REGULAR LOWER BODY CLOTHING: A LITTLE

## 2024-04-24 ASSESSMENT — ACTIVITIES OF DAILY LIVING (ADL)
ADEQUATE_TO_COMPLETE_ADL: YES
JUDGMENT_ADEQUATE_SAFELY_COMPLETE_DAILY_ACTIVITIES: YES
TOILETING: NEEDS ASSISTANCE
LACK_OF_TRANSPORTATION: NO
WALKS IN HOME: NEEDS ASSISTANCE
HEARING - RIGHT EAR: FUNCTIONAL
HEARING - LEFT EAR: FUNCTIONAL
BATHING: NEEDS ASSISTANCE
FEEDING YOURSELF: NEEDS ASSISTANCE
PATIENT'S MEMORY ADEQUATE TO SAFELY COMPLETE DAILY ACTIVITIES?: YES
LACK_OF_TRANSPORTATION: NO
GROOMING: NEEDS ASSISTANCE
DRESSING YOURSELF: NEEDS ASSISTANCE
ASSISTIVE_DEVICE: WALKER

## 2024-04-24 ASSESSMENT — LIFESTYLE VARIABLES
AUDIT-C TOTAL SCORE: 0
HOW OFTEN DO YOU HAVE 6 OR MORE DRINKS ON ONE OCCASION: NEVER
HOW MANY STANDARD DRINKS CONTAINING ALCOHOL DO YOU HAVE ON A TYPICAL DAY: PATIENT DOES NOT DRINK
SKIP TO QUESTIONS 9-10: 1
HOW OFTEN DO YOU HAVE A DRINK CONTAINING ALCOHOL: NEVER
AUDIT-C TOTAL SCORE: 0

## 2024-04-24 NOTE — ED PROVIDER NOTES
History/Exam limitations: none.   Additional history was obtained from patient and spouse/SO.          HPI:    Markie Nobles is a 79 y.o. male PMH CAD status post CABG, hypertension, hyperlipidemia, CKD, diabetes, MUGS presenting with decreased appetite, weight loss, nausea and vomiting times approximately 2+ weeks.  They noticed an increase in his weight loss and nausea nonbloody emesis over the last several weeks.  States that has been getting wound care for sacral ulceration.  Patient denies any headache, vision changes, chest pain, shortness breath abdominal pain, diarrhea, dysuria.  Not currently nauseous.  Had very minimal p.o. intake over the last several days, did drink a boost on the way here to the ED however.  Denies any current nausea.  No recent falls.  No focal weakness or numbness.  There is at the primary care office earlier today and were advised to present to the ED for further evaluation.          Physical Exam:  ED Triage Vitals [04/24/24 1416]   Temperature Heart Rate Respirations BP   36.7 °C (98.1 °F) 70 15 139/79      Pulse Ox Temp src Heart Rate Source Patient Position   97 % -- -- --      BP Location FiO2 (%)     -- --        GEN:      Alert, NAD  Eyes:       PERRL, EOMI  HENT:      NC/AT, OP clear, airway patent, MM  CV:      RRR, no MRG, no LE pitting edema, 2+ radial and pedal pulses  PULM:     Scant crackles in left lower lung field, easy WOB, symmetric chest rise  ABD:      Soft, NT, ND, no masses, BS +  :       No CVA TTP  NEURO:   A&Ox3, no focal deficits    MSK:      FROM, no joint deformities or swelling, no e/o trauma  SKIN:       Warm and dry, stage I small sacral ulceration with no surrounding crepitus or discharge  PSYCH:    Appropriate mood and affect         MDM/ED Course:   Markie Nobles is a 79 y.o. male PMH CAD status post CABG, hypertension, hyperlipidemia, CKD, diabetes, MUGS presenting with decreased appetite, weight loss, nausea and vomiting times approximately  2+ weeks.  Vitals reassuring.  Exam as documented above.  Given poor p.o. intake, differential includes dehydration, WARREN, electrolyte abnormality.  Differential includes underlying infectious etiology.  Differential includes underlying metabolic dysfunction.  No focal neurodeficits, low suspicion for CVA or acute intracranial pathology.  Differential with symptomatic anemia IV placed and labs drawn.  CBC reviewed and no leukocytosis, hemoglobin 9.2 from last 10.1.  Patient denies any dark or bloody stools.  Patient's sacral wound was evaluated and does not appear infected, superficial.  Chemistry with baseline renal function, sodium 147 from last 149.  Magnesium reassuring.  Phosphorus reassuring.  Troponin negative less likely ACS given time course.  Lipase negative.  COVID flu negative.  Urinalysis concerning for possible UTI given positive leukocyte esterase white cells and bacteria.  IV ceftriaxone given.  500 cc IV fluids given.  CT head with no acute findings per radiology read.  Chest x-ray with concerns of developing infiltrate left lower lung base.  Did have scant crackles in this area.  Concern for underlying pneumonia, IV azithromycin ordered as well.  Abdomen soft and nontender, no abdominal discomfort, lower suspicion for acute intra-abdominal pathology, normal bowel function low suspicion for bowel obstruction, considered CT imaging but ultimately deferred given this.  Patient care signed out to Dr. Watson for continued management stable condition.    EKG reviewed by me: 6:02 PM normal sinus rhythm, rate 68, normal axis, normal intervals, no STEMI, no ectopy, QTc 467 ms, overall similar to prior EKGs.     ED Course as of 04/26/24 1515   Wed Apr 24, 2024   1459 Sacral ulceration stage I, no surrounding crepitus no drainage [JM]      ED Course User Index  [JM] Adolfo Escobar MD         Diagnoses as of 04/26/24 1515   Pneumonia due to infectious organism, unspecified laterality, unspecified part of lung    Pneumonia, unspecified organism         Chronic medical conditions affecting care listed in MDM. I independently reviewed imaging studies and agreed with radiology reads. I reviewed recent and relevant outside records including PCP notes, Prior discharge summaries, and prior radiology reports.    Procedure  Procedures    Diagnosis:   1.  Pneumonia  2.  UTI  3.  Decreased oral intake    Dispo: Hospitalized in stable condition      Disclaimer: Portions of this note were dictated by speech recognition. An attempt at proof reading was made to minimize errors. Minor errors in transcription may be present.  Please call if questions.     Adolfo Escobar MD  04/26/24 7854

## 2024-04-24 NOTE — TELEPHONE ENCOUNTER
Patient wife called stating that  don't want to eat or not eating anymore he's losing a lot of weight she's still giving him the appetite pill he's throwing up she want to know what to do now

## 2024-04-24 NOTE — ED NOTES
Patient presents to the ed for past 3 weeks having difficulty holding down liquids and food. Wife reports he has had a swallowing eval in th past and since uses thicken in fluids, foods are solids. Patient currently denies n/v. patient has a healing wound  to  coccyx.     Erick Pierre RN  04/24/24 1500

## 2024-04-25 LAB
ALBUMIN SERPL BCP-MCNC: 3.2 G/DL (ref 3.4–5)
ALP SERPL-CCNC: 80 U/L (ref 33–136)
ALT SERPL W P-5'-P-CCNC: <3 U/L (ref 10–52)
ANION GAP SERPL CALC-SCNC: 17 MMOL/L (ref 10–20)
AST SERPL W P-5'-P-CCNC: 13 U/L (ref 9–39)
ATRIAL RATE: 68 BPM
BILIRUB SERPL-MCNC: 0.4 MG/DL (ref 0–1.2)
BUN SERPL-MCNC: 59 MG/DL (ref 6–23)
CALCIUM SERPL-MCNC: 8 MG/DL (ref 8.6–10.3)
CHLORIDE SERPL-SCNC: 114 MMOL/L (ref 98–107)
CO2 SERPL-SCNC: 20 MMOL/L (ref 21–32)
CREAT SERPL-MCNC: 4.28 MG/DL (ref 0.5–1.3)
EGFRCR SERPLBLD CKD-EPI 2021: 13 ML/MIN/1.73M*2
ERYTHROCYTE [DISTWIDTH] IN BLOOD BY AUTOMATED COUNT: 16.5 % (ref 11.5–14.5)
GLUCOSE SERPL-MCNC: 82 MG/DL (ref 74–99)
HCT VFR BLD AUTO: 27.8 % (ref 41–52)
HGB BLD-MCNC: 8.8 G/DL (ref 13.5–17.5)
HOLD SPECIMEN: NORMAL
MCH RBC QN AUTO: 28.5 PG (ref 26–34)
MCHC RBC AUTO-ENTMCNC: 31.7 G/DL (ref 32–36)
MCV RBC AUTO: 90 FL (ref 80–100)
NRBC BLD-RTO: 0 /100 WBCS (ref 0–0)
P AXIS: 70 DEGREES
P OFFSET: 183 MS
P ONSET: 127 MS
PLATELET # BLD AUTO: 193 X10*3/UL (ref 150–450)
POTASSIUM SERPL-SCNC: 5.2 MMOL/L (ref 3.5–5.3)
PR INTERVAL: 188 MS
PROT SERPL-MCNC: 6.5 G/DL (ref 6.4–8.2)
Q ONSET: 221 MS
QRS COUNT: 11 BEATS
QRS DURATION: 68 MS
QT INTERVAL: 440 MS
QTC CALCULATION(BAZETT): 467 MS
QTC FREDERICIA: 459 MS
R AXIS: 60 DEGREES
RBC # BLD AUTO: 3.09 X10*6/UL (ref 4.5–5.9)
SODIUM SERPL-SCNC: 146 MMOL/L (ref 136–145)
T AXIS: 92 DEGREES
T OFFSET: 441 MS
VENTRICULAR RATE: 68 BPM
WBC # BLD AUTO: 6.7 X10*3/UL (ref 4.4–11.3)

## 2024-04-25 PROCEDURE — 2500000006 HC RX 250 W HCPCS SELF ADMINISTERED DRUGS (ALT 637 FOR ALL PAYERS): Performed by: INTERNAL MEDICINE

## 2024-04-25 PROCEDURE — 92610 EVALUATE SWALLOWING FUNCTION: CPT | Mod: GN

## 2024-04-25 PROCEDURE — 2500000004 HC RX 250 GENERAL PHARMACY W/ HCPCS (ALT 636 FOR OP/ED): Performed by: INTERNAL MEDICINE

## 2024-04-25 PROCEDURE — 2500000006 HC RX 250 W HCPCS SELF ADMINISTERED DRUGS (ALT 637 FOR ALL PAYERS): Mod: MUE | Performed by: INTERNAL MEDICINE

## 2024-04-25 PROCEDURE — 1100000001 HC PRIVATE ROOM DAILY

## 2024-04-25 PROCEDURE — 99223 1ST HOSP IP/OBS HIGH 75: CPT | Performed by: INTERNAL MEDICINE

## 2024-04-25 PROCEDURE — 80053 COMPREHEN METABOLIC PANEL: CPT

## 2024-04-25 PROCEDURE — 2500000001 HC RX 250 WO HCPCS SELF ADMINISTERED DRUGS (ALT 637 FOR MEDICARE OP): Performed by: INTERNAL MEDICINE

## 2024-04-25 PROCEDURE — 36415 COLL VENOUS BLD VENIPUNCTURE: CPT

## 2024-04-25 PROCEDURE — 85027 COMPLETE CBC AUTOMATED: CPT

## 2024-04-25 PROCEDURE — 97163 PT EVAL HIGH COMPLEX 45 MIN: CPT | Mod: GP

## 2024-04-25 RX ORDER — METOPROLOL TARTRATE 25 MG/1
25 TABLET, FILM COATED ORAL DAILY
Status: DISCONTINUED | OUTPATIENT
Start: 2024-04-25 | End: 2024-05-01 | Stop reason: HOSPADM

## 2024-04-25 RX ORDER — CARBIDOPA AND LEVODOPA 25; 100 MG/1; MG/1
1.5 TABLET ORAL 4 TIMES DAILY
Status: DISCONTINUED | OUTPATIENT
Start: 2024-04-25 | End: 2024-05-01 | Stop reason: HOSPADM

## 2024-04-25 RX ORDER — CLOPIDOGREL BISULFATE 75 MG/1
75 TABLET ORAL DAILY
Status: DISCONTINUED | OUTPATIENT
Start: 2024-04-25 | End: 2024-05-01 | Stop reason: HOSPADM

## 2024-04-25 RX ORDER — TAMSULOSIN HYDROCHLORIDE 0.4 MG/1
0.4 CAPSULE ORAL DAILY
Status: DISCONTINUED | OUTPATIENT
Start: 2024-04-25 | End: 2024-05-01 | Stop reason: HOSPADM

## 2024-04-25 RX ORDER — ATORVASTATIN CALCIUM 80 MG/1
80 TABLET, FILM COATED ORAL DAILY
Status: DISCONTINUED | OUTPATIENT
Start: 2024-04-25 | End: 2024-05-01 | Stop reason: HOSPADM

## 2024-04-25 RX ORDER — FINASTERIDE 5 MG/1
5 TABLET, FILM COATED ORAL DAILY
Status: DISCONTINUED | OUTPATIENT
Start: 2024-04-25 | End: 2024-05-01 | Stop reason: HOSPADM

## 2024-04-25 RX ORDER — ENOXAPARIN SODIUM 100 MG/ML
30 INJECTION SUBCUTANEOUS EVERY 24 HOURS
Status: DISCONTINUED | OUTPATIENT
Start: 2024-04-25 | End: 2024-05-01 | Stop reason: HOSPADM

## 2024-04-25 RX ADMIN — AZITHROMYCIN MONOHYDRATE 500 MG: 500 INJECTION, POWDER, LYOPHILIZED, FOR SOLUTION INTRAVENOUS at 17:41

## 2024-04-25 RX ADMIN — METOPROLOL TARTRATE 25 MG: 25 TABLET, FILM COATED ORAL at 09:24

## 2024-04-25 RX ADMIN — CLOPIDOGREL 75 MG: 75 TABLET ORAL at 09:24

## 2024-04-25 RX ADMIN — ATORVASTATIN CALCIUM 80 MG: 80 TABLET, FILM COATED ORAL at 09:24

## 2024-04-25 RX ADMIN — POLYETHYLENE GLYCOL 3350 17 G: 17 POWDER, FOR SOLUTION ORAL at 09:24

## 2024-04-25 RX ADMIN — FINASTERIDE 5 MG: 5 TABLET, FILM COATED ORAL at 09:24

## 2024-04-25 RX ADMIN — TAMSULOSIN HYDROCHLORIDE 0.4 MG: 0.4 CAPSULE ORAL at 09:24

## 2024-04-25 RX ADMIN — CEFTRIAXONE SODIUM 1 G: 1 INJECTION, SOLUTION INTRAVENOUS at 23:09

## 2024-04-25 RX ADMIN — CARBIDOPA AND LEVODOPA 1.5 TABLET: 25; 100 TABLET ORAL at 09:26

## 2024-04-25 RX ADMIN — CARBIDOPA AND LEVODOPA 1.5 TABLET: 25; 100 TABLET ORAL at 14:11

## 2024-04-25 RX ADMIN — ENOXAPARIN SODIUM 30 MG: 30 INJECTION SUBCUTANEOUS at 09:24

## 2024-04-25 RX ADMIN — CARBIDOPA AND LEVODOPA 1.5 TABLET: 25; 100 TABLET ORAL at 17:41

## 2024-04-25 ASSESSMENT — COGNITIVE AND FUNCTIONAL STATUS - GENERAL
DAILY ACTIVITIY SCORE: 18
TOILETING: A LITTLE
DRESSING REGULAR LOWER BODY CLOTHING: A LITTLE
WALKING IN HOSPITAL ROOM: A LOT
HELP NEEDED FOR BATHING: A LOT
WALKING IN HOSPITAL ROOM: A LOT
DRESSING REGULAR UPPER BODY CLOTHING: A LITTLE
CLIMB 3 TO 5 STEPS WITH RAILING: TOTAL
STANDING UP FROM CHAIR USING ARMS: A LOT
MOBILITY SCORE: 11
TURNING FROM BACK TO SIDE WHILE IN FLAT BAD: A LITTLE
MOVING FROM LYING ON BACK TO SITTING ON SIDE OF FLAT BED WITH BEDRAILS: A LITTLE
MOVING TO AND FROM BED TO CHAIR: A LOT
MOBILITY SCORE: 13
CLIMB 3 TO 5 STEPS WITH RAILING: TOTAL
PERSONAL GROOMING: A LITTLE
TURNING FROM BACK TO SIDE WHILE IN FLAT BAD: A LOT
STANDING UP FROM CHAIR USING ARMS: A LOT
MOVING TO AND FROM BED TO CHAIR: A LOT
MOVING FROM LYING ON BACK TO SITTING ON SIDE OF FLAT BED WITH BEDRAILS: A LOT

## 2024-04-25 ASSESSMENT — ACTIVITIES OF DAILY LIVING (ADL)
ADL_ASSISTANCE: NEEDS ASSISTANCE
LACK_OF_TRANSPORTATION: NO

## 2024-04-25 ASSESSMENT — PAIN SCALES - GENERAL
PAINLEVEL_OUTOF10: 0 - NO PAIN
PAINLEVEL_OUTOF10: 0 - NO PAIN

## 2024-04-25 ASSESSMENT — PAIN - FUNCTIONAL ASSESSMENT
PAIN_FUNCTIONAL_ASSESSMENT: 0-10
PAIN_FUNCTIONAL_ASSESSMENT: 0-10

## 2024-04-25 ASSESSMENT — ENCOUNTER SYMPTOMS: VOMITING: 1

## 2024-04-25 NOTE — PROGRESS NOTES
4/25/2024 3:33 PM Wife gave SNF choices. Her choices are 1) [facility] 2) [facility] and 3) [facility]. I have requested referrals to be sent. Sarah Arredondo [facility]

## 2024-04-25 NOTE — PROGRESS NOTES
Care Coordinator Note:  TCC spoke with patient and patients dtr Isabella  at bedside. Demo is correct. Denies falls. Has walker and shower chair.  PCP is [facility]IGDAL[facility] Watson- seen 2024. Pharm is [facility].     Plan: patient in with N/V. PNA r/t aspiration. PT OT rec MOD. Snf list given to dtr for review. SLP following. IV zith/rocephin.     Status: inpatient  Payor: [employer]  Disposition: PT OT - MOD- SNF list given to dtr  Barrier: Will need AUTH.   ADOD: few days    Diana Hammond [facility]      04/25/24 1255   Discharge Planning   Living Arrangements Spouse/significant other   Support Systems Spouse/significant other;Children   Assistance Needed Assisted by family for all ADL.   Type of Residence Private residence   Number of Stairs to Enter Residence 0   Number of Stairs Within Residence 0   Do you have animals or pets at home? No   Who is requesting discharge planning? Provider   Home or Post Acute Services Post acute facilities (Rehab/SNF/etc)   Type of Post Acute Facility Services Skilled nursing   Patient expects to be discharged to: SNF list provided. Will need AUTH   Does the patient need discharge transport arranged? Yes   RoundTrip coordination needed? Yes   Has discharge transport been arranged? No   Financial Resource Strain   How hard is it for you to pay for the very basics like food, housing, medical care, and heating? Not hard   Housing Stability   In the last 12 months, was there a time when you were not able to pay the mortgage or rent on time? N   In the last 12 months, was there a time when you did not have a steady place to sleep or slept in a shelter (including now)? N   Transportation Needs   In the past 12 months, has lack of transportation kept you from medical appointments or from getting medications? no   In the past 12 months, has lack of transportation kept you from meetings, work, or from getting things needed for daily living? No

## 2024-04-25 NOTE — PROGRESS NOTES
Pharmacy Medication History Review    Markie Nobles is a 79 y.o. male admitted for Pneumonia, unspecified organism. Pharmacy reviewed the patient's [id number] medications and allergies for accuracy.    The list below reflectives the updated PTA list. Please review each medication in order reconciliation for additional clarification and justification.       The list below reflectives the updated allergy list. Please review each documented allergy for additional clarification and justification.  Allergies  Reviewed by Alina Reyes RN on 4/24/2024   No Known Allergies         Below are additional concerns with the patient's PTA list.  Prior to Admission Medications   Prescriptions Last Dose Informant   acetaminophen (Tylenol Extra Strength) 500 mg tablet 4/23/2024    Sig: Take 2 tablets (1,000 mg) by mouth every 8 hours if needed for mild pain (1 - 3).   allopurinol (Zyloprim) 100 mg tablet More than a month    Sig: Take 1 tablet (100 mg) by mouth once daily as needed (gout).   atorvastatin (Lipitor) 80 mg tablet 4/24/2024    Sig: TAKE 1 TABLET BY MOUTH ONCE  DAILY   carbidopa-levodopa (Sinemet)  mg tablet 4/23/2024    Sig: Take 1.5 tablets by mouth 4 times a day. 8am, 11am, 2pm, 5pm   clopidogrel (Plavix) 75 mg tablet 4/24/2024    Sig: TAKE 1 TABLET BY MOUTH ONCE  DAILY   docusate sodium (Colace) 100 mg capsule Past Week    Sig: TAKE 1 CAPSULE BY MOUTH EVERY DAY AS DIRECTED   Patient taking differently: Take 1 capsule (100 mg) by mouth 2 times a day as needed for constipation.   finasteride (Proscar) 5 mg tablet 4/23/2024    Sig: Take 1 tablet (5 mg) by mouth once daily.   lidocaine (Lidoderm) 5 % patch 4/23/2024    Sig: APPLY 1 PATCH DAILY & REMOVE AFTER 12 HOURS   Patient taking differently: Place 1 patch on the skin once daily as needed for mild pain (1 - 3).   metoprolol tartrate (Lopressor) 25 mg tablet 4/24/2024    Sig: Take 1 tablet (25 mg) by mouth once daily.   Patient taking differently:  Take 1 tablet (25 mg) by mouth once daily. Don't crush   naphazoline-glycerin 0.012-0.2 % drops Past Month    Sig: Administer 1 drop into both eyes every 8 hours if needed.   nitroglycerin (Nitrostat) 0.4 mg SL tablet More than a month    Sig: Place under the tongue.   tamsulosin (Flomax) 0.4 mg 24 hr capsule     Sig: TAKE 2 CAPSULES BY MOUTH  DAILY 1/2 HOUR AFTER SAME  MEAL OR AT BEDTIME WITH A  SNACK   Patient taking differently: Take 1 capsule (0.4 mg) by mouth once daily.      Facility-Administered Medications: None      Per wife bedside.     Saida Evangelista

## 2024-04-25 NOTE — PROGRESS NOTES
Speech-Language Pathology    [facility] Adult Inpatient Speech-Language Pathology Clinical Swallow Evaluation    Patient Name: Markie Nobles  MRN: [id number]  Today's Date: 4/25/2024   Time Calculation  Start Time: 1027  Stop Time: 1055  Time Calculation (min): 28 min         Current Problem:   1. Pneumonia, unspecified organism        2. Pneumonia due to infectious organism, unspecified laterality, unspecified part of lung        3. Oropharyngeal dysphagia              Recommendations:  Risk for Aspiration: Yes  Additional Recommendations:  (Modified barium swallow study; NPO until study completed)  Medication Administration Recommendations: Non Oral  Follow up treatments: Diet tolerance monitoring, Patient/family education  Dysphagia Goals:  (Pt will participate in MBS to r/o aspiration and determine least restrictive diet.)      Assessment:  Assessment Results: Pt presents with suspected pharyngeal dysphagia, overt s/s aspiration with thin liquids by straw, and increased risk aspiration d/t known hx of dysphagia and current dx of PD.  Medical Staff Made Aware: Yes      Plan:  Inpatient/Swing Bed or Outpatient: Inpatient  Treatment/Interventions: Complete MBSS, Diet recommendations, Patient/family education  SLP Plan: Skilled SLP  SLP Frequency:  (to be determined after MBS completed)  Duration: Current admission  SLP Discharge Recommendations: Continue home program upon D/C, Continue skilled speech therapy services post discharge  Discussed POC: Patient, Caregiver/family  Discussed Risks/Benefits: Yes  Patient/Caregiver Agreeable: Yes      Subjective   Current Problem:  Pt currently admitted with pneumonia; Pt reports this is first pneumonia he's experienced. Per chart and Pt/family reports, known h/o dysphagia. Dtr reports that Pt has been coughing with increasing frequency, and even required use of Heimlich maneuver at home in past 6 weeks.      General Visit Information:  Patient Class: Inpatient  Living Environment:  Home, Live with __ (wife)  Caregiver Feedback: Dtr present and supportive; provided hx. Per Pt/dtr, Pt tends to eat maybe 1 meal/day and drinks 1 Ensure/day d/t poor appetite; Pt typically coughs with liquids. Pt reports HHC ST tx, and isn't supposed to use straws. Dtr reports discussed PEG TF after previous MBS, but Pt/wife refused at that time.  Reason for Referral: Swallow Eval, concern for aspiration  Referred By: Jacqui Schmidt CNP  Past Medical History Relevant to Rehab: Parkinson's disease (dx'd approx 1 year ago)  Patient Seen During This Visit: Yes  Prior to Session Communication: Bedside nurse  Date of Order: 04/25/24  BaseLine Diet: Regular (no straws?)  Current Diet : Regular        Objective     Baseline Assessment:  Behavior/Cognition: Alert, Cooperative, Pleasant mood  Vision: Functional for self-feeding  Hearing: Within Functional Limits  Patient Positioning: Partially/Semi Reclined, Upright in Bed  Baseline Vocal Quality: Normal      Pain:  Pain Assessment: 0-10  Pain Score: 0 - No pain       Oral/Motor Assessment:  Oral Hygiene: WFL; pink, healthy appearing mucosa; mild-mod visible plaque along teeth.  Dentition: Adequate/Natural, Dentures (Lower Partial)  Oral Motor: Within Functional Limits  Intelligibility: Intelligible  Breath Support: Adequate for speech  Hearing: Within Functional Limits      Consistencies Trialed:  Consistencies Trialed: Yes  Pt fed himself all PO trials with use of left hand only.  Pt trialed 3x 1tsp puree, 1/2 liane cracker, 1 sip Mildly/Nectar-thick liquids by cup, 2-3 sips thin liquids by straw, 2-3 sips thin liquid by cup.      Clinical Observations:  Management of Oral Secretions: Adequate  Signs/Symptoms of Aspiration: Cough, Throat Clearing  Overt Signs or Symptoms of Aspiration: Thin (IDDSI Level 0) - Straw    Pt was asked to demonstrate swallowing exercises from home ST tx, but reported not knowing names or exercises. Pt reported being familiar with Hard Swallow  when asked, and demo'd 1x only with cues. Similar for Tongue Pullbacks and Tongue Raise exercises, but did not demo familiarity with either exercise.      4/24 CXR IMPRESSION:  1.  Developing infiltrate in the medial left lung base

## 2024-04-25 NOTE — H&P
Markie Nobles is a 79 y.o. male   Vomiting     Dysphagia  Associated symptoms include vomiting.      Patient with a past medical history of MGUS, CAD,s/p CABG, HTN, CKD IV, Anemia, ABEBE, hyperlipidemia, obesity, BPH,and gout, DM, HLD, Parkinsons with dementia and sacral pressure sores comes in with worsening anorexia weight loss and generalized malaise and decline  Recently was seen by neurology who increased the patient's carbidopa levodopa  Initially this made a difference but but now he is again reverted to full mobility  Has a chronic sore on the sacral area which is also having a hard time healing because of the patient's malnutrition  Patient's family brought him to the hospital because of declining condition  Workup in the emergency room showed dehydration pneumonia and possible UTI  Patient also appears quite malnourished  Wife reports that the patient has been coughing more after eating and also regurgitating food    Past Medical History  Past Medical History:   Diagnosis Date    Encounter for immunization 09/29/2016    Encounter for administration of vaccine    Encounter for screening, unspecified 04/28/2014    Screening    Gout, unspecified 10/26/2017    Acute gout    Idiopathic gout, right hand 01/17/2020    Acute idiopathic gout of right hand    Localized edema 07/06/2015    Pedal edema    Other conditions influencing health status 08/03/2015    Paronychia    Other symptoms and signs involving cognitive functions and awareness 06/09/2016    Cognitive changes    Pain in unspecified foot 08/03/2015    Foot pain    Personal history of other diseases of the circulatory system     History of hypertension    Personal history of other diseases of the digestive system 08/13/2019    History of acute gastritis    Personal history of other drug therapy     History of influenza vaccination    Personal history of other specified conditions 03/17/2016    History of diarrhea    Personal history of other specified  conditions 02/19/2015    History of elevated prostate specific antigen (PSA)    Strain of muscle and tendon of unspecified wall of thorax, initial encounter 12/05/2016    Strain of thoracic region, initial encounter       Surgical History  Past Surgical History:   Procedure Laterality Date    CHOLECYSTECTOMY  04/20/2018    Cholecystectomy    CORONARY ARTERY BYPASS GRAFT  12/16/2022    CABG    CT ANGIO NECK  7/29/2019    CT NECK ANGIO W AND WO IV CONTRAST 7/29/2019 [facility]    CT HEAD ANGIO W AND WO IV CONTRAST  7/29/2019    CT HEAD ANGIO W AND WO IV CONTRAST 7/29/2019 [facility] CL[facility]ICAL [facility]GAC[facility]        Social History  He reports that he has quit smoking. His smoking use included cigarettes. He has never been exposed to tobacco smoke. He has never used smokeless tobacco. He reports that he does not currently use alcohol. He reports that he does not currently use drugs.    Family History  Family History   Problem Relation Name Age of Onset    Other (ESRD) Mother          on dialysis    Hypertension Father      Dementia Other      Diabetes Other      Liver cancer Other      Kidney disease Other          Reported prior        Allergies  Patient has no known allergies.    Review of Systems   Gastrointestinal:  Positive for vomiting.        Constitutional: Cachexia  Eyes: no blurred vision and no diplopia.   ENT: no hearing loss, no tinnitus, no earache, no sore throat, no hoarseness and no swollen glands in the neck.   Cardiovascular: no chest pain, no tightness or heavy pressure, no shortness of breath, no palpitations and no lower extremity edema.   Respiratory: no cough, wheezing or shortness of breath at rest or exertion  Gastrointestinal: no change in bowel habits, no diarrhea, no constipation, no bloody stools, no nausea, no vomiting, no abdominal pain, no signs and symptoms of ulcer disease, no capo colored stools and no intolerance to fatty foods.   Genitourinary: no urinary frequency, no dysuria, no  hematuria, no burning sensation during urination, urinary stream is not smaller and urinary stream does not start and stop.   Musculoskeletal: Difficulty walking  Skin: no rashes, no change in skin color and pigmentation, no skin lesions and no skin lumps.   Neurological: Parkinson's with dementia  Psychiatric: no confusion, no memory lapses or loss, no depression and no sleep disturbances.   Endocrine: no excessive thirst, no dry skin, no cold intolerance, no heat intolerance and no increased urinary frequency.   Hematologic/Lymphatic: is not slow to heal, does not bleed easily, does not bruise easily, no thrombophlebitis, no anemia and no history of blood transfusion.   All other systems have been reviewed and are negative for complaint.     Vitals:    04/25/24 1519   BP: 115/66   Pulse: 70   Resp: 16   Temp: 37.3 °C (99.2 °F)   SpO2: 98%        Scheduled medications  atorvastatin, 80 mg, oral, Daily  azithromycin, 500 mg, intravenous, q24h  carbidopa-levodopa, 1.5 tablet, oral, 4x daily  cefTRIAXone, 1 g, intravenous, q24h  clopidogrel, 75 mg, oral, Daily  enoxaparin, 30 mg, subcutaneous, q24h  finasteride, 5 mg, oral, Daily  metoprolol tartrate, 25 mg, oral, Daily  pantoprazole, 40 mg, oral, Daily before breakfast  polyethylene glycol, 17 g, oral, Daily  tamsulosin, 0.4 mg, oral, Daily      Continuous medications     PRN medications  PRN medications: acetaminophen, benzonatate, ipratropium-albuteroL, melatonin, ondansetron, traMADol    Results from last 7 days   Lab Units 04/25/24  1035 04/24/24  1503   WBC AUTO x10*3/uL 6.7 7.8   HEMOGLOBIN g/dL 8.8* 9.2*   HEMATOCRIT % 27.8* 29.2*   PLATELETS AUTO x10*3/uL 193 213     Results from last 7 days   Lab Units 04/25/24  1035 04/24/24  1503   SODIUM mmol/L 146* 147*   POTASSIUM mmol/L 5.2 5.2   CHLORIDE mmol/L 114* 113*   CO2 mmol/L 20* 22   BUN mg/dL 59* 60*   CREATININE mg/dL 4.28* 4.33*   CALCIUM mg/dL 8.0* 8.5*   PROTEIN TOTAL g/dL 6.5 6.9   BILIRUBIN TOTAL mg/dL  0.4 0.7   ALK PHOS U/L 80 78   ALT U/L <3* 3*   AST U/L 13 15   GLUCOSE mg/dL 82 83     Results from last 7 days   Lab Units 04/24/24  1503   TROPHS ng/L 14        CT head wo IV contrast   Final Result   No acute intracranial bleed or focal mass effect.        Moderate volume loss.        Moderate chronic white matter ischemic disease in the deep   periventricular regions.        MACRO:   None        Signed by: Jordan Suero 4/24/2024 3:54 PM   Dictation workstation:   [id number]      XR chest 1 view   Final Result   1.  Developing infiltrate in the medial left lung base                  MACRO:   None        Signed by: Joseph Schoenberger 4/24/2024 3:29 PM   Dictation workstation:   [id number]      FL modified barium swallow study    (Results Pending)       Physical Exam     Constitutional   General appearance: Alert and in no acute distress.   Eyes   Inspection of eyes: Sclera and conjunctiva were normal.    Pupil exam: Pupils were equal in size. Extraocular movements were intact.   Pulmonary   Respiratory assessment: No respiratory distress, normal respiratory rhythm and effort.    Auscultation of Lungs: Basilar crackles  Cardiovascular   Auscultation of heart: Apical pulse normal, heart rate and rhythm normal, normal S1 and S2, no murmurs and no pericardial rub.    Exam for edema: No peripheral edema.   Abdomen   Abdominal Exam: No bruits, normal bowel sounds, soft, non-tender, no abdominal mass palpated.    Liver and Spleen exam: No hepato-splenomegaly.   Musculoskeletal     Inspection of digits and nails: No clubbing or cyanosis of the fingernails.    Inspection/palpation of joints, bones and muscles: No joint swelling. Normal movement of all extremities.   Skin   Skin inspection: Sacral ulcer POA  Neurologic   Cranial nerves: Nerves 2-12 were intact,   Psychiatric   Alert x 1-2      Assessment/Plan      #Aspiration pneumonia/UTI  Start IV antibiotics  Pulmonary hygiene  Speech consult for swallowing  eval    #Sacral ulcer present on admission  Consult wound care    #Severe malnutrition  Consult nutrition for input    #Parkinson's disease with dementia  #Deconditioning  Resume medications  PT OT consult    #Coronary artery disease s/p bypass  Stable    #Acute on chronic kidney injury stage IV  Started IV fluids for monitor    #Hypertension  Stable

## 2024-04-25 NOTE — CARE PLAN
The patient's goals for the shift include  Pt wants to rest     The clinical goals for the shift include safety, to have an increase in appetite    Problem: Pain  Goal: My pain/discomfort is manageable  Outcome: Progressing     Problem: Safety  Goal: Patient will be injury free during hospitalization  Outcome: Progressing  Goal: I will remain free of falls  Outcome: Progressing     Problem: Daily Care  Goal: Daily care needs are met  Outcome: Progressing     Problem: Psychosocial Needs  Goal: Demonstrates ability to cope with hospitalization/illness  Outcome: Progressing  Goal: Collaborate with me, my family, and caregiver to identify my specific goals  Outcome: Progressing

## 2024-04-25 NOTE — PROGRESS NOTES
Physical Therapy    Physical Therapy Evaluation    Patient Name: Markie Nobles  MRN: [id number]  Today's Date: 4/25/2024   Time Calculation  Start Time: 1112  Stop Time: 1129  Time Calculation (min): 17 min    Assessment/Plan   PT Assessment  PT Assessment Results: Decreased strength, Decreased endurance, Impaired balance, Decreased mobility, Decreased coordination, Impaired judgement  Rehab Prognosis: Fair  Barriers to Discharge: decreased endurance, decreased balance, Mod Ax2 for all upright mobility  Evaluation/Treatment Tolerance: Patient limited by fatigue  Medical Staff Made Aware: Yes  Strengths: Support of Caregivers, Housing layout  Barriers to Participation: Attitude of self, Insight into problems  End of Session Communication: Bedside nurse, Care Coordinator  Assessment Comment: PT eval complete. Pt limited in participation, limited in strength, and limited in endurance. Needing increased hands on assist for all OOB at this time. Needing continued PT for addressing functional deficits.  End of Session Patient Position: Alarm on, Bed, 3 rail up  IP OR SWING BED PT PLAN  Inpatient or Swing Bed: Inpatient  PT Plan  Treatment/Interventions: Bed mobility, Transfer training, Gait training, Stair training, Neuromuscular re-education, Balance training, Endurance training, Positioning, Postural re-education, Home exercise program, Therapeutic exercise, Therapeutic activity, Strengthening  PT Plan: Skilled PT  PT Frequency: 4 times per week  PT Discharge Recommendations: Moderate intensity level of continued care  Equipment Recommended upon Discharge: Wheeled walker  PT Recommended Transfer Status: Assist x2  PT - OK to Discharge: Yes (Per PT POC)      Subjective   General Visit Information:  General  Reason for Referral: Impaired Mobility  Referred By: SILVIANO Thayer  Past Medical History Relevant to Rehab:   Past Medical History:   Diagnosis Date    Encounter for immunization 09/29/2016    Encounter for  administration of vaccine    Encounter for screening, unspecified 04/28/2014    Screening    Gout, unspecified 10/26/2017    Acute gout    Idiopathic gout, right hand 01/17/2020    Acute idiopathic gout of right hand    Localized edema 07/06/2015    Pedal edema    Other conditions influencing health status 08/03/2015    Paronychia    Other symptoms and signs involving cognitive functions and awareness 06/09/2016    Cognitive changes    Pain in unspecified foot 08/03/2015    Foot pain    Personal history of other diseases of the circulatory system     History of hypertension    Personal history of other diseases of the digestive system 08/13/2019    History of acute gastritis    Personal history of other drug therapy     History of influenza vaccination    Personal history of other specified conditions 03/17/2016    History of diarrhea    Personal history of other specified conditions 02/19/2015    History of elevated prostate specific antigen (PSA)    Strain of muscle and tendon of unspecified wall of thorax, initial encounter 12/05/2016    Strain of thoracic region, initial encounter     Past Surgical History:   Procedure Laterality Date    CHOLECYSTECTOMY  04/20/2018    Cholecystectomy    CORONARY ARTERY BYPASS GRAFT  12/16/2022    CABG    CT ANGIO NECK  7/29/2019    CT NECK ANGIO W AND WO IV CONTRAST 7/29/2019 [facility]    CT HEAD ANGIO W AND WO IV CONTRAST  7/29/2019    CT HEAD ANGIO W AND WO IV CONTRAST 7/29/2019 [facility] CLINICAL [facility]GAC[facility]       Family/Caregiver Present: Yes  Caregiver Feedback: Dtr present, encouraging pt to participate at higher level and giving observations as to PLOF  Prior to Session Communication: Bedside nurse  Patient Position Received: Bed, 3 rail up, Alarm off, not on at start of session  Preferred Learning Style: auditory, verbal  General Comment: Pt supine in bed with dtr present when PT entered, cooperative to a point, but needing increased education and encouragement for  participation, grossly deconditioned and with impaired balance.  Home Living:  Home Living  Type of Home: House  Lives With: Spouse  Home Adaptive Equipment: Walker rolling or standard (WW vs. Rollator)  Home Layout: One level  Home Access: Stairs to enter with rails  Entrance Stairs-Rails: Both  Entrance Stairs-Number of Steps: 3  Bathroom Shower/Tub: Walk-in shower  Bathroom Toilet: Adaptive toilet seating  Bathroom Equipment: Bedside commode, Shower chair with back, Grab bars in shower  Home Living Comments: Reports single floor setup  Prior Level of Function:  Prior Function Per Pt/Caregiver Report  Level of [place]: Needs assistance with ADLs, Needs assistance with homemaking  Receives Help From: Family, Personal care attendant (Reports has family friend come in once per week for full shower)  ADL Assistance: Needs assistance (Reports has assist for all dressing tasks)  Bath:  (Reports shower x1/wk, performs sponge bathing on other days)  Dressing:  (Assist from family)  Homemaking Assistance: Needs assistance  Ambulatory Assistance: Needs assistance (Per dtr, heavy retropulsive nature throughout all walking, needing hands on assist for balance during all transfers and gait, minimal ambulation from room to couch to watch TV and back.)  Vocational: Retired  Hand Dominance: Right  Prior Function Comments: Does not drive, has assist for ADLs and IADLs throughout  Precautions:  Precautions  Medical Precautions: Fall precautions  Precautions Comment: +Parkinson's    Objective   Pain:  Pain Assessment  Pain Assessment: 0-10  Pain Score: 0 - No pain (Denies pain)  Cognition:  Cognition  Overall Cognitive Status: Impaired (Minimally impaired)  Arousal/Alertness: Delayed responses to stimuli  Orientation Level: Oriented X4  Following Commands: Follows one step commands with increased time  Safety Judgment: Decreased awareness of need for assistance  Problem Solving: Assistance required to identify errors  made  Attention: Within Functional Limits  Memory: Exceptions to WFL  Long-Term Memory: Impaired  Memory Comments: Pt needing minimal correction on PLOF from dtr, but mostly appropriate answers  Safety/Judgement: Exceptions to WFL  Complex Functional Tasks: Moderate  Novel Situations: Moderate  Routine Tasks: Minimal  Unable to Self-Monitor and Self-Correct Consistently: Moderate  Insight: Moderate  Impulsive: Mildly  Processing Speed: Delayed    General Assessments:  General Observation  General Observation: Pt needing increased hands on assist throughout session, very limited in mobility, but appears to be fairly near functional baseline per dtr report.     Activity Tolerance  Endurance: Tolerates 10 - 20 min exercise with multiple rests    Sensation  Light Touch: No apparent deficits  Sensation Comment: Pt denies numbness/tingling    Strength  Strength Comments: Needing Mod Ax2 for all upright mobility at this time  Strength  Strength Comments: Needing Mod Ax2 for all upright mobility at this time    Perception  Inattention/Neglect: Appears intact      Coordination  Movements are Fluid and Coordinated: No  Upper Body Coordination: Needing assist as well as VC/TC for coordination of all limbs for functional transfers  Lower Body Coordination: Needing assist as well as VC/TC for coordination of all limbs for functional transfers    Postural Control  Postural Control: Impaired  Trunk Control: Heavily retropulsive in both sitting and standing, tends to lean back and to R side  Righting Reactions: Impaired  Protective Responses: Impaired  Posture Comment: Forward head and rounded shoulders.    Static Sitting Balance  Static Sitting-Balance Support: Feet supported, Bilateral upper extremity supported  Static Sitting-Level of Assistance: Moderate assistance  Static Sitting-Comment/Number of Minutes: Heavy R lateral and retropulsive lean  Dynamic Sitting Balance  Dynamic Sitting-Balance Support: Feet supported, No upper  extremity supported  Dynamic Sitting-Balance: Forward lean, Lateral lean, Trunk control activities  Dynamic Sitting-Comments: Mod Ax1 for assist with trunk lean    Static Standing Balance  Static Standing-Balance Support: Bilateral upper extremity supported  Static Standing-Level of Assistance: Moderate assistance (x2)  Static Standing-Comment/Number of Minutes: for upright propulsion and balance due to heavy lean and retropulsion  Dynamic Standing Balance  Dynamic Standing-Balance Support: Bilateral upper extremity supported  Dynamic Standing-Balance:  (Side stepping and minimal ambulation)  Dynamic Standing-Comments: Mod Ax2  Functional Assessments:  Bed Mobility  Bed Mobility: Yes  Bed Mobility 1  Bed Mobility 1: Supine to sitting  Level of Assistance 1: Moderate assistance  Bed Mobility Comments 1: Needing trunk propulsion for upright sitting; needing no hands on assist for BLE, however, increased TC for understanding of BLE coordination, then continued support due to poor static sitting balance.  Bed Mobility 2  Bed Mobility  2: Sitting to supine  Level of Assistance 2: Contact guard  Bed Mobility Comments 2: No true hands on assist for return to supine, however, verbal cues for increased understanding of repositioning in bed.    Transfers  Transfer: Yes  Transfer 1  Technique 1: Sit to stand, Stand to sit  Transfer Device 1: Walker  Transfer Level of Assistance 1: Moderate assistance (x2)  Trials/Comments 1: Limited ability to perform own balance or propulsion/eccentric control.    Ambulation/Gait Training  Ambulation/Gait Training Performed: Yes  Ambulation/Gait Training 1  Surface 1: Level tile  Device 1: Rolling walker  Assistance 1: Moderate assistance (x2)  Quality of Gait 1: Narrow base of support, Shuffling gait, Foot sweep, Forward flexed posture, Foot slap (Mod Ax2 for upright propulsion, heavily retropulsive, minimal step height/length, minimal gait distance.)  Comments/Distance (ft) 1:  3'    Stairs  Stairs: No (Unsafe at this time.)  Extremity/Trunk Assessments:  RUE   RUE : Within Functional Limits  LUE   LUE: Within Functional Limits  RLE   RLE : Within Functional Limits  LLE   LLE : Within Functional Limits  Outcome Measures:  [facility] Basic Mobility  Turning from your back to your side while in a flat bed without using bedrails: A lot  Moving from lying on your back to sitting on the side of a flat bed without using bedrails: A lot  Moving to and from bed to chair (including a wheelchair): A lot  Standing up from a chair using your arms (e.g. wheelchair or bedside chair): A lot  To walk in hospital room: A lot  Climbing 3-5 steps with railing: Total  Basic Mobility - Total Score: 11    Encounter Problems       Encounter Problems (Active)       Balance       STG - Maintains dynamic standing balance with upper extremity support At Min Ax1, safely, without LOB, device PRN        Start:  04/25/24    Expected End:  05/09/24       INTERVENTIONS:  1. Practice standing with minimal support.  2. Educate patient about standing tolerance.  3. Educate patient about independence with gait, transfers, and ADL's.  4. Educate patient about use of assistive device.  5. Educate patient about self-directed care.            Mobility       STG - Patient will ambulate At Min Ax1 using Wheeled walker for 25' without LOB or gross gait deviations        Start:  04/25/24    Expected End:  05/09/24            Endurance - Pt to tolerate >/= 20 minutes therex, theract, gait and/or NMR with </= 5 minutes of rest breaks        Start:  04/25/24    Expected End:  05/09/24               PT Transfers       STG - Patient will perform bed mobility At Min Ax1, safely, without LOB, device PRN        Start:  04/25/24    Expected End:  05/09/24            STG - Patient will transfer sit to and from stand At Min Ax1, safely, without LOB, device PRN        Start:  04/25/24    Expected End:  05/09/24               Safety       Pt will  verbalize and apply safety awareness and precautions 100% of time throughout entire session         Start:  04/25/24    Expected End:  05/09/24                   Education Documentation  Precautions, taught by Petye Jurado PT at 4/25/2024  1:53 PM.  Learner: Family, Patient  Readiness: Acceptance  Method: Demonstration, Explanation  Response: Needs Reinforcement    Body Mechanics, taught by Petey Jurado PT at 4/25/2024  1:53 PM.  Learner: Family, Patient  Readiness: Acceptance  Method: Demonstration, Explanation  Response: Needs Reinforcement    Mobility Training, taught by Petey Jurado PT at 4/25/2024  1:53 PM.  Learner: Family, Patient  Readiness: Acceptance  Method: Demonstration, Explanation  Response: Needs Reinforcement    Education Comments  No comments found.

## 2024-04-25 NOTE — CARE PLAN
The patient's goals for the shift include      The clinical goals for the shift include sucessful MBS, increase po intake

## 2024-04-26 ENCOUNTER — APPOINTMENT (OUTPATIENT)
Dept: RADIOLOGY | Facility: HOSPITAL | Age: 79
DRG: 177 | End: 2024-04-26
Payer: MEDICARE

## 2024-04-26 LAB
ALBUMIN SERPL BCP-MCNC: 3 G/DL (ref 3.4–5)
ALP SERPL-CCNC: 73 U/L (ref 33–136)
ALT SERPL W P-5'-P-CCNC: <3 U/L (ref 10–52)
ANION GAP SERPL CALC-SCNC: 14 MMOL/L (ref 10–20)
AST SERPL W P-5'-P-CCNC: 11 U/L (ref 9–39)
BILIRUB SERPL-MCNC: 0.4 MG/DL (ref 0–1.2)
BUN SERPL-MCNC: 55 MG/DL (ref 6–23)
CALCIUM SERPL-MCNC: 8.4 MG/DL (ref 8.6–10.3)
CHLORIDE SERPL-SCNC: 114 MMOL/L (ref 98–107)
CO2 SERPL-SCNC: 25 MMOL/L (ref 21–32)
CREAT SERPL-MCNC: 4.03 MG/DL (ref 0.5–1.3)
EGFRCR SERPLBLD CKD-EPI 2021: 14 ML/MIN/1.73M*2
ERYTHROCYTE [DISTWIDTH] IN BLOOD BY AUTOMATED COUNT: 16.3 % (ref 11.5–14.5)
GLUCOSE SERPL-MCNC: 85 MG/DL (ref 74–99)
HCT VFR BLD AUTO: 27.8 % (ref 41–52)
HGB BLD-MCNC: 8.7 G/DL (ref 13.5–17.5)
MCH RBC QN AUTO: 28.5 PG (ref 26–34)
MCHC RBC AUTO-ENTMCNC: 31.3 G/DL (ref 32–36)
MCV RBC AUTO: 91 FL (ref 80–100)
NRBC BLD-RTO: 0 /100 WBCS (ref 0–0)
PLATELET # BLD AUTO: 194 X10*3/UL (ref 150–450)
POTASSIUM SERPL-SCNC: 4.6 MMOL/L (ref 3.5–5.3)
PROT SERPL-MCNC: 6.4 G/DL (ref 6.4–8.2)
RBC # BLD AUTO: 3.05 X10*6/UL (ref 4.5–5.9)
SODIUM SERPL-SCNC: 148 MMOL/L (ref 136–145)
WBC # BLD AUTO: 7.3 X10*3/UL (ref 4.4–11.3)

## 2024-04-26 PROCEDURE — 74230 X-RAY XM SWLNG FUNCJ C+: CPT | Performed by: STUDENT IN AN ORGANIZED HEALTH CARE EDUCATION/TRAINING PROGRAM

## 2024-04-26 PROCEDURE — 2500000001 HC RX 250 WO HCPCS SELF ADMINISTERED DRUGS (ALT 637 FOR MEDICARE OP): Performed by: INTERNAL MEDICINE

## 2024-04-26 PROCEDURE — 2500000006 HC RX 250 W HCPCS SELF ADMINISTERED DRUGS (ALT 637 FOR ALL PAYERS): Performed by: INTERNAL MEDICINE

## 2024-04-26 PROCEDURE — 2500000001 HC RX 250 WO HCPCS SELF ADMINISTERED DRUGS (ALT 637 FOR MEDICARE OP): Performed by: NURSE PRACTITIONER

## 2024-04-26 PROCEDURE — 85027 COMPLETE CBC AUTOMATED: CPT

## 2024-04-26 PROCEDURE — 99232 SBSQ HOSP IP/OBS MODERATE 35: CPT | Performed by: INTERNAL MEDICINE

## 2024-04-26 PROCEDURE — 2500000006 HC RX 250 W HCPCS SELF ADMINISTERED DRUGS (ALT 637 FOR ALL PAYERS): Mod: MUE | Performed by: INTERNAL MEDICINE

## 2024-04-26 PROCEDURE — 80053 COMPREHEN METABOLIC PANEL: CPT

## 2024-04-26 PROCEDURE — 2500000005 HC RX 250 GENERAL PHARMACY W/O HCPCS: Performed by: NURSE PRACTITIONER

## 2024-04-26 PROCEDURE — 74230 X-RAY XM SWLNG FUNCJ C+: CPT

## 2024-04-26 PROCEDURE — 97535 SELF CARE MNGMENT TRAINING: CPT | Mod: GO

## 2024-04-26 PROCEDURE — 2500000004 HC RX 250 GENERAL PHARMACY W/ HCPCS (ALT 636 FOR OP/ED): Performed by: NURSE PRACTITIONER

## 2024-04-26 PROCEDURE — 3430000001 HC RX 343 DIAGNOSTIC RADIOPHARMACEUTICALS: Performed by: INTERNAL MEDICINE

## 2024-04-26 PROCEDURE — 2500000004 HC RX 250 GENERAL PHARMACY W/ HCPCS (ALT 636 FOR OP/ED): Performed by: INTERNAL MEDICINE

## 2024-04-26 PROCEDURE — 92611 MOTION FLUOROSCOPY/SWALLOW: CPT | Mod: GN

## 2024-04-26 PROCEDURE — 1100000001 HC PRIVATE ROOM DAILY

## 2024-04-26 PROCEDURE — 97165 OT EVAL LOW COMPLEX 30 MIN: CPT | Mod: GO

## 2024-04-26 RX ORDER — ASPIRIN 300 MG/1
300 SUPPOSITORY RECTAL DAILY
Status: DISCONTINUED | OUTPATIENT
Start: 2024-04-26 | End: 2024-05-01 | Stop reason: HOSPADM

## 2024-04-26 RX ORDER — MENTHOL AND ZINC OXIDE .44; 20.625 G/100G; G/100G
1 OINTMENT TOPICAL 4 TIMES DAILY
Status: DISCONTINUED | OUTPATIENT
Start: 2024-04-26 | End: 2024-05-01 | Stop reason: HOSPADM

## 2024-04-26 RX ORDER — METOPROLOL TARTRATE 1 MG/ML
5 INJECTION, SOLUTION INTRAVENOUS EVERY 6 HOURS
Status: DISCONTINUED | OUTPATIENT
Start: 2024-04-26 | End: 2024-05-01

## 2024-04-26 RX ADMIN — TAMSULOSIN HYDROCHLORIDE 0.4 MG: 0.4 CAPSULE ORAL at 09:01

## 2024-04-26 RX ADMIN — Medication 1 APPLICATION: at 20:53

## 2024-04-26 RX ADMIN — METOPROLOL TARTRATE 5 MG: 5 INJECTION INTRAVENOUS at 16:37

## 2024-04-26 RX ADMIN — CEFTRIAXONE SODIUM 1 G: 1 INJECTION, SOLUTION INTRAVENOUS at 17:42

## 2024-04-26 RX ADMIN — BARIUM SULFATE 30 ML: 0.81 POWDER, FOR SUSPENSION ORAL at 14:48

## 2024-04-26 RX ADMIN — METOPROLOL TARTRATE 25 MG: 25 TABLET, FILM COATED ORAL at 09:02

## 2024-04-26 RX ADMIN — AZITHROMYCIN MONOHYDRATE 500 MG: 500 INJECTION, POWDER, LYOPHILIZED, FOR SOLUTION INTRAVENOUS at 18:32

## 2024-04-26 RX ADMIN — ENOXAPARIN SODIUM 30 MG: 30 INJECTION SUBCUTANEOUS at 09:01

## 2024-04-26 RX ADMIN — BARIUM SULFATE 5 ML: 400 SUSPENSION ORAL at 14:49

## 2024-04-26 RX ADMIN — Medication 1 APPLICATION: at 17:43

## 2024-04-26 RX ADMIN — ATORVASTATIN CALCIUM 80 MG: 80 TABLET, FILM COATED ORAL at 09:02

## 2024-04-26 RX ADMIN — POLYETHYLENE GLYCOL 3350 17 G: 17 POWDER, FOR SOLUTION ORAL at 09:02

## 2024-04-26 RX ADMIN — FINASTERIDE 5 MG: 5 TABLET, FILM COATED ORAL at 09:02

## 2024-04-26 RX ADMIN — CLOPIDOGREL 75 MG: 75 TABLET ORAL at 09:01

## 2024-04-26 RX ADMIN — BARIUM SULFATE 5 ML: 400 PASTE ORAL at 14:49

## 2024-04-26 RX ADMIN — PANTOPRAZOLE SODIUM 40 MG: 40 TABLET, DELAYED RELEASE ORAL at 06:29

## 2024-04-26 RX ADMIN — CARBIDOPA AND LEVODOPA 1.5 TABLET: 25; 100 TABLET ORAL at 09:01

## 2024-04-26 ASSESSMENT — COGNITIVE AND FUNCTIONAL STATUS - GENERAL
HELP NEEDED FOR BATHING: A LOT
TURNING FROM BACK TO SIDE WHILE IN FLAT BAD: A LITTLE
PERSONAL GROOMING: A LITTLE
CLIMB 3 TO 5 STEPS WITH RAILING: A LOT
DRESSING REGULAR UPPER BODY CLOTHING: A LITTLE
MOBILITY SCORE: 14
WALKING IN HOSPITAL ROOM: A LOT
CLIMB 3 TO 5 STEPS WITH RAILING: A LOT
MOVING FROM LYING ON BACK TO SITTING ON SIDE OF FLAT BED WITH BEDRAILS: A LITTLE
MOBILITY SCORE: 14
TOILETING: TOTAL
STANDING UP FROM CHAIR USING ARMS: A LOT
TURNING FROM BACK TO SIDE WHILE IN FLAT BAD: A LITTLE
MOVING FROM LYING ON BACK TO SITTING ON SIDE OF FLAT BED WITH BEDRAILS: A LITTLE
PERSONAL GROOMING: A LOT
DRESSING REGULAR UPPER BODY CLOTHING: A LOT
DRESSING REGULAR LOWER BODY CLOTHING: A LITTLE
EATING MEALS: A LITTLE
STANDING UP FROM CHAIR USING ARMS: A LOT
MOBILITY SCORE: 14
DRESSING REGULAR LOWER BODY CLOTHING: A LITTLE
DAILY ACTIVITIY SCORE: 17
DAILY ACTIVITIY SCORE: 11
CLIMB 3 TO 5 STEPS WITH RAILING: A LOT
MOVING TO AND FROM BED TO CHAIR: A LOT
MOVING TO AND FROM BED TO CHAIR: A LOT
EATING MEALS: A LITTLE
DAILY ACTIVITIY SCORE: 10
HELP NEEDED FOR BATHING: A LOT
TOILETING: A LITTLE
MOVING TO AND FROM BED TO CHAIR: A LOT
EATING MEALS: A LOT
HELP NEEDED FOR BATHING: A LOT
DAILY ACTIVITIY SCORE: 17
DRESSING REGULAR LOWER BODY CLOTHING: A LOT
TOILETING: TOTAL
DRESSING REGULAR UPPER BODY CLOTHING: A LITTLE
STANDING UP FROM CHAIR USING ARMS: A LOT
PERSONAL GROOMING: A LOT
TOILETING: A LITTLE
PERSONAL GROOMING: A LITTLE
EATING MEALS: TOTAL
TURNING FROM BACK TO SIDE WHILE IN FLAT BAD: A LITTLE
MOVING FROM LYING ON BACK TO SITTING ON SIDE OF FLAT BED WITH BEDRAILS: A LITTLE
WALKING IN HOSPITAL ROOM: A LOT
HELP NEEDED FOR BATHING: A LOT
DRESSING REGULAR UPPER BODY CLOTHING: A LOT
WALKING IN HOSPITAL ROOM: A LOT
DRESSING REGULAR LOWER BODY CLOTHING: A LOT

## 2024-04-26 ASSESSMENT — PAIN - FUNCTIONAL ASSESSMENT
PAIN_FUNCTIONAL_ASSESSMENT: 0-10

## 2024-04-26 ASSESSMENT — PAIN SCALES - GENERAL
PAINLEVEL_OUTOF10: 0 - NO PAIN

## 2024-04-26 ASSESSMENT — ACTIVITIES OF DAILY LIVING (ADL)
ADL_ASSISTANCE: NEEDS ASSISTANCE
HOME_MANAGEMENT_TIME_ENTRY: 9

## 2024-04-26 NOTE — PROGRESS NOTES
Occupational Therapy    Evaluation    Patient Name: Markie Nobles  MRN: [id number]  Today's Date: 4/26/2024  Time Calculation  Start Time: 1057  Stop Time: 1121  Time Calculation (min): 24 min        Assessment:  OT Assessment: Pt presents to occupational therapy evaluation with global weakness, imbalance, high fall risk, retropulsion in stance, and is now assist x2 for functional mobility. As a result, pt would benefit from continued OT and Mod Intensity Occupational Therapy at discharage.  Prognosis: Good  Barriers to Discharge: None  Evaluation/Treatment Tolerance: Patient limited by fatigue  Medical Staff Made Aware: Yes  End of Session Communication: Bedside nurse  End of Session Patient Position: Bed, 2 rail up, Alarm on  OT Assessment Results: Decreased upper extremity strength, Decreased endurance, Decreased functional mobility  Prognosis: Good  Barriers to Discharge: None  Evaluation/Treatment Tolerance: Patient limited by fatigue  Medical Staff Made Aware: Yes  Strengths: Ability to acquire knowledge, Attitude of self, Coping skills  Barriers to Participation: Comorbidities  Plan:  Treatment Interventions: ADL retraining, UE strengthening/ROM, Functional transfer training  OT Frequency: 3 times per week  OT Discharge Recommendations: Moderate intensity level of continued care  Equipment Recommended upon Discharge: Wheeled walker  OT Recommended Transfer Status: Assist of 2, Maximum assist  OT - OK to Discharge: Yes (Per POC)  Treatment Interventions: ADL retraining, UE strengthening/ROM, Functional transfer training    Subjective   Current Problem:  1. Pneumonia, unspecified organism        2. Pneumonia due to infectious organism, unspecified laterality, unspecified part of lung        3. Oropharyngeal dysphagia          General:  General  Reason for Referral: Admit for decreased appetite, weight loss, nausea and vomiting times approximately 2+ weeks`  Referred By: KIESHA Thayer-CNP  Past Medical  History Relevant to Rehab: Balance problems, CAD, age related cognitive decline, cognitive disorder, DM, HLD, HTN, Parkinson's Disease, CABG x3, CKD 3  Family/Caregiver Present: Yes  Caregiver Feedback: Spouse present  Prior to Session Communication: Bedside nurse  Patient Position Received: Bed, 3 rail up, Alarm on  Preferred Learning Style: verbal, visual  General Comment: Pt supine in bed at start of session. Spouse at bedside, supportive. Pt cooperative and agreeable with OT evaluation.  Precautions:  Medical Precautions: Fall precautions  Vital Signs:     Pain:  Pain Assessment  Pain Assessment: 0-10  Pain Score: 0 - No pain    Objective   Cognition:  Overall Cognitive Status: Within Functional Limits  Orientation Level: Oriented X4 (Verbal cues to use external cueing (white board).)  Following Commands: Follows one step commands with increased time  Problem Solving: Exceptions to WFL  Complex Functional Tasks: Impaired  Processing Speed: Delayed           Home Living:     Prior Function:  Level of [place]: Needs assistance with ADLs, Needs assistance with homemaking  Receives Help From: Family, Personal care attendant (Family friend assists with shower 1x/week)  ADL Assistance: Needs assistance (Assist for UB/LB dressing, bathing. Able to self-feed)  Homemaking Assistance: Needs assistance (Spouse performs all IADL tasks)  Ambulatory Assistance: Needs assistance (Per dtr, heavy retropulsive nature throughout all walking, needing hands on assist for balance during all transfers and gait, minimal ambulation from room to couch to watch TV and back.)  IADL History:  Homemaking Responsibilities: No  ADL:  Grooming Assistance: Maximal  UE Dressing Assistance: Maximal  LE Dressing Assistance: Maximal  LE Dressing Deficit: Don/doff R sock, Don/doff L sock  Activity Tolerance:     Bed Mobility/Transfers: Bed Mobility  Bed Mobility: Yes  Bed Mobility 1  Bed Mobility 1: Supine to sitting  Level of Assistance 1:  Maximum assistance  Bed Mobility Comments 1: Assist trunk and BLE positioning  Bed Mobility 2  Bed Mobility  2: Sitting to supine  Level of Assistance 2: Maximum assistance  Bed Mobility Comments 2: Assist for trunk and BLE positioning    Transfers  Transfer: Yes  Transfer 1  Transfer From 1: Sit to  Transfer to 1: Stand  Technique 1: Sit to stand  Transfer Device 1: Walker  Transfer Level of Assistance 1: Maximum assistance, +2  Trials/Comments 1: Retropulsive; two attempts for successful stance  Transfers 2  Transfer From 2: Stand to  Transfer to 2: Sit  Technique 2: Stand to sit  Transfer Device 2: Walker  Transfer Level of Assistance 2: Maximum assistance      Functional Mobility:  Functional Mobility  Functional Mobility Performed: Yes (Sidesteps towards HOB, Max x1)  Sitting Balance:  Static Sitting Balance  Static Sitting-Balance Support: Bilateral upper extremity supported, Feet supported  Static Sitting-Level of Assistance: Minimum assistance  Standing Balance:  Static Standing Balance  Static Standing-Balance Support: Bilateral upper extremity supported  Static Standing-Level of Assistance: Maximum assistance  Dynamic Standing Balance  Dynamic Standing-Balance Support: Bilateral upper extremity supported  Dynamic Standing-Balance: Forward lean   Modalities:     Vision:Vision - Basic Assessment  Current Vision: Wears glasses all the time  Sensation:  Light Touch: No apparent deficits  Strength:  Strength Comments: Generalized deconditioning; assist x2 for all mobility  Perception:  Inattention/Neglect: Appears intact  Coordination:  Movements are Fluid and Coordinated: Yes   Hand Function:  Gross Grasp: Functional  Coordination: Functional      Outcome Measures:[facility] Daily Activity  Putting on and taking off regular lower body clothing: A lot  Bathing (including washing, rinsing, drying): A lot  Putting on and taking off regular upper body clothing: A lot  Toileting, which includes using toilet, bedpan or  urinal: Total  Taking care of personal grooming such as brushing teeth: A lot  Eating Meals: A lot  Daily Activity - Total Score: 11        Education Documentation  Body Mechanics, taught by Natalie Cardoso OT at 4/26/2024 11:41 AM.  Learner: Patient  Readiness: Eager  Method: Explanation, Demonstration  Response: Verbalizes Understanding    Precautions, taught by Natalie Cardoso OT at 4/26/2024 11:41 AM.  Learner: Patient  Readiness: Eager  Method: Explanation, Demonstration  Response: Verbalizes Understanding    ADL Training, taught by Natalie Cardoso OT at 4/26/2024 11:41 AM.  Learner: Patient  Readiness: Eager  Method: Explanation, Demonstration  Response: Verbalizes Understanding    Education Comments  No comments found.        OP EDUCATION:  Education  Individual(s) Educated: Patient, Spouse  Education Provided: Anatomy & Physiology, Diagnosis & Precautions  Patient/Caregiver Demonstrated Understanding: yes  Plan of Care Discussed and Agreed Upon: yes  Patient Response to Education: Patient/Caregiver Verbalized Understanding of Information    Goals:  Encounter Problems       Encounter Problems (Active)       ADLs       Patient will perform UB and LB bathing with minimal assist  level of assistance and raised toilet seat and grab bars.       Start:  04/26/24    Expected End:  05/10/24            Patient with complete upper body dressing with minimal assist  level of assistance donning and doffing all UE clothes with PRN adaptive equipment while edge of bed        Start:  04/26/24    Expected End:  05/10/24            Patient will complete daily grooming tasks brushing teeth, shaving, and washing face/hair with minimal assist  level of assistance and PRN adaptive equipment while supported sitting.       Start:  04/26/24    Expected End:  05/10/24               EXERCISE/STRENGTHENING       Patient will complete BUE exercises for 1 set and 8 reps in order to improve strength and activity  for ADL performance.        Start:  04/26/24    Expected End:  05/10/24               MOBILITY       Patient will perform Functional mobility  with moderate assist level of assistance and least restrictive device in order to improve safety and functional mobility.       Start:  04/26/24    Expected End:  05/10/24               TRANSFERS       Patient will perform bed mobility minimal assist  level of assistance and bed rails and draw sheet in order to improve safety and independence with mobility       Start:  04/26/24    Expected End:  05/10/24            Patient will complete functional transfer to [facility] with least restrictive device with moderate assist level of assistance.       Start:  04/26/24    Expected End:  05/10/24            Patient will complete sit to stand transfer with moderate assist level of assistance and least restrictive device in order to improve safety and prepare for out of bed mobility.       Start:  04/26/24    Expected End:  05/10/24

## 2024-04-26 NOTE — CONSULTS
Nutrition Assessment Note    Reason for Assessment  Reason for Assessment: Provider consult order    Pt admitted for:  Pneumonia, unspecified organism [J18.9]  Pneumonia due to infectious organism, unspecified laterality, unspecified part of lung [J18.9]    Chart reviewed and pt visited, family at bedside.  Per family poor intake x 2 weeks, maybe 1 meal a day with a boost.  When well eats 2-3 meals a day.  Pt currently NPO, awaiting MBSS    If/when applicable, pt agreeable to supplement while admitted.    Past Medical History:   Diagnosis Date    Encounter for immunization 09/29/2016    Encounter for administration of vaccine    Encounter for screening, unspecified 04/28/2014    Screening    Gout, unspecified 10/26/2017    Acute gout    Idiopathic gout, right hand 01/17/2020    Acute idiopathic gout of right hand    Localized edema 07/06/2015    Pedal edema    Other conditions influencing health status 08/03/2015    Paronychia    Other symptoms and signs involving cognitive functions and awareness 06/09/2016    Cognitive changes    Pain in unspecified foot 08/03/2015    Foot pain    Personal history of other diseases of the circulatory system     History of hypertension    Personal history of other diseases of the digestive system 08/13/2019    History of acute gastritis    Personal history of other drug therapy     History of influenza vaccination    Personal history of other specified conditions 03/17/2016    History of diarrhea    Personal history of other specified conditions 02/19/2015    History of elevated prostate specific antigen (PSA)    Strain of muscle and tendon of unspecified wall of thorax, initial encounter 12/05/2016    Strain of thoracic region, initial encounter     Results for orders placed or performed during the hospital encounter of 04/24/24 (from the past 24 hour(s))   CBC   Result Value Ref Range    WBC 7.3 4.4 - 11.3 x10*3/uL    nRBC 0.0 0.0 - 0.0 /100 WBCs    RBC 3.05 (L) 4.50 - 5.90  "x10*6/uL    Hemoglobin 8.7 (L) 13.5 - 17.5 g/dL    Hematocrit 27.8 (L) 41.0 - 52.0 %    MCV 91 80 - 100 fL    MCH 28.5 26.0 - 34.0 pg    MCHC 31.3 (L) 32.0 - 36.0 g/dL    RDW 16.3 (H) 11.5 - 14.5 %    Platelets 194 150 - 450 x10*3/uL   Comprehensive metabolic panel   Result Value Ref Range    Glucose 85 74 - 99 mg/dL    Sodium 148 (H) 136 - 145 mmol/L    Potassium 4.6 3.5 - 5.3 mmol/L    Chloride 114 (H) 98 - 107 mmol/L    Bicarbonate 25 21 - 32 mmol/L    Anion Gap 14 10 - 20 mmol/L    Urea Nitrogen 55 (H) 6 - 23 mg/dL    Creatinine 4.03 (H) 0.50 - 1.30 mg/dL    eGFR 14 (L) >60 mL/min/1.73m*2    Calcium 8.4 (L) 8.6 - 10.3 mg/dL    Albumin 3.0 (L) 3.4 - 5.0 g/dL    Alkaline Phosphatase 73 33 - 136 U/L    Total Protein 6.4 6.4 - 8.2 g/dL    AST 11 9 - 39 U/L    Bilirubin, Total 0.4 0.0 - 1.2 mg/dL    ALT <3 (L) 10 - 52 U/L     Scheduled medications  atorvastatin, 80 mg, oral, Daily  azithromycin, 500 mg, intravenous, q24h  carbidopa-levodopa, 1.5 tablet, oral, 4x daily  cefTRIAXone, 1 g, intravenous, q24h  clopidogrel, 75 mg, oral, Daily  enoxaparin, 30 mg, subcutaneous, q24h  finasteride, 5 mg, oral, Daily  metoprolol tartrate, 25 mg, oral, Daily  pantoprazole, 40 mg, oral, Daily before breakfast  polyethylene glycol, 17 g, oral, Daily  tamsulosin, 0.4 mg, oral, Daily      Continuous medications     PRN medications  PRN medications: acetaminophen, benzonatate, ipratropium-albuteroL, melatonin, ondansetron, traMADol  Dietary Orders (From admission, onward)       Start     Ordered    04/26/24 1002  NPO Diet; Effective now  Diet effective now        Comments: Until MBS done    04/26/24 1001                  History:  Food and Nutrient History  Energy Intake: Poor < 50 %  Food and Nutrient History: x 2 weeks, maybe one meal a day; when well eats 2-3 meals a day    Anthropometrics:  Height: 177.8 cm (5' 10\")  Weight: 62.6 kg (138 lb)  BMI (Calculated): 19.8    Wt Readings from Last 20 Encounters:   04/24/24 62.6 kg (138 " lb)   03/07/24 59.9 kg (132 lb)   02/26/24 59.4 kg (131 lb)   02/26/24 60.2 kg (132 lb 11.5 oz)   02/12/24 59.9 kg (132 lb)   01/29/24 63.1 kg (139 lb 3.2 oz)   01/24/24 59.9 kg (132 lb)   01/23/24 59.9 kg (132 lb)   01/12/24 60 kg (132 lb 3.2 oz)   12/14/23 59.6 kg (131 lb 4.8 oz)   11/20/23 62 kg (136 lb 9.6 oz)   11/15/23 59.9 kg (132 lb)   10/31/23 60.2 kg (132 lb 11.5 oz)   10/18/23 66 kg (145 lb 6.4 oz)   10/17/23 64.8 kg (142 lb 13.7 oz)   10/03/23 66.9 kg (147 lb 7.8 oz)   07/17/23 66.7 kg (147 lb)   07/10/23 68.5 kg (151 lb)   06/08/23 69.1 kg (152 lb 6.4 oz)   04/25/23 66.7 kg (147 lb)     Weight Change  Significant Weight Loss: No    Estimated Energy Needs  Total Energy Estimated Needs (kCal): 1880 kCal  Total Estimated Energy Need per Day (kCal/kg): 2190 kCal/kg  Method for Estimating Needs: 30-35    Estimated Protein Needs  Total Protein Estimated Needs (g): 60 g  Total Protein Estimated Needs (g/kg): 95 g/kg  Method for Estimating Needs: 1.0-1.5    Estimated Fluid Needs  Method for Estimating Needs: 1ml/kcal or per MD    Nutrition Focused Physical Findings:  Subcutaneous Fat Loss  Orbital Fat Pads: Mild-Moderate (slight dark circles and slight hollowing)  Buccal Fat Pads: Mild-Moderate (flat cheeks, minimal bounce)    Muscle Wasting  Temporalis: Mild-Moderate (slight depression)  Pectoralis (Clavicular Region): Severe (protruding prominent clavicle)    Edema  Edema: none    Physical Findings (Nutrition Deficiency/Toxicity)  Skin: Positive (sacrum stage 1)     Nutrition Diagnosis   Malnutrition Diagnosis  Patient has Malnutrition Diagnosis: Yes  Malnutrition Diagnosis: Severe malnutrition related to chronic disease or condition  As Evidenced by: prolonged poor intake prior to hospital admit of < 50% of estimated energy needs in > 5 days, mild to mdoerate subcutaneous fat loss and moderate to severe muscle wasting present    Patient has Nutrition Diagnosis: Yes  Nutrition Diagnosis 1: Increased nutrient  needs  Diagnosis Status (1): New  Related to (1): pressure injury  As Evidenced by (1): stage 1 sacrum       Nutrition Interventions/Recommendations   Nutrition Prescription  Individualized Nutrition Prescription Provided for : Pt to be NPO/CLD< 5 days.  Should pt require to be NPO/CLD > 5 days, consider alternate route for nutrition. (RDN to recommend nutrition supplementation based on SLP evaluation)    Education Documentation  No documentation found.      Nutrition Monitoring and Evaluation   Food and Nutrient Related History  Energy Intake: Estimated energy intake    Fluid Intake: Estimated fluid intake    Anthropometrics: Body Composition/Growth/Weight History  Weight Change: Weight gain, Weight loss    Biochemical Data, Medical Tests and Procedures  Electrolyte and Renal Panel: Other (Comment)  Criteria: as clinically indicated    Gastrointestinal Profile: Other (Comment)  Criteria: as clinically indicated    Glucose/Endocrine Profile: Other (Comment)  Criteria: as clinically indicated    Nutritional Anemia Profile: Other (Comment)  Criteria: as clinically indicated    Vitamin Profile: Other (Comment)  Criteria: as clinically indicated    Nutrition Focused Physical Findings  Adipose: Loss of subcutaneous fat    Digestive System: Decrease in appetite    Muscles: Muscle atrophy    Skin: Impaired wound healing    Other: Stool output, Urine volume, Overall appearance    Follow Up  Time Spent (min): 60 minutes  Last Date of Nutrition Visit: 04/26/24  Nutrition Follow-Up Needed?: Dietitian to reassess per policy  Follow up Comment: KATERINA TRUJILLO

## 2024-04-26 NOTE — DISCHARGE INSTRUCTIONS
Wound care recommendations: Buttocks/Sacrum  Cleanse with bath wipes or soap and water. Rinse well and pat dry.   Apply no sting skin barrier (cavilon)   Apply Calmoseptine ointment to buttocks/perineum/groin four times a day and as needed.     As a preventative dressing: Cover Sacrum with Mepilex border dressing (Sacral)   Change every other day and as needed. Peel back and assess skin every shift and as needed    While in bed patient should only be on one fitted sheet, and one chux. Please, do not use brief while patient is resting in bed. Elevate heels off the bed surface at all times. Turn and reposition at least every 2 hours.

## 2024-04-26 NOTE — PROCEDURES
"Speech-Language Pathology      Modified Barium Swallow Study     Patient Name: Markie Nobles  MRN: [id number]  : 1945  Today's Date: 24  Time Calculation  Start Time: 1430  Stop Time: 1456  Time Calculation (min): 26 min       Recommendations:  NPO WITH CONSIDERATION FOR PEG TUBE  SLP FOR DYSPHAGIA MANAGEMENT     Assessment/Impression:    Full detailed SLP/Radiologist Modified Barium Swallow study report can be found under Chart Review tab, Imaging tab and  titled \"FL Modified Barium Swallow Study\"      Pt. presenting with oropharyngeal dysphagia characterized by reduced bolus formation, decreased TB retraction/PPW contraction; and reduced hyolaryngeal elevation/epiglottic deflection. Calcification of cervical vertebrae noted with bony prominence impinging into pharyngeal space. Premature pharyngeal entry noted with all boluses. Vallecular, piriform sinus and PPW residue noted with thin/nectar/honey liquids and puree boluses. Effortful swallows, chin tuck and reswallow did not improve pharyngeal clearance. Laryngeal penetration followed with all boluses as residue entered upper laryngeal vestibule. Silent aspiration noted with thin/nectar/honey liquids. Although no aspiration viewed with purees, increased risk for same exists. MBSS protocol modified due to patient safety concerns and positioning challenges. Patient not safe for po diet and will likely require consideration for long term alternative nutritional support given aspiration identified and history of dysphagia with weight loss.    Plan:  Treatment/Interventions: Pharyngeal exercises, Oral motor exercises, Patient/family education.  SLP Plan: Skilled SLP warranted  SLP Frequency: 2x per week  Duration: 30 days    Discussed POC: Patient  Discussed Risks/Benefits: Yes  Patient/Caregiver Agreeable: Yes    Pain:   Rating 0-10: unable to determine  Location: n/a       Goals:  Pt. to complete pharyngeal strengthening techniques    Education:   Pt. " educated on results of MBS study, recommended diet and recommended safe swallow strategies.  Unable to determine comprehension of information presented.

## 2024-04-26 NOTE — PROGRESS NOTES
04/26/24 1148   Discharge Planning   Patient expects to be discharged to: [facility].

## 2024-04-26 NOTE — PROGRESS NOTES
Markie Nobles is a 79 y.o. male     Patient did not do well with MBS  Speech recommends nothing by mouth  Had a long discussion with patient's spouse  The options are Dobbhoff versus PEG versus hospice  Advised her to the Dobbhoff would be a short-term solution and in my opinion would not be the ideal solution because I do not see him improving soon  The choices already between the PEG tube and hospice    Review of Systems           Vitals:    04/26/24 1138   BP: 128/70   Pulse: 59   Resp: 16   Temp: 36.8 °C (98.3 °F)   SpO2: 98%        Scheduled medications  aspirin, 300 mg, rectal, Daily  atorvastatin, 80 mg, oral, Daily  azithromycin, 500 mg, intravenous, q24h  carbidopa-levodopa, 1.5 tablet, oral, 4x daily  cefTRIAXone, 1 g, intravenous, q24h  clopidogrel, 75 mg, oral, Daily  enoxaparin, 30 mg, subcutaneous, q24h  finasteride, 5 mg, oral, Daily  menthol-zinc oxide, 1 Application, Topical, 4x daily  metoprolol, 5 mg, intravenous, q6h  [Held by provider] metoprolol tartrate, 25 mg, oral, Daily  [Held by provider] pantoprazole, 40 mg, oral, Daily before breakfast  polyethylene glycol, 17 g, oral, Daily  tamsulosin, 0.4 mg, oral, Daily      Continuous medications     PRN medications  PRN medications: acetaminophen, benzonatate, ipratropium-albuteroL, melatonin, ondansetron, traMADol    Lab Review   Results from last 7 days   Lab Units 04/26/24  0607 04/25/24  1035 04/24/24  1503   WBC AUTO x10*3/uL 7.3 6.7 7.8   HEMOGLOBIN g/dL 8.7* 8.8* 9.2*   HEMATOCRIT % 27.8* 27.8* 29.2*   PLATELETS AUTO x10*3/uL 194 193 213     Results from last 7 days   Lab Units 04/26/24  0607 04/25/24  1035 04/24/24  1503   SODIUM mmol/L 148* 146* 147*   POTASSIUM mmol/L 4.6 5.2 5.2   CHLORIDE mmol/L 114* 114* 113*   CO2 mmol/L 25 20* 22   BUN mg/dL 55* 59* 60*   CREATININE mg/dL 4.03* 4.28* 4.33*   CALCIUM mg/dL 8.4* 8.0* 8.5*   PROTEIN TOTAL g/dL 6.4 6.5 6.9   BILIRUBIN TOTAL mg/dL 0.4 0.4 0.7   ALK PHOS U/L 73 80 78   ALT U/L <3* <3* 3*    AST U/L 11 13 15   GLUCOSE mg/dL 85 82 83     Results from last 7 days   Lab Units 04/24/24  1503   TROPHS ng/L 14        CT head wo IV contrast   Final Result   No acute intracranial bleed or focal mass effect.        Moderate volume loss.        Moderate chronic white matter ischemic disease in the deep   periventricular regions.        MACRO:   None        Signed by: Jordan Suero 4/24/2024 3:54 PM   Dictation workstation:   [id number]      XR chest 1 view   Final Result   1.  Developing infiltrate in the medial left lung base                  MACRO:   None        Signed by: Joseph Schoenberger 4/24/2024 3:29 PM   Dictation workstation:   [id number]      FL modified barium swallow study    (Results Pending)         Physical Exam     Constitutional   General appearance: Alert and in no acute distress.     Pulmonary   Respiratory assessment: No respiratory distress, normal respiratory rhythm and effort.    Auscultation of Lungs: Crackles  Cardiovascular   Auscultation of heart: Apical pulse normal, heart rate and rhythm normal, normal S1 and S2, no murmurs and no pericardial rub.    Exam for edema: No peripheral edema.   Abdomen   Abdominal Exam: No bruits, normal bowel sounds, soft, non-tender, no abdominal mass palpated.    Liver and Spleen exam: No hepato-splenomegaly.   Musculoskeletal     Skin inspection: Normal skin color and pigmentation, normal skin turgor and no visible rash.   Neurologic   Cranial nerves: Nerves 2-12 were intact, no focal neuro defects.  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

## 2024-04-26 NOTE — CARE PLAN
The patient's goals for the shift include      The clinical goals for the shift include have successful MBS

## 2024-04-26 NOTE — PROGRESS NOTES
04/26/24 1456   Discharge Planning   Patient expects to be discharged to: [facility] and can admit until 04/30. Doc stated patient will D/C on Monday.

## 2024-04-26 NOTE — PROGRESS NOTES
4/26/2024 9:41 AM I spoke with patient's daughter. I updated her on accepting facilities.  Message left for wife to discuss FOC. Sarah AWAN

## 2024-04-26 NOTE — PROGRESS NOTES
04/26/24 0944   Discharge Planning   Living Arrangements Spouse/significant other   Support Systems Spouse/significant other;Children   Assistance Needed Patient needs assistance with ADL'S.   Type of Residence Private residence   Number of Stairs to Enter Residence 0   Number of Stairs Within Residence 0   Do you have animals or pets at home? No   Who is requesting discharge planning? Provider   Home or Post Acute Services Post acute facilities (Rehab/SNF/etc)   Type of Post Acute Facility Services Skilled nursing   Patient expects to be discharged to: [facility] can accept patient. Called wife to speak about D/C planning and no answer. Message was left. Patient will need a precert to SNF.   Does the patient need discharge transport arranged? Yes   RoundTrip coordination needed? Yes   Has discharge transport been arranged? No   Patient Choice   Provider Choice list and CMS website (https://medicare.gov/care-compare#search) for post-acute Quality and Resource Measure Data were provided and reviewed with: Family;Patient   Patient / Family choosing to utilize agency / facility established prior to hospitalization No

## 2024-04-26 NOTE — PROGRESS NOTES
Physical Therapy                 Therapy Communication Note    Patient Name: Markie Nobles  MRN: [id number]  Today's Date: 4/26/2024     Discipline: Physical Therapy    Missed Visit Reason: Missed Visit Reason: Patient refused (pt was attempted at this time and was encouraged to participate, but despite encouragement pt refused.)    Missed Time: Attempt    Comment: Participated in performance of tx and documentation under the direct supervision of CI, student Lilian STARR

## 2024-04-26 NOTE — CARE PLAN
The patient's goals for the shift include      The clinical goals for the shift include pt to remain free from falls/hds    Problem: Pain  Goal: My pain/discomfort is manageable  4/26/2024 0023 by Erendira Guadarrama LPN  Outcome: Progressing  4/26/2024 0023 by Erendira Guadarrama LPN  Outcome: Progressing     Problem: Safety  Goal: Patient will be injury free during hospitalization  4/26/2024 0023 by Erendira Guadarrama LPN  Outcome: Progressing  4/26/2024 0023 by Erendira Guadarrama LPN  Outcome: Progressing  Goal: I will remain free of falls  4/26/2024 0023 by Erendira Guadarrama LPN  Outcome: Progressing  4/26/2024 0023 by Erendira Guadarrama LPN  Outcome: Progressing     Problem: Daily Care  Goal: Daily care needs are met  4/26/2024 0023 by Erendira Guadarrama LPN  Outcome: Progressing  4/26/2024 0023 by Erendira Guadarrama LPN  Outcome: Progressing     Problem: Psychosocial Needs  Goal: Demonstrates ability to cope with hospitalization/illness  4/26/2024 0023 by Erendira Guadarrama LPN  Outcome: Progressing  4/26/2024 0023 by Erendira Guadarrama LPN  Outcome: Progressing  Goal: Collaborate with me, my family, and caregiver to identify my specific goals  4/26/2024 0023 by Erendira Guadarrama LPN  Outcome: Progressing  4/26/2024 0023 by Erendira Guadarrama LPN  Outcome: Progressing     Problem: Discharge Barriers  Goal: My discharge needs are met  4/26/2024 0023 by Erendira Guadarrama LPN  Outcome: Progressing  4/26/2024 0023 by Erendira Guadarrama LPN  Outcome: Progressing     Problem: Skin  Goal: Decreased wound size/increased tissue granulation at next dressing change  4/26/2024 0023 by Erendira Guadarrama LPN  Outcome: Progressing  Flowsheets (Taken 4/26/2024 0023)  Decreased wound size/increased tissue granulation at next dressing change: Protective dressings over bony prominences  4/26/2024 0023 by Erendira Guadarrama LPN  Outcome: Progressing  Flowsheets (Taken 4/26/2024 0023)  Decreased wound size/increased tissue granulation at next dressing change: Protective dressings over bony prominences  Goal: Participates  in plan/prevention/treatment measures  4/26/2024 0023 by Erendira Guadarrama LPN  Outcome: Progressing  Flowsheets (Taken 4/26/2024 0023)  Participates in plan/prevention/treatment measures:   Elevate heels   Increase activity/out of bed for meals  4/26/2024 0023 by Erendira Guadarrama LPN  Outcome: Progressing  Flowsheets (Taken 4/26/2024 0023)  Participates in plan/prevention/treatment measures:   Elevate heels   Increase activity/out of bed for meals  Goal: Prevent/manage excess moisture  4/26/2024 0023 by Erendira Guadarrama LPN  Outcome: Progressing  Flowsheets (Taken 4/26/2024 0023)  Prevent/manage excess moisture: Moisturize dry skin  4/26/2024 0023 by Erendira Guadarrama LPN  Outcome: Progressing  Flowsheets (Taken 4/26/2024 0023)  Prevent/manage excess moisture: Moisturize dry skin  Goal: Prevent/minimize sheer/friction injuries  4/26/2024 0023 by Erendira Guadarrama LPN  Outcome: Progressing  Flowsheets (Taken 4/26/2024 0023)  Prevent/minimize sheer/friction injuries: Turn/reposition every 2 hours/use positioning/transfer devices  4/26/2024 0023 by Erendira Guadarrama LPN  Outcome: Progressing  Flowsheets (Taken 4/26/2024 0023)  Prevent/minimize sheer/friction injuries: Turn/reposition every 2 hours/use positioning/transfer devices  Goal: Promote/optimize nutrition  4/26/2024 0023 by Erendira Guadarrama LPN  Outcome: Progressing  Flowsheets (Taken 4/26/2024 0023)  Promote/optimize nutrition: Offer water/supplements/favorite foods  4/26/2024 0023 by Erendira Guadarrama LPN  Outcome: Progressing  Flowsheets (Taken 4/26/2024 0023)  Promote/optimize nutrition: Offer water/supplements/favorite foods  Goal: Promote skin healing  4/26/2024 0023 by Erendira Guadarrama LPN  Outcome: Progressing  Flowsheets (Taken 4/26/2024 0023)  Promote skin healing: Assess skin/pad under line(s)/device(s)  4/26/2024 0023 by Erendira Guadarrama LPN  Outcome: Progressing  Flowsheets (Taken 4/26/2024 0023)  Promote skin healing: Assess skin/pad under line(s)/device(s)     Problem: Fall/Injury  Goal:  Not fall by end of shift  4/26/2024 0023 by Erendira Guadarrama LPN  Outcome: Progressing  4/26/2024 0023 by Erendira Guadarrama LPN  Outcome: Progressing  Goal: Be free from injury by end of the shift  4/26/2024 0023 by Erendira Guadarrama LPN  Outcome: Progressing  4/26/2024 0023 by Erendira Guadarrama LPN  Outcome: Progressing  Goal: Verbalize understanding of personal risk factors for fall in the hospital  4/26/2024 0023 by Erendira Guadarrama LPN  Outcome: Progressing  4/26/2024 0023 by Erendira Guadarrama LPN  Outcome: Progressing  Goal: Verbalize understanding of risk factor reduction measures to prevent injury from fall in the home  4/26/2024 0023 by Erendira Guadarrama LPN  Outcome: Progressing  4/26/2024 0023 by Erendira Guadarrama LPN  Outcome: Progressing  Goal: Use assistive devices by end of the shift  4/26/2024 0023 by Erendira Guadarrama LPN  Outcome: Progressing  4/26/2024 0023 by Erendira Guadarrama LPN  Outcome: Progressing  Goal: Pace activities to prevent fatigue by end of the shift  4/26/2024 0023 by Erendira Guadarrama LPN  Outcome: Progressing  4/26/2024 0023 by Erendira Guadarrama LPN  Outcome: Progressing

## 2024-04-26 NOTE — CONSULTS
Wound Care Consult     Visit Date: 4/26/2024      Patient Name: Markie Nobles         MRN: [id number]           YOB: 1945     Reason for Consult:         Wound History: PI/MASD to buttocks/perineum     Skin prevention supplies applied/in place:   EHOB waffle overlay mattress  EHOB waffle seat cushion  Mepilex heel foam dressing  Mepilex sacrum foam dressing  Heels elevated off bed with use of pillows.          Pertinent Labs:   Albumin   Date Value Ref Range Status   04/26/2024 3.0 (L) 3.4 - 5.0 g/dL Final   04/17/2024 3.4 3.4 - 5.0 g/dL Final     Albumin %   Date Value Ref Range Status   10/03/2023 75.5 % Final     ALBUMIN (MG/L) IN URINE   Date Value Ref Range Status   07/12/2022 1,027.2 Not Established mg/L Final     Albumin, Urine Random   Date Value Ref Range Status   01/30/2024 936.3 Not established mg/L Final     Albumin/Protein Total, Ur   Date Value Ref Range Status   09/30/2023 CANCELED       Comment:     Result canceled by the ancillary.       Wound Assessment:  Wound 03/07/24 Pressure Injury Coccyx (Active)   Wound Image   04/26/24 1331     Wound Team Summary Assessment: Per patient's wife injury has been present for a while. Wound was deeper and closes and reopens.  Patient had no complaints of discomfort during assessment. Incontinent of urine and stool, care provided. Notified Nora Madera CNP of wound care recommendations.     Buttocks/Sacrum- Mixed etiology- resolving PI & MASD/Irritant contact dermatitis due to fecal, urinary or dual incontinence.   Cleanse with bath wipes or soap and water. Rinse well and pat dry.   Apply no sting skin barrier (cavilon)   Apply Calmoseptine ointment to buttocks/perineum/groin four times a day and as needed.     As a preventative dressing: Cover Sacrum with Mepilex border dressing (Sacral)   Change every other day and as needed. Peel back and assess skin every shift and as needed.        Wound Team Plan: Thank you for this consult. Assessment has  been completed and orders have been placed. Wound care to be completed by nursing per orders. Please place new consult if there is a change in wound status.     While in bed patient should only be on one fitted sheet, and one chux. Please, do not use brief while patient is resting in bed. Elevate heels off the bed surface at all times. Turn and reposition at least every 2 hours.        Lucita Murillo RN BSN,[facility],CWOCN  [phone]   4/26/2024  3:05 PM

## 2024-04-27 LAB
ANION GAP SERPL CALC-SCNC: 15 MMOL/L (ref 10–20)
BACTERIA UR CULT: ABNORMAL
BUN SERPL-MCNC: 49 MG/DL (ref 6–23)
CALCIUM SERPL-MCNC: 8.4 MG/DL (ref 8.6–10.3)
CHLORIDE SERPL-SCNC: 115 MMOL/L (ref 98–107)
CO2 SERPL-SCNC: 22 MMOL/L (ref 21–32)
CREAT SERPL-MCNC: 4.11 MG/DL (ref 0.5–1.3)
EGFRCR SERPLBLD CKD-EPI 2021: 14 ML/MIN/1.73M*2
ERYTHROCYTE [DISTWIDTH] IN BLOOD BY AUTOMATED COUNT: 16.2 % (ref 11.5–14.5)
GLUCOSE SERPL-MCNC: 66 MG/DL (ref 74–99)
HCT VFR BLD AUTO: 27.5 % (ref 41–52)
HGB BLD-MCNC: 8.6 G/DL (ref 13.5–17.5)
MCH RBC QN AUTO: 27.9 PG (ref 26–34)
MCHC RBC AUTO-ENTMCNC: 31.3 G/DL (ref 32–36)
MCV RBC AUTO: 89 FL (ref 80–100)
NRBC BLD-RTO: 0 /100 WBCS (ref 0–0)
PLATELET # BLD AUTO: 189 X10*3/UL (ref 150–450)
POTASSIUM SERPL-SCNC: 4.9 MMOL/L (ref 3.5–5.3)
RBC # BLD AUTO: 3.08 X10*6/UL (ref 4.5–5.9)
SODIUM SERPL-SCNC: 147 MMOL/L (ref 136–145)
WBC # BLD AUTO: 5.2 X10*3/UL (ref 4.4–11.3)

## 2024-04-27 PROCEDURE — 2500000001 HC RX 250 WO HCPCS SELF ADMINISTERED DRUGS (ALT 637 FOR MEDICARE OP): Performed by: NURSE PRACTITIONER

## 2024-04-27 PROCEDURE — 97530 THERAPEUTIC ACTIVITIES: CPT | Mod: GP,CQ

## 2024-04-27 PROCEDURE — 1100000001 HC PRIVATE ROOM DAILY

## 2024-04-27 PROCEDURE — 2500000004 HC RX 250 GENERAL PHARMACY W/ HCPCS (ALT 636 FOR OP/ED): Performed by: INTERNAL MEDICINE

## 2024-04-27 PROCEDURE — 2500000005 HC RX 250 GENERAL PHARMACY W/O HCPCS: Performed by: NURSE PRACTITIONER

## 2024-04-27 PROCEDURE — 94664 DEMO&/EVAL PT USE INHALER: CPT

## 2024-04-27 PROCEDURE — 2500000004 HC RX 250 GENERAL PHARMACY W/ HCPCS (ALT 636 FOR OP/ED): Performed by: NURSE PRACTITIONER

## 2024-04-27 PROCEDURE — 80048 BASIC METABOLIC PNL TOTAL CA: CPT | Performed by: NURSE PRACTITIONER

## 2024-04-27 PROCEDURE — 36415 COLL VENOUS BLD VENIPUNCTURE: CPT | Performed by: NURSE PRACTITIONER

## 2024-04-27 PROCEDURE — 97110 THERAPEUTIC EXERCISES: CPT | Mod: GP,CQ

## 2024-04-27 PROCEDURE — 85027 COMPLETE CBC AUTOMATED: CPT

## 2024-04-27 PROCEDURE — 99232 SBSQ HOSP IP/OBS MODERATE 35: CPT | Performed by: INTERNAL MEDICINE

## 2024-04-27 RX ADMIN — Medication 1 APPLICATION: at 06:28

## 2024-04-27 RX ADMIN — Medication 1 APPLICATION: at 14:50

## 2024-04-27 RX ADMIN — CEFTRIAXONE SODIUM 1 G: 1 INJECTION, SOLUTION INTRAVENOUS at 17:36

## 2024-04-27 RX ADMIN — METOPROLOL TARTRATE 5 MG: 5 INJECTION INTRAVENOUS at 17:29

## 2024-04-27 RX ADMIN — Medication 1 APPLICATION: at 17:36

## 2024-04-27 RX ADMIN — AZITHROMYCIN MONOHYDRATE 500 MG: 500 INJECTION, POWDER, LYOPHILIZED, FOR SOLUTION INTRAVENOUS at 18:51

## 2024-04-27 RX ADMIN — ENOXAPARIN SODIUM 30 MG: 30 INJECTION SUBCUTANEOUS at 14:29

## 2024-04-27 RX ADMIN — ASPIRIN 300 MG: 300 SUPPOSITORY RECTAL at 14:30

## 2024-04-27 ASSESSMENT — PAIN SCALES - GENERAL
PAINLEVEL_OUTOF10: 5 - MODERATE PAIN
PAINLEVEL_OUTOF10: 0 - NO PAIN

## 2024-04-27 ASSESSMENT — COGNITIVE AND FUNCTIONAL STATUS - GENERAL
DRESSING REGULAR UPPER BODY CLOTHING: A LOT
STANDING UP FROM CHAIR USING ARMS: A LOT
STANDING UP FROM CHAIR USING ARMS: A LOT
DRESSING REGULAR LOWER BODY CLOTHING: TOTAL
EATING MEALS: A LOT
PERSONAL GROOMING: A LOT
TURNING FROM BACK TO SIDE WHILE IN FLAT BAD: A LITTLE
MOVING FROM LYING ON BACK TO SITTING ON SIDE OF FLAT BED WITH BEDRAILS: A LITTLE
TOILETING: TOTAL
MOBILITY SCORE: 13
MOVING TO AND FROM BED TO CHAIR: A LOT
MOVING FROM LYING ON BACK TO SITTING ON SIDE OF FLAT BED WITH BEDRAILS: A LITTLE
CLIMB 3 TO 5 STEPS WITH RAILING: TOTAL
MOBILITY SCORE: 12
CLIMB 3 TO 5 STEPS WITH RAILING: TOTAL
DRESSING REGULAR LOWER BODY CLOTHING: TOTAL
TURNING FROM BACK TO SIDE WHILE IN FLAT BAD: A LITTLE
WALKING IN HOSPITAL ROOM: TOTAL
TURNING FROM BACK TO SIDE WHILE IN FLAT BAD: A LITTLE
DAILY ACTIVITIY SCORE: 10
MOVING FROM LYING ON BACK TO SITTING ON SIDE OF FLAT BED WITH BEDRAILS: A LITTLE
WALKING IN HOSPITAL ROOM: A LOT
MOBILITY SCORE: 13
MOVING TO AND FROM BED TO CHAIR: A LOT
CLIMB 3 TO 5 STEPS WITH RAILING: TOTAL
WALKING IN HOSPITAL ROOM: A LOT
MOVING TO AND FROM BED TO CHAIR: A LOT
TOILETING: TOTAL
PERSONAL GROOMING: A LOT
DAILY ACTIVITIY SCORE: 10
DRESSING REGULAR UPPER BODY CLOTHING: A LOT
EATING MEALS: A LOT
HELP NEEDED FOR BATHING: A LOT
HELP NEEDED FOR BATHING: A LOT
STANDING UP FROM CHAIR USING ARMS: A LOT

## 2024-04-27 ASSESSMENT — PAIN - FUNCTIONAL ASSESSMENT: PAIN_FUNCTIONAL_ASSESSMENT: 0-10

## 2024-04-27 NOTE — CARE PLAN
The patient's goals for the shift include      The clinical goals for the shift include Pt to remain safe    Over the shift, the patient did make progress toward the following goals.   Problem: Skin  Goal: Promote skin healing  Flowsheets (Taken 4/27/2024 2955)  Promote skin healing:   Assess skin/pad under line(s)/device(s)   Turn/reposition every 2 hours/use positioning/transfer devices   Rotate device position/do not position patient on device

## 2024-04-27 NOTE — PROGRESS NOTES
Markie Nobles is a 79 y.o. male     Discussed with spouse and stepdaughter  Family agreeable for PEG tube  Will consult surgery    Review of Systems           Vitals:    04/27/24 0730   BP: 148/73   Pulse: 64   Resp: 14   Temp: 36.6 °C (97.9 °F)   SpO2: 99%        Scheduled medications  aspirin, 300 mg, rectal, Daily  atorvastatin, 80 mg, oral, Daily  azithromycin, 500 mg, intravenous, q24h  carbidopa-levodopa, 1.5 tablet, oral, 4x daily  cefTRIAXone, 1 g, intravenous, q24h  clopidogrel, 75 mg, oral, Daily  enoxaparin, 30 mg, subcutaneous, q24h  finasteride, 5 mg, oral, Daily  menthol-zinc oxide, 1 Application, Topical, 4x daily  metoprolol, 5 mg, intravenous, q6h  [Held by provider] metoprolol tartrate, 25 mg, oral, Daily  [Held by provider] pantoprazole, 40 mg, oral, Daily before breakfast  polyethylene glycol, 17 g, oral, Daily  tamsulosin, 0.4 mg, oral, Daily      Continuous medications     PRN medications  PRN medications: acetaminophen, benzonatate, ipratropium-albuteroL, melatonin, ondansetron, traMADol    Lab Review   Results from last 7 days   Lab Units 04/26/24  0607 04/25/24  1035 04/24/24  1503   WBC AUTO x10*3/uL 7.3 6.7 7.8   HEMOGLOBIN g/dL 8.7* 8.8* 9.2*   HEMATOCRIT % 27.8* 27.8* 29.2*   PLATELETS AUTO x10*3/uL 194 193 213     Results from last 7 days   Lab Units 04/26/24  0607 04/25/24  1035 04/24/24  1503   SODIUM mmol/L 148* 146* 147*   POTASSIUM mmol/L 4.6 5.2 5.2   CHLORIDE mmol/L 114* 114* 113*   CO2 mmol/L 25 20* 22   BUN mg/dL 55* 59* 60*   CREATININE mg/dL 4.03* 4.28* 4.33*   CALCIUM mg/dL 8.4* 8.0* 8.5*   PROTEIN TOTAL g/dL 6.4 6.5 6.9   BILIRUBIN TOTAL mg/dL 0.4 0.4 0.7   ALK PHOS U/L 73 80 78   ALT U/L <3* <3* 3*   AST U/L 11 13 15   GLUCOSE mg/dL 85 82 83     Results from last 7 days   Lab Units 04/24/24  1503   TROPHS ng/L 14        CT head wo IV contrast   Final Result   No acute intracranial bleed or focal mass effect.        Moderate volume loss.        Moderate chronic white  matter ischemic disease in the deep   periventricular regions.        MACRO:   None        Signed by: Jordan Suero 4/24/2024 3:54 PM   Dictation workstation:   [id number]      XR chest 1 view   Final Result   1.  Developing infiltrate in the medial left lung base                  MACRO:   None        Signed by: Joseph Schoenberger 4/24/2024 3:29 PM   Dictation workstation:   [id number]      FL modified barium swallow study    (Results Pending)         Physical Exam     Constitutional   General appearance: Alert and in no acute distress.     Pulmonary   Respiratory assessment: No respiratory distress, normal respiratory rhythm and effort.    Auscultation of Lungs: Crackles  Cardiovascular   Auscultation of heart: Apical pulse normal, heart rate and rhythm normal, normal S1 and S2, no murmurs and no pericardial rub.    Exam for edema: No peripheral edema.   Abdomen   Abdominal Exam: No bruits, normal bowel sounds, soft, non-tender, no abdominal mass palpated.    Liver and Spleen exam: No hepato-splenomegaly.   Musculoskeletal     Skin inspection: Normal skin color and pigmentation, normal skin turgor and no visible rash.   Neurologic   Cranial nerves: Nerves 2-12 were intact, no focal neuro defects.  Alert x 2    Assessment/Plan    #Aspiration pneumonia/UTI  Continue antibiotics  Failed swallow eval  Consult surgery for PEG tube placement     #Sacral ulcer present on admission  Wound care     #Severe malnutrition  Consult nutrition for input     #Parkinson's disease with dementia  #Deconditioning  Resume medications  PT OT consult     #Coronary artery disease s/p bypass  Stable     #Acute on chronic kidney injury stage IV  Continue IV fluids for monitor     #Hypertension  Stable

## 2024-04-27 NOTE — PROGRESS NOTES
04/27/24 1122   Discharge Planning   Patient expects to be discharged to: SNF reviewing     Patient is not med ready for discharge, per notes patient will need peg placement, referrals have been sent out to SNF facilities and they are reviewing , patient will need PT/OT and a pre-cert for discharge, I will continue to monitor for discharge planning.

## 2024-04-27 NOTE — CARE PLAN
The patient's goals for the shift include      The clinical goals for the shift include patient will remin safe and free from falls/injury by end of shift    Over the shift, the patient did   Problem: Pain  Goal: My pain/discomfort is manageable  Outcome: Progressing     Problem: Safety  Goal: Patient will be injury free during hospitalization  Outcome: Progressing   make progress toward the following goals.

## 2024-04-27 NOTE — PROGRESS NOTES
Physical Therapy    Physical Therapy Treatment    Patient Name: Markie Nobles  MRN: [id number]  Today's Date: 4/27/2024  Time Calculation  Start Time: 1342  Stop Time: 1420  Time Calculation (min): 38 min       Assessment/Plan   PT Assessment  PT Assessment Results: Decreased strength, Decreased endurance, Impaired balance, Decreased mobility, Decreased coordination, Impaired judgement  Rehab Prognosis: Fair  Barriers to Discharge: decreased endurance, decreased balance, Mod Ax2 for all upright mobility  Evaluation/Treatment Tolerance: Patient tolerated treatment well  End of Session Communication: Bedside nurse  End of Session Patient Position: Bed, 3 rail up  PT Plan  Inpatient/Swing Bed or Outpatient: Inpatient  PT Plan  Treatment/Interventions: Bed mobility, Transfer training, Balance training, Neuromuscular re-education, Strengthening, Endurance training, Range of motion, Therapeutic exercise, Therapeutic activity, Home exercise program, Positioning  PT Plan: Skilled PT  PT Frequency: 4 times per week  PT Discharge Recommendations: Moderate intensity level of continued care  Equipment Recommended upon Discharge: Wheeled walker  PT Recommended Transfer Status: Assist x2  PT - OK to Discharge: Yes (Per POC)      General Visit Information:   PT  Visit  PT Received On: 04/27/24  Response to Previous Treatment: Patient with no complaints from previous session.  General  Reason for Referral: Admit for decreased appetite, weight loss, nausea and vomiting times approximately 2+ weeks`  Referred By: KIESHA Thayer-CNP  Past Medical History Relevant to Rehab: Balance problems, CAD, age related cognitive decline, cognitive disorder, DM, HLD, HTN, Parkinson's Disease, CABG x3, CKD 3  Family/Caregiver Present: Yes  Caregiver Feedback: Spouse present  Prior to Session Communication: Bedside nurse  Patient Position Received: Bed, 3 rail up, Alarm on  Preferred Learning Style: verbal, visual  General Comment: Patient is  pleasant and alert.  Agreeabe to therapy session. Supine in bed with nursing present.    Subjective   Precautions:  Precautions  Medical Precautions: Fall precautions  Vital Signs:       Objective   Pain:  Pain Assessment  Pain Assessment: 0-10  Pain Score: 5 - Moderate pain  Pain Type: Chronic pain  Pain Location: Back  Cognition:  Cognition  Overall Cognitive Status: Within Functional Limits  Orientation Level: Oriented X4  Following Commands: Follows all commands and directions without difficulty  Postural Control:  Postural Control  Postural Control: Impaired  Head Control: Flexed head with difficulty lifting and maintaining forward head.  Trunk Control: Patient with difficulty sitting erect in bed. He is retropulsive with right side leaning. Working on core strengthening and sitting balance exercises.  Righting Reactions: Impaired  Protective Responses: Impaired  Posture Comment: Forward head and rounded shoulders.  Static Sitting Balance  Static Sitting-Balance Support: Feet supported, Bilateral upper extremity supported  Static Sitting-Level of Assistance: Moderate assistance  Static Sitting-Comment/Number of Minutes: Patient requires assistance to sit erect.  Right side leaning and is retropulsive.  Dynamic Sitting Balance  Dynamic Sitting-Balance Support: Feet supported, No upper extremity supported  Dynamic Sitting-Balance: Forward lean, Lateral lean, Trunk control activities  Dynamic Sitting-Comments: Mod assist to correct posture with reaching exercises but  improvement noted.  Extremity/Trunk Assessments:    Activity Tolerance:  Activity Tolerance  Endurance: Tolerates 30 min exercise with multiple rests  Treatments:       Therapeutic Activity  Therapeutic Activity Performed: Yes  Therapeutic Activity 1: Patient is instructed in and performs supine bilateral lower extremity ankle pumps, quad sets, heel slides, straight leg raises and abduction x 15reps x 1.  Seated bilateral lower extremity marches and  knee extension.  Seated core strengthening exercises are performed.  Working to improve strength and active range of motion.    Balance/Neuromuscular Re-Education  Balance/Neuromuscular Re-Education Activity Performed: Yes  Balance/Neuromuscular Re-Education Activity 1: Patient performs reaching activities while seated unsupported at edge of the bed.  Weakness noted with patient heavily leaning to the right and is retropulsive.  Improvement noted with training.    Bed Mobility  Bed Mobility: Yes  Bed Mobility 1  Bed Mobility 1: Supine to sitting  Level of Assistance 1: Maximum assistance  Bed Mobility Comments 1: Patient requires assistance to bring trunk upright and move LEs to the floor. Inability to sit erect initially but improvement noted with training.  Bed Mobility 2  Bed Mobility  2: Sitting to supine  Level of Assistance 2: Maximum assistance  Bed Mobility Comments 2: Assistance is needed to lower patient into bed and assist legs.    Ambulation/Gait Training  Ambulation/Gait Training Performed: No  Transfers  Transfer: No    Stairs  Stairs: No    Outcome Measures:  [facility] Basic Mobility  Turning from your back to your side while in a flat bed without using bedrails: A little  Moving from lying on your back to sitting on the side of a flat bed without using bedrails: A little  Moving to and from bed to chair (including a wheelchair): A lot  Standing up from a chair using your arms (e.g. wheelchair or bedside chair): A lot  To walk in hospital room: A lot  Climbing 3-5 steps with railing: Total  Basic Mobility - Total Score: 13    Education Documentation  Precautions, taught by La Gilbert PTA at 4/27/2024  3:42 PM.  Learner: Patient  Readiness: Acceptance  Method: Explanation, Demonstration  Response: Verbalizes Understanding, Demonstrated Understanding    Body Mechanics, taught by La Gilbert PTA at 4/27/2024  3:42 PM.  Learner: Patient  Readiness: Acceptance  Method: Explanation, Demonstration  Response:  Verbalizes Understanding, Demonstrated Understanding    Mobility Training, taught by La Gilbert PTA at 4/27/2024  3:42 PM.  Learner: Patient  Readiness: Acceptance  Method: Explanation, Demonstration  Response: Verbalizes Understanding, Demonstrated Understanding    Education Comments  No comments found.        OP EDUCATION:       Encounter Problems       Encounter Problems (Active)       Balance       STG - Maintains dynamic standing balance with upper extremity support At Min Ax1, safely, without LOB, device PRN  (Progressing)       Start:  04/25/24    Expected End:  05/09/24       INTERVENTIONS:  1. Practice standing with minimal support.  2. Educate patient about standing tolerance.  3. Educate patient about independence with gait, transfers, and ADL's.  4. Educate patient about use of assistive device.  5. Educate patient about self-directed care.            Mobility       STG - Patient will ambulate At Min Ax1 using Wheeled walker for 25' without LOB or gross gait deviations  (Progressing)       Start:  04/25/24    Expected End:  05/09/24            Endurance - Pt to tolerate >/= 20 minutes therex, theract, gait and/or NMR with </= 5 minutes of rest breaks  (Progressing)       Start:  04/25/24    Expected End:  05/09/24               PT Transfers       STG - Patient will perform bed mobility At Min Ax1, safely, without LOB, device PRN  (Progressing)       Start:  04/25/24    Expected End:  05/09/24            STG - Patient will transfer sit to and from stand At Min Ax1, safely, without LOB, device PRN  (Progressing)       Start:  04/25/24    Expected End:  05/09/24               Safety       Pt will verbalize and apply safety awareness and precautions 100% of time throughout entire session   (Progressing)       Start:  04/25/24    Expected End:  05/09/24

## 2024-04-28 LAB
ANION GAP SERPL CALC-SCNC: 19 MMOL/L (ref 10–20)
BUN SERPL-MCNC: 54 MG/DL (ref 6–23)
CALCIUM SERPL-MCNC: 8.7 MG/DL (ref 8.6–10.3)
CHLORIDE SERPL-SCNC: 113 MMOL/L (ref 98–107)
CO2 SERPL-SCNC: 21 MMOL/L (ref 21–32)
CREAT SERPL-MCNC: 4.13 MG/DL (ref 0.5–1.3)
EGFRCR SERPLBLD CKD-EPI 2021: 14 ML/MIN/1.73M*2
ERYTHROCYTE [DISTWIDTH] IN BLOOD BY AUTOMATED COUNT: 16.1 % (ref 11.5–14.5)
GLUCOSE BLD MANUAL STRIP-MCNC: 132 MG/DL (ref 74–99)
GLUCOSE BLD MANUAL STRIP-MCNC: 157 MG/DL (ref 74–99)
GLUCOSE BLD MANUAL STRIP-MCNC: 36 MG/DL (ref 74–99)
GLUCOSE BLD MANUAL STRIP-MCNC: 74 MG/DL (ref 74–99)
GLUCOSE BLD MANUAL STRIP-MCNC: 90 MG/DL (ref 74–99)
GLUCOSE SERPL-MCNC: 43 MG/DL (ref 74–99)
HCT VFR BLD AUTO: 29.8 % (ref 41–52)
HGB BLD-MCNC: 9.3 G/DL (ref 13.5–17.5)
MCH RBC QN AUTO: 28.4 PG (ref 26–34)
MCHC RBC AUTO-ENTMCNC: 31.2 G/DL (ref 32–36)
MCV RBC AUTO: 91 FL (ref 80–100)
NRBC BLD-RTO: 0 /100 WBCS (ref 0–0)
PLATELET # BLD AUTO: 212 X10*3/UL (ref 150–450)
POTASSIUM SERPL-SCNC: 5.2 MMOL/L (ref 3.5–5.3)
RBC # BLD AUTO: 3.28 X10*6/UL (ref 4.5–5.9)
SODIUM SERPL-SCNC: 148 MMOL/L (ref 136–145)
WBC # BLD AUTO: 5.6 X10*3/UL (ref 4.4–11.3)

## 2024-04-28 PROCEDURE — 82374 ASSAY BLOOD CARBON DIOXIDE: CPT | Performed by: NURSE PRACTITIONER

## 2024-04-28 PROCEDURE — 2500000005 HC RX 250 GENERAL PHARMACY W/O HCPCS: Performed by: NURSE PRACTITIONER

## 2024-04-28 PROCEDURE — 36415 COLL VENOUS BLD VENIPUNCTURE: CPT | Performed by: NURSE PRACTITIONER

## 2024-04-28 PROCEDURE — 2500000005 HC RX 250 GENERAL PHARMACY W/O HCPCS

## 2024-04-28 PROCEDURE — 99232 SBSQ HOSP IP/OBS MODERATE 35: CPT | Performed by: INTERNAL MEDICINE

## 2024-04-28 PROCEDURE — 1100000001 HC PRIVATE ROOM DAILY

## 2024-04-28 PROCEDURE — 2500000004 HC RX 250 GENERAL PHARMACY W/ HCPCS (ALT 636 FOR OP/ED): Performed by: INTERNAL MEDICINE

## 2024-04-28 PROCEDURE — 82947 ASSAY GLUCOSE BLOOD QUANT: CPT

## 2024-04-28 PROCEDURE — 85027 COMPLETE CBC AUTOMATED: CPT

## 2024-04-28 PROCEDURE — 2500000001 HC RX 250 WO HCPCS SELF ADMINISTERED DRUGS (ALT 637 FOR MEDICARE OP): Performed by: NURSE PRACTITIONER

## 2024-04-28 RX ORDER — DEXTROSE 50 % IN WATER (D50W) INTRAVENOUS SYRINGE
Status: COMPLETED
Start: 2024-04-28 | End: 2024-04-28

## 2024-04-28 RX ORDER — DEXTROSE 50 % IN WATER (D50W) INTRAVENOUS SYRINGE
25 ONCE
Status: COMPLETED | OUTPATIENT
Start: 2024-04-28 | End: 2024-04-28

## 2024-04-28 RX ORDER — DEXTROSE 50 % IN WATER (D50W) INTRAVENOUS SYRINGE
25
Status: DISCONTINUED | OUTPATIENT
Start: 2024-04-28 | End: 2024-05-01 | Stop reason: HOSPADM

## 2024-04-28 RX ORDER — METOPROLOL TARTRATE 1 MG/ML
5 INJECTION, SOLUTION INTRAVENOUS EVERY 6 HOURS
Status: CANCELLED | OUTPATIENT
Start: 2024-04-28

## 2024-04-28 RX ORDER — DEXTROSE 50 % IN WATER (D50W) INTRAVENOUS SYRINGE
12.5
Status: DISCONTINUED | OUTPATIENT
Start: 2024-04-28 | End: 2024-05-01 | Stop reason: HOSPADM

## 2024-04-28 RX ORDER — DEXTROSE, SODIUM CHLORIDE, SODIUM LACTATE, POTASSIUM CHLORIDE, AND CALCIUM CHLORIDE 5; .6; .31; .03; .02 G/100ML; G/100ML; G/100ML; G/100ML; G/100ML
75 INJECTION, SOLUTION INTRAVENOUS CONTINUOUS
Status: DISCONTINUED | OUTPATIENT
Start: 2024-04-28 | End: 2024-04-29

## 2024-04-28 RX ADMIN — Medication 1 APPLICATION: at 17:00

## 2024-04-28 RX ADMIN — DEXTROSE MONOHYDRATE 25 G: 25 INJECTION, SOLUTION INTRAVENOUS at 09:05

## 2024-04-28 RX ADMIN — SODIUM CHLORIDE, SODIUM LACTATE, POTASSIUM CHLORIDE, CALCIUM CHLORIDE AND DEXTROSE MONOHYDRATE 75 ML/HR: 5; 600; 310; 30; 20 INJECTION, SOLUTION INTRAVENOUS at 10:00

## 2024-04-28 RX ADMIN — METOPROLOL TARTRATE 5 MG: 5 INJECTION INTRAVENOUS at 21:13

## 2024-04-28 RX ADMIN — ASPIRIN 300 MG: 300 SUPPOSITORY RECTAL at 10:01

## 2024-04-28 RX ADMIN — CEFTRIAXONE SODIUM 1 G: 1 INJECTION, SOLUTION INTRAVENOUS at 17:40

## 2024-04-28 RX ADMIN — ENOXAPARIN SODIUM 30 MG: 30 INJECTION SUBCUTANEOUS at 10:00

## 2024-04-28 RX ADMIN — METOPROLOL TARTRATE 5 MG: 5 INJECTION INTRAVENOUS at 11:06

## 2024-04-28 RX ADMIN — DEXTROSE 50 % IN WATER (D50W) INTRAVENOUS SYRINGE 25 G: at 09:05

## 2024-04-28 RX ADMIN — Medication 1 APPLICATION: at 21:13

## 2024-04-28 RX ADMIN — Medication 1 APPLICATION: at 07:00

## 2024-04-28 RX ADMIN — Medication 1 APPLICATION: at 01:52

## 2024-04-28 RX ADMIN — Medication 1 APPLICATION: at 13:41

## 2024-04-28 ASSESSMENT — COGNITIVE AND FUNCTIONAL STATUS - GENERAL
DAILY ACTIVITIY SCORE: 13
HELP NEEDED FOR BATHING: A LOT
DRESSING REGULAR LOWER BODY CLOTHING: TOTAL
PERSONAL GROOMING: A LITTLE
WALKING IN HOSPITAL ROOM: A LOT
STANDING UP FROM CHAIR USING ARMS: A LOT
TOILETING: A LOT
MOVING TO AND FROM BED TO CHAIR: A LOT
EATING MEALS: A LITTLE
MOVING FROM LYING ON BACK TO SITTING ON SIDE OF FLAT BED WITH BEDRAILS: A LITTLE
DRESSING REGULAR UPPER BODY CLOTHING: A LOT
TURNING FROM BACK TO SIDE WHILE IN FLAT BAD: A LITTLE
CLIMB 3 TO 5 STEPS WITH RAILING: TOTAL
MOBILITY SCORE: 13

## 2024-04-28 ASSESSMENT — PAIN SCALES - GENERAL: PAINLEVEL_OUTOF10: 0 - NO PAIN

## 2024-04-28 ASSESSMENT — PAIN - FUNCTIONAL ASSESSMENT: PAIN_FUNCTIONAL_ASSESSMENT: 0-10

## 2024-04-28 NOTE — CARE PLAN
The patient's goals for the shift include      The clinical goals for the shift include Pt to remain safe    Over the shift, the patient did make progress toward the following goals.    Problem: Safety  Goal: Patient will be injury free during hospitalization  Outcome: Progressing     Problem: Daily Care  Goal: Daily care needs are met  Outcome: Progressing     Problem: Psychosocial Needs  Goal: Collaborate with me, my family, and caregiver to identify my specific goals  Outcome: Progressing     Problem: Skin  Goal: Promote skin healing  Outcome: Progressing

## 2024-04-28 NOTE — CARE PLAN
Problem: Skin  Goal: Promote/optimize nutrition  4/27/2024 2227 by Tono Mata RN  Outcome: Not Progressing  Note: Pt. NPO  4/27/2024 2227 by Tono Mata RN  Outcome: Progressing    Problem: Skin  Goal: Promote/optimize nutrition  4/27/2024 2227 by Tono Mata RN  Outcome: Not Progressing  Note: Pt. NPO  4/27/2024 2227 by Tono Mata RN  Outcome: Progressing

## 2024-04-28 NOTE — PROGRESS NOTES
Markie Nobles is a 79 y.o. male     Surgical input pending  Low blood sugars this morning  Will start on D5 normal    Review of Systems           Vitals:    04/28/24 0802   BP: 164/83   Pulse: 65   Resp: 17   Temp: 36.4 °C (97.6 °F)   SpO2: 99%        Scheduled medications  aspirin, 300 mg, rectal, Daily  atorvastatin, 80 mg, oral, Daily  azithromycin, 500 mg, intravenous, q24h  carbidopa-levodopa, 1.5 tablet, oral, 4x daily  cefTRIAXone, 1 g, intravenous, q24h  clopidogrel, 75 mg, oral, Daily  enoxaparin, 30 mg, subcutaneous, q24h  finasteride, 5 mg, oral, Daily  menthol-zinc oxide, 1 Application, Topical, 4x daily  metoprolol, 5 mg, intravenous, q6h  [Held by provider] metoprolol tartrate, 25 mg, oral, Daily  [Held by provider] pantoprazole, 40 mg, oral, Daily before breakfast  polyethylene glycol, 17 g, oral, Daily  tamsulosin, 0.4 mg, oral, Daily      Continuous medications  dextrose 5 % and lactated Ringer's, 75 mL/hr, Last Rate: 75 mL/hr (04/28/24 1000)      PRN medications  PRN medications: acetaminophen, benzonatate, dextrose, dextrose, glucagon, glucagon, ipratropium-albuteroL, melatonin, ondansetron, traMADol    Lab Review   Results from last 7 days   Lab Units 04/28/24  0738 04/27/24  1021 04/26/24  0607   WBC AUTO x10*3/uL 5.6 5.2 7.3   HEMOGLOBIN g/dL 9.3* 8.6* 8.7*   HEMATOCRIT % 29.8* 27.5* 27.8*   PLATELETS AUTO x10*3/uL 212 189 194     Results from last 7 days   Lab Units 04/28/24  0738 04/27/24  1022 04/26/24  0607 04/25/24  1035 04/24/24  1503   SODIUM mmol/L 148* 147* 148* 146* 147*   POTASSIUM mmol/L 5.2 4.9 4.6 5.2 5.2   CHLORIDE mmol/L 113* 115* 114* 114* 113*   CO2 mmol/L 21 22 25 20* 22   BUN mg/dL 54* 49* 55* 59* 60*   CREATININE mg/dL 4.13* 4.11* 4.03* 4.28* 4.33*   CALCIUM mg/dL 8.7 8.4* 8.4* 8.0* 8.5*   PROTEIN TOTAL g/dL  --   --  6.4 6.5 6.9   BILIRUBIN TOTAL mg/dL  --   --  0.4 0.4 0.7   ALK PHOS U/L  --   --  73 80 78   ALT U/L  --   --  <3* <3* 3*   AST U/L  --   --  11 13 15    GLUCOSE mg/dL 43* 66* 85 82 83     Results from last 7 days   Lab Units 04/24/24  1503   TROPHS ng/L 14        CT head wo IV contrast   Final Result   No acute intracranial bleed or focal mass effect.        Moderate volume loss.        Moderate chronic white matter ischemic disease in the deep   periventricular regions.        MACRO:   None        Signed by: oJrdan Suero 4/24/2024 3:54 PM   Dictation workstation:   [id number]      XR chest 1 view   Final Result   1.  Developing infiltrate in the medial left lung base                  MACRO:   None        Signed by: Joseph Schoenberger 4/24/2024 3:29 PM   Dictation workstation:   [id number]      FL modified barium swallow study    (Results Pending)         Physical Exam     Constitutional   General appearance: Alert and in no acute distress.     Pulmonary   Respiratory assessment: No respiratory distress, normal respiratory rhythm and effort.    Auscultation of Lungs: Crackles  Cardiovascular   Auscultation of heart: Apical pulse normal, heart rate and rhythm normal, normal S1 and S2, no murmurs and no pericardial rub.    Exam for edema: No peripheral edema.   Abdomen   Abdominal Exam: No bruits, normal bowel sounds, soft, non-tender, no abdominal mass palpated.    Liver and Spleen exam: No hepato-splenomegaly.   Musculoskeletal     Skin inspection: Normal skin color and pigmentation, normal skin turgor and no visible rash.   Neurologic   Cranial nerves: Nerves 2-12 were intact, no focal neuro defects.  Alert x 2    Assessment/Plan    #Aspiration pneumonia/UTI  Continue antibiotics  Failed swallow eval  Surgery consult pending    #Hypoglycemia  Start IV fluids     #Sacral ulcer present on admission  Wound care     #Severe malnutrition  Consult nutrition for input     #Parkinson's disease with dementia  #Deconditioning  Resume medications  PT OT consult     #Coronary artery disease s/p bypass  Stable     #Acute on chronic kidney injury stage IV  Continue IV  fluids     #Hypertension  Stable

## 2024-04-28 NOTE — PROGRESS NOTES
Care Coordinator Note:    Plan: consult placed for surgery for PEG placement. NMR over weekend to dc to SNF.   Status: inpatient  Payor:   Disposition: NEW SNF- [facility] auth through 4/30 at [place]  Barrier: PEG placement and tolerating TF  ADOD: 2-3 days    1313- Wife brought in living will and POA paperwork. Copy made and placed in chart.     Diana Hammond [facility]      04/28/24 0752   Discharge Planning   Patient expects to be discharged to: [facility] auth through 4/30 [place]

## 2024-04-29 ENCOUNTER — ANESTHESIA (OUTPATIENT)
Dept: OPERATING ROOM | Facility: HOSPITAL | Age: 79
DRG: 177 | End: 2024-04-29
Payer: MEDICARE

## 2024-04-29 ENCOUNTER — ANESTHESIA EVENT (OUTPATIENT)
Dept: OPERATING ROOM | Facility: HOSPITAL | Age: 79
DRG: 177 | End: 2024-04-29
Payer: MEDICARE

## 2024-04-29 PROBLEM — R13.12 OROPHARYNGEAL DYSPHAGIA: Status: ACTIVE | Noted: 2024-04-24

## 2024-04-29 LAB
ANION GAP SERPL CALC-SCNC: 15 MMOL/L (ref 10–20)
BUN SERPL-MCNC: 49 MG/DL (ref 6–23)
CALCIUM SERPL-MCNC: 8.4 MG/DL (ref 8.6–10.3)
CHLORIDE SERPL-SCNC: 118 MMOL/L (ref 98–107)
CO2 SERPL-SCNC: 22 MMOL/L (ref 21–32)
CREAT SERPL-MCNC: 3.83 MG/DL (ref 0.5–1.3)
EGFRCR SERPLBLD CKD-EPI 2021: 15 ML/MIN/1.73M*2
ERYTHROCYTE [DISTWIDTH] IN BLOOD BY AUTOMATED COUNT: 16 % (ref 11.5–14.5)
GLUCOSE BLD MANUAL STRIP-MCNC: 105 MG/DL (ref 74–99)
GLUCOSE BLD MANUAL STRIP-MCNC: 127 MG/DL (ref 74–99)
GLUCOSE BLD MANUAL STRIP-MCNC: 76 MG/DL (ref 74–99)
GLUCOSE BLD MANUAL STRIP-MCNC: 89 MG/DL (ref 74–99)
GLUCOSE BLD MANUAL STRIP-MCNC: 89 MG/DL (ref 74–99)
GLUCOSE BLD MANUAL STRIP-MCNC: 91 MG/DL (ref 74–99)
GLUCOSE BLD MANUAL STRIP-MCNC: 97 MG/DL (ref 74–99)
GLUCOSE SERPL-MCNC: 87 MG/DL (ref 74–99)
HCT VFR BLD AUTO: 26.5 % (ref 41–52)
HGB BLD-MCNC: 8.3 G/DL (ref 13.5–17.5)
MCH RBC QN AUTO: 28.2 PG (ref 26–34)
MCHC RBC AUTO-ENTMCNC: 31.3 G/DL (ref 32–36)
MCV RBC AUTO: 90 FL (ref 80–100)
NRBC BLD-RTO: 0 /100 WBCS (ref 0–0)
PLATELET # BLD AUTO: 174 X10*3/UL (ref 150–450)
POTASSIUM SERPL-SCNC: 4.7 MMOL/L (ref 3.5–5.3)
RBC # BLD AUTO: 2.94 X10*6/UL (ref 4.5–5.9)
SODIUM SERPL-SCNC: 150 MMOL/L (ref 136–145)
WBC # BLD AUTO: 5.3 X10*3/UL (ref 4.4–11.3)

## 2024-04-29 PROCEDURE — 0DH63UZ INSERTION OF FEEDING DEVICE INTO STOMACH, PERCUTANEOUS APPROACH: ICD-10-PCS | Performed by: SURGERY

## 2024-04-29 PROCEDURE — 85027 COMPLETE CBC AUTOMATED: CPT | Performed by: NURSE PRACTITIONER

## 2024-04-29 PROCEDURE — 3700000001 HC GENERAL ANESTHESIA TIME - INITIAL BASE CHARGE: Performed by: SURGERY

## 2024-04-29 PROCEDURE — 2500000005 HC RX 250 GENERAL PHARMACY W/O HCPCS: Performed by: SURGERY

## 2024-04-29 PROCEDURE — 36415 COLL VENOUS BLD VENIPUNCTURE: CPT | Performed by: NURSE PRACTITIONER

## 2024-04-29 PROCEDURE — 1100000001 HC PRIVATE ROOM DAILY

## 2024-04-29 PROCEDURE — 3E0G76Z INTRODUCTION OF NUTRITIONAL SUBSTANCE INTO UPPER GI, VIA NATURAL OR ARTIFICIAL OPENING: ICD-10-PCS | Performed by: INTERNAL MEDICINE

## 2024-04-29 PROCEDURE — 2500000004 HC RX 250 GENERAL PHARMACY W/ HCPCS (ALT 636 FOR OP/ED): Performed by: INTERNAL MEDICINE

## 2024-04-29 PROCEDURE — 99221 1ST HOSP IP/OBS SF/LOW 40: CPT | Performed by: SURGERY

## 2024-04-29 PROCEDURE — A43246 PR EDG PERCUTANEOUS PLACEMENT GASTROSTOMY TUBE: Performed by: NURSE ANESTHETIST, CERTIFIED REGISTERED

## 2024-04-29 PROCEDURE — A43246 PR EDG PERCUTANEOUS PLACEMENT GASTROSTOMY TUBE: Performed by: ANESTHESIOLOGY

## 2024-04-29 PROCEDURE — 2500000005 HC RX 250 GENERAL PHARMACY W/O HCPCS: Performed by: ANESTHESIOLOGY

## 2024-04-29 PROCEDURE — 3600000004 HC OR TIME - INITIAL BASE CHARGE - PROCEDURE LEVEL FOUR: Performed by: SURGERY

## 2024-04-29 PROCEDURE — 43246 EGD PLACE GASTROSTOMY TUBE: CPT | Performed by: SURGERY

## 2024-04-29 PROCEDURE — 82947 ASSAY GLUCOSE BLOOD QUANT: CPT

## 2024-04-29 PROCEDURE — 80048 BASIC METABOLIC PNL TOTAL CA: CPT | Performed by: NURSE PRACTITIONER

## 2024-04-29 PROCEDURE — 3600000009 HC OR TIME - EACH INCREMENTAL 1 MINUTE - PROCEDURE LEVEL FOUR: Performed by: SURGERY

## 2024-04-29 PROCEDURE — 7100000002 HC RECOVERY ROOM TIME - EACH INCREMENTAL 1 MINUTE: Performed by: SURGERY

## 2024-04-29 PROCEDURE — 2720000007 HC OR 272 NO HCPCS: Performed by: SURGERY

## 2024-04-29 PROCEDURE — 2500000004 HC RX 250 GENERAL PHARMACY W/ HCPCS (ALT 636 FOR OP/ED): Performed by: NURSE ANESTHETIST, CERTIFIED REGISTERED

## 2024-04-29 PROCEDURE — 2500000004 HC RX 250 GENERAL PHARMACY W/ HCPCS (ALT 636 FOR OP/ED): Performed by: SURGERY

## 2024-04-29 PROCEDURE — 2500000001 HC RX 250 WO HCPCS SELF ADMINISTERED DRUGS (ALT 637 FOR MEDICARE OP): Performed by: SURGERY

## 2024-04-29 PROCEDURE — 99232 SBSQ HOSP IP/OBS MODERATE 35: CPT | Performed by: INTERNAL MEDICINE

## 2024-04-29 PROCEDURE — 2500000005 HC RX 250 GENERAL PHARMACY W/O HCPCS: Performed by: NURSE PRACTITIONER

## 2024-04-29 PROCEDURE — 99100 ANES PT EXTEME AGE<1 YR&>70: CPT | Performed by: ANESTHESIOLOGY

## 2024-04-29 PROCEDURE — 7100000001 HC RECOVERY ROOM TIME - INITIAL BASE CHARGE: Performed by: SURGERY

## 2024-04-29 PROCEDURE — 3700000002 HC GENERAL ANESTHESIA TIME - EACH INCREMENTAL 1 MINUTE: Performed by: SURGERY

## 2024-04-29 RX ORDER — ONDANSETRON HYDROCHLORIDE 2 MG/ML
4 INJECTION, SOLUTION INTRAVENOUS ONCE AS NEEDED
Status: DISCONTINUED | OUTPATIENT
Start: 2024-04-29 | End: 2024-04-29 | Stop reason: HOSPADM

## 2024-04-29 RX ORDER — DEXTROSE MONOHYDRATE AND SODIUM CHLORIDE 5; .45 G/100ML; G/100ML
75 INJECTION, SOLUTION INTRAVENOUS CONTINUOUS
Status: DISCONTINUED | OUTPATIENT
Start: 2024-04-29 | End: 2024-05-01 | Stop reason: HOSPADM

## 2024-04-29 RX ORDER — ONDANSETRON HYDROCHLORIDE 2 MG/ML
INJECTION, SOLUTION INTRAVENOUS AS NEEDED
Status: DISCONTINUED | OUTPATIENT
Start: 2024-04-29 | End: 2024-04-29

## 2024-04-29 RX ORDER — SODIUM CHLORIDE, SODIUM LACTATE, POTASSIUM CHLORIDE, CALCIUM CHLORIDE 600; 310; 30; 20 MG/100ML; MG/100ML; MG/100ML; MG/100ML
100 INJECTION, SOLUTION INTRAVENOUS CONTINUOUS
Status: DISCONTINUED | OUTPATIENT
Start: 2024-04-29 | End: 2024-05-01

## 2024-04-29 RX ORDER — DEXTROSE 50 % IN WATER (D50W) INTRAVENOUS SYRINGE
12.5 ONCE
Status: COMPLETED | OUTPATIENT
Start: 2024-04-29 | End: 2024-04-29

## 2024-04-29 RX ORDER — LIDOCAINE HYDROCHLORIDE 10 MG/ML
INJECTION INFILTRATION; PERINEURAL AS NEEDED
Status: DISCONTINUED | OUTPATIENT
Start: 2024-04-29 | End: 2024-04-29 | Stop reason: HOSPADM

## 2024-04-29 RX ORDER — OXYCODONE HYDROCHLORIDE 5 MG/1
5 TABLET ORAL EVERY 4 HOURS PRN
Status: DISCONTINUED | OUTPATIENT
Start: 2024-04-29 | End: 2024-04-29 | Stop reason: HOSPADM

## 2024-04-29 RX ORDER — HYDRALAZINE HYDROCHLORIDE 20 MG/ML
5 INJECTION INTRAMUSCULAR; INTRAVENOUS EVERY 30 MIN PRN
Status: DISCONTINUED | OUTPATIENT
Start: 2024-04-29 | End: 2024-04-29 | Stop reason: HOSPADM

## 2024-04-29 RX ORDER — SODIUM CHLORIDE, SODIUM LACTATE, POTASSIUM CHLORIDE, CALCIUM CHLORIDE 600; 310; 30; 20 MG/100ML; MG/100ML; MG/100ML; MG/100ML
100 INJECTION, SOLUTION INTRAVENOUS CONTINUOUS
Status: DISCONTINUED | OUTPATIENT
Start: 2024-04-29 | End: 2024-04-29 | Stop reason: HOSPADM

## 2024-04-29 RX ORDER — FENTANYL CITRATE 50 UG/ML
INJECTION, SOLUTION INTRAMUSCULAR; INTRAVENOUS AS NEEDED
Status: DISCONTINUED | OUTPATIENT
Start: 2024-04-29 | End: 2024-04-29

## 2024-04-29 RX ORDER — CEFAZOLIN 1 G/1
INJECTION, POWDER, FOR SOLUTION INTRAVENOUS AS NEEDED
Status: DISCONTINUED | OUTPATIENT
Start: 2024-04-29 | End: 2024-04-29

## 2024-04-29 RX ORDER — PROPOFOL 10 MG/ML
INJECTION, EMULSION INTRAVENOUS AS NEEDED
Status: DISCONTINUED | OUTPATIENT
Start: 2024-04-29 | End: 2024-04-29

## 2024-04-29 RX ADMIN — FENTANYL CITRATE 50 MCG: 50 INJECTION, SOLUTION INTRAMUSCULAR; INTRAVENOUS at 13:18

## 2024-04-29 RX ADMIN — SODIUM CHLORIDE, SODIUM LACTATE, POTASSIUM CHLORIDE, CALCIUM CHLORIDE AND DEXTROSE MONOHYDRATE 75 ML/HR: 5; 600; 310; 30; 20 INJECTION, SOLUTION INTRAVENOUS at 01:31

## 2024-04-29 RX ADMIN — PROPOFOL 30 MG: 10 INJECTION, EMULSION INTRAVENOUS at 13:28

## 2024-04-29 RX ADMIN — METOPROLOL TARTRATE 5 MG: 5 INJECTION INTRAVENOUS at 10:57

## 2024-04-29 RX ADMIN — Medication 1 APPLICATION: at 21:00

## 2024-04-29 RX ADMIN — Medication 2 L/MIN: at 13:34

## 2024-04-29 RX ADMIN — CEFAZOLIN 2 G: 330 INJECTION, POWDER, FOR SOLUTION INTRAMUSCULAR; INTRAVENOUS at 13:20

## 2024-04-29 RX ADMIN — CARBIDOPA AND LEVODOPA 1.5 TABLET: 25; 100 TABLET ORAL at 18:17

## 2024-04-29 RX ADMIN — FENTANYL CITRATE 50 MCG: 50 INJECTION, SOLUTION INTRAMUSCULAR; INTRAVENOUS at 13:23

## 2024-04-29 RX ADMIN — DEXTROSE AND SODIUM CHLORIDE 75 ML/HR: 5; 450 INJECTION, SOLUTION INTRAVENOUS at 10:33

## 2024-04-29 RX ADMIN — Medication 1 APPLICATION: at 18:18

## 2024-04-29 RX ADMIN — PROPOFOL 60 MG: 10 INJECTION, EMULSION INTRAVENOUS at 13:23

## 2024-04-29 RX ADMIN — Medication 1 APPLICATION: at 18:17

## 2024-04-29 RX ADMIN — DEXTROSE MONOHYDRATE 12.5 G: 25 INJECTION, SOLUTION INTRAVENOUS at 13:04

## 2024-04-29 RX ADMIN — CEFTRIAXONE SODIUM 1 G: 1 INJECTION, SOLUTION INTRAVENOUS at 18:17

## 2024-04-29 RX ADMIN — ONDANSETRON 4 MG: 2 INJECTION INTRAMUSCULAR; INTRAVENOUS at 13:29

## 2024-04-29 RX ADMIN — SODIUM CHLORIDE, SODIUM LACTATE, POTASSIUM CHLORIDE, AND CALCIUM CHLORIDE: 600; 310; 30; 20 INJECTION, SOLUTION INTRAVENOUS at 13:11

## 2024-04-29 SDOH — HEALTH STABILITY: MENTAL HEALTH: CURRENT SMOKER: 0

## 2024-04-29 ASSESSMENT — COGNITIVE AND FUNCTIONAL STATUS - GENERAL
TURNING FROM BACK TO SIDE WHILE IN FLAT BAD: A LOT
WALKING IN HOSPITAL ROOM: A LOT
DAILY ACTIVITIY SCORE: 11
MOVING FROM LYING ON BACK TO SITTING ON SIDE OF FLAT BED WITH BEDRAILS: A LOT
TOILETING: A LOT
EATING MEALS: TOTAL
STANDING UP FROM CHAIR USING ARMS: A LOT
PERSONAL GROOMING: A LOT
MOBILITY SCORE: 12
CLIMB 3 TO 5 STEPS WITH RAILING: A LOT
HELP NEEDED FOR BATHING: A LOT
DRESSING REGULAR UPPER BODY CLOTHING: A LOT
MOVING TO AND FROM BED TO CHAIR: A LOT
DRESSING REGULAR LOWER BODY CLOTHING: A LOT

## 2024-04-29 ASSESSMENT — PAIN - FUNCTIONAL ASSESSMENT
PAIN_FUNCTIONAL_ASSESSMENT: VAS (VISUAL ANALOG SCALE)
PAIN_FUNCTIONAL_ASSESSMENT: 0-10
PAIN_FUNCTIONAL_ASSESSMENT: VAS (VISUAL ANALOG SCALE)
PAIN_FUNCTIONAL_ASSESSMENT: 0-10

## 2024-04-29 ASSESSMENT — PAIN SCALES - GENERAL
PAINLEVEL_OUTOF10: 0 - NO PAIN

## 2024-04-29 NOTE — PROGRESS NOTES
Markie Nobles is a 79 y.o. male     Not happy that is being denied coffee  PEG tube today    Review of Systems           Vitals:    04/29/24 1529   BP: 160/78   Pulse: (!) 47   Resp: 18   Temp: 36.7 °C (98 °F)   SpO2: 100%        Scheduled medications  aspirin, 300 mg, rectal, Daily  atorvastatin, 80 mg, oral, Daily  carbidopa-levodopa, 1.5 tablet, oral, 4x daily  cefTRIAXone, 1 g, intravenous, q24h  clopidogrel, 75 mg, oral, Daily  enoxaparin, 30 mg, subcutaneous, q24h  finasteride, 5 mg, oral, Daily  menthol-zinc oxide, 1 Application, Topical, 4x daily  metoprolol, 5 mg, intravenous, q6h  [Held by provider] metoprolol tartrate, 25 mg, oral, Daily  [Held by provider] pantoprazole, 40 mg, oral, Daily before breakfast  polyethylene glycol, 17 g, oral, Daily  tamsulosin, 0.4 mg, oral, Daily      Continuous medications  dextrose 5%-0.45 % sodium chloride, 75 mL/hr, Last Rate: 75 mL/hr (04/29/24 1033)  lactated Ringer's, 100 mL/hr, Last Rate: 100 mL/hr (04/29/24 1334)      PRN medications  PRN medications: acetaminophen, benzonatate, dextrose, dextrose, glucagon, glucagon, ipratropium-albuteroL, melatonin, ondansetron, traMADol    Lab Review   Results from last 7 days   Lab Units 04/29/24  0635 04/28/24  0738 04/27/24  1021   WBC AUTO x10*3/uL 5.3 5.6 5.2   HEMOGLOBIN g/dL 8.3* 9.3* 8.6*   HEMATOCRIT % 26.5* 29.8* 27.5*   PLATELETS AUTO x10*3/uL 174 212 189     Results from last 7 days   Lab Units 04/29/24  0635 04/28/24  0738 04/27/24  1022 04/26/24  0607 04/25/24  1035 04/24/24  1503   SODIUM mmol/L 150* 148* 147* 148* 146* 147*   POTASSIUM mmol/L 4.7 5.2 4.9 4.6 5.2 5.2   CHLORIDE mmol/L 118* 113* 115* 114* 114* 113*   CO2 mmol/L 22 21 22 25 20* 22   BUN mg/dL 49* 54* 49* 55* 59* 60*   CREATININE mg/dL 3.83* 4.13* 4.11* 4.03* 4.28* 4.33*   CALCIUM mg/dL 8.4* 8.7 8.4* 8.4* 8.0* 8.5*   PROTEIN TOTAL g/dL  --   --   --  6.4 6.5 6.9   BILIRUBIN TOTAL mg/dL  --   --   --  0.4 0.4 0.7   ALK PHOS U/L  --   --   --  73 80  78   ALT U/L  --   --   --  <3* <3* 3*   AST U/L  --   --   --  11 13 15   GLUCOSE mg/dL 87 43* 66* 85 82 83     Results from last 7 days   Lab Units 04/24/24  1503   TROPHS ng/L 14        FL modified barium swallow study   Final Result   Modified barium swallow study as detailed within the report.        MACRO:   None        Signed by: Mandi Cantu 4/29/2024 11:09 AM   Dictation workstation:   [id number]      CT head wo IV contrast   Final Result   No acute intracranial bleed or focal mass effect.        Moderate volume loss.        Moderate chronic white matter ischemic disease in the deep   periventricular regions.        MACRO:   None        Signed by: Jordan Suero 4/24/2024 3:54 PM   Dictation workstation:   [id number]      XR chest 1 view   Final Result   1.  Developing infiltrate in the medial left lung base                  MACRO:   None        Signed by: Joseph Schoenberger 4/24/2024 3:29 PM   Dictation workstation:   [id number]            Physical Exam     Constitutional   General appearance: Alert and in no acute distress.     Pulmonary   Respiratory assessment: No respiratory distress, normal respiratory rhythm and effort.    Auscultation of Lungs: Crackles  Cardiovascular   Auscultation of heart: Apical pulse normal, heart rate and rhythm normal, normal S1 and S2, no murmurs and no pericardial rub.    Exam for edema: No peripheral edema.   Abdomen   Abdominal Exam: No bruits, normal bowel sounds, soft, non-tender, no abdominal mass palpated.    Liver and Spleen exam: No hepato-splenomegaly.   Musculoskeletal     Skin inspection: Normal skin color and pigmentation, normal skin turgor and no visible rash.   Neurologic   Cranial nerves: Nerves 2-12 were intact, no focal neuro defects.  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

## 2024-04-29 NOTE — ANESTHESIA POSTPROCEDURE EVALUATION
Patient: Markie Nobles    Procedure Summary       Date: 04/29/24 Room / Location: [facility] OR 06 / Virtual [facility] OR    Anesthesia Start: 1313 Anesthesia Stop: 1340    Procedure: Esophagogastroduodenoscopy with Insertion PEG Diagnosis:       Pneumonia due to infectious organism, unspecified laterality, unspecified part of lung      Oropharyngeal dysphagia      (Pneumonia due to infectious organism, unspecified laterality, unspecified part of lung [J18.9])      (Oropharyngeal dysphagia [R13.12])    Surgeons: Geovanny Tolentino MD Responsible Provider: Narda Morales MD    Anesthesia Type: MAC ASA Status: 4            Anesthesia Type: MAC    Vitals Value Taken Time   /62 04/29/24 1346   Temp 36.7 °C (98.1 °F) 04/29/24 1334   Pulse 60 04/29/24 1401   Resp 13 04/29/24 1345   SpO2 100 % 04/29/24 1401   Vitals shown include unfiled device data.    Anesthesia Post Evaluation    Patient location during evaluation: PACU  Patient participation: complete - patient participated  Level of consciousness: awake and alert  Pain management: adequate  Airway patency: patent  Cardiovascular status: stable  Respiratory status: spontaneous ventilation  Hydration status: acceptable  Postoperative Nausea and Vomiting: none        No notable events documented.

## 2024-04-29 NOTE — ANESTHESIA PREPROCEDURE EVALUATION
Patient: Markie Nobles    Procedure Information       Date/Time: 04/29/24 1300    Procedure: Esophagogastroduodenoscopy with Insertion PEG    Location: [facility] OR 06 / Virtual [facility] A OR    Surgeons: Geovanny Tolentino MD                                                                                           Pre-Anesthesia Evaluation    Markie Nobles is a 79 y.o. male presents for procedure stated above. He is admitted to the hospital for Pneumonia, unspecified organism [J18.9];Pneumonia due to infectious organism, unspecified laterality, unspecified part of lung [J18.9]. Today is Hospital Day: 6    Relevant Problems   Cardiac   (+) Atherosclerotic heart disease of native coronary artery without angina pectoris   (+) CAD (coronary artery disease)   (+) Hyperlipidemia, unspecified   (+) Hypertension   (+) Old myocardial infarction      Pulmonary   (+) Pneumonia, unspecified organism      Neuro   (+) Cervical radiculopathy   (+) Chronic headache   (+) Peripheral neuropathy   (+) Unspecified dementia, unspecified severity, without behavioral disturbance, psychotic disturbance, mood disturbance, and anxiety (Multi)      GI   (+) Dysphagia, oropharyngeal phase   (+) Oral phase dysphagia   (+) Oropharyngeal dysphagia      /Renal   (+) BPH (benign prostatic hyperplasia)   (+) Benign prostatic hyperplasia without lower urinary tract symptoms   (+) Enlarged prostate with lower urinary tract symptoms (LUTS)   (+) UTI (urinary tract infection)      Liver   (+) Nonalcoholic steatohepatitis (ABEBE)      Endocrine   (+) Hyperparathyroidism, secondary (Multi)   (+) Obesity   (+) 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)      ID   (+) Pneumonia, unspecified organism   (+) UTI (urinary tract infection)      Medical History reviewed      Surgical History reviewed  Past Surgical History:   Procedure Laterality Date    CHOLECYSTECTOMY  04/20/2018    Cholecystectomy    CORONARY ARTERY BYPASS GRAFT  12/16/2022    CABG     CT ANGIO NECK  7/29/2019    CT NECK ANGIO W AND WO IV CONTRAST 7/29/2019 [facility] CLINICAL LEGAC[facility]    CT HEAD ANGIO W AND WO IV CONTRAST  7/29/2019    CT HEAD ANGIO W AND WO IV CONTRAST 7/29/2019 [facility] CLINICAL LEGACY       Social History reviewed  He reports that he has quit smoking. His smoking use included cigarettes. He has never been exposed to tobacco smoke. He has never used smokeless tobacco. He reports that he does not currently use alcohol. He reports that he does not currently use drugs.    Allergies and Medications reviewed  Patient has no known allergies.      Current Facility-Administered Medications:     acetaminophen (Tylenol) tablet 650 mg, 650 mg, oral, q6h PRN, Leslie Watson MD    aspirin suppository 300 mg, 300 mg, rectal, Daily, Nora Madera, APRN-CNP, 300 mg at 04/28/24 1001    atorvastatin (Lipitor) tablet 80 mg, 80 mg, oral, Daily, Leslie Watson MD, 80 mg at 04/26/24 0902    benzonatate (Tessalon) capsule 100 mg, 100 mg, oral, TID PRN, Leslie Watson MD    carbidopa-levodopa (Sinemet)  mg per tablet 1.5 tablet, 1.5 tablet, oral, 4x daily, Leslie Watson MD, 1.5 tablet at 04/26/24 0901    cefTRIAXone (Rocephin) IVPB 1 g, 1 g, intravenous, q24h, Leslie Watson MD, Stopped at 04/28/24 1810    clopidogrel (Plavix) tablet 75 mg, 75 mg, oral, Daily, Leslie Watson MD, 75 mg at 04/26/24 0901    dextrose 5%-0.45 % sodium chloride infusion, 75 mL/hr, intravenous, Continuous, Leslie Watson MD, Last Rate: 75 mL/hr at 04/29/24 1033, 75 mL/hr at 04/29/24 1033    dextrose 50 % injection 12.5 g, 12.5 g, intravenous, q15 min PRN, Dylan Aburto MD    dextrose 50 % injection 25 g, 25 g, intravenous, q15 min PRN, Dylan Aburto MD    enoxaparin (Lovenox) syringe 30 mg, 30 mg, subcutaneous, q24h, Leslie Watson MD, 30 mg at 04/28/24 1000    finasteride (Proscar) tablet 5 mg, 5 mg, oral, Daily, Leslie Watson MD, 5 mg at 04/26/24 0902    glucagon (Glucagen) injection 1 mg, 1 mg, intramuscular, q15  min PRN, Dylan Aburto MD    glucagon (Glucagen) injection 1 mg, 1 mg, intramuscular, q15 min PRN, Dylan Aburto MD    ipratropium-albuteroL (Duo-Neb) 0.5-2.5 mg/3 mL nebulizer solution 3 mL, 3 mL, nebulization, q4h PRN, Leslie Watson MD    lactated Ringer's infusion, 100 mL/hr, intravenous, Continuous, Narda Morales MD    melatonin tablet 3 mg, 3 mg, oral, Nightly PRN, Leslie Watson MD    menthol-zinc oxide (Calmoseptine - Risamine) 0.44-20.6 % ointment 1 Application, 1 Application, Topical, 4x daily, SILVIANO Minaya, 1 Application at 04/28/24 2113    metoprolol tartrate (Lopressor) injection 5 mg, 5 mg, intravenous, q6h, SONIA MinayaCNP, 5 mg at 04/29/24 1057    [Held by provider] metoprolol tartrate (Lopressor) tablet 25 mg, 25 mg, oral, Daily, Leslie Watson MD, 25 mg at 04/26/24 0902    ondansetron (Zofran) injection 4 mg, 4 mg, intravenous, q6h PRN, Leslie Watson MD    [Held by provider] pantoprazole (ProtoNix) EC tablet 40 mg, 40 mg, oral, Daily before breakfast, Leslie Watson MD, 40 mg at 04/26/24 0629    polyethylene glycol (Glycolax, Miralax) packet 17 g, 17 g, oral, Daily, Leslie Watson MD, 17 g at 04/26/24 0902    tamsulosin (Flomax) 24 hr capsule 0.4 mg, 0.4 mg, oral, Daily, Leslie Watson MD, 0.4 mg at 04/26/24 0901    traMADol (Ultram) tablet 50 mg, 50 mg, oral, q12h PRN, Leslie Watson MD    Prior to Admission medications    Medication Sig Start Date End Date Taking? Authorizing Provider   acetaminophen (Tylenol Extra Strength) 500 mg tablet Take 2 tablets (1,000 mg) by mouth every 8 hours if needed for mild pain (1 - 3). 11/19/20  Yes Historical Provider, MD   atorvastatin (Lipitor) 80 mg tablet TAKE 1 TABLET BY MOUTH ONCE  DAILY 12/14/23  Yes Leslie Watson MD   carbidopa-levodopa (Sinemet)  mg tablet Take 1.5 tablets by mouth 4 times a day. 8am, 11am, 2pm, 5pm 3/22/24  Yes Historical Provider, MD   clopidogrel (Plavix) 75 mg tablet TAKE 1 TABLET BY MOUTH  ONCE  DAILY 10/3/23  Yes Leslie Watson MD   docusate sodium (Colace) 100 mg capsule TAKE 1 CAPSULE BY MOUTH EVERY DAY AS DIRECTED  Patient taking differently: Take 1 capsule (100 mg) by mouth 2 times a day as needed for constipation. 11/28/23  Yes Leslie Watson MD   finasteride (Proscar) 5 mg tablet Take 1 tablet (5 mg) by mouth once daily. 2/7/19  Yes Historical Provider, MD   lidocaine (Lidoderm) 5 % patch APPLY 1 PATCH DAILY & REMOVE AFTER 12 HOURS  Patient taking differently: Place 1 patch on the skin once daily as needed for mild pain (1 - 3). 11/28/23  Yes Leslie Watson MD   metoprolol tartrate (Lopressor) 25 mg tablet Take 1 tablet (25 mg) by mouth once daily.  Patient taking differently: Take 1 tablet (25 mg) by mouth once daily. Don't crush 4/13/23  Yes Leslie Watson MD   naphazoline-glycerin 0.012-0.2 % drops Administer 1 drop into both eyes every 8 hours if needed. 9/16/20  Yes Historical Provider, MD   docusate sodium (Colace) 100 mg tablet Take 1 tablet (100 mg) by mouth once daily. 2/14/23 4/25/24 Yes Historical Provider, MD   allopurinol (Zyloprim) 100 mg tablet Take 1 tablet (100 mg) by mouth once daily as needed (gout).    Historical Provider, MD   nitroglycerin (Nitrostat) 0.4 mg SL tablet Place 1 tablet (0.4 mg) under the tongue every 5 minutes if needed for chest pain. 8/10/17   Historical Provider, MD   tamsulosin (Flomax) 0.4 mg 24 hr capsule TAKE 2 CAPSULES BY MOUTH  DAILY 1/2 HOUR AFTER SAME  MEAL OR AT BEDTIME WITH A  SNACK  Patient taking differently: Take 1 capsule (0.4 mg) by mouth once daily. 9/1/23   Leslie Watson MD   lidocaine (Lidoderm) 5 % patch APPLY 1 PATCH DAILY & REMOVE AFTER 12 HOURS 6/1/20 4/25/24  Historical Provider, MD   magnesium oxide (Mag-Ox) 400 mg (241.3 mg magnesium) tablet Take by mouth.  4/24/24  Historical Provider, MD   mirtazapine (Remeron) 7.5 mg tablet Take 7.5 mg by mouth once daily at bedtime. Indications: appetite 3/26/24 4/24/24  Historical Provider,  "MD   sennosides (Senokot) 8.6 mg tablet Take by mouth 2 times a day as needed. 9/15/20 4/24/24  Historical Provider, MD         Relevant Results reviewed  Results from last 7 days   Lab Units 04/29/24  0635   WBC AUTO x10*3/uL 5.3   HEMOGLOBIN g/dL 8.3*   HEMATOCRIT % 26.5*   PLATELETS AUTO x10*3/uL 174     Results from last 7 days   Lab Units 04/29/24  0635 04/28/24  0738 04/27/24  1021   WBC AUTO x10*3/uL 5.3 5.6 5.2   HEMOGLOBIN g/dL 8.3* 9.3* 8.6*   HEMATOCRIT % 26.5* 29.8* 27.5*   PLATELETS AUTO x10*3/uL 174 212 189     Results from last 7 days   Lab Units 04/29/24  0635 04/28/24  0738 04/27/24  1022 04/26/24  0607 04/25/24  1035 04/24/24  1503   SODIUM mmol/L 150* 148* 147* 148* 146* 147*   POTASSIUM mmol/L 4.7 5.2 4.9 4.6 5.2 5.2   CHLORIDE mmol/L 118* 113* 115* 114* 114* 113*   CO2 mmol/L 22 21 22 25 20* 22   BUN mg/dL 49* 54* 49* 55* 59* 60*   CREATININE mg/dL 3.83* 4.13* 4.11* 4.03* 4.28* 4.33*   CALCIUM mg/dL 8.4* 8.7 8.4* 8.4* 8.0* 8.5*   PROTEIN TOTAL g/dL  --   --   --  6.4 6.5 6.9   BILIRUBIN TOTAL mg/dL  --   --   --  0.4 0.4 0.7   ALK PHOS U/L  --   --   --  73 80 78   ALT U/L  --   --   --  <3* <3* 3*   AST U/L  --   --   --  11 13 15   GLUCOSE mg/dL 87 43* 66* 85 82 83         Lab Results   Component Value Date/Time    PROTIME 12.8 (H) 07/30/2019 0225    INR 1.2 (H) 07/30/2019 0225     No results found for: \"TROPONINT\"  Results from last 7 days   Lab Units 04/24/24  1503   TROPHS ng/L 14          Past Cardiology Tests (Last 3 Years):  EKG:  Results for orders placed during the hospital encounter of 04/24/24    ECG 12 lead    Narrative  Normal sinus rhythm  Nonspecific T wave abnormality  Prolonged QT  Abnormal ECG  When compared with ECG of 07-MAR-2024 18:53,  No significant change was found  See ED provider note for full interpretation and clinical correlation  Confirmed by Katie Sweeney ([id number]) on 4/25/2024 10:55:21 AM      ECG 12 lead    Narrative  Normal sinus rhythm  Possible Left " "atrial enlargement  T wave abnormality, consider lateral ischemia  Abnormal ECG  When compared with ECG of 10-MAR-2023 17:12,  Previous ECG has undetermined rhythm, needs review    See ED provider note for full interpretation and clinical correlation  Confirmed by Theresa Kim ([id number]) on 3/7/2024 8:59:10 PM    Echo: 7/31/2019     1. The left ventricular systolic function is normal with a 60-65% estimated ejection fraction.   2. Poorly visualized anatomical structures due to suboptimal image quality.   3. Abnormal septal motion consistent with post-operative status.   4. Moderately increased left ventricular septal thickness.   5. Mobile atrial septal aneurym. Agitated saline contrast study was nondiagnostic for intracardiac shunt because image disappears for several cardiac cycles. Late after injection there is no contrast within the left side of the heart so not likely to have a significant intracardiac shunt.   6. Trileaflet AV with mild calcification without AI or AS. There appears to be calcium in the LVOT beneath the AV. There is no acceleration within the LVOT to suggest that this represents a subvalvular membrane.   7. Lipomatous hypertrophy of the atrial septum.      MRI BRAIN WO CONTRAST    - Impression -  No acute infarct, hemorrhage or mass is noted.    The white matter hyperintensities have significantly increased in  prominence from the 2016 exam; they may be secondary to degenerative,  chronic microvascular ischemic or other etiologies. The prominence of  the ventricles and sulci has also increased suggesting central volume  loss.  === 04/24/24 ===    CT HEAD WO IV CONTRAST    - Impression -  No acute intracranial bleed or focal mass effect.    Moderate volume loss.    Moderate chronic white matter ischemic disease in the deep  periventricular regions.                                             Visit Vitals  /69   Pulse (!) 49   Temp 36.3 °C (97.3 °F) (Temporal)   Resp 16   Ht 1.778 m (5' 10\") "   Wt 62.6 kg (138 lb 0.1 oz)   SpO2 97%   BMI 19.80 kg/m²   Smoking Status Former   BSA 1.76 m²             Clinical information reviewed:   Tobacco  Allergies  Meds   Med Hx  Surg Hx   Fam Hx  Soc Hx        NPO Detail:  NPO/Void Status  Carbohydrate Drink Given Prior to Surgery? : N  Date of Last Liquid: 04/26/24  Time of Last Liquid: 0000  Date of Last Solid: 04/26/24  Time of Last Solid: 0000  Time of Last Void: 1000         Physical Exam    Airway  Mallampati: II  TM distance: >3 FB  Neck ROM: full     Cardiovascular   Rhythm: regular  Rate: abnormal     Dental   (+) upper dentures     Pulmonary   Comments: Non labored respiration   Abdominal            Anesthesia Plan    History of general anesthesia?: yes  History of complications of general anesthesia?: no    ASA 4     MAC   (With standard ASA monitoring.)  The patient is not a current smoker.    intravenous induction   Anesthetic plan and risks discussed with patient.    Plan discussed with CRNA and CAA.

## 2024-04-29 NOTE — PROGRESS NOTES
Markie Nobles is a 79 y.o. male on day 5 of admission presenting with Pneumonia, unspecified organism.     04/29/24 1240   Discharge Planning   Type of Post Acute Facility Services Skilled nursing   Patient expects to be discharged to: [facility]/AUTH approved until 4/30     Plan: to have PEG placement this afternoon. AUTH approved until 4/30. Possible DC tomorrow if able to tolerate PEG feedings.  Disposition: [facility]/AUTH approved  Barrier: tolerate PEG feedings, dietician consult for peg recs  ADOD:        Natalie Jacobson RN

## 2024-04-29 NOTE — OP NOTE
Esophagogastroduodenoscopy with Insertion PEG Operative Note     Date: 2024  OR Location: [facility] A OR    Name: Markie Nobles, : 1945, Age: 79 y.o., MRN: [id number], Sex: male    Diagnosis  Pre-op Diagnosis     * Pneumonia due to infectious organism, unspecified laterality, unspecified part of lung [J18.9]     * Oropharyngeal dysphagia [R13.12] Post-op Diagnosis     * Pneumonia due to infectious organism, unspecified laterality, unspecified part of lung [J18.9]     * Oropharyngeal dysphagia [R13.12]     Procedures  Esophagogastroduodenoscopy with Insertion PEG  [address]83 - [facility] PERCUTANEOUS PLACEMENT GASTROSTOMY TUBE      Surgeons      * Geovanny Tolentino - Primary    Resident/Fellow/Other Assistant:  Surgeons and Role:  * No surgeons found with a matching role *    Procedure Summary  Anesthesia: Monitor Anesthesia Care  ASA: IV  Anesthesia Staff: Anesthesiologist: Narda Morales MD  CRNA: KIESHA Vila-CRNA  Estimated Blood Loss: 5mL  Intra-op Medications:   Administrations occurring from 1300 to 1320 on 24:   Medication Name Total Dose   acetaminophen (Tylenol) tablet 650 mg Cannot be calculated   aspirin suppository 300 mg Cannot be calculated   atorvastatin (Lipitor) tablet 80 mg Cannot be calculated   benzonatate (Tessalon) capsule 100 mg Cannot be calculated   carbidopa-levodopa (Sinemet)  mg per tablet 1.5 tablet Cannot be calculated   cefTRIAXone (Rocephin) IVPB 1 g Cannot be calculated   clopidogrel (Plavix) tablet 75 mg Cannot be calculated   dextrose 50 % injection 12.5 g Cannot be calculated   dextrose 50 % injection 25 g Cannot be calculated   enoxaparin (Lovenox) syringe 30 mg Cannot be calculated   finasteride (Proscar) tablet 5 mg Cannot be calculated   glucagon (Glucagen) injection 1 mg Cannot be calculated   glucagon (Glucagen) injection 1 mg Cannot be calculated   ipratropium-albuteroL (Duo-Neb) 0.5-2.5 mg/3 mL nebulizer solution 3 mL Cannot be calculated   melatonin tablet  3 mg Cannot be calculated   menthol-zinc oxide (Calmoseptine - Risamine) 0.44-20.6 % ointment 1 Application Cannot be calculated   metoprolol tartrate (Lopressor) injection 5 mg Cannot be calculated   ondansetron (Zofran) injection 4 mg Cannot be calculated   polyethylene glycol (Glycolax, Miralax) packet 17 g Cannot be calculated   tamsulosin (Flomax) 24 hr capsule 0.4 mg Cannot be calculated   traMADol (Ultram) tablet 50 mg Cannot be calculated   dextrose 50 % injection 12.5 g 12.5 g              Anesthesia Record               Intraprocedure I/O Totals          Intake    LR bolus 300.00 mL    Total Intake 300 mL          Specimen: No specimens collected     Staff:   Circulator: Shaila Molina RN  Scrub Person: Rupa Negron         Drains and/or Catheters:   Gastrostomy/Enterostomy PEG-jejunostomy LUQ (Active)   Dressing Status Clean;Dry 04/29/24 1334   Dressing Intervention New dressing 04/29/24 1334       External Urinary Catheter Male (Active)   Collection Container Belly bag 04/29/24 1334   Securement Method Tape 04/29/24 1334   Output (mL) 300 mL 04/28/24 0100       Tourniquet Times:         Implants:     Findings: na    Indications: Markie Nobles is an 79 y.o. male who is having surgery for Pneumonia due to infectious organism, unspecified laterality, unspecified part of lung [J18.9]  Oropharyngeal dysphagia [R13.12].     The patient was seen in the preoperative area. The risks, benefits, complications, treatment options, non-operative alternatives, expected recovery and outcomes were discussed with the patient. The possibilities of reaction to medication, pulmonary aspiration, injury to surrounding structures, bleeding, recurrent infection, the need for additional procedures, failure to diagnose a condition, and creating a complication requiring transfusion or operation were discussed with the patient. The patient concurred with the proposed plan, giving informed consent.  The site of surgery was properly  noted/marked if necessary per policy. The patient has been actively warmed in preoperative area. Preoperative antibiotics have been ordered and given within 1 hours of incision. Venous thrombosis prophylaxis have been ordered including bilateral sequential compression devices    Procedure Details: Patient was brought to the OR placed supine.  Timeout was performed confirm patient procedure.  He received antibiotics and IV sedation.  Bite-block was placed.  Abdominal wall was prepped and draped sterilely.  I performed upper endoscopy and advance the scope through the esophagus into the stomach.  We advanced the scope then passed the pylorus.  Grossly normal duodenum and stomach.  The transillumination and palpation we chose a left subcostal incision site.  I injected local made a small incision in the skin with scalpel.  The needle with sheath was then advanced into the gastric lumen.  I was able to snare this and then exchanged the needle for the guidewire.  We snared the guidewire and brought it out the oral cavity.  Then using pull technique we position the PEG tube at 3 cm at the external flange on the abdominal wall.  I then performed endoscopy again to ensure that the inner bumper was flush with the mucosa without any bleeding and then we desufflated the stomach on the way out.  Accessories to the PEG tube are attached dressing was placed.  Patient was then transferred recovery room in satisfactory condition    Complications:  None; patient tolerated the procedure well.    Disposition: PACU - hemodynamically stable.  Condition: stable         Additional Details:     Attending Attestation: I was present for the entire procedure.    Geovanny Tolentino  Phone Number: [phone]

## 2024-04-29 NOTE — CONSULTS
Reason For Consult  Need for alternative feeding access    Assessment/Plan   I reviewed the rationale for PEG tube with patient and wife.  We discussed the technique of endoscopic feeding tube placement.  Risks and benefits of procedure reviewed.  They would like to proceed.  We may be able to get this on the schedule for this afternoon    History Of Present Illness  Markie Nobles is a 79 y.o. male presenting with weakness and weight loss.  He failed his swallow evaluation and it was recommended he get a feeding tube.  Surgery was consulted to discuss this with patient and wife.  He has never had a feeding tube before.  Denies any prior abdominal surgeries..     Past Medical History  He has a past medical history of Encounter for immunization (09/29/2016), Encounter for screening, unspecified (04/28/2014), Gout, unspecified (10/26/2017), Idiopathic gout, right hand (01/17/2020), Localized edema (07/06/2015), Other conditions influencing health status (08/03/2015), Other symptoms and signs involving cognitive functions and awareness (06/09/2016), Pain in unspecified foot (08/03/2015), Personal history of other diseases of the circulatory system, Personal history of other diseases of the digestive system (08/13/2019), Personal history of other drug therapy, Personal history of other specified conditions (03/17/2016), Personal history of other specified conditions (02/19/2015), and Strain of muscle and tendon of unspecified wall of thorax, initial encounter (12/05/2016).    Surgical History  He has a past surgical history that includes Coronary artery bypass graft (12/16/2022); Cholecystectomy (04/20/2018); CT angio head w and wo IV contrast (7/29/2019); and CT angio neck (7/29/2019).     Social History  He reports that he has quit smoking. His smoking use included cigarettes. He has never been exposed to tobacco smoke. He has never used smokeless tobacco. He reports that he does not currently use alcohol. He  "reports that he does not currently use drugs.    Family History  Family History   Problem Relation Name Age of Onset    Other (ESRD) Mother          on dialysis    Hypertension Father      Dementia Other      Diabetes Other      Liver cancer Other      Kidney disease Other          Reported prior        Allergies  Patient has no known allergies.    Review of Systems     Physical Exam  General: No acute distress.  Alert and oriented.  There is some cachexia present  HEENT: normal  Neck: supple  Pulmonary: lungs clear to auscultation bilaterally  CV: RR, S1S2, no murmurs.  Pulses palpable and equal.  Good capillary refill  Abdomen: soft, non tender, no masses  : grossly normal external genitalia  MS: grossly normal  Neurologic: alert and oriented, strength/sensation intact  Skin: non jaundiced, no lesions  Psych: mood appropriate    Last Recorded Vitals  Blood pressure 166/79, pulse 73, temperature 36.7 °C (98 °F), temperature source Oral, resp. rate 18, height 1.778 m (5' 10\"), weight 62.6 kg (138 lb), SpO2 97%.    Relevant Results  Lab Results   Component Value Date    WBC 5.3 04/29/2024    HGB 8.3 (L) 04/29/2024    HCT 26.5 (L) 04/29/2024    MCV 90 04/29/2024     04/29/2024         Sodium   Date Value Ref Range Status   04/29/2024 150 (H) 136 - 145 mmol/L Final     Lactate   Date Value Ref Range Status   01/20/2023 1.6 0.4 - 2.0 mmol/L Final     Comment:      Venipuncture immediately after or during the    administration of Metamizole may lead to falsely   low results. Testing should be performed immediately   prior to Metamizole dosing.       Chloride   Date Value Ref Range Status   04/29/2024 118 (H) 98 - 107 mmol/L Final     Urea Nitrogen   Date Value Ref Range Status   04/29/2024 49 (H) 6 - 23 mg/dL Final         Lab Results   Component Value Date    K 4.7 04/29/2024    CALCIUM 8.4 (L) 04/29/2024      No results found for this or any previous visit from the past 3 days.          I spent 30 minutes in " the professional and overall care of this patient.

## 2024-04-29 NOTE — PROGRESS NOTES
Occupational Therapy                 Therapy Communication Note    Patient Name: Markie Nobles  MRN: [id number]  Today's Date: 4/29/2024     Discipline: Occupational Therapy    Missed Visit Reason: Missed Visit Reason: Patient in a medical procedure (Per discussion with RN, pt currently off floor for PEG tube placement.)    Missed Time: Attempt

## 2024-04-29 NOTE — PROGRESS NOTES
Physical Therapy                 Therapy Communication Note    Patient Name: Markie Nobles  MRN: [id number]  Today's Date: 4/29/2024     Discipline: Physical Therapy    Missed Visit Reason: Missed Visit Reason: Patient in a medical procedure (pt was attempted at this time and per RN, pt is off of the floor due to PEG placement. Will f/u when medically appropriate.)    Missed Time: Attempt    Comment: Participated in performance of tx and documentation under the direct supervision of CI, student Lilian STARR

## 2024-04-29 NOTE — CARE PLAN
The patient's goals for the shift include      The clinical goals for the shift include pt to tolerate tubefeeding

## 2024-04-29 NOTE — PROGRESS NOTES
Nutrition Note    RDN consulted for enteral assessment and recommendations.   S/p PEG tube placement earlier today, okay to use PEG for feeds @ 17:00 per communication orders.     Chart reviewed, events noted. Noted pt with h/o CKD stage IV and ABEBE.    Pt NPO as per SLP recommendations 2/2 dysphagia.     TF ordered Nepro @ 35 ml/hr goal rate will provide 1512 kcal/day (24 kcal/kg),  68 g/day protein   (1.1 g/kg protein),  611 ml free water + water flushes 240 ml total (13 ml/kg).     Recommend adjust water flushes along with IVF.     Complete RDN assessment and recommendations to follow.   D/w AYAAN and RN via secure chat and vocera.     Dietary Orders (From admission, onward)       Start     Ordered    04/29/24 1700  Enteral feeding with NPO PEG (percutaneous endoscopic gastric); 15 (Start TF @ 15 ml/hr. Advance by 10 ml every 6 hours as tolerated to goal rate 35 ml/hr.); 60; Water; Tap water; Every 6 hours  Diet effective now        Comments: Start TF @ 17:00   Question Answer Comment   Tube feeding formula: Nepro with Carbsteady    Feeding route: PEG (percutaneous endoscopic gastric)    Tube feeding continuous rate (mL/hr): 15 Start TF @ 15 ml/hr. Advance by 10 ml every 6 hours as tolerated to goal rate 35 ml/hr.   Tube feeding flush (mL): 60    Flush type: Water    Water type: Tap water    Flush frequency: Every 6 hours        04/29/24 1615    04/26/24 1002  NPO Diet; Effective now  Diet effective now        Comments: Until MBS done    04/26/24 1001                    Scheduled medications  aspirin, 300 mg, rectal, Daily  atorvastatin, 80 mg, oral, Daily  carbidopa-levodopa, 1.5 tablet, oral, 4x daily  cefTRIAXone, 1 g, intravenous, q24h  clopidogrel, 75 mg, oral, Daily  enoxaparin, 30 mg, subcutaneous, q24h  finasteride, 5 mg, oral, Daily  menthol-zinc oxide, 1 Application, Topical, 4x daily  metoprolol, 5 mg, intravenous, q6h  [Held by provider] metoprolol tartrate, 25 mg, oral, Daily  [Held by provider]  "pantoprazole, 40 mg, oral, Daily before breakfast  polyethylene glycol, 17 g, oral, Daily  tamsulosin, 0.4 mg, oral, Daily      Continuous medications  dextrose 5%-0.45 % sodium chloride, 75 mL/hr, Last Rate: 75 mL/hr (04/29/24 1033)  lactated Ringer's, 100 mL/hr, Last Rate: 100 mL/hr (04/29/24 1334)      PRN medications  PRN medications: acetaminophen, benzonatate, dextrose, dextrose, glucagon, glucagon, ipratropium-albuteroL, melatonin, ondansetron, traMADol    Visit Vitals  /78 (Patient Position: Lying)   Pulse (!) 47   Temp 36.7 °C (98 °F)   Resp 18   Ht 1.778 m (5' 10\")   Wt 62.6 kg (138 lb 0.1 oz)   SpO2 100%   BMI 19.80 kg/m²   Smoking Status Former   BSA 1.76 m²       "

## 2024-04-30 LAB
ANION GAP SERPL CALC-SCNC: 13 MMOL/L (ref 10–20)
BUN SERPL-MCNC: 43 MG/DL (ref 6–23)
CALCIUM SERPL-MCNC: 8 MG/DL (ref 8.6–10.3)
CHLORIDE SERPL-SCNC: 116 MMOL/L (ref 98–107)
CO2 SERPL-SCNC: 26 MMOL/L (ref 21–32)
CREAT SERPL-MCNC: 3.71 MG/DL (ref 0.5–1.3)
EGFRCR SERPLBLD CKD-EPI 2021: 16 ML/MIN/1.73M*2
ERYTHROCYTE [DISTWIDTH] IN BLOOD BY AUTOMATED COUNT: 15.9 % (ref 11.5–14.5)
GLUCOSE SERPL-MCNC: 124 MG/DL (ref 74–99)
HCT VFR BLD AUTO: 27.4 % (ref 41–52)
HGB BLD-MCNC: 8.3 G/DL (ref 13.5–17.5)
MCH RBC QN AUTO: 27.2 PG (ref 26–34)
MCHC RBC AUTO-ENTMCNC: 30.3 G/DL (ref 32–36)
MCV RBC AUTO: 90 FL (ref 80–100)
NRBC BLD-RTO: 0 /100 WBCS (ref 0–0)
PLATELET # BLD AUTO: 163 X10*3/UL (ref 150–450)
POTASSIUM SERPL-SCNC: 4.5 MMOL/L (ref 3.5–5.3)
RBC # BLD AUTO: 3.05 X10*6/UL (ref 4.5–5.9)
SODIUM SERPL-SCNC: 150 MMOL/L (ref 136–145)
WBC # BLD AUTO: 7.8 X10*3/UL (ref 4.4–11.3)

## 2024-04-30 PROCEDURE — 36415 COLL VENOUS BLD VENIPUNCTURE: CPT | Performed by: SURGERY

## 2024-04-30 PROCEDURE — 1100000001 HC PRIVATE ROOM DAILY

## 2024-04-30 PROCEDURE — 85027 COMPLETE CBC AUTOMATED: CPT | Performed by: SURGERY

## 2024-04-30 PROCEDURE — 2500000004 HC RX 250 GENERAL PHARMACY W/ HCPCS (ALT 636 FOR OP/ED): Performed by: SURGERY

## 2024-04-30 PROCEDURE — 2500000006 HC RX 250 W HCPCS SELF ADMINISTERED DRUGS (ALT 637 FOR ALL PAYERS): Performed by: SURGERY

## 2024-04-30 PROCEDURE — 80048 BASIC METABOLIC PNL TOTAL CA: CPT | Performed by: SURGERY

## 2024-04-30 PROCEDURE — 92526 ORAL FUNCTION THERAPY: CPT | Mod: GN

## 2024-04-30 PROCEDURE — 2500000001 HC RX 250 WO HCPCS SELF ADMINISTERED DRUGS (ALT 637 FOR MEDICARE OP): Performed by: SURGERY

## 2024-04-30 PROCEDURE — 99232 SBSQ HOSP IP/OBS MODERATE 35: CPT | Performed by: INTERNAL MEDICINE

## 2024-04-30 PROCEDURE — 2500000005 HC RX 250 GENERAL PHARMACY W/O HCPCS: Performed by: SURGERY

## 2024-04-30 RX ADMIN — Medication 1 APPLICATION: at 08:00

## 2024-04-30 RX ADMIN — TAMSULOSIN HYDROCHLORIDE 0.4 MG: 0.4 CAPSULE ORAL at 09:06

## 2024-04-30 RX ADMIN — POLYETHYLENE GLYCOL 3350 17 G: 17 POWDER, FOR SOLUTION ORAL at 09:05

## 2024-04-30 RX ADMIN — DEXTROSE AND SODIUM CHLORIDE 75 ML/HR: 5; 450 INJECTION, SOLUTION INTRAVENOUS at 05:57

## 2024-04-30 RX ADMIN — ATORVASTATIN CALCIUM 80 MG: 80 TABLET, FILM COATED ORAL at 09:06

## 2024-04-30 RX ADMIN — Medication 1 APPLICATION: at 17:12

## 2024-04-30 RX ADMIN — SODIUM CHLORIDE, POTASSIUM CHLORIDE, SODIUM LACTATE AND CALCIUM CHLORIDE 100 ML/HR: 600; 310; 30; 20 INJECTION, SOLUTION INTRAVENOUS at 23:27

## 2024-04-30 RX ADMIN — CLOPIDOGREL 75 MG: 75 TABLET ORAL at 09:06

## 2024-04-30 RX ADMIN — CARBIDOPA AND LEVODOPA 1.5 TABLET: 25; 100 TABLET ORAL at 09:04

## 2024-04-30 RX ADMIN — ENOXAPARIN SODIUM 30 MG: 30 INJECTION SUBCUTANEOUS at 09:06

## 2024-04-30 RX ADMIN — Medication 1 APPLICATION: at 22:02

## 2024-04-30 RX ADMIN — CARBIDOPA AND LEVODOPA 1.5 TABLET: 25; 100 TABLET ORAL at 12:03

## 2024-04-30 RX ADMIN — FINASTERIDE 5 MG: 5 TABLET, FILM COATED ORAL at 09:06

## 2024-04-30 RX ADMIN — Medication 1 APPLICATION: at 17:13

## 2024-04-30 RX ADMIN — CARBIDOPA AND LEVODOPA 1.5 TABLET: 25; 100 TABLET ORAL at 13:34

## 2024-04-30 RX ADMIN — METOPROLOL TARTRATE 5 MG: 5 INJECTION INTRAVENOUS at 00:45

## 2024-04-30 RX ADMIN — CARBIDOPA AND LEVODOPA 1.5 TABLET: 25; 100 TABLET ORAL at 17:10

## 2024-04-30 RX ADMIN — CEFTRIAXONE SODIUM 1 G: 1 INJECTION, SOLUTION INTRAVENOUS at 17:10

## 2024-04-30 ASSESSMENT — COGNITIVE AND FUNCTIONAL STATUS - GENERAL
CLIMB 3 TO 5 STEPS WITH RAILING: A LOT
STANDING UP FROM CHAIR USING ARMS: A LOT
MOVING TO AND FROM BED TO CHAIR: A LOT
MOBILITY SCORE: 12
HELP NEEDED FOR BATHING: A LOT
WALKING IN HOSPITAL ROOM: A LOT
EATING MEALS: TOTAL
DRESSING REGULAR UPPER BODY CLOTHING: A LOT
DRESSING REGULAR LOWER BODY CLOTHING: A LOT
PERSONAL GROOMING: A LOT
TOILETING: A LOT
DAILY ACTIVITIY SCORE: 11
TURNING FROM BACK TO SIDE WHILE IN FLAT BAD: A LOT
MOVING FROM LYING ON BACK TO SITTING ON SIDE OF FLAT BED WITH BEDRAILS: A LOT

## 2024-04-30 ASSESSMENT — PAIN - FUNCTIONAL ASSESSMENT
PAIN_FUNCTIONAL_ASSESSMENT: 0-10
PAIN_FUNCTIONAL_ASSESSMENT: 0-10

## 2024-04-30 ASSESSMENT — PAIN SCALES - GENERAL
PAINLEVEL_OUTOF10: 0 - NO PAIN

## 2024-04-30 NOTE — CARE PLAN
The patient's goals for the shift include      The clinical goals for the shift include pt to tolerate tubefeedings

## 2024-04-30 NOTE — CARE PLAN
Problem: Skin  Goal: Decreased wound size/increased tissue granulation at next dressing change  4/30/2024 0455 by Tono Mata RN  Outcome: Progressing  4/30/2024 0453 by Tono Mata RN  Outcome: Progressing  Flowsheets (Taken 4/30/2024 0453)  Decreased wound size/increased tissue granulation at next dressing change:   Protective dressings over bony prominences   Utilize specialty bed per algorithm  4/30/2024 0453 by Tono Mata RN  Outcome: Progressing  Flowsheets (Taken 4/30/2024 0453)  Decreased wound size/increased tissue granulation at next dressing change:   Protective dressings over bony prominences   Utilize specialty bed per algorithm

## 2024-04-30 NOTE — PROGRESS NOTES
Markie Nobles is a 79 y.o. male on day 6 of admission presenting with Pneumonia, unspecified organism.     04/30/24 0824   Discharge Planning   Patient expects to be discharged to: [facility]/ AUTH approved until 4/30     Plan: s/p PEG placement yesterday. Will start with tube feedings this morning. AUTH expires at midnight, so new AUTH will need to be started tomorrow if patient not able to DC this afternoon.  Disposition: [facility]/AUTH approved until 4/30  Barrier: tolerate PEG feedings  ADOD:  today/tomorrow      Natalie Jacobson RN

## 2024-04-30 NOTE — PROGRESS NOTES
Speech-Language Pathology    SLP Adult Inpatient  Speech-Language Pathology Treatment     Patient Name: Markie Noblse  MRN: [id number]  Today's Date: 4/30/2024  Time Calculation  Start Time: 0855  Stop Time: 0925  Time Calculation (min): 30 min       SLP Assessment:  SLP TX Intervention Outcome: Making Progress Towards Goals  Treatment Tolerance: Patient tolerated treatment well  Medical Staff Made Aware: Yes  Education Provided: Yes (Wife present and supportive)    Recommendations:  Cont NPO with nonoral nutrition/hydration  Risk for Aspiration: Yes    Rec'd frequent, aggressive oral care to improve infection control, to maintain dental health, and potentially reduce risk for aspiration pneumonia.     Recommend use of hydrogen peroxide based mouthwash and toothbrush for oral care.     Do NOT recommend use of glycerin-based oral swabs as they have been found to further dryness of oral mucosa, thus increasing discomfort and infection risk.       Goals:  - Pt. to complete pharyngeal strengthening techniques (initiated after MBS completed 4/29); addressed this date in therapy. Pt unable to recall exercises independently but completed some with mod-max cues.  - Pt/family to report/demo comprehension of education discussed (initiated 4/30); addressed this date in session. Wife, Bree, reports/demos comprehension of education provided and models of pharyngeal motor exercises (PMEx) addressed in session. Pt does not report or demo comprehension.       Plan:  Inpatient/Swing Bed or Outpatient: Inpatient  SLP TX Plan: Continue Plan of Care  SLP Plan: Skilled SLP  SLP Frequency: 2x per week  Duration: Current admission  SLP Discharge Recommendations: Continue skilled SLP services at the next level of care  Discussed POC: Patient, Caregiver/family  Discussed Risks/Benefits: Yes  Patient/Caregiver Agreeable: Yes  SLP - OK to Discharge: Yes      Subjective     General Visit Information:   Patient Seen During This Visit: Yes  Pt  seen bedside for dysphagia tx; wife present, supportive, and asked appropriate questions.   SLP aware that Pt had PEG placed yesterday afternoon for tube feeds, and appears to be tolerating well. During session, nursing provided meds via PEG tube without noted difficulties.  Oral care provided as part of therapy with Pt partially completing himself, with use of hydrogen-peroxide based mouthwash and toothbrush. Oral cavity appeared clearer and healthy-pink after care completed.      Pain Assessment:   Pain Assessment: 0-10  Pain Score: 0 - No pain      Objective     Therapeutic Swallow:  Therapeutic Swallow Intervention : Caregiver Education, Pharyngeal Strengthening Techniques  Pharyngeal Strengthening Techniques: Effortful Swallow (Tongue Pullbacks)  Solid Diet Recommendations: NPO  Liquid Diet Recommendations: NPO    With mod-max cues, Pt completed two Pharyngeal Motor Exercises (PMEx) successfully:  Effortful Swallows 2/3x  Tongue Pullbacks completed for total of 15x with model     Handouts provided and reviewed with wife who reports comprehension of all material discussed during session.

## 2024-04-30 NOTE — PROGRESS NOTES
Markie Nobles is a 79 y.o. male     PEG tube functioning well  He is tolerating tube feeds at the current rate  Sodium remains high though    Review of Systems           Vitals:    04/30/24 0900   BP: 144/61   Pulse: 58   Resp: 18   Temp: 37.1 °C (98.7 °F)   SpO2: 100%        Scheduled medications  aspirin, 300 mg, rectal, Daily  atorvastatin, 80 mg, oral, Daily  carbidopa-levodopa, 1.5 tablet, oral, 4x daily  cefTRIAXone, 1 g, intravenous, q24h  clopidogrel, 75 mg, oral, Daily  enoxaparin, 30 mg, subcutaneous, q24h  finasteride, 5 mg, oral, Daily  menthol-zinc oxide, 1 Application, Topical, 4x daily  metoprolol, 5 mg, intravenous, q6h  [Held by provider] metoprolol tartrate, 25 mg, oral, Daily  [Held by provider] pantoprazole, 40 mg, oral, Daily before breakfast  polyethylene glycol, 17 g, oral, Daily  tamsulosin, 0.4 mg, oral, Daily      Continuous medications  dextrose 5%-0.45 % sodium chloride, 75 mL/hr, Last Rate: 75 mL/hr (04/30/24 0557)  lactated Ringer's, 100 mL/hr, Last Rate: 100 mL/hr (04/29/24 1334)      PRN medications  PRN medications: acetaminophen, benzonatate, dextrose, dextrose, glucagon, glucagon, ipratropium-albuteroL, melatonin, ondansetron, traMADol    Lab Review   Results from last 7 days   Lab Units 04/30/24  0734 04/29/24  0635 04/28/24  0738   WBC AUTO x10*3/uL 7.8 5.3 5.6   HEMOGLOBIN g/dL 8.3* 8.3* 9.3*   HEMATOCRIT % 27.4* 26.5* 29.8*   PLATELETS AUTO x10*3/uL 163 174 212     Results from last 7 days   Lab Units 04/30/24  0734 04/29/24  0635 04/28/24  0738 04/27/24  1022 04/26/24  0607 04/25/24  1035 04/24/24  1503   SODIUM mmol/L 150* 150* 148*   < > 148* 146* 147*   POTASSIUM mmol/L 4.5 4.7 5.2   < > 4.6 5.2 5.2   CHLORIDE mmol/L 116* 118* 113*   < > 114* 114* 113*   CO2 mmol/L 26 22 21   < > 25 20* 22   BUN mg/dL 43* 49* 54*   < > 55* 59* 60*   CREATININE mg/dL 3.71* 3.83* 4.13*   < > 4.03* 4.28* 4.33*   CALCIUM mg/dL 8.0* 8.4* 8.7   < > 8.4* 8.0* 8.5*   PROTEIN TOTAL g/dL  --   --    --   --  6.4 6.5 6.9   BILIRUBIN TOTAL mg/dL  --   --   --   --  0.4 0.4 0.7   ALK PHOS U/L  --   --   --   --  73 80 78   ALT U/L  --   --   --   --  <3* <3* 3*   AST U/L  --   --   --   --  11 13 15   GLUCOSE mg/dL 124* 87 43*   < > 85 82 83    < > = values in this interval not displayed.     Results from last 7 days   Lab Units 04/24/24  1503   TROPHS ng/L 14        FL modified barium swallow study   Final Result   Modified barium swallow study as detailed within the report.        MACRO:   None        Signed by: Mandi Cantu 4/29/2024 11:09 AM   Dictation workstation:   [id number]      CT head wo IV contrast   Final Result   No acute intracranial bleed or focal mass effect.        Moderate volume loss.        Moderate chronic white matter ischemic disease in the deep   periventricular regions.        MACRO:   None        Signed by: Jordan Suero 4/24/2024 3:54 PM   Dictation workstation:   [id number]      XR chest 1 view   Final Result   1.  Developing infiltrate in the medial left lung base                  MACRO:   None        Signed by: Joseph Schoenberger 4/24/2024 3:29 PM   Dictation workstation:   [id number]            Physical Exam     Constitutional   General appearance: Alert and in no acute distress.     Pulmonary   Respiratory assessment: No respiratory distress, normal respiratory rhythm and effort.    Auscultation of Lungs: Crackles  Cardiovascular   Auscultation of heart: Apical pulse normal, heart rate and rhythm normal, normal S1 and S2, no murmurs and no pericardial rub.    Exam for edema: No peripheral edema.   Abdomen   Abdominal Exam: No bruits, normal bowel sounds, soft, non-tender, no abdominal mass palpated.   PEG site intact  Liver and Spleen exam: No hepato-splenomegaly.   Musculoskeletal     Skin inspection: Normal skin color and pigmentation, normal skin turgor and no visible rash.   Neurologic   Cranial nerves: Nerves 2-12 were intact, no focal neuro defects.  Alert x  2    Assessment/Plan    #Aspiration pneumonia/UTI  Continue antibiotics      #Dysphagia  S/p PEG tube placement  Tolerating tube feeds    #Hyper natremia  Continue hydration  Free water through the PEG tube    #Sacral ulcer present on admission  Wound care     #Severe malnutrition  Consult nutrition for input     #Parkinson's disease with dementia  #Deconditioning  Resume medications  PT OT consult     #Coronary artery disease s/p bypass  Stable     #Acute on chronic kidney injury stage IV  Continue IV fluids     #Hypertension  Stable

## 2024-05-01 VITALS
WEIGHT: 138.01 LBS | OXYGEN SATURATION: 96 % | HEIGHT: 70 IN | HEART RATE: 67 BPM | TEMPERATURE: 98 F | RESPIRATION RATE: 18 BRPM | DIASTOLIC BLOOD PRESSURE: 68 MMHG | SYSTOLIC BLOOD PRESSURE: 128 MMHG | BODY MASS INDEX: 19.76 KG/M2

## 2024-05-01 LAB
ANION GAP SERPL CALC-SCNC: 10 MMOL/L (ref 10–20)
BUN SERPL-MCNC: 39 MG/DL (ref 6–23)
CALCIUM SERPL-MCNC: 8.1 MG/DL (ref 8.6–10.3)
CHLORIDE SERPL-SCNC: 115 MMOL/L (ref 98–107)
CO2 SERPL-SCNC: 25 MMOL/L (ref 21–32)
CREAT SERPL-MCNC: 3.18 MG/DL (ref 0.5–1.3)
EGFRCR SERPLBLD CKD-EPI 2021: 19 ML/MIN/1.73M*2
ERYTHROCYTE [DISTWIDTH] IN BLOOD BY AUTOMATED COUNT: 15.7 % (ref 11.5–14.5)
GLUCOSE BLD MANUAL STRIP-MCNC: 101 MG/DL (ref 74–99)
GLUCOSE BLD MANUAL STRIP-MCNC: 113 MG/DL (ref 74–99)
GLUCOSE BLD MANUAL STRIP-MCNC: 123 MG/DL (ref 74–99)
GLUCOSE SERPL-MCNC: 93 MG/DL (ref 74–99)
HCT VFR BLD AUTO: 24.8 % (ref 41–52)
HGB BLD-MCNC: 7.8 G/DL (ref 13.5–17.5)
MCH RBC QN AUTO: 28.2 PG (ref 26–34)
MCHC RBC AUTO-ENTMCNC: 31.5 G/DL (ref 32–36)
MCV RBC AUTO: 90 FL (ref 80–100)
NRBC BLD-RTO: 0 /100 WBCS (ref 0–0)
PLATELET # BLD AUTO: 136 X10*3/UL (ref 150–450)
POTASSIUM SERPL-SCNC: 4.1 MMOL/L (ref 3.5–5.3)
RBC # BLD AUTO: 2.77 X10*6/UL (ref 4.5–5.9)
SODIUM SERPL-SCNC: 146 MMOL/L (ref 136–145)
SODIUM SERPL-SCNC: 146 MMOL/L (ref 136–145)
WBC # BLD AUTO: 7.9 X10*3/UL (ref 4.4–11.3)

## 2024-05-01 PROCEDURE — 84295 ASSAY OF SERUM SODIUM: CPT | Performed by: NURSE PRACTITIONER

## 2024-05-01 PROCEDURE — 36415 COLL VENOUS BLD VENIPUNCTURE: CPT | Performed by: SURGERY

## 2024-05-01 PROCEDURE — 97116 GAIT TRAINING THERAPY: CPT | Mod: GP,CQ | Performed by: PHYSICAL THERAPY ASSISTANT

## 2024-05-01 PROCEDURE — 2500000006 HC RX 250 W HCPCS SELF ADMINISTERED DRUGS (ALT 637 FOR ALL PAYERS): Performed by: SURGERY

## 2024-05-01 PROCEDURE — 82947 ASSAY GLUCOSE BLOOD QUANT: CPT

## 2024-05-01 PROCEDURE — 2500000001 HC RX 250 WO HCPCS SELF ADMINISTERED DRUGS (ALT 637 FOR MEDICARE OP): Performed by: SURGERY

## 2024-05-01 PROCEDURE — 97535 SELF CARE MNGMENT TRAINING: CPT | Mod: GO,CO

## 2024-05-01 PROCEDURE — 99232 SBSQ HOSP IP/OBS MODERATE 35: CPT | Performed by: INTERNAL MEDICINE

## 2024-05-01 PROCEDURE — 85027 COMPLETE CBC AUTOMATED: CPT | Performed by: SURGERY

## 2024-05-01 PROCEDURE — 2500000005 HC RX 250 GENERAL PHARMACY W/O HCPCS: Performed by: SURGERY

## 2024-05-01 PROCEDURE — 97110 THERAPEUTIC EXERCISES: CPT | Mod: GP,CQ | Performed by: PHYSICAL THERAPY ASSISTANT

## 2024-05-01 PROCEDURE — 2500000004 HC RX 250 GENERAL PHARMACY W/ HCPCS (ALT 636 FOR OP/ED): Performed by: SURGERY

## 2024-05-01 PROCEDURE — 82374 ASSAY BLOOD CARBON DIOXIDE: CPT | Performed by: SURGERY

## 2024-05-01 PROCEDURE — 97530 THERAPEUTIC ACTIVITIES: CPT | Mod: GO,CO

## 2024-05-01 RX ORDER — AMOXICILLIN AND CLAVULANATE POTASSIUM 500; 125 MG/1; MG/1
1 TABLET, FILM COATED ORAL 2 TIMES DAILY
Start: 2024-05-01 | End: 2024-05-04

## 2024-05-01 RX ORDER — ALLOPURINOL 100 MG/1
100 TABLET ORAL DAILY PRN
Start: 2024-05-01 | End: 2024-06-07

## 2024-05-01 RX ORDER — POLYETHYLENE GLYCOL 3350 17 G/17G
17 POWDER, FOR SOLUTION ORAL DAILY
Start: 2024-05-01 | End: 2024-06-07

## 2024-05-01 RX ORDER — TALC
3 POWDER (GRAM) TOPICAL NIGHTLY PRN
Start: 2024-05-01 | End: 2024-06-07

## 2024-05-01 RX ORDER — MENTHOL AND ZINC OXIDE .44; 20.625 G/100G; G/100G
1 OINTMENT TOPICAL 4 TIMES DAILY
Start: 2024-05-01 | End: 2024-06-07

## 2024-05-01 RX ORDER — BENZONATATE 100 MG/1
100 CAPSULE ORAL 3 TIMES DAILY PRN
Start: 2024-05-01 | End: 2024-06-07

## 2024-05-01 RX ORDER — METOPROLOL TARTRATE 25 MG/1
25 TABLET, FILM COATED ORAL DAILY
Start: 2024-05-01 | End: 2024-06-07

## 2024-05-01 RX ADMIN — CARBIDOPA AND LEVODOPA 1.5 TABLET: 25; 100 TABLET ORAL at 12:10

## 2024-05-01 RX ADMIN — POLYETHYLENE GLYCOL 3350 17 G: 17 POWDER, FOR SOLUTION ORAL at 10:36

## 2024-05-01 RX ADMIN — CARBIDOPA AND LEVODOPA 1.5 TABLET: 25; 100 TABLET ORAL at 15:00

## 2024-05-01 RX ADMIN — CARBIDOPA AND LEVODOPA 1.5 TABLET: 25; 100 TABLET ORAL at 10:36

## 2024-05-01 RX ADMIN — FINASTERIDE 5 MG: 5 TABLET, FILM COATED ORAL at 10:36

## 2024-05-01 RX ADMIN — CLOPIDOGREL 75 MG: 75 TABLET ORAL at 10:36

## 2024-05-01 RX ADMIN — CARBIDOPA AND LEVODOPA 1.5 TABLET: 25; 100 TABLET ORAL at 17:00

## 2024-05-01 RX ADMIN — ATORVASTATIN CALCIUM 80 MG: 80 TABLET, FILM COATED ORAL at 10:36

## 2024-05-01 RX ADMIN — Medication 1 APPLICATION: at 06:49

## 2024-05-01 RX ADMIN — TAMSULOSIN HYDROCHLORIDE 0.4 MG: 0.4 CAPSULE ORAL at 10:36

## 2024-05-01 RX ADMIN — ENOXAPARIN SODIUM 30 MG: 30 INJECTION SUBCUTANEOUS at 10:36

## 2024-05-01 RX ADMIN — METOPROLOL TARTRATE 5 MG: 5 INJECTION INTRAVENOUS at 03:29

## 2024-05-01 RX ADMIN — DEXTROSE AND SODIUM CHLORIDE 75 ML/HR: 5; 450 INJECTION, SOLUTION INTRAVENOUS at 09:55

## 2024-05-01 ASSESSMENT — COGNITIVE AND FUNCTIONAL STATUS - GENERAL
WALKING IN HOSPITAL ROOM: A LOT
HELP NEEDED FOR BATHING: A LOT
DRESSING REGULAR UPPER BODY CLOTHING: A LOT
CLIMB 3 TO 5 STEPS WITH RAILING: A LOT
TOILETING: TOTAL
TURNING FROM BACK TO SIDE WHILE IN FLAT BAD: A LOT
PERSONAL GROOMING: A LITTLE
MOVING TO AND FROM BED TO CHAIR: A LOT
PERSONAL GROOMING: A LOT
STANDING UP FROM CHAIR USING ARMS: A LOT
CLIMB 3 TO 5 STEPS WITH RAILING: A LOT
DRESSING REGULAR UPPER BODY CLOTHING: A LOT
DRESSING REGULAR LOWER BODY CLOTHING: A LOT
MOVING FROM LYING ON BACK TO SITTING ON SIDE OF FLAT BED WITH BEDRAILS: A LOT
MOBILITY SCORE: 12
STANDING UP FROM CHAIR USING ARMS: A LOT
DRESSING REGULAR LOWER BODY CLOTHING: A LOT
MOVING FROM LYING ON BACK TO SITTING ON SIDE OF FLAT BED WITH BEDRAILS: A LOT
MOVING TO AND FROM BED TO CHAIR: A LOT
HELP NEEDED FOR BATHING: A LOT
DAILY ACTIVITIY SCORE: 11
MOBILITY SCORE: 12
TOILETING: A LOT
WALKING IN HOSPITAL ROOM: A LOT
TURNING FROM BACK TO SIDE WHILE IN FLAT BAD: A LOT
EATING MEALS: TOTAL
TURNING FROM BACK TO SIDE WHILE IN FLAT BAD: A LOT
MOVING FROM LYING ON BACK TO SITTING ON SIDE OF FLAT BED WITH BEDRAILS: A LOT
DAILY ACTIVITIY SCORE: 11
EATING MEALS: TOTAL

## 2024-05-01 ASSESSMENT — PAIN SCALES - GENERAL
PAINLEVEL_OUTOF10: 0 - NO PAIN
PAINLEVEL_OUTOF10: 0 - NO PAIN

## 2024-05-01 ASSESSMENT — ACTIVITIES OF DAILY LIVING (ADL): HOME_MANAGEMENT_TIME_ENTRY: 8

## 2024-05-01 ASSESSMENT — PAIN - FUNCTIONAL ASSESSMENT
PAIN_FUNCTIONAL_ASSESSMENT: 0-10
PAIN_FUNCTIONAL_ASSESSMENT: 0-10

## 2024-05-01 NOTE — PROGRESS NOTES
"Nutrition Follow-up Note    Met with pt and pt's wife earlier today, pt sleeping when entered room. Spoke to wife about tube feed via the PEG tube, discussed feeding method options (continuous v bolus).   Revisited pt and pt's wife just now. Information(written) provided to wife re: enteral support along with Nepro formula information sheet, as discussed during first visit.     Noted pt to be discharged tonight to LTC/SNF.     Per RN, pt tolerating TF. No BM yet today.     Noted per chart review, x1 BM 4/29.      PEG placed 4/29.     History:  Food and Nutrient History  Food and Nutrient History: NPO, Nepro @ 35 ml/hr via PEG    Nepro @ 35 ml/hr goal rate provides 1512 kcal/day (24 kcal/kg),  68 g/day protein   (1.1 g/kg protein),  611 ml free water + water flushes 240 ml total (13 ml/kg)       Dietary Orders (From admission, onward)       Start     Ordered    04/29/24 1700  Enteral feeding with NPO PEG (percutaneous endoscopic gastric); 15 (Start TF @ 15 ml/hr. Advance by 10 ml every 6 hours as tolerated to goal rate 35 ml/hr.); 60; Water; Tap water; Every 6 hours  Diet effective now        Comments: Start TF @ 17:00   Question Answer Comment   Tube feeding formula: Nepro with Carbsteady    Feeding route: PEG (percutaneous endoscopic gastric)    Tube feeding continuous rate (mL/hr): 15 Start TF @ 15 ml/hr. Advance by 10 ml every 6 hours as tolerated to goal rate 35 ml/hr.   Tube feeding flush (mL): 60    Flush type: Water    Water type: Tap water    Flush frequency: Every 6 hours        04/29/24 1615                    Anthropometrics:  Height: 177.8 cm (5' 10\")  Weight: 62.6 kg (138 lb 0.1 oz)  BMI (Calculated): 19.8    Weight Change  Weight History / % Weight Change: 62.6 kg 4/26 per RDN note  Significant Weight Loss: No     IBW/kg (Dietitian Calculated): 75.5 kg  Percent of IBW: 83 %     Scheduled medications  aspirin, 300 mg, rectal, Daily  atorvastatin, 80 mg, oral, Daily  carbidopa-levodopa, 1.5 tablet, oral, " "4x daily  cefTRIAXone, 1 g, intravenous, q24h  clopidogrel, 75 mg, oral, Daily  enoxaparin, 30 mg, subcutaneous, q24h  finasteride, 5 mg, oral, Daily  menthol-zinc oxide, 1 Application, Topical, 4x daily  [Held by provider] metoprolol tartrate, 25 mg, oral, Daily  [Held by provider] pantoprazole, 40 mg, oral, Daily before breakfast  polyethylene glycol, 17 g, oral, Daily  tamsulosin, 0.4 mg, oral, Daily      Continuous medications  dextrose 5%-0.45 % sodium chloride, 75 mL/hr, Last Rate: 75 mL/hr (05/01/24 0955)      PRN medications  PRN medications: acetaminophen, benzonatate, dextrose, dextrose, glucagon, glucagon, ipratropium-albuteroL, melatonin, ondansetron, traMADol     Latest Reference Range & Units 04/26/24 06:07   GLUCOSE 74 - 99 mg/dL 85   SODIUM 136 - 145 mmol/L 148 (H)   POTASSIUM 3.5 - 5.3 mmol/L 4.6   CHLORIDE 98 - 107 mmol/L 114 (H)   Bicarbonate 21 - 32 mmol/L 25   Anion Gap 10 - 20 mmol/L 14   Blood Urea Nitrogen 6 - 23 mg/dL 55 (H)   Creatinine 0.50 - 1.30 mg/dL 4.03 (H)   EGFR >60 mL/min/1.73m*2 14 (L)   Calcium 8.6 - 10.3 mg/dL 8.4 (L)   Albumin 3.4 - 5.0 g/dL 3.0 (L)   Alkaline Phosphatase 33 - 136 U/L 73   ALT 10 - 52 U/L <3 (L)   AST 9 - 39 U/L 11   Bilirubin Total 0.0 - 1.2 mg/dL 0.4   Total Protein 6.4 - 8.2 g/dL 6.4   (H): Data is abnormally high  (L): Data is abnormally low    I/O last 3 completed shifts:  In: - (0 mL/kg)   Out: 300 (4.8 mL/kg) [Urine:300 (0.1 mL/kg/hr)]  Weight: 62.6 kg   No intake/output data recorded.    Per [employer] Pump totals for past 72 hours:   1189 ml total volume tube feed formula   350 ml total volume water flush  TF started 4/26 at approximately 18:00 per RN, started @ 15 ml/hr and titrated up. Currently infusing @ 35 ml/hr.       Energy Needs:  Calculated Energy Needs Using Equations  Height: 177.8 cm (5' 10\")  Temp: 36.7 °C (98 °F)    Estimated Energy Needs  Total Energy Estimated Needs (kCal): 1550 kCal  Total Estimated Energy Need per Day (kCal/kg): 25 " kCal/kg  Method for Estimating Needs: 30-35    Estimated Protein Needs  Total Protein Estimated Needs (g): 60 g  Total Protein Estimated Needs (g/kg): 95 g/kg  Method for Estimating Needs: 50-70 g/day (0.8-1.1 g/kg ABW)    Estimated Fluid Needs  Method for Estimating Needs: per MD         Nutrition Focused Physical Findings:  Subcutaneous Fat Loss  Orbital Fat Pads: Mild-Moderate (slight dark circles and slight hollowing)  Buccal Fat Pads: Mild-Moderate (flat cheeks, minimal bounce)  Triceps: Defer  Ribs: Defer    Muscle Wasting  Temporalis: Mild-Moderate (slight depression)  Pectoralis (Clavicular Region): Severe (protruding prominent clavicle)  Deltoid/Trapezius: Severe (squared shoulders, acromion process prominent)  Interosseous: Mild-Moderate (slightly depressed area between thumb and forefinger)  Trapezius/Infraspinatus/Supraspinatus (Scapular Region): Defer  Quadriceps: Defer  Gastrocnemius: Defer    Edema  Edema: none         Physical Findings (Nutrition Deficiency/Toxicity)  Skin: Positive (PEG, sacrum mixed pressure injury/MASD)       Nutrition Diagnosis   Malnutrition Diagnosis  Patient has Malnutrition Diagnosis: Yes  Diagnosis Status: Ongoing  Malnutrition Diagnosis: Severe malnutrition related to chronic disease or condition  As Evidenced by: prolonged poor intake prior to hospital admit of < 50% of estimated energy needs in > 5 days, mild to mdoerate subcutaneous fat loss and moderate to severe muscle wasting present    Patient has Nutrition Diagnosis: Yes  Nutrition Diagnosis 1: Increased nutrient needs  Diagnosis Status (1): Ongoing  Related to (1): pressure injury  As Evidenced by (1): stage 1 sacrum       Nutrition Interventions/Recommendations   Nutrition Prescription  Individualized Nutrition Prescription Provided for : TF tolerance via PEG, continue TF as ordered  - continue TF as ordered at this time   - increase water flushes to 120 ml every six hours; adjust as indicated, wean off IVF when  medically appropriate   - bowel regimen as ordered   - monitor wt trend closely   - RFP, Mg in am; replete prn     Food and/or Nutrient Delivery Interventions  Interventions: Enteral intake    Enteral Intake: Other (Comment)  Goal: TF tolerance, transition to bolus feeds when medically appropriate (wife mentioned she eventually wants to take pt home)    Additional Interventions: nutrition plan of care to parallel overall plan of care     Coordination of Nutrition Care by a Nutrition Professional  Collaboration and Referral of Nutrition Care: Collaboration by nutrition professional with other providers  Nutrition plan of care d/w MD, RN.     Education Documentation  Nutrition Care Manual, taught by Virgen Riley RD at 5/1/2024  5:38 PM.  Learner: Significant Other, Caregiver, Patient  Readiness: Acceptance  Method: Handout, Explanation  Response: Verbalizes Understanding, Needs Reinforcement  Comment: TF d/w pt's wife and pt. Written information material provided to pt's wife on enteral nutrition support and Nepro formula info sheet.             Nutrition Monitoring and Evaluation   Food and Nutrient Related History       Enteral and Parenteral Nutrition Intake: Enteral nutrition formula/solution, Enteral nutrition intake  Criteria: monitor closley     Anthropometrics: Body Composition/Growth/Weight History  Weight: Weight change    Criteria: monitor closely    Biochemical Data, Medical Tests and Procedures  Electrolyte and Renal Panel: Other (Comment), Phosphorus, BUN, Potassium, Sodium, Calcium, serum, Chloride, Creatinine, Magnesium  Criteria: monitor closely as indicated    Gastrointestinal Profile: Other (Comment), Aspartate aminotransferase (AST), Alanine aminotransferase (ALT), Bilirubin, total  Criteria: as indicated    Glucose/Endocrine Profile: Other (Comment), Glucose, casual  Criteria: as indicated    Nutritional Anemia Profile: Other (Comment), Hematocrit, Hemoglobin, Iron, serum  Criteria: as  indicated    Vitamin Profile: Other (Comment), Vitamin D, 25 hydroxy  Criteria: as indicated    Nutrition Focused Physical Findings: monitor          Follow Up  Time Spent (min): 60 minutes  Last Date of Nutrition Visit: 05/01/24  Nutrition Follow-Up Needed?: Dietitian to reassess per policy  Follow up Comment: TF via PEG; HEDY

## 2024-05-01 NOTE — CARE PLAN
Problem: Pain  Goal: My pain/discomfort is manageable  Outcome: Progressing     Problem: Safety  Goal: Patient will be injury free during hospitalization  Outcome: Progressing   The patient's goals for the shift include      The clinical goals for the shift include patient will remain safe and free from falls/injury this shift    Over the shift, the patient did make progress toward the following goals.

## 2024-05-01 NOTE — PROGRESS NOTES
Physical Therapy    Physical Therapy Treatment    Patient Name: Markie Nobles  MRN: [id number]  Today's Date: 5/1/2024  Time Calculation  Start Time: 1129  Stop Time: 1207  Time Calculation (min): 38 min       Assessment/Plan   PT Assessment  End of Session Communication: Bedside nurse  End of Session Patient Position: Up in chair, Alarm on (wife present in room upon departure)  PT Plan  Inpatient/Swing Bed or Outpatient: Inpatient  PT Plan  Treatment/Interventions: Bed mobility, Transfer training, Gait training, Therapeutic exercise  PT Plan: Skilled PT  PT Frequency: 4 times per week  PT Discharge Recommendations: Moderate intensity level of continued care  Equipment Recommended upon Discharge: Wheeled walker  PT Recommended Transfer Status: Assist x2  PT - OK to Discharge:  (per POC)      General Visit Information:   PT  Visit  PT Received On: 05/01/24  General  Reason for Referral: impaired mobility  Referred By: SILVIANO Thayer  Family/Caregiver Present: Yes (wife was present in room)  Co-Treatment: OT  Co-Treatment Reason: to maximize safety and participation  Prior to Session Communication: Bedside nurse  Patient Position Received: Bed, 3 rail up, Alarm on  General Comment: pt agreeable and supine in bed upon arrival. pt req increased time secondary to slow amira.    Subjective   Precautions:  Precautions  Medical Precautions: Fall precautions  Post-Surgical Precautions: Abdominal surgery precautions  Precautions Comment: +Parkinson's  Vital Signs:  Vital Signs  Heart Rate: 95  Heart Rate Source: Monitor    Objective   Pain:  Pain Assessment  Pain Assessment: 0-10  Pain Score: 0 - No pain  Cognition:  Cognition  Overall Cognitive Status: Impaired  Orientation Level: Disoriented to time  Postural Control:  Static Sitting Balance  Static Sitting-Balance Support: Feet supported, Bilateral upper extremity supported  Static Sitting-Level of Assistance: Maximum assistance  Static Sitting-Comment/Number  of Minutes: pt demonstrated poor static sitting balance and demonstrated heavy retropulsion and right lateral lean.  Static Standing Balance  Static Standing-Balance Support: Bilateral upper extremity supported  Static Standing-Level of Assistance: Maximum assistance (x2)  Static Standing-Comment/Number of Minutes: ~ 2 mins (pt demonstrated poor static standing balance and req max vc for posture correction. pt couldn't activate glutes and demonstrated flexed trunk and unable to self correct)  Extremity/Trunk Assessments:    Activity Tolerance:     Treatments:  Therapeutic Exercise  Therapeutic Exercise Performed: Yes (pt performed seated LAQ 15x. pt req min vc for slower eccentric control and demonstrated slow amira)    Bed Mobility  Bed Mobility: Yes  Bed Mobility 1  Bed Mobility 1: Supine to sitting, Log roll  Level of Assistance 1: Maximum verbal cues, Maximum tactile cues, Maximum assistance (x2)  Bed Mobility Comments 1: HOB elevated, pt req assist with trunk support and BLE into upright position.pt initiated movements and used bed rail for assistance.    Ambulation/Gait Training  Ambulation/Gait Training Performed:  (pt performed 5 side steps to chair using FWW and max assist x2. pt req max vc for sequencing, FWW management, and posture correction.)  Transfers  Transfer: Yes  Transfer 1  Technique 1: Sit to stand, Stand to sit  Transfer Device 1: Walker, Gait belt  Transfer Level of Assistance 1: Maximum assistance, Maximum verbal cues, +2  Trials/Comments 1: pt demonstrated flex trunk and unable to maintain upright posture or activate glutes.    Outcome Measures:  [facility] Basic Mobility  Turning from your back to your side while in a flat bed without using bedrails: A lot  Moving from lying on your back to sitting on the side of a flat bed without using bedrails: A lot  Moving to and from bed to chair (including a wheelchair): A lot  Standing up from a chair using your arms (e.g. wheelchair or bedside chair):  A lot  To walk in hospital room: A lot  Climbing 3-5 steps with railing: A lot  Basic Mobility - Total Score: 12    Participated in performance of tx and documentation under the direct supervision of CI, student Lilian STARR        Education Documentation  Precautions, taught by RENNY Tariq at 5/1/2024  1:25 PM.  Learner: Patient  Readiness: Acceptance  Method: Explanation  Response: Verbalizes Understanding    Body Mechanics, taught by RENNY Tariq at 5/1/2024  1:25 PM.  Learner: Patient  Readiness: Acceptance  Method: Explanation  Response: Verbalizes Understanding    Mobility Training, taught by RENNY Tariq at 5/1/2024  1:25 PM.  Learner: Patient  Readiness: Acceptance  Method: Explanation  Response: Verbalizes Understanding    Education Comments  No comments found.        OP EDUCATION:       Encounter Problems       Encounter Problems (Active)       Balance       STG - Maintains dynamic standing balance with upper extremity support At Min Ax1, safely, without LOB, device PRN  (Progressing)       Start:  04/25/24    Expected End:  05/01/24       INTERVENTIONS:  1. Practice standing with minimal support.  2. Educate patient about standing tolerance.  3. Educate patient about independence with gait, transfers, and ADL's.  4. Educate patient about use of assistive device.  5. Educate patient about self-directed care.            Mobility       STG - Patient will ambulate At Min Ax1 using Wheeled walker for 25' without LOB or gross gait deviations  (Progressing)       Start:  04/25/24    Expected End:  05/01/24            Endurance - Pt to tolerate >/= 20 minutes therex, theract, gait and/or NMR with </= 5 minutes of rest breaks  (Progressing)       Start:  04/25/24    Expected End:  05/01/24               PT Transfers       STG - Patient will perform bed mobility At Min Ax1, safely, without LOB, device PRN  (Progressing)       Start:  04/25/24    Expected End:  05/01/24             STG - Patient will transfer sit to and from stand At Min Ax1, safely, without LOB, device PRN  (Progressing)       Start:  04/25/24    Expected End:  05/01/24               Safety       Pt will verbalize and apply safety awareness and precautions 100% of time throughout entire session   (Progressing)       Start:  04/25/24    Expected End:  05/01/24

## 2024-05-01 NOTE — PROGRESS NOTES
Occupational Therapy    Occupational Therapy Treatment    Name: Markie Nobles  MRN: [id number]  : 1945  Date: 24  Time Calculation  Start Time: 1128  Stop Time: 1157  Time Calculation (min): 29 min    Assessment:  Prognosis: Poor  Barriers to Discharge: None  Evaluation/Treatment Tolerance: Patient limited by fatigue  Medical Staff Made Aware: Yes  End of Session Communication: PCT/NA/CTA (PCNA notified)  End of Session Patient Position: Alarm on, Up in chair (PCNA notified)  Plan:  Treatment Interventions:  (Student participated in performance of tx and documentation under the direct supervision of Bakari RUTHERFORD SOTA.)  OT Frequency: 3 times per week  OT Discharge Recommendations: Moderate intensity level of continued care  Equipment Recommended upon Discharge: Wheeled walker  OT Recommended Transfer Status: Maximum assist, Assist of 2  OT - OK to Discharge: Yes (Per POC)    Subjective   Previous Visit Info:  OT Last Visit  OT Received On: 24  General:  General  Reason for Referral: Admit for decreased appetite, weight loss, nausea and vomiting times approximately 2+ weeks`  Referred By: SILVIANO Thayer  Past Medical History Relevant to Rehab: Balance problems, CAD, age related cognitive decline, cognitive disorder, DM, HLD, HTN, Parkinson's Disease, CABG x3, CKD 3  Family/Caregiver Present: Yes  Caregiver Feedback: Pt wife present  Co-Treatment: PT  Co-Treatment Reason: Partial co treat with SPTA to maximize pt safety and participation  General Comment: Pt cooperative to OT tx. Increased time for all task d/t slow moving and fatigued  Precautions:  Post-Surgical Precautions: Abdominal surgery precautions (s/p abdominal binder)  Precautions Comment: +Parkinson's  Vitals:  Vital Signs  Heart Rate: 95 (Ranging from )  Heart Rate Source: Monitor  Pain Assessment:  Pain Assessment  Pain Assessment: 0-10  Pain Score: 0 - No pain     Objective   Cognition:  Overall Cognitive  Status: Impaired  Orientation Level: Disoriented to time  Activities of Daily Living:   LE Dressing  LE Dressing: Yes  Sock Level of Assistance: Dependent, Maximum verbal cues  LE Dressing Where Assessed: Edge of bed  LE Dressing Comments: Pt at close Sup to doff socks requiring increased time needed to use figure 4 dressing technique. Multiple seated rest breaks d/t exertion to complete this task. Pt dependent to florence socks (VC provided for encouragement to complete task)    Bed Mobility/Transfers:   Bed Mobility  Bed Mobility: Yes  Bed Mobility 1  Bed Mobility 1: Supine to sitting, Log roll  Level of Assistance 1: Maximum verbal cues, Maximum tactile cues, Maximum assistance (x2)  Bed Mobility Comments 1: HOB slightly elevated. Assist with trunk control and BLE pt unable to complete log roll without assistance. Pt deconditions easily and lacks BUE strength needed to complete task (VC/TC provided for hand placement)    Transfers  Transfer: Yes  Transfer 1  Transfer From 1: Bed to  Transfer to 1: Commode-drop arm  Technique 1: Sit to stand  Transfer Device 1: Walker, Gait belt  Transfer Level of Assistance 1: Maximum assistance, Maximum verbal cues (x2)  Trials/Comments 1: Pt forward flexed posture and unable to maintain upright posture    Sitting Balance:  Dynamic Sitting Balance  Dynamic Sitting-Balance Support: Feet supported, Right upper extremity supported  Dynamic Sitting-Balance: Forward lean, Reaching across midline  Dynamic Sitting-Comments: Pt tolerated ~7 minutes of dynamic sitting EOB for LB dressing. Max assist required d/t right lateral lean and retropulsion  Standing Balance:  Static Standing Balance  Static Standing-Balance Support: Bilateral upper extremity supported  Static Standing-Level of Assistance: Maximum assistance (x2)  Static Standing-Comment/Number of Minutes: Pt tolerated ~30 secs of static standing Max x2 with FWW. Pt unsteady and forward flexed at trunk (Max VC to maintain upright  posture)    Outcome Measures:  [facility] Daily Activity  Putting on and taking off regular lower body clothing: A lot  Bathing (including washing, rinsing, drying): A lot  Putting on and taking off regular upper body clothing: A lot  Toileting, which includes using toilet, bedpan or urinal: Total  Taking care of personal grooming such as brushing teeth: A little  Eating Meals: Total  Daily Activity - Total Score: 11        Education Documentation  Body Mechanics, taught by CAMMY Mcdonough at 5/1/2024  1:02 PM.  Learner: Patient  Readiness: Acceptance  Method: Explanation  Response: Needs Reinforcement, Demonstrated Understanding    Precautions, taught by CAMMY Mcdonough at 5/1/2024  1:02 PM.  Learner: Patient  Readiness: Acceptance  Method: Explanation  Response: Needs Reinforcement, Demonstrated Understanding    ADL Training, taught by CAMMY Mcdonough at 5/1/2024  1:02 PM.  Learner: Patient  Readiness: Acceptance  Method: Explanation  Response: Needs Reinforcement, Demonstrated Understanding    Education Comments  No comments found.      Goals:  Encounter Problems       Encounter Problems (Active)       ADLs       Patient will perform UB and LB bathing with minimal assist  level of assistance and raised toilet seat and grab bars. (Not Progressing)       Start:  04/26/24    Expected End:  05/01/24            Patient with complete upper body dressing with minimal assist  level of assistance donning and doffing all UE clothes with PRN adaptive equipment while edge of bed  (Not Progressing)       Start:  04/26/24    Expected End:  05/01/24            Patient will complete daily grooming tasks brushing teeth, shaving, and washing face/hair with minimal assist  level of assistance and PRN adaptive equipment while supported sitting. (Not Progressing)       Start:  04/26/24    Expected End:  05/01/24               EXERCISE/STRENGTHENING       Patient will complete BUE exercises for 1 set and 8 reps in order  to improve strength and activity for ADL performance.  (Not Progressing)       Start:  04/26/24    Expected End:  05/01/24               MOBILITY       Patient will perform Functional mobility  with moderate assist level of assistance and least restrictive device in order to improve safety and functional mobility. (Not Progressing)       Start:  04/26/24    Expected End:  05/01/24               TRANSFERS       Patient will perform bed mobility minimal assist  level of assistance and bed rails and draw sheet in order to improve safety and independence with mobility (Progressing)       Start:  04/26/24    Expected End:  05/01/24            Patient will complete functional transfer to [facility] with least restrictive device with moderate assist level of assistance. (Progressing)       Start:  04/26/24    Expected End:  05/01/24            Patient will complete sit to stand transfer with moderate assist level of assistance and least restrictive device in order to improve safety and prepare for out of bed mobility. (Progressing)       Start:  04/26/24    Expected End:  05/01/24

## 2024-05-01 NOTE — PROGRESS NOTES
Markie Nobles is a 79 y.o. male     Bleeding noted at PEG site  Packing shows blood clots  Surgery has been made aware  Otherwise tolerating tube feeds well  No pain or discomfort    Review of Systems           Vitals:    05/01/24 0815   BP: 147/68   Pulse: 67   Resp: 18   Temp: 36.7 °C (98 °F)   SpO2: 99%        Scheduled medications  aspirin, 300 mg, rectal, Daily  atorvastatin, 80 mg, oral, Daily  carbidopa-levodopa, 1.5 tablet, oral, 4x daily  cefTRIAXone, 1 g, intravenous, q24h  clopidogrel, 75 mg, oral, Daily  enoxaparin, 30 mg, subcutaneous, q24h  finasteride, 5 mg, oral, Daily  menthol-zinc oxide, 1 Application, Topical, 4x daily  [Held by provider] metoprolol tartrate, 25 mg, oral, Daily  [Held by provider] pantoprazole, 40 mg, oral, Daily before breakfast  polyethylene glycol, 17 g, oral, Daily  tamsulosin, 0.4 mg, oral, Daily      Continuous medications  dextrose 5%-0.45 % sodium chloride, 75 mL/hr, Last Rate: 75 mL/hr (05/01/24 0955)      PRN medications  PRN medications: acetaminophen, benzonatate, dextrose, dextrose, glucagon, glucagon, ipratropium-albuteroL, melatonin, ondansetron, traMADol    Lab Review   Results from last 7 days   Lab Units 05/01/24  0631 04/30/24  0734 04/29/24  0635   WBC AUTO x10*3/uL 7.9 7.8 5.3   HEMOGLOBIN g/dL 7.8* 8.3* 8.3*   HEMATOCRIT % 24.8* 27.4* 26.5*   PLATELETS AUTO x10*3/uL 136* 163 174     Results from last 7 days   Lab Units 05/01/24  0631 04/30/24  0734 04/29/24  0635 04/27/24  1022 04/26/24  0607 04/25/24  1035 04/24/24  1503   SODIUM mmol/L 146*  146* 150* 150*   < > 148* 146* 147*   POTASSIUM mmol/L 4.1 4.5 4.7   < > 4.6 5.2 5.2   CHLORIDE mmol/L 115* 116* 118*   < > 114* 114* 113*   CO2 mmol/L 25 26 22   < > 25 20* 22   BUN mg/dL 39* 43* 49*   < > 55* 59* 60*   CREATININE mg/dL 3.18* 3.71* 3.83*   < > 4.03* 4.28* 4.33*   CALCIUM mg/dL 8.1* 8.0* 8.4*   < > 8.4* 8.0* 8.5*   PROTEIN TOTAL g/dL  --   --   --   --  6.4 6.5 6.9   BILIRUBIN TOTAL mg/dL  --   --    --   --  0.4 0.4 0.7   ALK PHOS U/L  --   --   --   --  73 80 78   ALT U/L  --   --   --   --  <3* <3* 3*   AST U/L  --   --   --   --  11 13 15   GLUCOSE mg/dL 93 124* 87   < > 85 82 83    < > = values in this interval not displayed.     Results from last 7 days   Lab Units 04/24/24  1503   TROPHS ng/L 14        FL modified barium swallow study   Final Result   Modified barium swallow study as detailed within the report.        MACRO:   None        Signed by: Mandi Cantu 4/29/2024 11:09 AM   Dictation workstation:   [id number]      CT head wo IV contrast   Final Result   No acute intracranial bleed or focal mass effect.        Moderate volume loss.        Moderate chronic white matter ischemic disease in the deep   periventricular regions.        MACRO:   None        Signed by: Jordan Suero 4/24/2024 3:54 PM   Dictation workstation:   [id number]      XR chest 1 view   Final Result   1.  Developing infiltrate in the medial left lung base                  MACRO:   None        Signed by: Joseph Schoenberger 4/24/2024 3:29 PM   Dictation workstation:   [id number]            Physical Exam     Constitutional   General appearance: Alert and in no acute distress.     Pulmonary   Respiratory assessment: No respiratory distress, normal respiratory rhythm and effort.    Auscultation of Lungs: Crackles  Cardiovascular   Auscultation of heart: Apical pulse normal, heart rate and rhythm normal, normal S1 and S2, no murmurs and no pericardial rub.    Exam for edema: No peripheral edema.   Abdomen   Abdominal Exam: No bruits, normal bowel sounds, soft, non-tender, no abdominal mass palpated.   PEG site intact  Liver and Spleen exam: No hepato-splenomegaly.   Musculoskeletal     Skin inspection: Normal skin color and pigmentation, normal skin turgor and no visible rash.   Neurologic   Cranial nerves: Nerves 2-12 were intact, no focal neuro defects.  Alert x 2    Assessment/Plan    #Aspiration pneumonia/UTI  Continue  antibiotics      #Dysphagia  S/p PEG tube placement  Tolerating tube feeds  Bleeding noted at site  Surgery will come and reassess    #Hyper natremia  Improving  Continue fluids/free water    #Sacral ulcer present on admission  Wound care     #Severe malnutrition  Continue tube feeds     #Parkinson's disease with dementia  #Deconditioning  Continue medications  Authorization pending for going to rehab facility     #Coronary artery disease s/p bypass  Stable     #Acute on chronic kidney injury stage IV  Continue IV fluids     #Hypertension  Stable

## 2024-05-06 ENCOUNTER — APPOINTMENT (OUTPATIENT)
Dept: PRIMARY CARE | Facility: CLINIC | Age: 79
End: 2024-05-06
Payer: MEDICARE

## 2024-05-07 ENCOUNTER — APPOINTMENT (OUTPATIENT)
Dept: HEMATOLOGY/ONCOLOGY | Facility: HOSPITAL | Age: 79
End: 2024-05-07
Payer: MEDICARE

## 2024-05-09 NOTE — DISCHARGE SUMMARY
Discharge Diagnosis  Pneumonia, unspecified organism    Issues Requiring Follow-Up  Follow-up with PCP    Test Results Pending At Discharge  Pending Labs       No current pending labs.            Hospital Course  Patient with a past medical history of MGUS, CAD,s/p CABG, HTN, CKD IV, Anemia, ABEBE, hyperlipidemia, obesity, BPH,and gout, DM, HLD, Parkinsons with dementia and sacral pressure sores comes in with worsening anorexia weight loss and generalized malaise and decline  Recently was seen by neurology who increased the patient's carbidopa levodopa  Initially this made a difference but but now he is again reverted to full mobility  Has a chronic sore on the sacral area which is also having a hard time healing because of the patient's malnutrition  Patient's family brought him to the hospital because of declining condition  Workup in the emergency room showed dehydration pneumonia and possible UTI  Patient also appears quite malnourished  Wife reports that the patient has been coughing more after eating and also regurgitating food    S/p PEG tube for nutrition  Hyponatremia resolved  Discharge to rehab    Pertinent Physical Exam At Time of Discharge  Physical Exam    Home Medications     Medication List      START taking these medications     benzonatate 100 mg capsule; Commonly known as: Tessalon; Take 1 capsule   (100 mg) by mouth 3 times a day as needed for cough. Do not crush or chew.   melatonin 3 mg tablet; 1 tablet (3 mg) by g-tube route as needed at   bedtime for sleep.   menthol-zinc oxide 0.44-20.6 % ointment; Commonly known as: Calmoseptine   - Risamine; Apply 1 Application topically 4 times a day.   polyethylene glycol 17 gram packet; Commonly known as: Glycolax,   Miralax; Take 17 g by mouth once daily.     CHANGE how you take these medications     allopurinol 100 mg tablet; Commonly known as: Zyloprim; Take 1 tablet   (100 mg) by mouth once daily as needed (gout). Continue to hold due to ziggy   on  ckd; What changed: additional instructions   docusate sodium 100 mg capsule; Commonly known as: Colace; TAKE 1   CAPSULE BY MOUTH EVERY DAY AS DIRECTED; What changed: when to take this,   reasons to take this   tamsulosin 0.4 mg 24 hr capsule; Commonly known as: Flomax; TAKE 2   CAPSULES BY MOUTH  DAILY 1/2 HOUR AFTER SAME  MEAL OR AT BEDTIME WITH A    SNACK; What changed: See the new instructions.     CONTINUE taking these medications     atorvastatin 80 mg tablet; Commonly known as: Lipitor; TAKE 1 TABLET BY   MOUTH ONCE  DAILY   carbidopa-levodopa  mg tablet; Commonly known as: Sinemet   clopidogrel 75 mg tablet; Commonly known as: Plavix; TAKE 1 TABLET BY   MOUTH ONCE  DAILY   finasteride 5 mg tablet; Commonly known as: Proscar   metoprolol tartrate 25 mg tablet; Commonly known as: Lopressor; Take 1   tablet (25 mg) by mouth once daily. Don't crush   nitroglycerin 0.4 mg SL tablet; Commonly known as: Nitrostat   Tylenol Extra Strength 500 mg tablet; Generic drug: acetaminophen     STOP taking these medications     lidocaine 5 % patch; Commonly known as: Lidoderm   naphazoline-glycerin 0.012-0.2 % drops     ASK your doctor about these medications     amoxicillin-pot clavulanate 500-125 mg tablet; Commonly known as:   Augmentin; Take 1 tablet by mouth 2 times a day for 3 days.; Ask about:   Should I take this medication?       Outpatient Follow-Up  Future Appointments   Date Time Provider Department Center   5/20/2024  3:30 PM [id number]   9/10/2024  2:30 PM Jose L Chiu MD [id number]   9/27/2024  2:00 PM Lo Bar, APRN-CNP [facility]       Leslie Watson MD

## 2024-05-16 ENCOUNTER — HOSPITAL ENCOUNTER (INPATIENT)
Facility: HOSPITAL | Age: 79
LOS: 21 days | DRG: 177 | End: 2024-06-06
Attending: EMERGENCY MEDICINE | Admitting: INTERNAL MEDICINE
Payer: MEDICARE

## 2024-05-16 ENCOUNTER — APPOINTMENT (OUTPATIENT)
Dept: RADIOLOGY | Facility: HOSPITAL | Age: 79
DRG: 177 | End: 2024-05-16
Payer: MEDICARE

## 2024-05-16 DIAGNOSIS — D64.9 ANEMIA, UNSPECIFIED TYPE: ICD-10-CM

## 2024-05-16 DIAGNOSIS — I21.4 NSTEMI (NON-ST ELEVATED MYOCARDIAL INFARCTION) (MULTI): Primary | ICD-10-CM

## 2024-05-16 DIAGNOSIS — N17.9 AKI (ACUTE KIDNEY INJURY) (CMS-HCC): ICD-10-CM

## 2024-05-16 LAB
ABO GROUP (TYPE) IN BLOOD: NORMAL
ALBUMIN SERPL BCP-MCNC: 3 G/DL (ref 3.4–5)
ALP SERPL-CCNC: 302 U/L (ref 33–136)
ALT SERPL W P-5'-P-CCNC: 41 U/L (ref 10–52)
ANION GAP BLDV CALCULATED.4IONS-SCNC: 8 MMOL/L (ref 10–25)
ANION GAP SERPL CALC-SCNC: 17 MMOL/L (ref 10–20)
ANTIBODY SCREEN: NORMAL
APPEARANCE UR: CLEAR
AST SERPL W P-5'-P-CCNC: 168 U/L (ref 9–39)
BASE EXCESS BLDV CALC-SCNC: -3.3 MMOL/L (ref -2–3)
BASOPHILS # BLD AUTO: 0.03 X10*3/UL (ref 0–0.1)
BASOPHILS NFR BLD AUTO: 0.3 %
BILIRUB SERPL-MCNC: 0.4 MG/DL (ref 0–1.2)
BILIRUB UR STRIP.AUTO-MCNC: NEGATIVE MG/DL
BLOOD EXPIRATION DATE: NORMAL
BODY TEMPERATURE: 37 DEGREES CELSIUS
BUN SERPL-MCNC: 107 MG/DL (ref 6–23)
CA-I BLDV-SCNC: 1.18 MMOL/L (ref 1.1–1.33)
CALCIUM SERPL-MCNC: 8.8 MG/DL (ref 8.6–10.6)
CARDIAC TROPONIN I PNL SERPL HS: 516 NG/L (ref 0–53)
CARDIAC TROPONIN I PNL SERPL HS: 747 NG/L (ref 0–53)
CHLORIDE BLDV-SCNC: 107 MMOL/L (ref 98–107)
CHLORIDE SERPL-SCNC: 98 MMOL/L (ref 98–107)
CHLORIDE UR-SCNC: <15 MMOL/L
CHLORIDE/CREATININE (MMOL/G) IN URINE: NORMAL
CO2 SERPL-SCNC: 22 MMOL/L (ref 21–32)
COLOR UR: ABNORMAL
CREAT SERPL-MCNC: 3.64 MG/DL (ref 0.5–1.3)
CREAT UR-MCNC: 35 MG/DL (ref 20–370)
DISPENSE STATUS: NORMAL
EGFRCR SERPLBLD CKD-EPI 2021: 16 ML/MIN/1.73M*2
EOSINOPHIL # BLD AUTO: 0.18 X10*3/UL (ref 0–0.4)
EOSINOPHIL NFR BLD AUTO: 1.7 %
ERYTHROCYTE [DISTWIDTH] IN BLOOD BY AUTOMATED COUNT: 15.9 % (ref 11.5–14.5)
GLUCOSE BLDV-MCNC: 118 MG/DL (ref 74–99)
GLUCOSE SERPL-MCNC: 129 MG/DL (ref 74–99)
GLUCOSE UR STRIP.AUTO-MCNC: NORMAL MG/DL
HCO3 BLDV-SCNC: 22.7 MMOL/L (ref 22–26)
HCT VFR BLD AUTO: 18.4 % (ref 41–52)
HCT VFR BLD EST: 21 % (ref 41–52)
HGB BLD-MCNC: 6.2 G/DL (ref 13.5–17.5)
HGB BLDV-MCNC: 6.9 G/DL (ref 13.5–17.5)
IMM GRANULOCYTES # BLD AUTO: 0.05 X10*3/UL (ref 0–0.5)
IMM GRANULOCYTES NFR BLD AUTO: 0.5 % (ref 0–0.9)
INHALED O2 CONCENTRATION: 21 %
IRON SATN MFR SERPL: 16 % (ref 25–45)
IRON SERPL-MCNC: 34 UG/DL (ref 35–150)
KETONES UR STRIP.AUTO-MCNC: NEGATIVE MG/DL
LACTATE BLDV-SCNC: 1.3 MMOL/L (ref 0.4–2)
LACTATE SERPL-SCNC: 1.1 MMOL/L (ref 0.4–2)
LEUKOCYTE ESTERASE UR QL STRIP.AUTO: NEGATIVE
LYMPHOCYTES # BLD AUTO: 0.7 X10*3/UL (ref 0.8–3)
LYMPHOCYTES NFR BLD AUTO: 6.6 %
MCH RBC QN AUTO: 28.4 PG (ref 26–34)
MCHC RBC AUTO-ENTMCNC: 33.7 G/DL (ref 32–36)
MCV RBC AUTO: 84 FL (ref 80–100)
MONOCYTES # BLD AUTO: 1.01 X10*3/UL (ref 0.05–0.8)
MONOCYTES NFR BLD AUTO: 9.6 %
MUCOUS THREADS #/AREA URNS AUTO: NORMAL /LPF
NEUTROPHILS # BLD AUTO: 8.58 X10*3/UL (ref 1.6–5.5)
NEUTROPHILS NFR BLD AUTO: 81.3 %
NITRITE UR QL STRIP.AUTO: NEGATIVE
NRBC BLD-RTO: 0 /100 WBCS (ref 0–0)
OXYHGB MFR BLDV: 20 % (ref 45–75)
PCO2 BLDV: 45 MM HG (ref 41–51)
PH BLDV: 7.31 PH (ref 7.33–7.43)
PH UR STRIP.AUTO: 6 [PH]
PLATELET # BLD AUTO: 140 X10*3/UL (ref 150–450)
PO2 BLDV: 21 MM HG (ref 35–45)
POTASSIUM BLDV-SCNC: 4.9 MMOL/L (ref 3.5–5.3)
POTASSIUM SERPL-SCNC: 5.1 MMOL/L (ref 3.5–5.3)
POTASSIUM UR-SCNC: 21 MMOL/L
POTASSIUM/CREAT UR-RTO: 60 MMOL/G CREAT
PRODUCT BLOOD TYPE: 5100
PRODUCT CODE: NORMAL
PROT SERPL-MCNC: 7 G/DL (ref 6.4–8.2)
PROT UR STRIP.AUTO-MCNC: ABNORMAL MG/DL
RBC # BLD AUTO: 2.18 X10*6/UL (ref 4.5–5.9)
RBC # UR STRIP.AUTO: ABNORMAL /UL
RBC #/AREA URNS AUTO: NORMAL /HPF
RH FACTOR (ANTIGEN D): NORMAL
SAO2 % BLDV: 20 % (ref 45–75)
SODIUM BLDV-SCNC: 133 MMOL/L (ref 136–145)
SODIUM SERPL-SCNC: 132 MMOL/L (ref 136–145)
SODIUM UR-SCNC: 24 MMOL/L
SODIUM/CREAT UR-RTO: 69 MMOL/G CREAT
SP GR UR STRIP.AUTO: 1.01
SQUAMOUS #/AREA URNS AUTO: NORMAL /HPF
TIBC SERPL-MCNC: 211 UG/DL (ref 240–445)
UIBC SERPL-MCNC: 177 UG/DL (ref 110–370)
UNIT ABO: NORMAL
UNIT NUMBER: NORMAL
UNIT RH: NORMAL
UNIT VOLUME: 350
UROBILINOGEN UR STRIP.AUTO-MCNC: NORMAL MG/DL
WBC # BLD AUTO: 10.6 X10*3/UL (ref 4.4–11.3)
WBC #/AREA URNS AUTO: NORMAL /HPF
XM INTEP: NORMAL

## 2024-05-16 PROCEDURE — 87899 AGENT NOS ASSAY W/OPTIC: CPT

## 2024-05-16 PROCEDURE — 84520 ASSAY OF UREA NITROGEN: CPT | Mod: 91 | Performed by: EMERGENCY MEDICINE

## 2024-05-16 PROCEDURE — 84484 ASSAY OF TROPONIN QUANT: CPT | Performed by: EMERGENCY MEDICINE

## 2024-05-16 PROCEDURE — 83540 ASSAY OF IRON: CPT

## 2024-05-16 PROCEDURE — 84484 ASSAY OF TROPONIN QUANT: CPT | Mod: 91 | Performed by: STUDENT IN AN ORGANIZED HEALTH CARE EDUCATION/TRAINING PROGRAM

## 2024-05-16 PROCEDURE — 86922 COMPATIBILITY TEST ANTIGLOB: CPT

## 2024-05-16 PROCEDURE — 96361 HYDRATE IV INFUSION ADD-ON: CPT

## 2024-05-16 PROCEDURE — 87449 NOS EACH ORGANISM AG IA: CPT

## 2024-05-16 PROCEDURE — 71250 CT THORAX DX C-: CPT

## 2024-05-16 PROCEDURE — 3E0G76Z INTRODUCTION OF NUTRITIONAL SUBSTANCE INTO UPPER GI, VIA NATURAL OR ARTIFICIAL OPENING: ICD-10-PCS

## 2024-05-16 PROCEDURE — 36415 COLL VENOUS BLD VENIPUNCTURE: CPT | Performed by: EMERGENCY MEDICINE

## 2024-05-16 PROCEDURE — 82435 ASSAY OF BLOOD CHLORIDE: CPT | Performed by: EMERGENCY MEDICINE

## 2024-05-16 PROCEDURE — 87040 BLOOD CULTURE FOR BACTERIA: CPT | Mod: 91 | Performed by: EMERGENCY MEDICINE

## 2024-05-16 PROCEDURE — 86901 BLOOD TYPING SEROLOGIC RH(D): CPT | Performed by: EMERGENCY MEDICINE

## 2024-05-16 PROCEDURE — 2500000001 HC RX 250 WO HCPCS SELF ADMINISTERED DRUGS (ALT 637 FOR MEDICARE OP): Performed by: STUDENT IN AN ORGANIZED HEALTH CARE EDUCATION/TRAINING PROGRAM

## 2024-05-16 PROCEDURE — 73030 X-RAY EXAM OF SHOULDER: CPT | Mod: LEFT SIDE | Performed by: STUDENT IN AN ORGANIZED HEALTH CARE EDUCATION/TRAINING PROGRAM

## 2024-05-16 PROCEDURE — 74176 CT ABD & PELVIS W/O CONTRAST: CPT

## 2024-05-16 PROCEDURE — 87081 CULTURE SCREEN ONLY: CPT

## 2024-05-16 PROCEDURE — 85025 COMPLETE CBC W/AUTO DIFF WBC: CPT | Performed by: EMERGENCY MEDICINE

## 2024-05-16 PROCEDURE — 99285 EMERGENCY DEPT VISIT HI MDM: CPT | Performed by: EMERGENCY MEDICINE

## 2024-05-16 PROCEDURE — 93010 ELECTROCARDIOGRAM REPORT: CPT | Performed by: EMERGENCY MEDICINE

## 2024-05-16 PROCEDURE — 99285 EMERGENCY DEPT VISIT HI MDM: CPT | Mod: 25

## 2024-05-16 PROCEDURE — 1210000001 HC SEMI-PRIVATE ROOM DAILY

## 2024-05-16 PROCEDURE — P9016 RBC LEUKOCYTES REDUCED: HCPCS

## 2024-05-16 PROCEDURE — 2500000004 HC RX 250 GENERAL PHARMACY W/ HCPCS (ALT 636 FOR OP/ED): Performed by: EMERGENCY MEDICINE

## 2024-05-16 PROCEDURE — 2500000004 HC RX 250 GENERAL PHARMACY W/ HCPCS (ALT 636 FOR OP/ED)

## 2024-05-16 PROCEDURE — 74176 CT ABD & PELVIS W/O CONTRAST: CPT | Performed by: RADIOLOGY

## 2024-05-16 PROCEDURE — 73030 X-RAY EXAM OF SHOULDER: CPT | Mod: LT

## 2024-05-16 PROCEDURE — 71250 CT THORAX DX C-: CPT | Performed by: RADIOLOGY

## 2024-05-16 PROCEDURE — 36430 TRANSFUSION BLD/BLD COMPNT: CPT

## 2024-05-16 PROCEDURE — 81001 URINALYSIS AUTO W/SCOPE: CPT | Performed by: EMERGENCY MEDICINE

## 2024-05-16 PROCEDURE — 82436 ASSAY OF URINE CHLORIDE: CPT

## 2024-05-16 PROCEDURE — 96365 THER/PROPH/DIAG IV INF INIT: CPT

## 2024-05-16 PROCEDURE — 83550 IRON BINDING TEST: CPT | Mod: 91

## 2024-05-16 PROCEDURE — 83605 ASSAY OF LACTIC ACID: CPT | Performed by: EMERGENCY MEDICINE

## 2024-05-16 PROCEDURE — 96367 TX/PROPH/DG ADDL SEQ IV INF: CPT

## 2024-05-16 PROCEDURE — 71045 X-RAY EXAM CHEST 1 VIEW: CPT | Performed by: STUDENT IN AN ORGANIZED HEALTH CARE EDUCATION/TRAINING PROGRAM

## 2024-05-16 PROCEDURE — 71045 X-RAY EXAM CHEST 1 VIEW: CPT

## 2024-05-16 PROCEDURE — 2500000001 HC RX 250 WO HCPCS SELF ADMINISTERED DRUGS (ALT 637 FOR MEDICARE OP)

## 2024-05-16 PROCEDURE — 84132 ASSAY OF SERUM POTASSIUM: CPT | Mod: 91 | Performed by: EMERGENCY MEDICINE

## 2024-05-16 RX ORDER — MENTHOL AND ZINC OXIDE .44; 20.625 G/100G; G/100G
1 OINTMENT TOPICAL 4 TIMES DAILY
Status: DISCONTINUED | OUTPATIENT
Start: 2024-05-16 | End: 2024-06-06 | Stop reason: HOSPADM

## 2024-05-16 RX ORDER — VANCOMYCIN HYDROCHLORIDE 1 G/200ML
1000 INJECTION, SOLUTION INTRAVENOUS ONCE
Status: DISCONTINUED | OUTPATIENT
Start: 2024-05-16 | End: 2024-05-16

## 2024-05-16 RX ORDER — BENZONATATE 100 MG/1
100 CAPSULE ORAL 3 TIMES DAILY PRN
Status: DISCONTINUED | OUTPATIENT
Start: 2024-05-16 | End: 2024-06-06 | Stop reason: HOSPADM

## 2024-05-16 RX ORDER — METOPROLOL TARTRATE 25 MG/1
25 TABLET, FILM COATED ORAL DAILY
Status: DISCONTINUED | OUTPATIENT
Start: 2024-05-17 | End: 2024-06-06

## 2024-05-16 RX ORDER — FINASTERIDE 5 MG/1
5 TABLET, FILM COATED ORAL DAILY
Status: DISCONTINUED | OUTPATIENT
Start: 2024-05-17 | End: 2024-06-06 | Stop reason: HOSPADM

## 2024-05-16 RX ORDER — TAMSULOSIN HYDROCHLORIDE 0.4 MG/1
0.4 CAPSULE ORAL DAILY
Status: DISCONTINUED | OUTPATIENT
Start: 2024-05-17 | End: 2024-06-06 | Stop reason: HOSPADM

## 2024-05-16 RX ORDER — CARBIDOPA AND LEVODOPA 25; 100 MG/1; MG/1
1.5 TABLET ORAL 4 TIMES DAILY
Status: DISCONTINUED | OUTPATIENT
Start: 2024-05-16 | End: 2024-06-06

## 2024-05-16 RX ORDER — ACETAMINOPHEN 325 MG/1
650 TABLET ORAL EVERY 8 HOURS PRN
Status: DISCONTINUED | OUTPATIENT
Start: 2024-05-16 | End: 2024-05-23

## 2024-05-16 RX ORDER — CLOPIDOGREL BISULFATE 75 MG/1
75 TABLET ORAL DAILY
Status: DISCONTINUED | OUTPATIENT
Start: 2024-05-17 | End: 2024-06-06

## 2024-05-16 RX ORDER — VANCOMYCIN HYDROCHLORIDE 750 MG/150ML
1.5 INJECTION, SOLUTION INTRAVENOUS ONCE
Status: COMPLETED | OUTPATIENT
Start: 2024-05-16 | End: 2024-05-16

## 2024-05-16 RX ORDER — ASPIRIN 325 MG
325 TABLET ORAL ONCE
Status: COMPLETED | OUTPATIENT
Start: 2024-05-16 | End: 2024-05-16

## 2024-05-16 RX ORDER — VANCOMYCIN HYDROCHLORIDE 1 G/20ML
INJECTION, POWDER, LYOPHILIZED, FOR SOLUTION INTRAVENOUS DAILY PRN
Status: DISCONTINUED | OUTPATIENT
Start: 2024-05-16 | End: 2024-05-18

## 2024-05-16 RX ADMIN — SODIUM CHLORIDE 500 ML: 9 INJECTION, SOLUTION INTRAVENOUS at 21:41

## 2024-05-16 RX ADMIN — SODIUM CHLORIDE 1000 ML: 9 INJECTION, SOLUTION INTRAVENOUS at 16:36

## 2024-05-16 RX ADMIN — ASPIRIN 325 MG ORAL TABLET 325 MG: 325 PILL ORAL at 17:11

## 2024-05-16 RX ADMIN — VANCOMYCIN HYDROCHLORIDE 1.5 G: 750 INJECTION, SOLUTION INTRAVENOUS at 16:34

## 2024-05-16 RX ADMIN — CARBIDOPA AND LEVODOPA 1.5 TABLET: 25; 100 TABLET ORAL at 23:22

## 2024-05-16 RX ADMIN — MEROPENEM 1000 MG: 1 INJECTION, POWDER, FOR SOLUTION INTRAVENOUS at 23:44

## 2024-05-16 RX ADMIN — PIPERACILLIN SODIUM AND TAZOBACTAM SODIUM 4.5 G: 4; .5 INJECTION, SOLUTION INTRAVENOUS at 16:16

## 2024-05-16 ASSESSMENT — PAIN DESCRIPTION - ORIENTATION: ORIENTATION: MID

## 2024-05-16 ASSESSMENT — PAIN SCALES - GENERAL: PAINLEVEL_OUTOF10: 3

## 2024-05-16 ASSESSMENT — COLUMBIA-SUICIDE SEVERITY RATING SCALE - C-SSRS
6. HAVE YOU EVER DONE ANYTHING, STARTED TO DO ANYTHING, OR PREPARED TO DO ANYTHING TO END YOUR LIFE?: NO
1. IN THE PAST MONTH, HAVE YOU WISHED YOU WERE DEAD OR WISHED YOU COULD GO TO SLEEP AND NOT WAKE UP?: NO
2. HAVE YOU ACTUALLY HAD ANY THOUGHTS OF KILLING YOURSELF?: NO

## 2024-05-16 ASSESSMENT — PAIN - FUNCTIONAL ASSESSMENT: PAIN_FUNCTIONAL_ASSESSMENT: 0-10

## 2024-05-16 ASSESSMENT — PAIN DESCRIPTION - PROGRESSION: CLINICAL_PROGRESSION: NOT CHANGED

## 2024-05-16 ASSESSMENT — LIFESTYLE VARIABLES
HAVE PEOPLE ANNOYED YOU BY CRITICIZING YOUR DRINKING: NO
EVER FELT BAD OR GUILTY ABOUT YOUR DRINKING: NO
EVER HAD A DRINK FIRST THING IN THE MORNING TO STEADY YOUR NERVES TO GET RID OF A HANGOVER: NO
HAVE YOU EVER FELT YOU SHOULD CUT DOWN ON YOUR DRINKING: NO
TOTAL SCORE: 0

## 2024-05-16 ASSESSMENT — PAIN DESCRIPTION - DESCRIPTORS: DESCRIPTORS: ACHING

## 2024-05-16 ASSESSMENT — PAIN DESCRIPTION - LOCATION: LOCATION: BUTTOCKS

## 2024-05-16 ASSESSMENT — PAIN DESCRIPTION - PAIN TYPE: TYPE: ACUTE PAIN

## 2024-05-16 NOTE — ED PROVIDER NOTES
HPI   Chief Complaint   Patient presents with    Hypotension       78yo M with past medical history of MGUS, CAD,s/p CABG, HTN, CKD IV, Anemia, ABEBE, hyperlipidemia, obesity, BPH,and gout, DM, HLD, Parkinsons with dementia and peg tube and sacral pressure sores, recent hospitalization for pneumonia here with concern for hypotension at his facility.  They deny any fevers, but do note he has had a productive cough.  No melena, hematochezia, abdominal or back pain or urinary symptoms.      History provided by:  Patient and medical records  History limited by:  Dementia   used: No                        [place] Coma Scale Score: 14                     Patient History   Past Medical History:   Diagnosis Date    Encounter for immunization 09/29/2016    Encounter for administration of vaccine    Encounter for screening, unspecified 04/28/2014    Screening    Gout, unspecified 10/26/2017    Acute gout    Idiopathic gout, right hand 01/17/2020    Acute idiopathic gout of right hand    Localized edema 07/06/2015    Pedal edema    Other conditions influencing health status 08/03/2015    Paronychia    Other symptoms and signs involving cognitive functions and awareness 06/09/2016    Cognitive changes    Pain in unspecified foot 08/03/2015    Foot pain    Personal history of other diseases of the circulatory system     History of hypertension    Personal history of other diseases of the digestive system 08/13/2019    History of acute gastritis    Personal history of other drug therapy     History of influenza vaccination    Personal history of other specified conditions 03/17/2016    History of diarrhea    Personal history of other specified conditions 02/19/2015    History of elevated prostate specific antigen (PSA)    Strain of muscle and tendon of unspecified wall of thorax, initial encounter 12/05/2016    Strain of thoracic region, initial encounter     Past Surgical History:   Procedure Laterality Date     CHOLECYSTECTOMY  04/20/2018    Cholecystectomy    CORONARY ARTERY BYPASS GRAFT  12/16/2022    CABG    CT ANGIO NECK  07/29/2019    CT NECK ANGIO W AND WO IV CONTRAST 7/29/2019 [facility]    CT HEAD ANGIO W AND WO IV CONTRAST  07/29/2019    CT HEAD ANGIO W AND WO IV CONTRAST 7/29/2019 [facility]    GASTROSTOMY TUBE PLACEMENT  04/29/2024     Family History   Problem Relation Name Age of Onset    Other (ESRD) Mother          on dialysis    Hypertension Father      Dementia Other      Diabetes Other      Liver cancer Other      Kidney disease Other          Reported prior     Social History     Tobacco Use    Smoking status: Former     Types: Cigarettes     Passive exposure: Never    Smokeless tobacco: Never   Vaping Use    Vaping status: Never Used   Substance Use Topics    Alcohol use: Not Currently    Drug use: Not Currently       Physical Exam   ED Triage Vitals [05/16/24 1358]   Temperature Heart Rate Respirations BP   37 °C (98.6 °F) 78 13 123/77      Pulse Ox Temp Source Heart Rate Source Patient Position   99 % Oral Monitor Lying      BP Location FiO2 (%)     Right arm --       Physical Exam  Constitutional:       Appearance: Normal appearance.   HENT:      Head: Normocephalic and atraumatic.      Right Ear: External ear normal.      Left Ear: External ear normal.      Nose: Nose normal.      Mouth/Throat:      Mouth: Mucous membranes are moist.      Pharynx: Oropharynx is clear.   Eyes:      General: No scleral icterus.     Extraocular Movements: Extraocular movements intact.      Conjunctiva/sclera: Conjunctivae normal.      Pupils: Pupils are equal, round, and reactive to light.   Cardiovascular:      Rate and Rhythm: Normal rate and regular rhythm.      Pulses: Normal pulses.      Heart sounds: Normal heart sounds.   Pulmonary:      Effort: Pulmonary effort is normal. No respiratory distress.      Comments: Rhonchorous breath sounds bilateral lower lobes  Abdominal:      Palpations:  Abdomen is soft.      Tenderness: There is no abdominal tenderness.   Genitourinary:     Comments: Stage I sacral decubitus ulcer without surrounding erythema tenderness or drainage  Musculoskeletal:         General: Normal range of motion.      Cervical back: Normal range of motion and neck supple.      Right lower leg: No edema.      Left lower leg: No edema.   Skin:     General: Skin is warm and dry.      Capillary Refill: Capillary refill takes less than 2 seconds.   Neurological:      General: No focal deficit present.      Mental Status: He is alert and oriented to person, place, and time. Mental status is at baseline.      Sensory: No sensory deficit.      Motor: No weakness.      Comments: Parkinsonian facies, constricted affect         ED Course & MDM   Diagnoses as of 05/16/24 1954   NSTEMI (non-ST elevated myocardial infarction) (Multi)   WARREN (acute kidney injury) (CMS[facility])   Anemia, unspecified type       Medical Decision Making  79-year-old male here for concern of hypotension from facility.  Presents hemodynamically stable, no acute distress, mentating at his baseline, afebrile and saturating well on room air.  Exam notable for chronically ill but acutely nontoxic-appearing male, no signs of active blood loss, and he denies any melena or hematochezia, but he does have an acute on chronic anemia with a hemoglobin today of 6.2, no further bleeding in the ER, was given 1 unit of packed red blood cells.  Additionally, he has rhonchorous breath sounds bilaterally, and given recent admission for pneumonia, concern for recurrence, CT chest abdomen pelvis obtained after blood cultures and started on bank Zosyn, CT shows pneumonia, patient remains on room air without increased work of breathing.  He was also found to have an WARREN on CKD which could be secondary to dehydration, sepsis or intrinsic, I suspect the patient is overall intravascularly depleted especially given the type II NSTEMI that he was found to  have with initial troponin greater than 700 downtrending to greater than 500, patient was given an aspirin, his EKG had no acute ischemic changes concern for STEMI.  Patient to be admitted for ongoing observation, workup and treatment.  Admitted in stable condition.    Amount and/or Complexity of Data Reviewed  External Data Reviewed: notes.  Labs: ordered.  Radiology: ordered and independent interpretation performed.  ECG/medicine tests: ordered and independent interpretation performed.     Details: Normal sinus rhythm, no ST changes, normal axis, normal QTc    Risk  Decision regarding hospitalization.        Procedure  Procedures     Jose Guadalupe Naranjo MD  Resident  05/16/24 1957

## 2024-05-16 NOTE — ED TRIAGE NOTES
[place] [facility] [place] [facility] home after having low BP of 88/47 and bradycardia. Patient complains of pain in his left shoulder that he does not know when started. Per EMS, patient's baseline is A&Ox1 with his name. Patient also has a peg tube.

## 2024-05-17 ENCOUNTER — APPOINTMENT (OUTPATIENT)
Dept: RADIOLOGY | Facility: HOSPITAL | Age: 79
DRG: 177 | End: 2024-05-17
Payer: MEDICARE

## 2024-05-17 LAB
ALBUMIN SERPL BCP-MCNC: 2.5 G/DL (ref 3.4–5)
ALBUMIN SERPL BCP-MCNC: 2.6 G/DL (ref 3.4–5)
ANION GAP SERPL CALC-SCNC: 13 MMOL/L (ref 10–20)
ANION GAP SERPL CALC-SCNC: 14 MMOL/L (ref 10–20)
APTT PPP: 26 SECONDS (ref 27–38)
BASOPHILS # BLD AUTO: 0.02 X10*3/UL (ref 0–0.1)
BASOPHILS NFR BLD AUTO: 0.2 %
BILIRUB DIRECT SERPL-MCNC: 0.1 MG/DL (ref 0–0.3)
BILIRUB SERPL-MCNC: 0.7 MG/DL (ref 0–1.2)
BUN SERPL-MCNC: 105 MG/DL (ref 6–23)
BUN SERPL-MCNC: 105 MG/DL (ref 6–23)
BUN SERPL-MCNC: 99 MG/DL (ref 6–23)
CALCIUM SERPL-MCNC: 8 MG/DL (ref 8.6–10.6)
CALCIUM SERPL-MCNC: 8 MG/DL (ref 8.6–10.6)
CHLORIDE SERPL-SCNC: 102 MMOL/L (ref 98–107)
CHLORIDE SERPL-SCNC: 103 MMOL/L (ref 98–107)
CO2 SERPL-SCNC: 22 MMOL/L (ref 21–32)
CO2 SERPL-SCNC: 23 MMOL/L (ref 21–32)
CREAT SERPL-MCNC: 3.67 MG/DL (ref 0.5–1.3)
CREAT SERPL-MCNC: 3.76 MG/DL (ref 0.5–1.3)
EGFRCR SERPLBLD CKD-EPI 2021: 16 ML/MIN/1.73M*2
EGFRCR SERPLBLD CKD-EPI 2021: 16 ML/MIN/1.73M*2
EOSINOPHIL # BLD AUTO: 0.2 X10*3/UL (ref 0–0.4)
EOSINOPHIL NFR BLD AUTO: 2.3 %
ERYTHROCYTE [DISTWIDTH] IN BLOOD BY AUTOMATED COUNT: 14.9 % (ref 11.5–14.5)
ERYTHROCYTE [DISTWIDTH] IN BLOOD BY AUTOMATED COUNT: 15.2 % (ref 11.5–14.5)
FERRITIN SERPL-MCNC: 2529 NG/ML (ref 20–300)
FIBRINOGEN PPP-MCNC: 656 MG/DL (ref 200–400)
GLUCOSE BLD MANUAL STRIP-MCNC: 75 MG/DL (ref 74–99)
GLUCOSE SERPL-MCNC: 63 MG/DL (ref 74–99)
GLUCOSE SERPL-MCNC: 83 MG/DL (ref 74–99)
HCT VFR BLD AUTO: 20.9 % (ref 41–52)
HCT VFR BLD AUTO: 21.9 % (ref 41–52)
HGB BLD-MCNC: 7.2 G/DL (ref 13.5–17.5)
HGB BLD-MCNC: 7.4 G/DL (ref 13.5–17.5)
HGB RETIC QN: 34 PG (ref 28–38)
HOLD SPECIMEN: NORMAL
IMM GRANULOCYTES # BLD AUTO: 0.05 X10*3/UL (ref 0–0.5)
IMM GRANULOCYTES NFR BLD AUTO: 0.6 % (ref 0–0.9)
IMMATURE RETIC FRACTION: 7.6 %
INR PPP: 1.1 (ref 0.9–1.1)
IRON SATN MFR SERPL: 19 % (ref 25–45)
IRON SERPL-MCNC: 36 UG/DL (ref 35–150)
LACTATE SERPL-SCNC: 0.9 MMOL/L (ref 0.4–2)
LDH SERPL L TO P-CCNC: 199 U/L (ref 84–246)
LEGIONELLA AG UR QL: NEGATIVE
LYMPHOCYTES # BLD AUTO: 1.29 X10*3/UL (ref 0.8–3)
LYMPHOCYTES NFR BLD AUTO: 14.7 %
MAGNESIUM SERPL-MCNC: 1.92 MG/DL (ref 1.6–2.4)
MCH RBC QN AUTO: 28.9 PG (ref 26–34)
MCH RBC QN AUTO: 29.1 PG (ref 26–34)
MCHC RBC AUTO-ENTMCNC: 33.8 G/DL (ref 32–36)
MCHC RBC AUTO-ENTMCNC: 34.4 G/DL (ref 32–36)
MCV RBC AUTO: 84 FL (ref 80–100)
MCV RBC AUTO: 86 FL (ref 80–100)
MONOCYTES # BLD AUTO: 1.56 X10*3/UL (ref 0.05–0.8)
MONOCYTES NFR BLD AUTO: 17.7 %
NEUTROPHILS # BLD AUTO: 5.67 X10*3/UL (ref 1.6–5.5)
NEUTROPHILS NFR BLD AUTO: 64.5 %
NRBC BLD-RTO: 0 /100 WBCS (ref 0–0)
NRBC BLD-RTO: 0 /100 WBCS (ref 0–0)
PHOSPHATE SERPL-MCNC: 3.4 MG/DL (ref 2.5–4.9)
PHOSPHATE SERPL-MCNC: 3.7 MG/DL (ref 2.5–4.9)
PLATELET # BLD AUTO: 50 X10*3/UL (ref 150–450)
PLATELET # BLD AUTO: 69 X10*3/UL (ref 150–450)
POTASSIUM SERPL-SCNC: 4.7 MMOL/L (ref 3.5–5.3)
POTASSIUM SERPL-SCNC: 5.1 MMOL/L (ref 3.5–5.3)
PROCALCITONIN SERPL-MCNC: 1.02 NG/ML
PROTHROMBIN TIME: 12.9 SECONDS (ref 9.8–12.8)
RBC # BLD AUTO: 2.49 X10*6/UL (ref 4.5–5.9)
RBC # BLD AUTO: 2.54 X10*6/UL (ref 4.5–5.9)
RETICS #: 0.02 X10*6/UL (ref 0.02–0.11)
RETICS/RBC NFR AUTO: 0.9 % (ref 0.5–2)
S PNEUM AG UR QL: NEGATIVE
SODIUM SERPL-SCNC: 133 MMOL/L (ref 136–145)
SODIUM SERPL-SCNC: 134 MMOL/L (ref 136–145)
TIBC SERPL-MCNC: 187 UG/DL (ref 240–445)
UIBC SERPL-MCNC: 151 UG/DL (ref 110–370)
VANCOMYCIN TROUGH SERPL-MCNC: 12.6 UG/ML (ref 5–20)
WBC # BLD AUTO: 8.3 X10*3/UL (ref 4.4–11.3)
WBC # BLD AUTO: 8.8 X10*3/UL (ref 4.4–11.3)

## 2024-05-17 PROCEDURE — 36415 COLL VENOUS BLD VENIPUNCTURE: CPT

## 2024-05-17 PROCEDURE — 99223 1ST HOSP IP/OBS HIGH 75: CPT

## 2024-05-17 PROCEDURE — 85730 THROMBOPLASTIN TIME PARTIAL: CPT

## 2024-05-17 PROCEDURE — 73130 X-RAY EXAM OF HAND: CPT | Mod: LEFT SIDE | Performed by: INTERNAL MEDICINE

## 2024-05-17 PROCEDURE — 73110 X-RAY EXAM OF WRIST: CPT | Mod: LT

## 2024-05-17 PROCEDURE — 82947 ASSAY GLUCOSE BLOOD QUANT: CPT

## 2024-05-17 PROCEDURE — 83605 ASSAY OF LACTIC ACID: CPT

## 2024-05-17 PROCEDURE — 87449 NOS EACH ORGANISM AG IA: CPT

## 2024-05-17 PROCEDURE — 2500000004 HC RX 250 GENERAL PHARMACY W/ HCPCS (ALT 636 FOR OP/ED)

## 2024-05-17 PROCEDURE — 83021 HEMOGLOBIN CHROMOTOGRAPHY: CPT

## 2024-05-17 PROCEDURE — 80202 ASSAY OF VANCOMYCIN: CPT

## 2024-05-17 PROCEDURE — 82746 ASSAY OF FOLIC ACID SERUM: CPT | Performed by: INTERNAL MEDICINE

## 2024-05-17 PROCEDURE — 2550000001 HC RX 255 CONTRASTS: Performed by: STUDENT IN AN ORGANIZED HEALTH CARE EDUCATION/TRAINING PROGRAM

## 2024-05-17 PROCEDURE — 84145 PROCALCITONIN (PCT): CPT

## 2024-05-17 PROCEDURE — 85610 PROTHROMBIN TIME: CPT | Mod: 91

## 2024-05-17 PROCEDURE — 80069 RENAL FUNCTION PANEL: CPT | Mod: 91

## 2024-05-17 PROCEDURE — 82248 BILIRUBIN DIRECT: CPT

## 2024-05-17 PROCEDURE — 82607 VITAMIN B-12: CPT | Performed by: INTERNAL MEDICINE

## 2024-05-17 PROCEDURE — 1200000002 HC GENERAL ROOM WITH TELEMETRY DAILY

## 2024-05-17 PROCEDURE — A9575 INJ GADOTERATE MEGLUMI 0.1ML: HCPCS | Performed by: STUDENT IN AN ORGANIZED HEALTH CARE EDUCATION/TRAINING PROGRAM

## 2024-05-17 PROCEDURE — 84520 ASSAY OF UREA NITROGEN: CPT

## 2024-05-17 PROCEDURE — 99498 ADVNCD CARE PLAN ADDL 30 MIN: CPT

## 2024-05-17 PROCEDURE — 83540 ASSAY OF IRON: CPT

## 2024-05-17 PROCEDURE — 2500000001 HC RX 250 WO HCPCS SELF ADMINISTERED DRUGS (ALT 637 FOR MEDICARE OP)

## 2024-05-17 PROCEDURE — 73130 X-RAY EXAM OF HAND: CPT | Mod: LT

## 2024-05-17 PROCEDURE — 83615 LACTATE (LD) (LDH) ENZYME: CPT | Performed by: STUDENT IN AN ORGANIZED HEALTH CARE EDUCATION/TRAINING PROGRAM

## 2024-05-17 PROCEDURE — 85025 COMPLETE CBC W/AUTO DIFF WBC: CPT

## 2024-05-17 PROCEDURE — 85027 COMPLETE CBC AUTOMATED: CPT

## 2024-05-17 PROCEDURE — 85362 FIBRIN DEGRADATION PRODUCTS: CPT

## 2024-05-17 PROCEDURE — 73090 X-RAY EXAM OF FOREARM: CPT | Mod: LEFT SIDE | Performed by: INTERNAL MEDICINE

## 2024-05-17 PROCEDURE — C9113 INJ PANTOPRAZOLE SODIUM, VIA: HCPCS

## 2024-05-17 PROCEDURE — 2500000006 HC RX 250 W HCPCS SELF ADMINISTERED DRUGS (ALT 637 FOR ALL PAYERS): Mod: MUE

## 2024-05-17 PROCEDURE — 73130 X-RAY EXAM OF HAND: CPT | Mod: LEFT SIDE | Performed by: STUDENT IN AN ORGANIZED HEALTH CARE EDUCATION/TRAINING PROGRAM

## 2024-05-17 PROCEDURE — 70553 MRI BRAIN STEM W/O & W/DYE: CPT

## 2024-05-17 PROCEDURE — 82247 BILIRUBIN TOTAL: CPT

## 2024-05-17 PROCEDURE — 83550 IRON BINDING TEST: CPT | Mod: 91

## 2024-05-17 PROCEDURE — 83735 ASSAY OF MAGNESIUM: CPT

## 2024-05-17 PROCEDURE — 73110 X-RAY EXAM OF WRIST: CPT | Mod: LEFT SIDE | Performed by: INTERNAL MEDICINE

## 2024-05-17 PROCEDURE — 99497 ADVNCD CARE PLAN 30 MIN: CPT

## 2024-05-17 PROCEDURE — 83020 HEMOGLOBIN ELECTROPHORESIS: CPT | Performed by: PATHOLOGY

## 2024-05-17 PROCEDURE — 82728 ASSAY OF FERRITIN: CPT

## 2024-05-17 PROCEDURE — 83010 ASSAY OF HAPTOGLOBIN QUANT: CPT

## 2024-05-17 PROCEDURE — 73090 X-RAY EXAM OF FOREARM: CPT | Mod: LT

## 2024-05-17 PROCEDURE — 73110 X-RAY EXAM OF WRIST: CPT | Mod: LEFT SIDE | Performed by: STUDENT IN AN ORGANIZED HEALTH CARE EDUCATION/TRAINING PROGRAM

## 2024-05-17 PROCEDURE — 99221 1ST HOSP IP/OBS SF/LOW 40: CPT | Performed by: STUDENT IN AN ORGANIZED HEALTH CARE EDUCATION/TRAINING PROGRAM

## 2024-05-17 PROCEDURE — 85384 FIBRINOGEN ACTIVITY: CPT

## 2024-05-17 PROCEDURE — 70553 MRI BRAIN STEM W/O & W/DYE: CPT | Performed by: RADIOLOGY

## 2024-05-17 PROCEDURE — 84100 ASSAY OF PHOSPHORUS: CPT | Mod: 91

## 2024-05-17 PROCEDURE — 85045 AUTOMATED RETICULOCYTE COUNT: CPT

## 2024-05-17 RX ORDER — ONDANSETRON 4 MG/1
4 TABLET, FILM COATED ORAL EVERY 8 HOURS PRN
COMMUNITY

## 2024-05-17 RX ORDER — PANTOPRAZOLE SODIUM 40 MG/10ML
40 INJECTION, POWDER, LYOPHILIZED, FOR SOLUTION INTRAVENOUS 2 TIMES DAILY
Status: DISCONTINUED | OUTPATIENT
Start: 2024-05-17 | End: 2024-05-19

## 2024-05-17 RX ORDER — GADOTERATE MEGLUMINE 376.9 MG/ML
15 INJECTION INTRAVENOUS
Status: COMPLETED | OUTPATIENT
Start: 2024-05-17 | End: 2024-05-17

## 2024-05-17 RX ADMIN — TAMSULOSIN HYDROCHLORIDE 0.4 MG: 0.4 CAPSULE ORAL at 08:05

## 2024-05-17 RX ADMIN — Medication 1 APPLICATION: at 08:00

## 2024-05-17 RX ADMIN — DEXTROSE MONOHYDRATE 1 G: 5 INJECTION INTRAVENOUS at 21:34

## 2024-05-17 RX ADMIN — CLOPIDOGREL BISULFATE 75 MG: 75 TABLET ORAL at 08:02

## 2024-05-17 RX ADMIN — GADOTERATE MEGLUMINE 12 ML: 376.9 INJECTION INTRAVENOUS at 11:55

## 2024-05-17 RX ADMIN — FINASTERIDE 5 MG: 5 TABLET, FILM COATED ORAL at 08:02

## 2024-05-17 RX ADMIN — METOPROLOL TARTRATE 25 MG: 50 TABLET, FILM COATED ORAL at 08:01

## 2024-05-17 RX ADMIN — ACETAMINOPHEN 650 MG: 325 TABLET ORAL at 17:11

## 2024-05-17 RX ADMIN — Medication 1 APPLICATION: at 12:41

## 2024-05-17 RX ADMIN — CARBIDOPA AND LEVODOPA 1.5 TABLET: 25; 100 TABLET ORAL at 12:44

## 2024-05-17 RX ADMIN — PANTOPRAZOLE SODIUM 40 MG: 40 INJECTION, POWDER, FOR SOLUTION INTRAVENOUS at 21:31

## 2024-05-17 RX ADMIN — CARBIDOPA AND LEVODOPA 1.5 TABLET: 25; 100 TABLET ORAL at 07:59

## 2024-05-17 RX ADMIN — MEROPENEM 1000 MG: 1 INJECTION, POWDER, FOR SOLUTION INTRAVENOUS at 08:46

## 2024-05-17 RX ADMIN — CARBIDOPA AND LEVODOPA 1.5 TABLET: 25; 100 TABLET ORAL at 21:31

## 2024-05-17 RX ADMIN — CARBIDOPA AND LEVODOPA 1.5 TABLET: 25; 100 TABLET ORAL at 17:10

## 2024-05-17 SDOH — SOCIAL STABILITY: SOCIAL INSECURITY: DO YOU FEEL UNSAFE GOING BACK TO THE PLACE WHERE YOU ARE LIVING?: NO

## 2024-05-17 SDOH — SOCIAL STABILITY: SOCIAL INSECURITY: ARE THERE ANY APPARENT SIGNS OF INJURIES/BEHAVIORS THAT COULD BE RELATED TO ABUSE/NEGLECT?: NO

## 2024-05-17 SDOH — SOCIAL STABILITY: SOCIAL INSECURITY: WERE YOU ABLE TO COMPLETE ALL THE BEHAVIORAL HEALTH SCREENINGS?: YES

## 2024-05-17 SDOH — SOCIAL STABILITY: SOCIAL INSECURITY: HAS ANYONE EVER THREATENED TO HURT YOUR FAMILY OR YOUR PETS?: NO

## 2024-05-17 SDOH — SOCIAL STABILITY: SOCIAL INSECURITY: ARE YOU OR HAVE YOU BEEN THREATENED OR ABUSED PHYSICALLY, EMOTIONALLY, OR SEXUALLY BY ANYONE?: NO

## 2024-05-17 SDOH — SOCIAL STABILITY: SOCIAL INSECURITY: HAVE YOU HAD THOUGHTS OF HARMING ANYONE ELSE?: NO

## 2024-05-17 SDOH — SOCIAL STABILITY: SOCIAL INSECURITY: DO YOU FEEL ANYONE HAS EXPLOITED OR TAKEN ADVANTAGE OF YOU FINANCIALLY OR OF YOUR PERSONAL PROPERTY?: NO

## 2024-05-17 SDOH — SOCIAL STABILITY: SOCIAL INSECURITY: DOES ANYONE TRY TO KEEP YOU FROM HAVING/CONTACTING OTHER FRIENDS OR DOING THINGS OUTSIDE YOUR HOME?: NO

## 2024-05-17 SDOH — SOCIAL STABILITY: SOCIAL INSECURITY: ABUSE: ADULT

## 2024-05-17 ASSESSMENT — LIFESTYLE VARIABLES
HOW OFTEN DO YOU HAVE 6 OR MORE DRINKS ON ONE OCCASION: NEVER
SKIP TO QUESTIONS 9-10: 1
AUDIT-C TOTAL SCORE: 0
HOW OFTEN DO YOU HAVE A DRINK CONTAINING ALCOHOL: NEVER
HOW MANY STANDARD DRINKS CONTAINING ALCOHOL DO YOU HAVE ON A TYPICAL DAY: PATIENT DOES NOT DRINK
AUDIT-C TOTAL SCORE: 0

## 2024-05-17 ASSESSMENT — COGNITIVE AND FUNCTIONAL STATUS - GENERAL: PATIENT BASELINE BEDBOUND: YES

## 2024-05-17 ASSESSMENT — PAIN - FUNCTIONAL ASSESSMENT: PAIN_FUNCTIONAL_ASSESSMENT: 0-10

## 2024-05-17 ASSESSMENT — ACTIVITIES OF DAILY LIVING (ADL)
DRESSING YOURSELF: DEPENDENT
HEARING - LEFT EAR: FUNCTIONAL
ADEQUATE_TO_COMPLETE_ADL: YES
HEARING - RIGHT EAR: FUNCTIONAL
WALKS IN HOME: DEPENDENT
GROOMING: DEPENDENT
PATIENT'S MEMORY ADEQUATE TO SAFELY COMPLETE DAILY ACTIVITIES?: NO
FEEDING YOURSELF: DEPENDENT
JUDGMENT_ADEQUATE_SAFELY_COMPLETE_DAILY_ACTIVITIES: YES
BATHING: DEPENDENT
LACK_OF_TRANSPORTATION: NO
TOILETING: DEPENDENT

## 2024-05-17 ASSESSMENT — PAIN SCALES - GENERAL: PAINLEVEL_OUTOF10: 0 - NO PAIN

## 2024-05-17 NOTE — CONSULTS
Wound Care Consult     Visit Date: 5/17/2024      Patient Name: Markie Nobles         MRN: [id number]           YOB: 1945     Reason for Consult: Sacral wound       Wound Assessment:  Wound 03/07/24 Moisture Associated Skin Damage Coccyx (Active)     Wound location: sacrum     Size: 6 x 6 cm     Undermining: no  Tracking: no  Wound type: healing wound  Wound bed: dry pink scar tissue with epithelialization c/b prominent bone structure and frequent fecal incontinence  Draining: none  Periwound skin: Clean, dry, and intact   Therapeutic surface: ED cot    Recommendations: TWICE DAILY and as needed with clean-ups      Keep clean and dry.       Apply Triad wound dressing cream to damaged tissue       TO APPLY TRIAD: Triad wound dressing cream can be applied directly from the tube or by using a gloved finger. Gently spread Triad evenly over the area of application to the thickness of a dime.     Reapply Triad wound dressing cream with each clean up and as needed. In the perineal area, reapply Triad after each episode of incontinence. With higher exudate levels requiring more frequent re-applications. ( order number [id number]).      When soiled, wash top layer of soiled Triad wound dressing cream off with warm wipes as needed pat dry, then reapply Triad.        **EVERY THREE DAYS WASH DOWN TO SKIN.             TO REMOVE TRIAD: Use pH-balanced wound cleanser to soften Triad. Gently wipe to remove without scrubbing.             Then reapply if needed.    Apply EHOB air mattress chair pad under sacrum while in bed. ([employer] order #[id number])      Wound Team Plan: Primary provider, please review recommendation. If you agree with recommendation please enter as wound orders in EMR. Thank you.      While inpatient, Secure chat with questions or if condition changes. For urgent communications [place] [phone].       Hannah Cheek RN  5/17/2024  4:15 PM

## 2024-05-17 NOTE — H&P
History Of Present Illness    Mr. Nobles is an 79 year old Male w/ a PMHx MGUS, CAD (s/p CABG x3 in 2022), HTN, CKD IV, Anemia, ABEBE, hyperlipidemia, obesity, BPH, gout, NIDDM, HLD, Parkinsons (w/ dementia), dysphagia, s/p peg tube (04/2024), sacral pressure sores, s/p hospitalization for aspiration PNA (DC'd 5/1/24) being admitted for PNA and AOCA.    All history obtained from patient's wife:  Patient resides at his facility ([facility]), and on 5/14 he began to develop lethargy. On 5/15, the patient was working with PT/OT and had one episode of vomiting (NB/NB). On the day of admission (5/16), the patient was observed with heavy oral secretions while sitting upright in his chair. He was working with PT, when he became unresponsive to verbal and tactile stimuli (eyes were open, no evidence of LOC, tongue biting, or incontinence). Per the patient's wife, a “Code Blue” was called and the patient began to respond. He was found to be normoglycemic, however his blood pressure was soft, prompting the facility call EMS.    Per the patient's wife, he had not been complaining of dizziness/LH, fevers, CP, or SOB. He developed a nonproductive cough on 5/14. Has diarrhea at baseline without increase in frequency of quantity. Has not been complaining of dysuria.    The patient is fed through a PEG and is supposed to be NPO, however his wife states that speech therapy has been conducting swallow trials with various fluids (orange juice). He was recently hospitalized (4/25 - 5/1) and treated for aspiration PNA and suspected UTI before being discharged on a three day course of Augmentin (500-125mg) BID.     Past Medical History  Past Medical History:   Diagnosis Date    Encounter for immunization 09/29/2016    Encounter for administration of vaccine    Encounter for screening, unspecified 04/28/2014    Screening    Gout, unspecified 10/26/2017    Acute gout    Idiopathic gout, right hand 01/17/2020    Acute idiopathic gout of right  hand    Localized edema 07/06/2015    Pedal edema    Other conditions influencing health status 08/03/2015    Paronychia    Other symptoms and signs involving cognitive functions and awareness 06/09/2016    Cognitive changes    Pain in unspecified foot 08/03/2015    Foot pain    Personal history of other diseases of the circulatory system     History of hypertension    Personal history of other diseases of the digestive system 08/13/2019    History of acute gastritis    Personal history of other drug therapy     History of influenza vaccination    Personal history of other specified conditions 03/17/2016    History of diarrhea    Personal history of other specified conditions 02/19/2015    History of elevated prostate specific antigen (PSA)    Strain of muscle and tendon of unspecified wall of thorax, initial encounter 12/05/2016    Strain of thoracic region, initial encounter       Surgical History  Past Surgical History:   Procedure Laterality Date    CHOLECYSTECTOMY  04/20/2018    Cholecystectomy    CORONARY ARTERY BYPASS GRAFT  12/16/2022    CABG    CT ANGIO NECK  07/29/2019    CT NECK ANGIO W AND WO IV CONTRAST 7/29/2019 [facility]    CT HEAD ANGIO W AND WO IV CONTRAST  07/29/2019    CT HEAD ANGIO W AND WO IV CONTRAST 7/29/2019 [facility] CLINICAL [facility]GAC[facility]    GASTROSTOMY TUBE PLACEMENT  04/29/2024        Social History  He reports that he has quit smoking. His smoking use included cigarettes. He has never been exposed to tobacco smoke. He has never used smokeless tobacco. He reports that he does not currently use alcohol. He reports that he does not currently use drugs.    Family History  Family History   Problem Relation Name Age of Onset    Other (ESRD) Mother          on dialysis    Hypertension Father      Dementia Other      Diabetes Other      Liver cancer Other      Kidney disease Other          Reported prior          Allergies  Patient has no known allergies.    Brief ED Course:  Vitals: /77,  "HR 78, RR 13, O2sat 99% on RA, T 37 C  CBC: WBC 10.6 > 6.2 / 18.4 < 140 w/ neutrophilic predominance  RFP: 132 / 5.1 / 98 / 22 / 107 / Cr 3.64 <   ALT 41, ,   VBG: pH 7.31 / pCO2 45 / Lactate 1.3  Troponin I HS: 747 -> 516  UA: 1+ protein, 1+ blood, nitrite (negative), esterase (negative), 1-5 WBC  BCx: Collected  EKG: Sinus, normal axis, normal QRS morphology, No acute ST changes  CXR: No evidence of acute cardiopulmonary process   CTAP w/out contrast:  1. Tree-in-bud/consolidative nodularity of the bilateral lower lungs  with peribronchial wall cuffing nonspecific secretions within the  trachea concerning for acute/chronic aspiration and/or pneumonia.      Abdomen-Pelvis  1.  No acute process within the abdomen/pelvis.    Interventions: ASA 325mg, Zosyn 4.5g x1 dose, Vancomycin 1.5g x1 dose, NS 1L bolus, 1u pRBC (completed at 23:00)    Review of Systems per HPI     Physical Exam  GEN: Ill appearing, cachectic  HEENT: AT/NC, PERRL, EOMI  CARDIO: RRR, normal S1 and S2  PULM: Rhonchorus RLL, no increased WOB  ABD: Soft, NT/ND, bowel sounds +, PEG in place w/out purulence or erythema  : No grayson  EXTR: Warm/well perfused  NEURO: Responds to verbal and tactile stimuli, does not follow all commands, AO x1  PSYCH: Appropriate mood/affect     Last Recorded Vitals  Blood pressure 135/80, pulse 86, temperature 36.9 °C (98.4 °F), resp. rate 16, height 1.778 m (5' 10\"), weight 62.6 kg (138 lb), SpO2 99%.    Relevant Results  Scheduled medications  carbidopa-levodopa, 1.5 tablet, oral, 4x daily  clopidogrel, 75 mg, oral, Daily  finasteride, 5 mg, oral, Daily  menthol-zinc oxide, 1 Application, Topical, 4x daily  meropenem, 1,000 mg, intravenous, q12h  metoprolol tartrate, 25 mg, oral, Daily  tamsulosin, 0.4 mg, oral, Daily      Continuous medications     PRN medications  PRN medications: acetaminophen, benzonatate, vancomycin    Results for orders placed or performed during the hospital encounter of " 05/16/24 (from the past 24 hour(s))   CBC and Auto Differential   Result Value Ref Range    WBC 10.6 4.4 - 11.3 x10*3/uL    nRBC 0.0 0.0 - 0.0 /100 WBCs    RBC 2.18 (L) 4.50 - 5.90 x10*6/uL    Hemoglobin 6.2 (LL) 13.5 - 17.5 g/dL    Hematocrit 18.4 (L) 41.0 - 52.0 %    MCV 84 80 - 100 fL    MCH 28.4 26.0 - 34.0 pg    MCHC 33.7 32.0 - 36.0 g/dL    RDW 15.9 (H) 11.5 - 14.5 %    Platelets 140 (L) 150 - 450 x10*3/uL    Neutrophils % 81.3 40.0 - 80.0 %    Immature Granulocytes %, Automated 0.5 0.0 - 0.9 %    Lymphocytes % 6.6 13.0 - 44.0 %    Monocytes % 9.6 2.0 - 10.0 %    Eosinophils % 1.7 0.0 - 6.0 %    Basophils % 0.3 0.0 - 2.0 %    Neutrophils Absolute 8.58 (H) 1.60 - 5.50 x10*3/uL    Immature Granulocytes Absolute, Automated 0.05 0.00 - 0.50 x10*3/uL    Lymphocytes Absolute 0.70 (L) 0.80 - 3.00 x10*3/uL    Monocytes Absolute 1.01 (H) 0.05 - 0.80 x10*3/uL    Eosinophils Absolute 0.18 0.00 - 0.40 x10*3/uL    Basophils Absolute 0.03 0.00 - 0.10 x10*3/uL   Comprehensive Metabolic Panel   Result Value Ref Range    Glucose 129 (H) 74 - 99 mg/dL    Sodium 132 (L) 136 - 145 mmol/L    Potassium 5.1 3.5 - 5.3 mmol/L    Chloride 98 98 - 107 mmol/L    Bicarbonate 22 21 - 32 mmol/L    Anion Gap 17 10 - 20 mmol/L    Urea Nitrogen 107 (HH) 6 - 23 mg/dL    Creatinine 3.64 (H) 0.50 - 1.30 mg/dL    eGFR 16 (L) >60 mL/min/1.73m*2    Calcium 8.8 8.6 - 10.6 mg/dL    Albumin 3.0 (L) 3.4 - 5.0 g/dL    Alkaline Phosphatase 302 (H) 33 - 136 U/L    Total Protein 7.0 6.4 - 8.2 g/dL     (H) 9 - 39 U/L    Bilirubin, Total 0.4 0.0 - 1.2 mg/dL    ALT 41 10 - 52 U/L   Lactate   Result Value Ref Range    Lactate 1.1 0.4 - 2.0 mmol/L   Blood Culture    Specimen: Peripheral Venipuncture; Blood culture   Result Value Ref Range    Blood Culture Loaded on Instrument - Culture in progress    Blood Culture    Specimen: Peripheral Venipuncture; Blood culture   Result Value Ref Range    Blood Culture Loaded on Instrument - Culture in progress     Troponin I, High Sensitivity   Result Value Ref Range    Troponin I, High Sensitivity 747 (HH) 0 - 53 ng/L   Blood Gas Venous Full Panel   Result Value Ref Range    POCT pH, Venous 7.31 (L) 7.33 - 7.43 pH    POCT pCO2, Venous 45 41 - 51 mm Hg    POCT pO2, Venous 21 (L) 35 - 45 mm Hg    POCT SO2, Venous 20 (L) 45 - 75 %    POCT Oxy Hemoglobin, Venous 20.0 (L) 45.0 - 75.0 %    POCT Hematocrit Calculated, Venous 21.0 (L) 41.0 - 52.0 %    POCT Sodium, Venous 133 (L) 136 - 145 mmol/L    POCT Potassium, Venous 4.9 3.5 - 5.3 mmol/L    POCT Chloride, Venous 107 98 - 107 mmol/L    POCT Ionized Calicum, Venous 1.18 1.10 - 1.33 mmol/L    POCT Glucose, Venous 118 (H) 74 - 99 mg/dL    POCT Lactate, Venous 1.3 0.4 - 2.0 mmol/L    POCT Base Excess, Venous -3.3 (L) -2.0 - 3.0 mmol/L    POCT HCO3 Calculated, Venous 22.7 22.0 - 26.0 mmol/L    POCT Hemoglobin, Venous 6.9 (L) 13.5 - 17.5 g/dL    POCT Anion Gap, Venous 8.0 (L) 10.0 - 25.0 mmol/L    Patient Temperature 37.0 degrees Celsius    FiO2 21 %   Iron and TIBC   Result Value Ref Range    Iron 34 (L) 35 - 150 ug/dL    UIBC 177 110 - 370 ug/dL    TIBC 211 (L) 240 - 445 ug/dL    % Saturation 16 (L) 25 - 45 %   Type and Screen   Result Value Ref Range    ABO TYPE O     Rh TYPE POS     ANTIBODY SCREEN NEG    Troponin I, High Sensitivity   Result Value Ref Range    Troponin I, High Sensitivity 516 (HH) 0 - 53 ng/L   Prepare RBC: 1 Units   Result Value Ref Range    PRODUCT CODE M0865J05     Unit Number [id number]     Unit ABO O     Unit RH POS     XM INTEP COMP     Dispense Status TR     Blood Expiration Date June 04, 2024 23:59 EDT     PRODUCT BLOOD TYPE 5100     UNIT VOLUME 350    Urinalysis with Reflex Culture and Microscopic   Result Value Ref Range    Color, Urine Light-Yellow Light-Yellow, Yellow, Dark-Yellow    Appearance, Urine Clear Clear    Specific Gravity, Urine 1.010 1.005 - 1.035    pH, Urine 6.0 5.0, 5.5, 6.0, 6.5, 7.0, 7.5, 8.0    Protein, Urine 50 (1+) (A)  NEGATIVE, 10 (TRACE), 20 (TRACE) mg/dL    Glucose, Urine Normal Normal mg/dL    Blood, Urine 0.06 (1+) (A) NEGATIVE    Ketones, Urine NEGATIVE NEGATIVE mg/dL    Bilirubin, Urine NEGATIVE NEGATIVE    Urobilinogen, Urine Normal Normal mg/dL    Nitrite, Urine NEGATIVE NEGATIVE    Leukocyte Esterase, Urine NEGATIVE NEGATIVE   Urine electrolytes   Result Value Ref Range    Sodium, Urine Random 24 mmol/L    Sodium/Creatinine Ratio 69 Not established. mmol/g Creat    Potassium, Urine Random 21 mmol/L    Potassium/Creatinine Ratio 60 Not established mmol/g Creat    Chloride, Urine Random <15 mmol/L    Chloride/Creatinine Ratio      Creatinine, Urine Random 35.0 20.0 - 370.0 mg/dL   Urinalysis Microscopic   Result Value Ref Range    WBC, Urine 1-5 1-5, NONE /HPF    RBC, Urine NONE NONE, 1-2, 3-5 /HPF    Squamous Epithelial Cells, Urine 1-9 (SPARSE) Reference range not established. /HPF    Mucus, Urine FEW Reference range not established. /LPF     CT chest abdomen pelvis wo IV contrast    Result Date: 5/16/2024  Interpreted By:  Josh Anguiano,  and Maximo Rowe STUDY: CT CHEST ABDOMEN PELVIS WO CONTRAST;  5/16/2024 5:43 pm   INDICATION: Signs/Symptoms:new hypotension; concern sepsis of unknown origin.   COMPARISON: Chest radiograph 05/16/2024, CT chest/abdomen/pelvis 09/03/2021.   ACCESSION NUMBER(S): [id number]   ORDERING CLINICIAN: JENNIE HOPE   TECHNIQUE: CT of the chest, abdomen and pelvis was performed. Contiguous axial images were obtained at 3 mm slice thickness through the chest, abdomen and pelvis in 2 mm slice thickness through the chest. Coronal and sagittal reconstructions at 3 mm slice thickness were performed. No intravenous or oral contrast agents were administered.   FINDINGS: Please note that the study is limited without intravenous contrast.   CHEST:   LUNG/PLEURA/LARGE AIRWAYS: Nonspecific secretions within the trachea. Mild predominantly lower lung peribronchial wall thickening.  Tree-in-bud opacities of the left and right lower lungs and posterior right middle lobe. Small scattered more consolidative bibasilar opacities. No pneumothorax or pleural effusion. Scattered pulmonary nodules which are stable from prior exam. For example a left lower lobe pulmonary nodule measures up to 0.7 cm (series 204, image 184, similar to prior exam.   VESSELS: Aorta and pulmonary arteries are normal caliber.  Mild atherosclerotic changes are noted of the aorta and branching vessels. Severe coronary artery calcifications are present.   HEART: Status post CABG. Normal size.  No pericardial effusion   MEDIASTINUM AND EREN: No mediastinal, hilar or axillary lymph nodes are present.  The esophagus appears normal.   CHEST WALL AND LOWER NECK: Within normal limits.  The visualized thyroid gland appears within normal limits.   ABDOMEN:   LIVER: The liver is normal in size without evidence of focal liver lesions.   BILE DUCTS: The bile ducts are not dilated.   GALLBLADDER: The gallbladder is surgically absent.   PANCREAS: The pancreas appears unremarkable.   SPLEEN: Redemonstration of marked nodular contour of the spleen and sequelae of previous splenic infarcts.   ADRENAL GLANDS: Within normal limits.   KIDNEYS AND URETERS: Within normal limits.  No hydroureteronephrosis or nephroureterolithiasis is present.   PELVIS:   BLADDER: Within normal limits.   REPRODUCTIVE ORGANS: The prostate is not enlarged.   BOWEL: PEG tube in place. The stomach is unremarkable.  The small and large bowel are normal in caliber and demonstrate no wall thickening.  The appendix is not definitely visualized. There is however no pericecal stranding or fluid.   VESSELS: Severe aortoiliac atheromatous calcification. There is no aneurysmal dilatation of the abdominal aorta. The IVC is within normal limits.   PERITONEUM/RETROPERITONEUM/LYMPH NODES: No ascites or free air, no fluid collection.  The retroperitoneum appears unremarkable.  No  abdominopelvic lymphadenopathy is present.   ABDOMINAL WALL: The abdominal wall soft tissues appear normal. Bilateral inguinal hernial sacs containing fluid.   BONES: Status post median sternotomy. No suspicious osseous lesions are present. Degenerative discogenic disease is noted in the lower thoracic and lumbar spine.       Chest 1. Tree-in-bud/consolidative nodularity of the bilateral lower lungs with peribronchial wall cuffing nonspecific secretions within the trachea concerning for acute/chronic aspiration and/or pneumonia.   Abdomen-Pelvis 1.  No acute process within the abdomen/pelvis.   I personally reviewed the images/study and I agree with the findings as stated by Dr. Jae Marsh. This study was interpreted at [place], [place].   MACRO: None   Signed by: Josh Anguiano 5/16/2024 6:50 PM Dictation workstation:   [id number]    XR shoulder left 2+ views    Result Date: 5/16/2024  Interpreted By:  Marquis Ramires, STUDY: XR SHOULDER LEFT 2+ VIEWS; ;  5/16/2024 2:59 pm   INDICATION: Signs/Symptoms:lives at snf; no falls with new L shoulder pain.   COMPARISON: None.   ACCESSION NUMBER(S): [id number]   ORDERING CLINICIAN: JENNIE KAMRYN-WHITE   FINDINGS: Three views of the left shoulder.   No acute fracture. No dislocation. Mild acromioclavicular and glenohumeral osteoarthrosis. The soft tissues are unremarkable.       Mild glenohumeral and acromioclavicular osteoarthrosis.   MACRO: None   Signed by: Marquis Ramires 5/16/2024 3:01 PM Dictation workstation:   [id number]    XR chest 1 view    Result Date: 5/16/2024  Interpreted By:  Marquis Ramires, STUDY: XR CHEST 1 VIEW;  5/16/2024 2:48 pm   INDICATION: Signs/Symptoms:shock.   COMPARISON: 04/24/2024   ACCESSION NUMBER(S): [id number]   ORDERING CLINICIAN: JENNIE KAMRYN-WHITE   FINDINGS: AP radiograph of the chest was provided.     CARDIOMEDIASTINAL SILHOUETTE: Cardiomediastinal silhouette is normal in size and  configuration.   LUNGS: Lungs are clear.   ABDOMEN: No remarkable upper abdominal findings.   BONES: No acute osseous changes.       1.  No evidence of acute cardiopulmonary process.     MACRO: None   Signed by: Marquis Ramires 5/16/2024 2:57 PM Dictation workstation:   [id number]            Assessment/Plan   Principal Problem:    NSTEMI (non-ST elevated myocardial infarction) (Multi)      ASSESSMENT:  Mr. Nobles is an 79 year old Male w/ a PMHx MGUS, CAD (s/p CABG x3 in 2022), HTN, CKD IV, Anemia, ABEBE, hyperlipidemia, obesity, BPH, gout, NIDDM, HLD, Parkinsons (w/ dementia), dysphagia, s/p peg tube (04/2024), sacral pressure sores, s/p hospitalization for aspiration PNA (DC'd 5/1/24) being admitted for PNA and AOCA. Patient found to have lethargy and mild leukocytosis (WBC 10), likely infectious etiology. CT shows b/l lower lung findings suggestive of aspiration PNA, and given the patient's recent admission, this seems like the likely source of his infection. Additional sources include his sacral ulcer (not visualized) vs. PEG tube site (low suspicion) vs. UTI (ruled out w/ negative UA). Will empirically treat for aspiration PNA while covering SSTI, however given recent hospitalization, will cover for MRSA until swab results. Was found to have acute on chronic anemia with drop in Hgb from 7.8 -> 6.2 over two weeks. No obvious source of bleed, and given this minor drop over 2 weeks, if the patient is bleeding it's likely a slow trickle (EGD/Colonoscopy 2019: friable gastric mucosal atrophy, duodenal erythema, and diverticulosis). Troponin I  -> 516 w/out EKG changes, likely Type II MI.    PLAN:    # Aspiration PNA  :: WBC 10 on admission (up from 7.9 on 5/1)  :: CT Chest: Tree-in-bud b/l lower lungs and secretions in trachea. C/f acute/chronic aspiration and/or PNA  :: Low concern for systemic infection (afebrile, HDS)  :: S/p Vanc/Zosyn in ED  -Vancomycin (pharmacy to dose):  (5/16- )  -Meropenem 1g q12  (renal dosing): (5/16- )  -Aspiration precautions  -F/u urine strep and legionella  -F/u MRSA nares, consider DC Vanc in AM    # AOCA  # Possible slow GIB  :: Hgb 7.8 -> 6.2 over two weeks  :: No objective evidence of bleed  :: Author witnessed a bowel movement in the ED, was not melenic  :: 2019: EGD/Colonoscopy - friable gastric mucosal atrophy, duodenal erythema, and diverticulosis  :: S/p 1u pRBC in ED completed 5/16 at 23:00  -Trend Hgb  -Maintain active T&S  -Consider GI c/s if Hgb not incrementing  -Will not hold tube feeds via PEG at this time  -Consider initiating    # Sacral Ulcer  :: Unable to visualize 2/2 patient not following commands  -Inspect wound in AM  -Wound care c/s    # Parkinson's Disease w/ Dementia  # AMS  :: AMS likely 2/2 infection i/s/o pre-existing cognitive dysfunction  :: AO x1 and follows commands poorly  -Continue home medication: Carbidopa-Levodopa 1.5tab QID  -If mental status not improving, consider CTH non contrast STAT    # CAD (S/p CABG x3 in 2022)  # Troponinemia  :: Troponin I  -> 516 w/out EKG changes, likely Type II MI  -Continue home medication: Clopidogrel 75mg daily  -Continue home medication: Metoprolol tartrate 25mg daily (unclear why patient is on this dose frequency, however it populated in prior cardiologist note)    #BPH  -Continue home medication: Finasteride 5mg daily  -Continue home medication: Tamsulosin 0.4mg daily    # Severe Protein Calorie Malnutrition  -Tube feeds initiated in anticipation of Nutrition recs  -FWF 250ml q6    F: S/p 1.5L NS  E: PRN  N: Tube feeds via PEG  A: PEG, PIV    Code Status: Full Code (confirmed with patient's wife)    NOK: Bree Nobles (Wife) - [phone]           Emanuel Dave MD

## 2024-05-17 NOTE — PROGRESS NOTES
Vancomycin Dosing by Pharmacy  FOLLOW UP    Markie Nobles is a 79 y.o. male who Pharmacy is consulted to dose vancomycin for pneumonia.     Based on the patient's indication and renal status, this patient is being dosed based on a goal vancomycin level of 15-20.     Current vancomycin dose: Dosing by levels due to unstable renal function.    Most recent vancomycin level: 12.6 mcg/mL (24 hour level)    Renal function is currently declining.    Estimated Creatinine Clearance: 13.9 mL/min (A) (by C-G formula based on SCr of 3.76 mg/dL (H)).    Vancomycin, Trough   Date Value Ref Range Status   05/17/2024 12.6 5.0 - 20.0 ug/mL Final     Comment:     Therapeutic Ranges:    Peak (all ages):        30.0-40.0 ug/mL                       Trough (all ages):        10.0-20.0 ug/mL                                             Vancomycin trough concentrations drawn immediately prior to the next dose at steady-state are preferred for concentration-guided monitoring of patients treated with vancomycin.    Reference: Am J Health-Syst Pharm. 2020; 77(11):835-864.            Results from last 7 days   Lab Units 05/17/24  0719 05/17/24  0134 05/16/24  1421   CREATININE mg/dL 3.76* 3.67* 3.64*   BUN mg/dL 99* 105*  105* 107*   WBC AUTO x10*3/uL 8.8  --  10.6        Visit Vitals  /81   Pulse 70   Temp 36.9 °C (98.4 °F)   Resp 12       Urine Culture   Date/Time Value Ref Range Status   04/24/2024 06:04 PM >100,000 Escherichia coli (A)  Final     Blood Culture   Date/Time Value Ref Range Status   05/16/2024 02:21 PM No growth at 1 day  Preliminary   05/16/2024 02:21 PM No growth at 1 day  Preliminary        Assessment/Plan    Vancomycin level is below goal range of 15-20. Will re-dose vancomycin with one time order of 1000 mg.    The next vancomycin level will be ordered for 5/18 PM at ~1800 (24 hrs post-dose), unless clinically indicated sooner.  Will continue to monitor renal function daily while on vancomycin and order serum  creatinine at least every 48 hours if not already ordered.  Will follow for continued vancomycin needs, clinical response, and signs/symptoms of toxicity.       Epi Shore, PharmD

## 2024-05-17 NOTE — PROGRESS NOTES
"Markie Nobles is a 79 y.o. male on day 1 of admission presenting with NSTEMI (non-ST elevated myocardial infarction) (Multi).    Subjective   NAEO. Wife at bedside says that patient was very lethargic and minimally responsive at home. Feels like patient has improved a lot. The patient endorsed left wrist pain. It was seen to be swollen.       Objective     Vitals:    05/17/24 1600   BP: 144/81   Pulse: 70   Resp: 12   Temp:    SpO2: 100%         Physical Exam  GEN: Ill appearing, cachectic  HEENT: AT/NC, PERRL, EOMI  CARDIO: RRR, normal S1 and S2  PULM: Rhonchorus RLL, no increased WOB  ABD: Soft, NT/ND, bowel sounds +, PEG in place w/out purulence or erythema  : No grayson  EXTR: Warm/well perfused; left wrist swollen and warm  NEURO: Responds to verbal and tactile stimuli, does not follow all commands, AO x1  PSYCH: Appropriate mood/affect    Last Recorded Vitals  Blood pressure 144/81, pulse 70, temperature 36.9 °C (98.4 °F), resp. rate 12, height 1.778 m (5' 10\"), weight 62.6 kg (138 lb), SpO2 100%.  Intake/Output last 3 Shifts:  I/O last 3 completed shifts:  In: 467.5 (7.5 mL/kg) [Blood:367.5; IV Piggyback:100]  Out: - (0 mL/kg)   Weight: 62.6 kg       Assessment/Plan   Principal Problem:    NSTEMI (non-ST elevated myocardial infarction) (Multi)    Mr. Nobles is an 79 year old Male w/ a PMHx MGUS, CAD (s/p CABG x3 in 2022), HTN, CKD IV, Anemia, ABEBE, hyperlipidemia, obesity, BPH, gout, NIDDM, HLD, Parkinsons (w/ dementia), dysphagia, s/p peg tube (04/2024), sacral pressure sores, s/p hospitalization for aspiration PNA (DC'd 5/1/24) initially admitted for PNA and AOCA. Patient found to have lethargy and mild leukocytosis (WBC 10), likely infectious etiology. CT shows b/l lower lung findings suggestive of aspiration PNA, and given the patient's recent admission, this seems like the likely source of his infection. Additional sources include his sacral ulcer (not visualized) vs. PEG tube site (low suspicion) vs. " UTI (ruled out w/ negative UA). He was found to be anemic and given 1x unit of blood transfusion with appropriate increment. BUN very elevated as well and GI was consulted for concerns of GI bleed from the PEG. 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 Patient's left wrist was swollen as well. Wife this say that patient has had gout flares in the past (last one a year ago). Xray left wrist was obtained and showed scaphoid fracture. Ortho was consulted for this. Palliative care was consulted for goals of care and patient changed code status to DNR/DNI on 5/17. Per GI, resumed tube feeds on 5/17 with nutrition consulted placed. Active issues include: monitor for bleeding, left scaphoid fracture, tube feeds, encephalopathy.     Changes 05/17/24  - ortho consulted for scaphoid fracture  - GI consulted for anemia  - palliative consulted for goals of care  - code status changed to DNR/DNI  - obtained hemolysis work up  - H pylori pending per GI rec  - MRI brain obtained and demonstrated old vascular lesions but also ventricular dilation possibly consistent with NPH per radiology read; briefly and informally discussed with neuro team, given that patient already follows with neurology in outpatient setting and difficulty of evaluating NPH when diagnosis would be clouded by patient's other active issues (uremia, GI bleed, etc), nothing acutely to be done from neurology perspective, can follow up in outpatient setting  - xray left hand, wrist, arm obtained per ortho request  - Bcx ngtd  - per GI, resume tube feeds and nutrition consulted; will start with nepro and change based on nutrition recs tomorrow  - held plavix because possible GI bleed and unclear cardiac indication    PLAN:    # AMS, improving  # Parkinson's Disease w/ Dementia  :: Mentation improving after blood transfusion and abx  -Continue home medication: Carbidopa-Levodopa  1.5tab QID  - MRI brain obtained on 5/17 demonstrated old vascular lesions but also ventricular dilation possibly consistent with NPH per radiology read (day team to discuss finding)     # Aspiration PNA  :: WBC 10 on admission (up from 7.9 on 5/1)  :: CT Chest: Tree-in-bud b/l lower lungs and secretions in trachea. C/f acute/chronic aspiration and/or PNA  :: Low concern for systemic infection (afebrile, HDS)  :: S/p Vanc/Zosyn in ED  -Vancomycin (pharmacy to dose):  (5/16- )  -Meropenem 1g q12 (renal dosing): (5/16- )  -Aspiration precautions  -F/u urine strep and legionella  -F/u MRSA nares, consider DC Vanc in AM     # AOCA  # Possible slow GIB  :: Hgb 7.8 -> 6.2 over two weeks  :: No objective evidence of bleed  :: Author witnessed a bowel movement in the ED, was not melenic  :: 2019: EGD/Colonoscopy - friable gastric mucosal atrophy, duodenal erythema, and diverticulosis  :: S/p 1u pRBC in ED completed 5/16 at 23:00  -Trend Hgb  -Maintain active T&S  -tube feeds  - GI consulted    #Scaphoid Fracture  :: Xrays of left wrist obtained  - ortho consulted     # Sacral Ulcer  -Wound care c/s     # CAD (S/p CABG x3 in 2022)  # Troponinemia  :: Troponin I  -> 516 w/out EKG changes, likely Type II MI  -hold home medication: Clopidogrel 75mg daily  -Continue home medication: Metoprolol tartrate 25mg daily (unclear why patient is on this dose frequency, however it populated in prior cardiologist note)     #BPH  -Continue home medication: Finasteride 5mg daily  -Continue home medication: Tamsulosin 0.4mg daily     # Severe Protein Calorie Malnutrition  -Tube feeds initiated in anticipation of Nutrition recs  -FWF 250ml q6     F: S/p 1.5L NS  E: PRN  N: Tube feeds via PEG  A: PEG, PIV     Code Status: DNR/DNI (per palliative care discussion on 5/17)     NOK: Bree Nobles (Wife) - [phone]           Deondre Grimes MD PhD

## 2024-05-17 NOTE — CONSULTS
[facility]   [facility] [place]  INITIAL CONSULT NOTE       Reason For Consult  Concern for GI Bleed    SUBJECTIVE     History Of Present Illness  Markie Nobles is a 79 y.o. male with a past medical history of MGUS, CAD (s/p CABG x3 in 2022), HTN, CKD IV (baseline between 3 and 4, Anemia, ABEBE, hyperlipidemia, obesity, BPH, gout, NIDDM, HLD, Parkinsons (w/ dementia), dysphagia, s/p peg tube (04/29/2024), sacral pressure sores, s/p hospitalization at [facility] (4/24/2024 to 5/1/2024) who was brought to [facility] ER on 5/16/2024 for becoming unresponsive and for hypotension at his SNF ([facility]). GI has been consulted for concern for upper GI bleed.    Briefly, the patient was admitted to [facility] from 4/24/2024 to 5/1/2024 for worsening anorexia, weight loss and malaise. He was found to imaging findings concerning for pneumonia and he was also treated for E.coli UTI. He was evaluated by SLP and he underwent a MBS on 4/26/2024 and he was found to be at high risk for aspiration and silent aspiration noted with thin/nectar/honey liquids. He had no aspiration with pureed foods, but SLP felt that he was at an increased risk for aspiration. As such, he was recommmended for PEG placement. He was evaluated by Dr. Geovanny Tolentino on 4/29/2024 who then placed an endoscopically placed PEG tube at 3 cm at the abdominal wall. He was then discharged to [facility] on 5/1/2024 for rehab.     At [facility], the patient was apparently doing fine for 2 weeks when he was noted to become more lethargic on 5/14/2024. On 5/15, the patient was working with PT/OT and had one episode of NBNB emesis. On the day of admission (5/16/2024), the patient was observed with heavy oral secretions while sitting upright in his chair. He was working with PT, when he became unresponsive to verbal and tactile stimuli (eyes were open, no evidence of LOC, tongue biting, or incontinence). Per the  patient's wife, a “Code Blue” was called and the patient began to respond. He was found to be normoglycemic, however his blood pressure was soft (88/47) and he was bradycardic, prompting the facility call EMS. Of note, wife says the patient's tube feeds were suspended around 11:30 am yesterday when EMS was called.     No fevers, chills, chest pain, nausea or vomiting. Has baseline loose stools since tube feeds were started. No blood in the stools noted previously. Previously, per chart review, had chronic constipation (likely related to his Parkinson's) and he used to have a bowel movement only once every 5 days and wife has had to disimpact him.     Patient has also lost a lot of weight since his Parkinson's diagnosis.  He has anemia and has received iron infusions previously. He had EGD and colonoscopy 2019. There was non-obstructing Schatzki ring, gastric mucosal atrophy with mildly friable mucosa (No H, pylori), and erythematous duodenopathy. There was one small tubular adenoma as well as diverticulosis.    Upon arrival to [facility], his vitals were stable. His hemoglobin had downtrended from 7.8 at the time of his discharge from [facility] to 6.2 on arrival yesterday without any overt signs of GI bleed. He received 1 PRBC with appropriate increment to 7.2. In addition , his platelets have downtrended this morning from 140 to 69. Interestingly, his creatinine has stayed within his baseline, but his BUN uptrended from 39 to 107 raising concern for a GI bleed. Orthopedics were consulted for L scaphoid bone fracture.    When I went to examine him, patient was lying comfortably in bed. He had just had a liquid bowel movement, but it was just dark green in color, and it did not smell like melenic stool. I proceeded to aspirate his PEG tube feed contents, after instilling some water, and all I got back was water with some flecks of hematin. Also of note, the patient is on Plavix for unclear reasons even though  his CABG was back in 2022.    Patient denies any family history of GI malignancies.     Review of Systems  A 12 point ROS was performed and is negative except for HPI above.      Past Medical History:    Past Medical History:   Diagnosis Date    Encounter for immunization 09/29/2016    Encounter for administration of vaccine    Encounter for screening, unspecified 04/28/2014    Screening    Gout, unspecified 10/26/2017    Acute gout    Idiopathic gout, right hand 01/17/2020    Acute idiopathic gout of right hand    Localized edema 07/06/2015    Pedal edema    Other conditions influencing health status 08/03/2015    Paronychia    Other symptoms and signs involving cognitive functions and awareness 06/09/2016    Cognitive changes    Pain in unspecified foot 08/03/2015    Foot pain    Personal history of other diseases of the circulatory system     History of hypertension    Personal history of other diseases of the digestive system 08/13/2019    History of acute gastritis    Personal history of other drug therapy     History of influenza vaccination    Personal history of other specified conditions 03/17/2016    History of diarrhea    Personal history of other specified conditions 02/19/2015    History of elevated prostate specific antigen (PSA)    Strain of muscle and tendon of unspecified wall of thorax, initial encounter 12/05/2016    Strain of thoracic region, initial encounter       Home Medications  Current Outpatient Medications   Medication Instructions    acetaminophen (TYLENOL EXTRA STRENGTH) 1,000 mg, g-tube, Every 8 hours PRN    allopurinol (ZYLOPRIM) 100 mg, oral, Daily PRN, Continue to hold due to ziggy on ckd    atorvastatin (LIPITOR) 80 mg, oral, Daily    benzonatate (TESSALON) 100 mg, oral, 3 times daily PRN, Do not crush or chew.    carbidopa-levodopa (Sinemet)  mg tablet 1.5 tablets, g-tube, 4 times daily, 8am, 11am, 2pm, 5pm    clopidogrel (PLAVIX) 75 mg, oral, Daily    docusate sodium (COLACE)  100 mg, oral, Daily    finasteride (Proscar) 5 mg tablet 1 tablet, g-tube, Daily    melatonin 3 mg, g-tube, Nightly PRN    menthol-zinc oxide (Calmoseptine - Risamine) 0.44-20.6 % ointment 1 Application, Topical, 4 times daily    metoprolol tartrate (LOPRESSOR) 25 mg, oral, Daily, Don't crush    nitroglycerin (NITROSTAT) 0.4 mg, sublingual, Every 5 min PRN    ondansetron (ZOFRAN) 4 mg, oral, Every 8 hours PRN    polyethylene glycol (GLYCOLAX, MIRALAX) 17 g, oral, Daily    tamsulosin (Flomax) 0.4 mg 24 hr capsule TAKE 2 CAPSULES BY MOUTH  DAILY 1/2 HOUR AFTER SAME  MEAL OR AT BEDTIME WITH A  SNACK        Surgical History:    Past Surgical History:   Procedure Laterality Date    CHOLECYSTECTOMY  04/20/2018    Cholecystectomy    CORONARY ARTERY BYPASS GRAFT  12/16/2022    CABG    CT ANGIO NECK  07/29/2019    CT NECK ANGIO W AND WO IV CONTRAST 7/29/2019 [facility] CLINICAL LEGACY    CT HEAD ANGIO W AND WO IV CONTRAST  07/29/2019    CT HEAD ANGIO W AND WO IV CONTRAST 7/29/2019 [facility] CLINICAL LEGACY    GASTROSTOMY TUBE PLACEMENT  04/29/2024       Allergies:  No Known Allergies    Social History:    Social History     Socioeconomic History    Marital status:      Spouse name: Not on file    Number of children: Not on file    Years of education: Not on file    Highest education level: Not on file   Occupational History    Not on file   Tobacco Use    Smoking status: Former     Types: Cigarettes     Passive exposure: Never    Smokeless tobacco: Never   Vaping Use    Vaping status: Never Used   Substance and Sexual Activity    Alcohol use: Not Currently    Drug use: Not Currently    Sexual activity: Not on file   Other Topics Concern    Not on file   Social History Narrative    Not on file     Social Determinants of Health     Financial Resource Strain: Low Risk  (4/25/2024)    Overall Financial Resource Strain (CARDIA)     Difficulty of Paying Living Expenses: Not hard at all   Food Insecurity: Not on file   Transportation  Needs: No Transportation Needs (4/25/2024)    PRAPARE - Transportation     Lack of Transportation (Medical): No     Lack of Transportation (Non-Medical): No   Physical Activity: Not on file   Stress: Not on file   Social Connections: Feeling Socially Integrated (3/27/2024)    OASIS : Social Isolation     Frequency of experiencing loneliness or isolation: Never   Intimate Partner Violence: Not on file   Housing Stability: Low Risk  (4/25/2024)    Housing Stability Vital Sign     Unable to Pay for Housing in the Last Year: No     Number of Places Lived in the Last Year: 1     Unstable Housing in the Last Year: No       Family History:    Family History   Problem Relation Name Age of Onset    Other (ESRD) Mother          on dialysis    Hypertension Father      Dementia Other      Diabetes Other      Liver cancer Other      Kidney disease Other          Reported prior       EXAM     Vitals:    Vitals:    05/17/24 0955 05/17/24 1005 05/17/24 1015 05/17/24 1128   BP:  135/79     Pulse: 66 70 67 70   Resp:    14   Temp:       TempSrc:       SpO2: 99% 100% 99% 98%   Weight:       Height:         Failed to redirect to the Timeline version of the Step-In SmartLink.    Intake/Output Summary (Last 24 hours) at 5/17/2024 1142  Last data filed at 5/17/2024 0014  Gross per 24 hour   Intake 467.5 ml   Output --   Net 467.5 ml         Physical Exam  General: cachectic looking male, in NAD  HEENT: mild pallor, no scleral icterus, temporal wasting noted  Respiratory: CTA bilaterally, normal work of breathing  Cardiovascular: S1, S2 heard  Abdomen: Soft, nontender, nondistended, bowel sounds present. No masses palpated. A PEG tube is placed in her L abdomen, bumper at 2.5 cm  (eventually loosened to 5 cm)  Extremities: no edema, no asterixis, thin legs. Tender L wrist  Neuro: alert and oriented X1 (baseline), CNII-XII grossly intact, moves all 4 extremities with no focal deficits  : external male condom catheter in place      OBJECTIVE                                                                              Medications       Current Facility-Administered Medications:     acetaminophen (Tylenol) tablet 650 mg, 650 mg, oral, q8h PRN, Emanuel Dave MD, 650 mg at 05/17/24 1711    benzonatate (Tessalon) capsule 100 mg, 100 mg, oral, TID PRN, Emanuel Dave MD    carbidopa-levodopa (Sinemet)  mg per tablet 1.5 tablet, 1.5 tablet, oral, 4x daily, Emanuel Dave MD, 1.5 tablet at 05/17/24 2131    [Held by provider] clopidogrel (Plavix) tablet 75 mg, 75 mg, oral, Daily, Emanuel Dave MD, 75 mg at 05/17/24 0802    finasteride (Proscar) tablet 5 mg, 5 mg, oral, Daily, Emanuel Dave MD, 5 mg at 05/17/24 0802    menthol-zinc oxide (Calmoseptine - Risamine) 0.44-20.6 % ointment 1 Application, 1 Application, Topical, 4x daily, Emanuel Dave MD, 1 Application at 05/17/24 1241    meropenem (Merrem) 1,000 mg in sodium chloride 0.9% 100 mL IV, 1,000 mg, intravenous, q12h, Emanuel Dave MD, Stopped at 05/17/24 0916    metoprolol tartrate (Lopressor) tablet 25 mg, 25 mg, oral, Daily, Emanuel Dave MD, 25 mg at 05/17/24 0801    pantoprazole (ProtoNix) injection 40 mg, 40 mg, intravenous, BID, Deondre Grimes MD PhD, 40 mg at 05/17/24 2131    tamsulosin (Flomax) 24 hr capsule 0.4 mg, 0.4 mg, oral, Daily, Emanuel Dave MD, 0.4 mg at 05/17/24 0805    vancomycin (Vancocin) pharmacy to dose - pharmacy monitoring, , miscellaneous, Daily PRN, Emanuel Dave MD                                                                            Labs     Results for orders placed or performed during the hospital encounter of 05/16/24 (from the past 24 hour(s))   CBC and Auto Differential   Result Value Ref Range    WBC 10.6 4.4 - 11.3 x10*3/uL    nRBC 0.0 0.0 - 0.0 /100 WBCs    RBC 2.18 (L) 4.50 - 5.90 x10*6/uL    Hemoglobin 6.2 (LL) 13.5 - 17.5 g/dL    Hematocrit 18.4 (L) 41.0 - 52.0 %    MCV 84 80 - 100 fL    MCH 28.4 26.0 - 34.0 pg     MCHC 33.7 32.0 - 36.0 g/dL    RDW 15.9 (H) 11.5 - 14.5 %    Platelets 140 (L) 150 - 450 x10*3/uL    Neutrophils % 81.3 40.0 - 80.0 %    Immature Granulocytes %, Automated 0.5 0.0 - 0.9 %    Lymphocytes % 6.6 13.0 - 44.0 %    Monocytes % 9.6 2.0 - 10.0 %    Eosinophils % 1.7 0.0 - 6.0 %    Basophils % 0.3 0.0 - 2.0 %    Neutrophils Absolute 8.58 (H) 1.60 - 5.50 x10*3/uL    Immature Granulocytes Absolute, Automated 0.05 0.00 - 0.50 x10*3/uL    Lymphocytes Absolute 0.70 (L) 0.80 - 3.00 x10*3/uL    Monocytes Absolute 1.01 (H) 0.05 - 0.80 x10*3/uL    Eosinophils Absolute 0.18 0.00 - 0.40 x10*3/uL    Basophils Absolute 0.03 0.00 - 0.10 x10*3/uL   Comprehensive Metabolic Panel   Result Value Ref Range    Glucose 129 (H) 74 - 99 mg/dL    Sodium 132 (L) 136 - 145 mmol/L    Potassium 5.1 3.5 - 5.3 mmol/L    Chloride 98 98 - 107 mmol/L    Bicarbonate 22 21 - 32 mmol/L    Anion Gap 17 10 - 20 mmol/L    Urea Nitrogen 107 (HH) 6 - 23 mg/dL    Creatinine 3.64 (H) 0.50 - 1.30 mg/dL    eGFR 16 (L) >60 mL/min/1.73m*2    Calcium 8.8 8.6 - 10.6 mg/dL    Albumin 3.0 (L) 3.4 - 5.0 g/dL    Alkaline Phosphatase 302 (H) 33 - 136 U/L    Total Protein 7.0 6.4 - 8.2 g/dL     (H) 9 - 39 U/L    Bilirubin, Total 0.4 0.0 - 1.2 mg/dL    ALT 41 10 - 52 U/L   Lactate   Result Value Ref Range    Lactate 1.1 0.4 - 2.0 mmol/L   Blood Culture    Specimen: Peripheral Venipuncture; Blood culture   Result Value Ref Range    Blood Culture Loaded on Instrument - Culture in progress    Blood Culture    Specimen: Peripheral Venipuncture; Blood culture   Result Value Ref Range    Blood Culture Loaded on Instrument - Culture in progress    Troponin I, High Sensitivity   Result Value Ref Range    Troponin I, High Sensitivity 747 (HH) 0 - 53 ng/L   Blood Gas Venous Full Panel   Result Value Ref Range    POCT pH, Venous 7.31 (L) 7.33 - 7.43 pH    POCT pCO2, Venous 45 41 - 51 mm Hg    POCT pO2, Venous 21 (L) 35 - 45 mm Hg    POCT SO2, Venous 20 (L) 45 - 75 %     POCT Oxy Hemoglobin, Venous 20.0 (L) 45.0 - 75.0 %    POCT Hematocrit Calculated, Venous 21.0 (L) 41.0 - 52.0 %    POCT Sodium, Venous 133 (L) 136 - 145 mmol/L    POCT Potassium, Venous 4.9 3.5 - 5.3 mmol/L    POCT Chloride, Venous 107 98 - 107 mmol/L    POCT Ionized Calicum, Venous 1.18 1.10 - 1.33 mmol/L    POCT Glucose, Venous 118 (H) 74 - 99 mg/dL    POCT Lactate, Venous 1.3 0.4 - 2.0 mmol/L    POCT Base Excess, Venous -3.3 (L) -2.0 - 3.0 mmol/L    POCT HCO3 Calculated, Venous 22.7 22.0 - 26.0 mmol/L    POCT Hemoglobin, Venous 6.9 (L) 13.5 - 17.5 g/dL    POCT Anion Gap, Venous 8.0 (L) 10.0 - 25.0 mmol/L    Patient Temperature 37.0 degrees Celsius    FiO2 21 %   Iron and TIBC   Result Value Ref Range    Iron 34 (L) 35 - 150 ug/dL    UIBC 177 110 - 370 ug/dL    TIBC 211 (L) 240 - 445 ug/dL    % Saturation 16 (L) 25 - 45 %   Type and Screen   Result Value Ref Range    ABO TYPE O     Rh TYPE POS     ANTIBODY SCREEN NEG    Troponin I, High Sensitivity   Result Value Ref Range    Troponin I, High Sensitivity 516 (HH) 0 - 53 ng/L   Prepare RBC: 1 Units   Result Value Ref Range    PRODUCT CODE A2193O44     Unit Number [id number]     Unit ABO O     Unit RH POS     XM INTEP COMP     Dispense Status TR     Blood Expiration Date June 04, 2024 23:59 EDT     PRODUCT BLOOD TYPE 5100     UNIT VOLUME 350    Urinalysis with Reflex Culture and Microscopic   Result Value Ref Range    Color, Urine Light-Yellow Light-Yellow, Yellow, Dark-Yellow    Appearance, Urine Clear Clear    Specific Gravity, Urine 1.010 1.005 - 1.035    pH, Urine 6.0 5.0, 5.5, 6.0, 6.5, 7.0, 7.5, 8.0    Protein, Urine 50 (1+) (A) NEGATIVE, 10 (TRACE), 20 (TRACE) mg/dL    Glucose, Urine Normal Normal mg/dL    Blood, Urine 0.06 (1+) (A) NEGATIVE    Ketones, Urine NEGATIVE NEGATIVE mg/dL    Bilirubin, Urine NEGATIVE NEGATIVE    Urobilinogen, Urine Normal Normal mg/dL    Nitrite, Urine NEGATIVE NEGATIVE    Leukocyte Esterase, Urine NEGATIVE NEGATIVE   Extra  Urine Gray Tube   Result Value Ref Range    Extra Tube Hold for add-ons.    Urine electrolytes   Result Value Ref Range    Sodium, Urine Random 24 mmol/L    Sodium/Creatinine Ratio 69 Not established. mmol/g Creat    Potassium, Urine Random 21 mmol/L    Potassium/Creatinine Ratio 60 Not established mmol/g Creat    Chloride, Urine Random <15 mmol/L    Chloride/Creatinine Ratio      Creatinine, Urine Random 35.0 20.0 - 370.0 mg/dL   Urinalysis Microscopic   Result Value Ref Range    WBC, Urine 1-5 1-5, NONE /HPF    RBC, Urine NONE NONE, 1-2, 3-5 /HPF    Squamous Epithelial Cells, Urine 1-9 (SPARSE) Reference range not established. /HPF    Mucus, Urine FEW Reference range not established. /LPF   Renal function panel   Result Value Ref Range    Glucose 83 74 - 99 mg/dL    Sodium 134 (L) 136 - 145 mmol/L    Potassium 5.1 3.5 - 5.3 mmol/L    Chloride 103 98 - 107 mmol/L    Bicarbonate 23 21 - 32 mmol/L    Anion Gap 13 10 - 20 mmol/L    Urea Nitrogen 105 (HH) 6 - 23 mg/dL    Creatinine 3.67 (H) 0.50 - 1.30 mg/dL    eGFR 16 (L) >60 mL/min/1.73m*2    Calcium 8.0 (L) 8.6 - 10.6 mg/dL    Phosphorus 3.4 2.5 - 4.9 mg/dL    Albumin 2.6 (L) 3.4 - 5.0 g/dL   Ferritin   Result Value Ref Range    Ferritin 2,529 (H) 20 - 300 ng/mL   Iron and TIBC   Result Value Ref Range    Iron 36 35 - 150 ug/dL    UIBC 151 110 - 370 ug/dL    TIBC 187 (L) 240 - 445 ug/dL    % Saturation 19 (L) 25 - 45 %   Bilirubin, Total   Result Value Ref Range    Bilirubin, Total 0.7 0.0 - 1.2 mg/dL   Bilirubin, Direct   Result Value Ref Range    Bilirubin, Direct 0.1 0.0 - 0.3 mg/dL   CBC and Auto Differential   Result Value Ref Range    WBC 8.8 4.4 - 11.3 x10*3/uL    nRBC 0.0 0.0 - 0.0 /100 WBCs    RBC 2.49 (L) 4.50 - 5.90 x10*6/uL    Hemoglobin 7.2 (L) 13.5 - 17.5 g/dL    Hematocrit 20.9 (L) 41.0 - 52.0 %    MCV 84 80 - 100 fL    MCH 28.9 26.0 - 34.0 pg    MCHC 34.4 32.0 - 36.0 g/dL    RDW 14.9 (H) 11.5 - 14.5 %    Platelets 69 (L) 150 - 450 x10*3/uL     Neutrophils % 64.5 40.0 - 80.0 %    Immature Granulocytes %, Automated 0.6 0.0 - 0.9 %    Lymphocytes % 14.7 13.0 - 44.0 %    Monocytes % 17.7 2.0 - 10.0 %    Eosinophils % 2.3 0.0 - 6.0 %    Basophils % 0.2 0.0 - 2.0 %    Neutrophils Absolute 5.67 (H) 1.60 - 5.50 x10*3/uL    Immature Granulocytes Absolute, Automated 0.05 0.00 - 0.50 x10*3/uL    Lymphocytes Absolute 1.29 0.80 - 3.00 x10*3/uL    Monocytes Absolute 1.56 (H) 0.05 - 0.80 x10*3/uL    Eosinophils Absolute 0.20 0.00 - 0.40 x10*3/uL    Basophils Absolute 0.02 0.00 - 0.10 x10*3/uL   Renal function panel   Result Value Ref Range    Glucose 63 (L) 74 - 99 mg/dL    Sodium 133 (L) 136 - 145 mmol/L    Potassium 4.7 3.5 - 5.3 mmol/L    Chloride 102 98 - 107 mmol/L    Bicarbonate 22 21 - 32 mmol/L    Anion Gap 14 10 - 20 mmol/L    Urea Nitrogen 99 (HH) 6 - 23 mg/dL    Creatinine 3.76 (H) 0.50 - 1.30 mg/dL    eGFR 16 (L) >60 mL/min/1.73m*2    Calcium 8.0 (L) 8.6 - 10.6 mg/dL    Phosphorus 3.7 2.5 - 4.9 mg/dL    Albumin 2.5 (L) 3.4 - 5.0 g/dL   Magnesium   Result Value Ref Range    Magnesium 1.92 1.60 - 2.40 mg/dL   Reticulocytes   Result Value Ref Range    Retic % 0.9 0.5 - 2.0 %    Retic Absolute 0.021 0.017 - 0.110 x10*6/uL    Reticulocyte Hemoglobin 34 28 - 38 pg    Immature Retic fraction 7.6 <=16.0 %   Lactate   Result Value Ref Range    Lactate 0.9 0.4 - 2.0 mmol/L   Fibrinogen   Result Value Ref Range    Fibrinogen 656 (H) 200 - 400 mg/dL   Coagulation Screen   Result Value Ref Range    Protime 12.9 (H) 9.8 - 12.8 seconds    INR 1.1 0.9 - 1.1    aPTT 26 (L) 27 - 38 seconds                                                                              Imaging   CT c/a/p 5/16/2024   IMPRESSION:  Chest  1. Tree-in-bud/consolidative nodularity of the bilateral lower lungs with peribronchial wall cuffing nonspecific secretions within thetrachea concerning for acute/chronic aspiration and/or pneumonia.      Abdomen-Pelvis  1.  No acute process within the  abdomen/pelvis.                                                                           GI Procedures     He had EGD and colonoscopy 2019. There was a non-obstructing Schatzki ring, gastric mucosal atrophy with mildly friable mucosa (No H, pylori), and erythematous duodenopathy. There was one small tubular adenoma as well as diverticulosis.     ASSESSMENT / PLAN                  ASSESSMENT:  Markie Nobles is a 79 y.o. male with a past medical history of MGUS, CAD (s/p CABG x3 in 2022), HTN, CKD IV (baseline between 3 and 4, Anemia, ABEBE, hyperlipidemia, obesity, BPH, gout, NIDDM, HLD, Parkinsons (w/ dementia), dysphagia, s/p peg tube (04/29/2024), sacral pressure sores, s/p hospitalization at [facility] (4/24/2024 to 5/1/2024) who was [facility] ER on 5/16/2024 for becoming unresponsive and for hypotension at his SNF ([facility]). GI has been consulted for concern for upper GI bleed.    Aspiration of stomach contents through the PEG tube site revealed specks of hematin. 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 The suspected UGIB happened in the setting of Plavix.     PLAN:  No emergent need to do EGD for this patient given that he has minimal blood loss is minimal at best   External bumper was loosened to around 5 cm  Daily dressing changes for PEG  Can resume tube feeds  Order Ensure/Boost   Request Nutrition consult  Start 40 mg IV PPP BID  Check H.pylori stool antigen  Discontinue Plavix permanently if deemed appropriate     Patient was seen and discussed with Dr. Charlie Kaba.    Thank you for this interesting consult. Gastroenterology will continue to follow.    -During weekday hours of 7am-5pm please do not hesitate to contact me on [employer] Chat or page [phone] if there  are any further questions between the weekday hours of 7 AM - 5 PM.   -After hours, on weekends, and on holidays, please page the [place] GI [place] [phone]. Thank you.    Marilee Beltre MD MPH   GI Fellow  [facility]

## 2024-05-17 NOTE — PROGRESS NOTES
Pharmacy Medication History Review    Markie Nobles is a 79 y.o. male admitted for NSTEMI (non-ST elevated myocardial infarction) (Multi). Pharmacy reviewed the patient's zehmk-mn-ekpnzfrte medications and allergies for accuracy.    The list below reflects the updated PTA list. Comments regarding how patient may be taking medications differently can be found in the Admit Orders Activity  Prior to Admission Medications   Prescriptions Last Dose Informant   acetaminophen (Tylenol Extra Strength) 500 mg tablet     Si tablets (1,000 mg) by g-tube route every 8 hours if needed for mild pain (1 - 3).   allopurinol (Zyloprim) 100 mg tablet     Sig: Take 1 tablet (100 mg) by mouth once daily as needed (gout). Continue to hold due to ziggy on ckd   Patient taking differently: 1 tablet (100 mg) by g-tube route once daily as needed (gout). Continue to hold due to ziggy on ckd   atorvastatin (Lipitor) 80 mg tablet     Sig: TAKE 1 TABLET BY MOUTH ONCE  DAILY   Patient taking differently: 1 tablet (80 mg) by g-tube route once daily.   benzonatate (Tessalon) 100 mg capsule     Sig: Take 1 capsule (100 mg) by mouth 3 times a day as needed for cough. Do not crush or chew.   carbidopa-levodopa (Sinemet)  mg tablet     Si.5 tablets by g-tube route 4 times a day. 8am, 11am, 2pm, 5pm   clopidogrel (Plavix) 75 mg tablet     Sig: TAKE 1 TABLET BY MOUTH ONCE  DAILY   Patient taking differently: 1 tablet (75 mg) by g-tube route once daily.   docusate sodium (Colace) 100 mg capsule     Sig: TAKE 1 CAPSULE BY MOUTH EVERY DAY AS DIRECTED   finasteride (Proscar) 5 mg tablet     Si tablet (5 mg) by g-tube route once daily.   melatonin 3 mg tablet     Si tablet (3 mg) by g-tube route as needed at bedtime for sleep.   menthol-zinc oxide (Calmoseptine - Risamine) 0.44-20.6 % ointment     Sig: Apply 1 Application topically 4 times a day.   metoprolol tartrate (Lopressor) 25 mg tablet     Sig: Take 1 tablet (25 mg) by mouth once  daily. Don't crush   Patient taking differently: 0.5 tablets (12.5 mg) by g-tube route once daily. Don't crush   nitroglycerin (Nitrostat) 0.4 mg SL tablet     Sig: Place 1 tablet (0.4 mg) under the tongue every 5 minutes if needed for chest pain.   ondansetron (Zofran) 4 mg tablet     Sig: Take 1 tablet (4 mg) by mouth every 8 hours if needed for nausea.   polyethylene glycol (Glycolax, Miralax) 17 gram packet     Sig: Take 17 g by mouth once daily.   tamsulosin (Flomax) 0.4 mg 24 hr capsule     Sig: TAKE 2 CAPSULES BY MOUTH  DAILY 1/2 HOUR AFTER SAME  MEAL OR AT BEDTIME WITH A  SNACK      Facility-Administered Medications: None       The list below reflects the updated allergy list. Please review each documented allergy for additional clarification and justification.  Allergies  Reviewed by Ana Laura Lopez RN on 5/16/2024   No Known Allergies         Patient declines M2B at discharge. Patient presented from SNF, please reassess need for M2B closer to discharge and placement.     Sources used to complete the med history include [facility] from [facility] and [facility]    Below are additional concerns with the patient's PTA list.  Patient receiving most medications through g-tube per SNF medication profile  Finasteride tablets should be handled with caution by those able to become pregnant or are pregnant   recommends tamsulosin capsules NOT be opened    Oliverio Lacey, PharmD  Transitions of Care Pharmacist  [facility]  Please reach out via Secure Chat for questions, or if no response call [facility] or [facility]

## 2024-05-17 NOTE — CONSULTS
Inpatient consult to Palliative Care  Consult performed by: SILVIANO Laird  Consult ordered by: Yahaira Constantino MD      Palliative Medicine Consult  Complex medical decision making, symptom management, patient/family support    History obtained from chart review including ED note, H&P, patient's daily progress notes, review of lab/test results, and discussion with primary team and bedside RN.    Subjective    History of Present Illness  Mr. Markie Nobles is an 79y gentleman with a pmh of MGUS, CAD s/p CABG in 2022, CKD IV, HTN, anemia, ABEBE, HLD, BPH, HLD, gout, Parkinsons disease and dementia, sacral pressure sores, and dysphagia s/p peg tube in April 2024. Recently admitted for aspiration PNA early this month.   Pt admitted this admission with lethargy and leukocytosis, findings concerning for aspiration PNA.     Introduction to Palliative Care  Met with pt at bedside.   Patient remains altered, does not have capacity to make their own medical decisions at this time. Unable to participate in ROS or goals of care discussion. Surrogate decision maker is wife Bree.  Staff present: Viridiana SALVADOR  Palliative Medicine was introduced as a specialty service for patients with serious illness to help with symptom management, improve quality of life, assist with goals of care conversations, navigate complex decision making, and provide support to patients and families. Support and empathy was provided throughout the encounter. Provided reflective listening and presence.     Symptoms   ROS limited due to altered mental status and dementia    Palliative Medicine Social History:  Patient has been  to wife for 47 years.  He used to work for [employer] as a .  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  Altogether, patient and wife have 4 children.  Patient [employer] music and use to ove to dance.  His favorite foods include donuts, BBQ, shrimp/fish.  He is a  "[employer] sports fan.  He loves watching television, used to love singing in [employer] and bowling.  Before patient last hospitalization, he was living at home with wife.  Wife was able to dress, bathe, and toilet him.  Was ambulating slowly with walker at that time.  Patient stopped driving 4 years ago.  Since last hospitalization, patient has been at [place]. used to be involved in \"[employer]\" activities in the community.    Objective    Last Recorded Vitals  /81   Pulse 70   Temp 36.9 °C (98.4 °F)   Resp 12   Ht 1.778 m (5' 10\")   Wt 62.6 kg (138 lb)   SpO2 100%   BMI 19.80 kg/m²      Physical Exam  Constitutional:       Appearance: He is ill-appearing.   HENT:      Head: Normocephalic.      Mouth/Throat:      Mouth: Mucous membranes are dry.   Cardiovascular:      Rate and Rhythm: Normal rate.   Pulmonary:      Effort: Pulmonary effort is normal.   Abdominal:      Palpations: Abdomen is soft.   Musculoskeletal:         General: Tenderness (left ue) present.   Skin:     General: Skin is warm and dry.   Neurological:      Mental Status: He is disoriented.          Relevant Results  Results for orders placed or performed during the hospital encounter of 05/16/24 (from the past 24 hour(s))   Urinalysis with Reflex Culture and Microscopic   Result Value Ref Range    Color, Urine Light-Yellow Light-Yellow, Yellow, Dark-Yellow    Appearance, Urine Clear Clear    Specific Gravity, Urine 1.010 1.005 - 1.035    pH, Urine 6.0 5.0, 5.5, 6.0, 6.5, 7.0, 7.5, 8.0    Protein, Urine 50 (1+) (A) NEGATIVE, 10 (TRACE), 20 (TRACE) mg/dL    Glucose, Urine Normal Normal mg/dL    Blood, Urine 0.06 (1+) (A) NEGATIVE    Ketones, Urine NEGATIVE NEGATIVE mg/dL    Bilirubin, Urine NEGATIVE NEGATIVE    Urobilinogen, Urine Normal Normal mg/dL    Nitrite, Urine NEGATIVE NEGATIVE    Leukocyte Esterase, Urine NEGATIVE NEGATIVE   Extra Urine Gray Tube   Result Value Ref Range    Extra Tube Hold for add-ons.    Urine electrolytes "   Result Value Ref Range    Sodium, Urine Random 24 mmol/L    Sodium/Creatinine Ratio 69 Not established. mmol/g Creat    Potassium, Urine Random 21 mmol/L    Potassium/Creatinine Ratio 60 Not established mmol/g Creat    Chloride, Urine Random <15 mmol/L    Chloride/Creatinine Ratio      Creatinine, Urine Random 35.0 20.0 - 370.0 mg/dL   Urinalysis Microscopic   Result Value Ref Range    WBC, Urine 1-5 1-5, NONE /HPF    RBC, Urine NONE NONE, 1-2, 3-5 /HPF    Squamous Epithelial Cells, Urine 1-9 (SPARSE) Reference range not established. /HPF    Mucus, Urine FEW Reference range not established. /LPF   Streptococcus pneumoniae Antigen, Urine    Specimen: Urine   Result Value Ref Range    Streptococcus pneumoniae Ag, Urine Negative Negative   Legionella Antigen, Urine    Specimen: Urine   Result Value Ref Range    L. pneumophila Urine Ag Negative Negative   Renal function panel   Result Value Ref Range    Glucose 83 74 - 99 mg/dL    Sodium 134 (L) 136 - 145 mmol/L    Potassium 5.1 3.5 - 5.3 mmol/L    Chloride 103 98 - 107 mmol/L    Bicarbonate 23 21 - 32 mmol/L    Anion Gap 13 10 - 20 mmol/L    Urea Nitrogen 105 (HH) 6 - 23 mg/dL    Creatinine 3.67 (H) 0.50 - 1.30 mg/dL    eGFR 16 (L) >60 mL/min/1.73m*2    Calcium 8.0 (L) 8.6 - 10.6 mg/dL    Phosphorus 3.4 2.5 - 4.9 mg/dL    Albumin 2.6 (L) 3.4 - 5.0 g/dL   Ferritin   Result Value Ref Range    Ferritin 2,529 (H) 20 - 300 ng/mL   Iron and TIBC   Result Value Ref Range    Iron 36 35 - 150 ug/dL    UIBC 151 110 - 370 ug/dL    TIBC 187 (L) 240 - 445 ug/dL    % Saturation 19 (L) 25 - 45 %   Blood Urea Nitrogen   Result Value Ref Range    Urea Nitrogen 105 (HH) 6 - 23 mg/dL   Bilirubin, Total   Result Value Ref Range    Bilirubin, Total 0.7 0.0 - 1.2 mg/dL   Bilirubin, Direct   Result Value Ref Range    Bilirubin, Direct 0.1 0.0 - 0.3 mg/dL   CBC and Auto Differential   Result Value Ref Range    WBC 8.8 4.4 - 11.3 x10*3/uL    nRBC 0.0 0.0 - 0.0 /100 WBCs    RBC 2.49 (L) 4.50  - 5.90 x10*6/uL    Hemoglobin 7.2 (L) 13.5 - 17.5 g/dL    Hematocrit 20.9 (L) 41.0 - 52.0 %    MCV 84 80 - 100 fL    MCH 28.9 26.0 - 34.0 pg    MCHC 34.4 32.0 - 36.0 g/dL    RDW 14.9 (H) 11.5 - 14.5 %    Platelets 69 (L) 150 - 450 x10*3/uL    Neutrophils % 64.5 40.0 - 80.0 %    Immature Granulocytes %, Automated 0.6 0.0 - 0.9 %    Lymphocytes % 14.7 13.0 - 44.0 %    Monocytes % 17.7 2.0 - 10.0 %    Eosinophils % 2.3 0.0 - 6.0 %    Basophils % 0.2 0.0 - 2.0 %    Neutrophils Absolute 5.67 (H) 1.60 - 5.50 x10*3/uL    Immature Granulocytes Absolute, Automated 0.05 0.00 - 0.50 x10*3/uL    Lymphocytes Absolute 1.29 0.80 - 3.00 x10*3/uL    Monocytes Absolute 1.56 (H) 0.05 - 0.80 x10*3/uL    Eosinophils Absolute 0.20 0.00 - 0.40 x10*3/uL    Basophils Absolute 0.02 0.00 - 0.10 x10*3/uL   Renal function panel   Result Value Ref Range    Glucose 63 (L) 74 - 99 mg/dL    Sodium 133 (L) 136 - 145 mmol/L    Potassium 4.7 3.5 - 5.3 mmol/L    Chloride 102 98 - 107 mmol/L    Bicarbonate 22 21 - 32 mmol/L    Anion Gap 14 10 - 20 mmol/L    Urea Nitrogen 99 (HH) 6 - 23 mg/dL    Creatinine 3.76 (H) 0.50 - 1.30 mg/dL    eGFR 16 (L) >60 mL/min/1.73m*2    Calcium 8.0 (L) 8.6 - 10.6 mg/dL    Phosphorus 3.7 2.5 - 4.9 mg/dL    Albumin 2.5 (L) 3.4 - 5.0 g/dL   Magnesium   Result Value Ref Range    Magnesium 1.92 1.60 - 2.40 mg/dL   Reticulocytes   Result Value Ref Range    Retic % 0.9 0.5 - 2.0 %    Retic Absolute 0.021 0.017 - 0.110 x10*6/uL    Reticulocyte Hemoglobin 34 28 - 38 pg    Immature Retic fraction 7.6 <=16.0 %   Lactate   Result Value Ref Range    Lactate 0.9 0.4 - 2.0 mmol/L   Fibrinogen   Result Value Ref Range    Fibrinogen 656 (H) 200 - 400 mg/dL   Coagulation Screen   Result Value Ref Range    Protime 12.9 (H) 9.8 - 12.8 seconds    INR 1.1 0.9 - 1.1    aPTT 26 (L) 27 - 38 seconds   Vancomycin, Trough   Result Value Ref Range    Vancomycin, Trough 12.6 5.0 - 20.0 ug/mL      XR hand left 3+ views, XR forearm left 2  views  Narrative: Interpreted By:  Lila Richard,   STUDY:  XR HAND LEFT 3+ VIEWS; XR FOREARM LEFT 2 VIEWS; ;  5/17/2024 3:22 pm      INDICATION:  Signs/Symptoms:fracture asesssment; Signs/Symptoms:fracture  assessment.      COMPARISON:  Left wrist radiographs 05/17/2024      ACCESSION NUMBER(S):  [id number]; [id number]      ORDERING CLINICIAN:  BENOIT PARISH      FINDINGS:  Left hand: Similar alignment of a mildly displaced fracture involving  the mid scaphoid pole. Mild joint space narrowing and osteophytosis  of the 1st carpometacarpal joint is visualized. There is also mild  joint space narrowing and osteophytosis of the 2nd metacarpal  phalangeal joint. Moderate joint space narrowing of the triscaphe  joint is visualized. There is mild-to-moderate joint space narrowing  of the radiocarpal articulations.      Left forearm: No acute osseous changes. No elbow joint effusion.      Impression: Similar alignment of mildly displaced fracture involving the mid  scaphoid pole. No other acute osseous changes of the hand or forearm.      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      MACRO:  None      Signed by: Lila Richard 5/17/2024 3:29 PM  Dictation workstation:   [id number]  MR brain w and wo IV contrast  Narrative: Interpreted By:  Jigar Alonso,   STUDY:  MR BRAIN W AND WO IV CONTRAST; ;  5/17/2024 12:18 pm      INDICATION:  Signs/Symptoms:encephalopathy.      COMPARISON:  CT head from 04/20/2024. MRI brain from 05/15/2016.      ACCESSION NUMBER(S):  [id number]      ORDERING CLINICIAN:  BENOIT PARISH      TECHNIQUE:  MRI of the brain was performed with the acquisition of axial  diffusion-weighted, axial T1, axial FLAIR, axial T2 gradient echo,  axial T1 fat saturated postcontrast sequence, and volumetric axial T1  post contrast sequence with multiplanar reformats.      Contrast: 12 mL of Dotarem was injected intravenously.      FINDINGS:  There is no acute  intracranial hemorrhage or infarct. There is no  abnormal extra-axial fluid collection or mass effect.      There is stable mild disproportionate enlargement of the  supratentorial ventricles compared to the adjacent sulci with  narrowing of the parasagittal frontal parietal lobe sulci in a  pattern that could reflect sequela of normal pressure hydrocephalus.      There are regions of T2 hyperintensity within the bilateral cerebral  hemispheric white matter and juanita which are probably sequela of  chronic small vessel ischemic changes.      There are small old infarcts located within the cerebellum.      There is no abnormal intracranial enhancement or mass.      There is mild mucosal thickening within the maxillary sinuses and  ethmoid air cells. There is a small mucosal cyst located within the  left maxillary sinus. Mastoid air cells are clear.      Impression: 1. No acute intracranial abnormality. No abnormal intracranial  enhancement or mass.      2. Mild disproportionate enlargement of the supratentorial ventricles  compared to the adjacent sulci in a pattern that is suggestive of  normal pressure hydrocephalus. Other differential consideration would  be central volume loss.      3. Findings of probable chronic small vessel ischemic changes,  overall moderate to marked in extent for patient's age. 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      This study was interpreted at [facility].      MACRO:  None      Signed by: Jigar Alonso 5/17/2024 12:27 PM  Dictation workstation:   [id number]  XR wrist left 3+ views  Narrative: Interpreted By:  Lila Richard and Kamau Nyokabi   STUDY:  XR WRIST LEFT 3+ VIEWS; ;  5/17/2024 11:38 am      INDICATION:  Signs/Symptoms:concerns for gout vs joint infection.      COMPARISON:  None.      ACCESSION NUMBER(S):  [id number]      ORDERING CLINICIAN:  BENOIT PARISH      FINDINGS:  Three views of the left wrist.      There is an age-indeterminate  mildly displaced fracture involving the  midpole of the scaphoid. No acute fracture or malalignment of the  left wrist.      Moderate radiocarpal degenerative change with joint space loss. Mild  triscaphe degenerative changes. Subchondral cystic changes of the  distal scaphoid pole are also noted.      No mineralization within the joint spaces. No abnormal periosteal  reaction or erosive changes. Extensive vascular calcifications.      Impression: 1. There is a mildly displaced fracture involving the midpole of the  scaphoid.  2. Mild-to-moderate polyarticular degenerative changes of the wrist  as above. No definite osseous erosions to suggest crystalline  arthropathy.      I personally reviewed the images/study and I agree with Dr. Cuate Nash findings as stated. This study was interpreted at [place], [place]      MACRO:  None      Signed by: Lila Richard 5/17/2024 12:04 PM  Dictation workstation:   [id number]     Encounter Date: 04/24/24   ECG 12 lead   Result Value    Ventricular Rate 68    Atrial Rate 68    DE Interval 188    QRS Duration 68    QT Interval 440    QTC Calculation(Bazett) 467    P Axis 70    R Axis 60    T Axis 92    QRS Count 11    Q Onset 221    P Onset 127    P Offset 183    T Offset 441    QTC Fredericia 459    Narrative    Normal sinus rhythm  Nonspecific T wave abnormality  Prolonged QT  Abnormal ECG  When compared with ECG of 07-MAR-2024 18:53,  No significant change was found  See ED provider note for full interpretation and clinical correlation  Confirmed by Katie Sweeney ([id number]) on 4/25/2024 10:55:21 AM        Allergies  Patient has no known allergies.    Scheduled medications  carbidopa-levodopa, 1.5 tablet, oral, 4x daily  [Held by provider] clopidogrel, 75 mg, oral, Daily  finasteride, 5 mg, oral, Daily  menthol-zinc oxide, 1 Application, Topical, 4x daily  meropenem, 1,000 mg, intravenous, q12h  metoprolol tartrate, 25 mg,  oral, Daily  pantoprazole, 40 mg, intravenous, BID  tamsulosin, 0.4 mg, oral, Daily      Continuous medications     PRN medications  PRN medications: acetaminophen, benzonatate, vancomycin     Assessment/Plan    Mr. Markie Nobles is an 79y gentleman with a pmh of MGUS, CAD s/p CABG in 2022, CKD IV, HTN, anemia, ABEBE, HLD, BPH, HLD, gout, Parkinsons disease and dementia, sacral pressure sores, and dysphagia s/p peg tube in April 2024. Recently admitted for aspiration PNA early this month.   Pt admitted this admission with lethargy and leukocytosis, findings concerning for aspiration PNA.     Palliative Performance Scale (PPS): 10    ------------------------------------------------------------------------------------------------------------------------------------------------------------------------------------  Advanced Care Planning  Wife consented to a voluntary Advanced Care Planning meeting.   Serious Illness Assessment and Counseling:  Life Limiting Disease: Parkinsons disease, dementia, aspiration pna posing threat to life or function.     Disease Specific Information Provided/Prognosis Discussed: Patient's current clinical condition, including diagnosis, prognosis, and management plan were discussed.   Counseling provided on aspiration PNA  Counseling provided on poor prognosis and what to expect with disease progression of parkinsons/dementia.   Counseling provided on the irreversible and progressive nature of patient's diseases     Feeding Tube: discussed that peg feeds do not eliminate risk for aspiration     Understanding/Overall Impression: wife expressing clear understanding of overall health status and severity of illness.     Goals/Hopes: Discussion ensued about goals for medical care going forward. Allowed wife time to talk about pt's current quality of life, disease course/progression, and symptom and treatment burden. Discussed care plan to continue with aggressive hospital care despite symptom and  treatment burden versus choosing to transition to comfort based plan of care that focuses on symptom management and quality of life. At this point in time, wife still would like to continue with treatment-focused care and would like to maximize his QOL with the time he has left. She is fully understanding of disease progression and that time may be short, given overall poor prognosis.     Resuscitation Assessment: Counseling provided on the benefit versus burden of CPR in the setting of patient's overall health status and aspiration PNA. Wife believes that patient would want to die peacefully when the time comes and not undergo things such as CPR, mechanical ventilation/intubation.     Advanced Directives:  Counseling provided on the importance of not crisis planning as disease burden progresses but to establish treatment limitations now so in the future medical team will be clear on what patient feels is an acceptable quality of life for the patient and what treatment limitations' patient would like set into place based on that.       Surrogate Health Care Decision Maker: Breeadama Nobles  HPOA: Yes, on file   Living will: Yes, on file     Code Status: Decision to change code status to DNR/DNI per Bree's wishes.     All questions and concerns were addressed during encounter.     I spent 60 minutes in providing separately identifiable ACP services with the patient and/or surrogate decision maker in a voluntary conversation discussing the patient's wishes and goals as detailed in the above note.   ------------------------------------------------------------------------------------------------------------------------------------------------------------------------------------    #Complex Medical Decision Making  #Goals of Care  #Advanced Care Planning  - Code status: DNR/DNI  - Surrogate decision maker: oliver Nobles is [facility] [phone]  - Goals are mix of survival and time and improved quality of life  -  Advanced Directives on file   - anticipate discharge back to [place] when medically ready, can engage palliative care services at [place] for additional support    #Psychosocial Support  - Music Therapy, [employer]  - Spiritual Care Support, [employer]  - Art Therapy      Plan of Care discussed with: Updated MD and bedside RN on goals of care decision, medication adjustments, and code status     Medical Decision Making was high level due to high complexity of problems, extensive data review, and high risk of management/treatment.     - Parkinsons disease, dementia, aspiration pna posing threat to life or function.    - Decision not to resuscitate because of poor prognosis    Thank you for allowing us to participate in the care of this patient. Palliative will continue to follow as needed. Palliative medicine is available Monday-Friday, 8a-6p. Please contact team with any questions or concerns.  Team pager [phone] (weekdays)  Viridiana Davis DNP, CNP   (on EPIC secure chat)

## 2024-05-17 NOTE — CARE PLAN
The patient's goals for the shift include  feel better    The clinical goals for the shift include Pt will remain hds for duration of shift    Over the shift, the patient made progress towards all goals.

## 2024-05-17 NOTE — PROGRESS NOTES
"Vancomycin Dosing by Pharmacy- INITIAL    Markie Nobles is a 79 y.o. year old male who Pharmacy has been consulted for vancomycin dosing for pneumonia. Based on the patient's indication and renal status this patient will be dosed based on a goal trough/random level of 15-20.     Renal function is currently declining.    Visit Vitals  /78   Pulse 82   Temp 37 °C (98.6 °F) (Oral)   Resp 16        Lab Results   Component Value Date    CREATININE 3.64 (H) 05/16/2024    CREATININE 3.18 (H) 05/01/2024    CREATININE 3.71 (H) 04/30/2024    CREATININE 3.83 (H) 04/29/2024        Patient weight is 62.6 kg    No results found for: \"CULTURE\"     No intake/output data recorded.  [unfilled]    Lab Results   Component Value Date    PATIENTTEMP 37.0 05/16/2024    PATIENTTEMP 37.0 03/07/2024    PATIENTTEMP 37.0 01/22/2023    PATIENTTEMP 37.0 01/21/2023    PATIENTTEMP 37.0 01/20/2023          Assessment/Plan     Patient was given a 1.5gm dose today at 1630. We will follow up with a 24H lvl  Follow-up level will be ordered on 5/17 at 1530 unless clinically indicated sooner.  Will continue to monitor renal function daily while on vancomycin and order serum creatinine at least every 48 hours if not already ordered.  Follow for continued vancomycin needs, clinical response, and signs/symptoms of toxicity.       Dl Angel, PharmD       "

## 2024-05-17 NOTE — CONSULTS
Orthopaedic Surgery Consult H&P    HPI:   Orthopaedic Problems/Injuries: Left scaphoid waist fx    79M RHD (DNR/DNI, MGUS, CAD (s/p CABG 2023), anemia, ABEBE, Parkinson’s w dementia) presents with above after unknown mechanism. In facility for swallowing difficulties due to parkinson’s.    PMH: per above/EMR  PSH: per above/EMR  SocHx:      -  Denies tobacco use      -  Denies EtOH use      -  Denies other drug use  FamHx:  Non-contributory to this patient's acute orthopaedic problem.   Allergies: Reviewed in EMR  Meds: Reviewed in EMR    ROS      - 14 point ROS negative except as above    Physical Exam:  Gen: AOx3, NAD  HEENT: normocephalic atraumatic  Psych: appropriate mood and affect  Resp: nonlabored breathing  Cardiac: Extremities WWP, RRR to peripheral palpation  Neuro: CN 2-12 grossly intact  Skin: no rashes    Left Hand:  - Skin: intact  - Painful at site of injury  - SILT M/U/R  - RoM: full pronation/supination  - Fires AIN/PIN/Ulnar distributions  - Fingertips pink/warm, cap refill < 2sec  - 2+ radial pulse  - Minimal pain with passive stretch of fingers  - Hand and Forearm compartments compressible  - No fusiform digital swelling noted  - No scissoring noted with full fist    A full secondary exam was performed and all relevant findings discussed and noted above.    Imaging:  AP and lateral radiographs of the left hand display L scaphoid waist fx.    Assessment:  Orthopaedic Problems/Injuries:  L scaphoid waist fx    79M RHD (DNR/DNI, MGUS, CAD (s/p CABG 2023), anemia, ABEBE, Parkinson’s w dementia) presents with above after unknown mechanism. In facility for swallowing difficulties due to parkinson’s. Closed, NVI. L Thumb spica splint. Post splint XR pending.     Plan:  - No acute orthopaedic surgical intervention  - Placed in Thumb spica splint  - DVT ppx per primary  - ABX not indicated  - Patient to follow up in clinic with Dr. Agudelo in 1-2 weeks.  Please call [phone] to schedule appointment  after discharge.    Consult seen and staffed within 30 minutes of notification.    Consult discussed with attending, Dr. Magnus Rodriguez MD  PGY-1 Orthopaedic Surgery  On-call Resident  _________________________________________________________    This patient will be followed by the Hand Team while inpatient.   normal sinus rhythm

## 2024-05-18 LAB
ALBUMIN SERPL BCP-MCNC: 2.6 G/DL (ref 3.4–5)
ALP SERPL-CCNC: 125 U/L (ref 33–136)
ALT SERPL W P-5'-P-CCNC: 32 U/L (ref 10–52)
ANION GAP SERPL CALC-SCNC: 15 MMOL/L (ref 10–20)
AST SERPL W P-5'-P-CCNC: 97 U/L (ref 9–39)
BILIRUB DIRECT SERPL-MCNC: 0.1 MG/DL (ref 0–0.3)
BILIRUB SERPL-MCNC: 0.4 MG/DL (ref 0–1.2)
BLOOD EXPIRATION DATE: NORMAL
BUN SERPL-MCNC: 111 MG/DL (ref 6–23)
CALCIUM SERPL-MCNC: 8.2 MG/DL (ref 8.6–10.6)
CHLORIDE SERPL-SCNC: 104 MMOL/L (ref 98–107)
CO2 SERPL-SCNC: 20 MMOL/L (ref 21–32)
CREAT SERPL-MCNC: 3.83 MG/DL (ref 0.5–1.3)
DISPENSE STATUS: NORMAL
EGFRCR SERPLBLD CKD-EPI 2021: 15 ML/MIN/1.73M*2
ERYTHROCYTE [DISTWIDTH] IN BLOOD BY AUTOMATED COUNT: 15 % (ref 11.5–14.5)
GLUCOSE BLD MANUAL STRIP-MCNC: 101 MG/DL (ref 74–99)
GLUCOSE BLD MANUAL STRIP-MCNC: 102 MG/DL (ref 74–99)
GLUCOSE BLD MANUAL STRIP-MCNC: 106 MG/DL (ref 74–99)
GLUCOSE BLD MANUAL STRIP-MCNC: 89 MG/DL (ref 74–99)
GLUCOSE SERPL-MCNC: 98 MG/DL (ref 74–99)
HAPTOGLOB SERPL-MCNC: 281 MG/DL (ref 37–246)
HCT VFR BLD AUTO: 22.6 % (ref 41–52)
HGB BLD-MCNC: 7.9 G/DL (ref 13.5–17.5)
MAGNESIUM SERPL-MCNC: 2.01 MG/DL (ref 1.6–2.4)
MCH RBC QN AUTO: 29.4 PG (ref 26–34)
MCHC RBC AUTO-ENTMCNC: 35 G/DL (ref 32–36)
MCV RBC AUTO: 84 FL (ref 80–100)
NRBC BLD-RTO: 0 /100 WBCS (ref 0–0)
PHOSPHATE SERPL-MCNC: 3.8 MG/DL (ref 2.5–4.9)
PLATELET # BLD AUTO: 32 X10*3/UL (ref 150–450)
POTASSIUM SERPL-SCNC: 4.9 MMOL/L (ref 3.5–5.3)
PRODUCT BLOOD TYPE: 5100
PRODUCT CODE: NORMAL
PROT SERPL-MCNC: 5.8 G/DL (ref 6.4–8.2)
RBC # BLD AUTO: 2.69 X10*6/UL (ref 4.5–5.9)
SODIUM SERPL-SCNC: 134 MMOL/L (ref 136–145)
STAPHYLOCOCCUS SPEC CULT: NORMAL
UNIT ABO: NORMAL
UNIT NUMBER: NORMAL
UNIT RH: NORMAL
UNIT VOLUME: 340
WBC # BLD AUTO: 8.6 X10*3/UL (ref 4.4–11.3)

## 2024-05-18 PROCEDURE — 82947 ASSAY GLUCOSE BLOOD QUANT: CPT | Mod: 91

## 2024-05-18 PROCEDURE — 1200000002 HC GENERAL ROOM WITH TELEMETRY DAILY

## 2024-05-18 PROCEDURE — 36415 COLL VENOUS BLD VENIPUNCTURE: CPT

## 2024-05-18 PROCEDURE — 2500000001 HC RX 250 WO HCPCS SELF ADMINISTERED DRUGS (ALT 637 FOR MEDICARE OP)

## 2024-05-18 PROCEDURE — 84100 ASSAY OF PHOSPHORUS: CPT

## 2024-05-18 PROCEDURE — 83735 ASSAY OF MAGNESIUM: CPT

## 2024-05-18 PROCEDURE — 2500000004 HC RX 250 GENERAL PHARMACY W/ HCPCS (ALT 636 FOR OP/ED)

## 2024-05-18 PROCEDURE — P9037 PLATE PHERES LEUKOREDU IRRAD: HCPCS

## 2024-05-18 PROCEDURE — 92610 EVALUATE SWALLOWING FUNCTION: CPT | Mod: GN

## 2024-05-18 PROCEDURE — 99231 SBSQ HOSP IP/OBS SF/LOW 25: CPT | Performed by: STUDENT IN AN ORGANIZED HEALTH CARE EDUCATION/TRAINING PROGRAM

## 2024-05-18 PROCEDURE — 85027 COMPLETE CBC AUTOMATED: CPT

## 2024-05-18 PROCEDURE — 82248 BILIRUBIN DIRECT: CPT

## 2024-05-18 PROCEDURE — 36430 TRANSFUSION BLD/BLD COMPNT: CPT

## 2024-05-18 PROCEDURE — 2500000006 HC RX 250 W HCPCS SELF ADMINISTERED DRUGS (ALT 637 FOR ALL PAYERS): Mod: MUE

## 2024-05-18 PROCEDURE — 80048 BASIC METABOLIC PNL TOTAL CA: CPT

## 2024-05-18 PROCEDURE — C9113 INJ PANTOPRAZOLE SODIUM, VIA: HCPCS

## 2024-05-18 PROCEDURE — 99232 SBSQ HOSP IP/OBS MODERATE 35: CPT

## 2024-05-18 RX ADMIN — CARBIDOPA AND LEVODOPA 1.5 TABLET: 25; 100 TABLET ORAL at 06:55

## 2024-05-18 RX ADMIN — METOPROLOL TARTRATE 25 MG: 50 TABLET, FILM COATED ORAL at 09:31

## 2024-05-18 RX ADMIN — Medication 1 APPLICATION: at 17:00

## 2024-05-18 RX ADMIN — CARBIDOPA AND LEVODOPA 1.5 TABLET: 25; 100 TABLET ORAL at 21:28

## 2024-05-18 RX ADMIN — PANTOPRAZOLE SODIUM 40 MG: 40 INJECTION, POWDER, FOR SOLUTION INTRAVENOUS at 09:32

## 2024-05-18 RX ADMIN — Medication 1 APPLICATION: at 13:00

## 2024-05-18 RX ADMIN — Medication 1 APPLICATION: at 07:00

## 2024-05-18 RX ADMIN — TAMSULOSIN HYDROCHLORIDE 0.4 MG: 0.4 CAPSULE ORAL at 09:31

## 2024-05-18 RX ADMIN — CARBIDOPA AND LEVODOPA 1.5 TABLET: 25; 100 TABLET ORAL at 13:42

## 2024-05-18 RX ADMIN — CARBIDOPA AND LEVODOPA 1.5 TABLET: 25; 100 TABLET ORAL at 18:29

## 2024-05-18 RX ADMIN — MEROPENEM 1000 MG: 1 INJECTION, POWDER, FOR SOLUTION INTRAVENOUS at 21:29

## 2024-05-18 RX ADMIN — MEROPENEM 1000 MG: 1 INJECTION, POWDER, FOR SOLUTION INTRAVENOUS at 00:38

## 2024-05-18 RX ADMIN — FINASTERIDE 5 MG: 5 TABLET, FILM COATED ORAL at 09:31

## 2024-05-18 RX ADMIN — MEROPENEM 1000 MG: 1 INJECTION, POWDER, FOR SOLUTION INTRAVENOUS at 09:31

## 2024-05-18 RX ADMIN — PANTOPRAZOLE SODIUM 40 MG: 40 INJECTION, POWDER, FOR SOLUTION INTRAVENOUS at 21:29

## 2024-05-18 ASSESSMENT — COGNITIVE AND FUNCTIONAL STATUS - GENERAL
HELP NEEDED FOR BATHING: TOTAL
STANDING UP FROM CHAIR USING ARMS: TOTAL
MOVING TO AND FROM BED TO CHAIR: TOTAL
DRESSING REGULAR UPPER BODY CLOTHING: TOTAL
EATING MEALS: TOTAL
DAILY ACTIVITIY SCORE: 6
CLIMB 3 TO 5 STEPS WITH RAILING: TOTAL
TOILETING: TOTAL
PERSONAL GROOMING: TOTAL
TURNING FROM BACK TO SIDE WHILE IN FLAT BAD: A LOT
WALKING IN HOSPITAL ROOM: TOTAL
DRESSING REGULAR LOWER BODY CLOTHING: TOTAL
MOVING FROM LYING ON BACK TO SITTING ON SIDE OF FLAT BED WITH BEDRAILS: A LOT
MOBILITY SCORE: 8

## 2024-05-18 ASSESSMENT — PAIN SCALES - GENERAL
PAINLEVEL_OUTOF10: 0 - NO PAIN

## 2024-05-18 ASSESSMENT — PAIN - FUNCTIONAL ASSESSMENT
PAIN_FUNCTIONAL_ASSESSMENT: 0-10

## 2024-05-18 NOTE — PROGRESS NOTES
Vancomycin Dosing by Pharmacy- Cessation of Therapy    Consult to pharmacy for vancomycin dosing has been discontinued by the prescriber, pharmacy will sign off at this time.    Please call pharmacy if there are further questions or re-enter a consult if vancomycin is resumed.     Dottie Salinas, PharmD

## 2024-05-18 NOTE — PROGRESS NOTES
"Markie Nobles is a 79 y.o. male on day 2 of admission presenting with NSTEMI (non-ST elevated myocardial infarction) (Multi).    Subjective   No acute complaints. Wife, at bedside, reports mental status is now at baseline.       Objective     Vitals:    05/18/24 1228   BP: 132/71   Pulse: 63   Resp: 17   Temp: 36.4 °C (97.5 °F)   SpO2: 100%         Physical Exam  GEN: Ill appearing, cachectic  HEENT: AT/NC, PERRL, EOMI  CARDIO: RRR, normal S1 and S2  PULM: Rhonchorus RLL, no increased WOB  ABD: Soft, NT/ND, bowel sounds +, PEG in place w/out purulence or erythema  : No grayson  EXTR: Warm/well perfused; left wrist swollen and warm  NEURO: Responds to verbal and tactile stimuli, does not follow all commands, AO x1  PSYCH: Appropriate mood/affect    Last Recorded Vitals  Blood pressure 132/71, pulse 63, temperature 36.4 °C (97.5 °F), temperature source Temporal, resp. rate 17, height 1.778 m (5' 10\"), weight 62.6 kg (138 lb), SpO2 100%.  Intake/Output last 3 Shifts:  I/O last 3 completed shifts:  In: 467.5 (7.5 mL/kg) [Blood:367.5; IV Piggyback:100]  Out: - (0 mL/kg)   Weight: 62.6 kg       Assessment/Plan   Principal Problem:    NSTEMI (non-ST elevated myocardial infarction) (Multi)    Mr. Nobles is an 79 year old Male w/ a PMHx MGUS, CAD (s/p CABG x3 in 2022), HTN, CKD IV, Anemia, ABEBE, hyperlipidemia, obesity, BPH, gout, NIDDM, HLD, Parkinsons (w/ dementia), dysphagia, s/p peg tube (04/2024), sacral pressure sores, s/p hospitalization for aspiration PNA (DC'd 5/1/24) initially admitted for PNA and AOCA. Patient found to have lethargy and mild leukocytosis (WBC 10), likely infectious etiology. CT shows b/l lower lung findings suggestive of aspiration PNA, and given the patient's recent admission, this seems like the likely source of his infection. Additional sources include his sacral ulcer (not visualized) vs. PEG tube site (low suspicion) vs. UTI (ruled out w/ negative UA). He was found to be anemic and given 1x " unit of blood transfusion with appropriate increment. BUN very elevated as well and GI was consulted for concerns of GI bleed from the PEG. 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 Patient's left wrist was swollen as well. Wife this say that patient has had gout flares in the past (last one a year ago). Xray left wrist was obtained and showed scaphoid fracture. Ortho was consulted for this. Palliative care was consulted for goals of care and patient changed code status to DNR/DNI on 5/17. Per GI, resumed tube feeds on 5/17 with nutrition consulted placed. Active issues include: monitor for bleeding, left scaphoid fracture, tube feeds, encephalopathy.     Changes 05/18/24  - Hgb stable.   - PLTs 33, 1U PLT transfused iso possible ongoing GIB (most likely has resolved by now but Hgb drop was noted two day ago)  - Plavix on hold, considering ASA for secondary ppx (2022 CABG)  - split provided for L scaphoid (moderately displaced) fracture, pt will followup with ortho on 6/11 at 3:15PM with Dr. Agudelo (appointment scheduled)  - GI recs to continue IV PPI BID for now; recommend discontinuing Plavix if possible  - Bcx NGTD, MRSA nares negative. Vanc Dcd. Will consider tapering off aracely on 5/19.    PLAN:    # AMS, resolved  # Parkinson's Disease w/ Dementia  :: Mentation improving after blood transfusion and abx  -Continue home medication: Carbidopa-Levodopa 1.5tab QID  - MRI brain obtained on 5/17 demonstrated old vascular lesions but also ventricular dilation possibly consistent with NPH per radiology read (day team to discuss finding)     # Aspiration PNA  :: WBC 10 on admission (up from 7.9 on 5/1)  :: CT Chest: Tree-in-bud b/l lower lungs and secretions in trachea. C/f acute/chronic aspiration and/or PNA  :: Low concern for systemic infection (afebrile, HDS)  :: S/p Vanc/Zosyn in ED  -Vancomycin (pharmacy to dose):  (5/16-  )  -Meropenem 1g q12 (renal dosing): (5/16- )  -Aspiration precautions  -F/u urine strep and legionella  -F/u MRSA nares, consider DC Vanc in AM     # AOCA  # Possible slow GIB  :: Hgb 7.8 -> 6.2 over two weeks  :: No objective evidence of bleed  :: Author witnessed a bowel movement in the ED, was not melenic  :: 2019: EGD/Colonoscopy - friable gastric mucosal atrophy, duodenal erythema, and diverticulosis  :: S/p 1u pRBC in ED completed 5/16 at 23:00  -Trend Hgb  -Maintain active T&S  -tube feeds  - GI consulted    #Scaphoid Fracture  :: Xrays of left wrist obtained  - ortho consulted     # Sacral Ulcer  -Wound care c/s     # CAD (S/p CABG x3 in 2022)  # Troponinemia  :: Troponin I  -> 516 w/out EKG changes, likely Type II MI  -hold home medication: Clopidogrel 75mg daily  -Continue home medication: Metoprolol tartrate 25mg daily (unclear why patient is on this dose frequency, however it populated in prior cardiologist note)     #BPH  -Continue home medication: Finasteride 5mg daily  -Continue home medication: Tamsulosin 0.4mg daily     # Severe Protein Calorie Malnutrition  -Tube feeds initiated in anticipation of Nutrition recs  -FWF 250ml q6     F: S/p 1.5L NS  E: PRN  N: Tube feeds via PEG  A: PEG, PIV     Code Status: DNR/DNI (per palliative care discussion on 5/17)     NOK: Bree Nobles (Wife) - [phone]           Jacky Ya MD

## 2024-05-18 NOTE — PROGRESS NOTES
Gastroenterology Consult Service Progress Note  Department of Gastroenterology & Hepatology  [facility]    [facility]  May 18, 2024   Patient: Markie Nobles    Medical Record: [id number]  Reason for Initial Consult: concern for GI bleed  Requesting Service: IM/ [place] team    Interval History: diet advanced, transferred to floor from ED. Appears much more awake and altert this AM, conversant.     Physical Exam:    Vitals:    05/17/24 1600 05/17/24 2330 05/18/24 0408 05/18/24 0809   BP: 144/81 134/72 138/75 135/70   Pulse: 70 69 66 73   Resp: 12   16   Temp:  36.3 °C (97.3 °F) 36.4 °C (97.5 °F) 37.1 °C (98.8 °F)   TempSrc:    Temporal   SpO2: 100% 100% 100% 100%   Weight:       Height:             Intake/Output Summary (Last 24 hours) at 5/18/2024 1020  Last data filed at 5/18/2024 1001  Gross per 24 hour   Intake 100 ml   Output --   Net 100 ml     General: cachectic looking male, in NAD  HEENT: mild pallor, no scleral icterus, temporal wasting noted  Respiratory: CTA bilaterally, normal work of breathing  Cardiovascular: S1, S2 heard  Abdomen: Soft, nontender, nondistended, bowel sounds present. No masses palpated. A PEG tube is placed in her L abdomen, bumper at 5 cm   Extremities: no edema, no asterixis, thin legs. Tender L wrist  Neuro: alert and oriented X1 (baseline), CNII-XII grossly intact, moves all 4 extremities with no focal deficits  : external male condom catheter in place     Labs:  Lab Results   Component Value Date    WBC 8.3 05/17/2024    HGB 7.4 (L) 05/17/2024    HCT 21.9 (L) 05/17/2024    MCV 86 05/17/2024    PLT 50 (L) 05/17/2024     Lab Results   Component Value Date    GLUCOSE 63 (L) 05/17/2024    CALCIUM 8.0 (L) 05/17/2024     (L) 05/17/2024    K 4.7 05/17/2024    CO2 22 05/17/2024     05/17/2024    BUN 99 (HH) 05/17/2024    CREATININE 3.76 (H) 05/17/2024         Imaging:  CT c/a/p 5/16/2024   IMPRESSION:  Chest  1.  Tree-in-bud/consolidative nodularity of the bilateral lower lungs with peribronchial wall cuffing nonspecific secretions within thetrachea concerning for acute/chronic aspiration and/or pneumonia.      Abdomen-Pelvis  1.  No acute process within the abdomen/pelvis.    GI procedures:  He had EGD and colonoscopy 2019. There was a non-obstructing Schatzki ring, gastric mucosal atrophy with mildly friable mucosa (No H, pylori), and erythematous duodenopathy. There was one small tubular adenoma as well as diverticulosis     Assessment and Plan:     Markie Nobles is a 79 y.o. male with a past medical history of MGUS, CAD (s/p CABG x3 in 2022), HTN, CKD IV (baseline between 3 and 4, Anemia, ABEBE, hyperlipidemia, obesity, BPH, gout, NIDDM, HLD, Parkinsons (w/ dementia), dysphagia, s/p peg tube (04/29/2024), sacral pressure sores, s/p hospitalization at [facility] (4/24/2024 to 5/1/2024) who was brought to [facility] ER on 5/16/2024 for becoming unresponsive and for hypotension at his SNF ([place]). GI has been consulted for concern for upper GI bleed.     Aspiration of stomach contents through the PEG tube site revealed specks of hematin. 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 The suspected UGIB happened in the setting of Plavix. No emergent need to do EGD at this time, this was discussed with wife at bedside.      - No emergent need to do EGD  - Daily dressing changes for PEG  - continue tube feeds with ensure/boost if pt is truly appropriate for oral feeds   - continue PPI BID  - stool HP Ag  - Discontinue Plavix permanently if deemed appropriate      Patient was seen and discussed with Dr. Charlie Kaba.       Angie Garg, GI fellow    Thank you for the consultation.  The consulting team  will sign off now.  Please do not hesitate to contact us again on by paging the consultation team again between the weekday hours of 7 AM - 5 PM.  If there is an urgent concern during the weekend, after-hours, or holidays; then please page the on-call GI fellow at [phone]. Thank you.      SIGNATURE: Angie Garg MD PATIENT NAME: Markie Nobles   DATE: May 18, 2024 MRN: [id number]

## 2024-05-18 NOTE — CONSULTS
"Nutrition Initial Assessment:   Nutrition Assessment    Reason for Assessment: Tube feeding recommendations -- Nepro infusing @ 25mls/hr via PEG tube this morning at visit.     Mr. Nobles is an 79 year old Male w/ a PMHx MGUS, CAD (s/p CABG x3 in 2022), HTN, CKD IV, Anemia, ABEBE, hyperlipidemia, obesity, BPH, gout, NIDDM, HLD, Parkinsons (w/ dementia), dysphagia, s/p peg tube (04/2024), sacral pressure sores, s/p hospitalization for aspiration PNA (DC'd 5/1/24) being admitted for PNA and AOCA. Patient found to have lethargy and mild leukocytosis (WBC 10), likely infectious etiology.     Nutrition History:  Food and Nutrient History: Nepro goal @ 40mls/hr via PEG while at SNF per pt's wife at bedside.  Notes pt was given juice with speech therapy at SNF, concern given dx aspiration pneumonia. Currently NPO.  Vitamin/Herbal Supplement Use: melatonin per med list  Food Allergies/Intolerances:  None  GI Symptoms: Diarrhea  Oral Problems: Swallowing difficulty       Anthropometrics:  Height: 177.8 cm (5' 10\")   Weight: 62.6 kg (138 lb)   BMI (Calculated): 19.8  IBW/kg (Dietitian Calculated): 75.5 kg  Percent of IBW: 83 %       Weight History:   Wt Readings from Last 20 Encounters:   05/16/24 62.6 kg (138 lb)   05/01/24 62.6 kg (138 lb 0.1 oz)   03/07/24 59.9 kg (132 lb)   02/26/24 59.4 kg (131 lb)   02/26/24 60.2 kg (132 lb 11.5 oz)   02/12/24 59.9 kg (132 lb)   01/29/24 63.1 kg (139 lb 3.2 oz)   01/24/24 59.9 kg (132 lb)   01/23/24 59.9 kg (132 lb)   01/12/24 60 kg (132 lb 3.2 oz)   12/14/23 59.6 kg (131 lb 4.8 oz)   11/20/23 62 kg (136 lb 9.6 oz)   11/15/23 59.9 kg (132 lb)   10/31/23 60.2 kg (132 lb 11.5 oz)   10/18/23 66 kg (145 lb 6.4 oz)   10/17/23 64.8 kg (142 lb 13.7 oz)   10/03/23 66.9 kg (147 lb 7.8 oz)   07/17/23 66.7 kg (147 lb)   07/10/23 68.5 kg (151 lb)   06/08/23 69.1 kg (152 lb 6.4 oz)      Weight Change %:  Weight History / % Weight Change: no significant wt changes within past ~6-12 mo's, however " trending loss of 6.5 kgs or 9.4% in just under 1 year  Significant Weight Loss: No    Nutrition Focused Physical Exam Findings:  Lying on side in fetal position  Subcutaneous Fat Loss:   Orbital Fat Pads: Severe (dark circles, hollowing and loose skin)  Buccal Fat Pads: Severe (hollow, sunken and narrow face)  Muscle Wasting:  Temporalis: Severe (hollowed scooping depression)  Pectoralis (Clavicular Region): Severe (protruding prominent clavicle)  Deltoid/Trapezius: Severe (squared shoulders, acromion process prominent)  Interosseous: Defer  Edema:  Edema: none  Physical Findings:       Nutrition Significant Labs:  CBC Trend:   Results from last 7 days   Lab Units 05/17/24  1901 05/17/24  0719 05/16/24  1421   WBC AUTO x10*3/uL 8.3 8.8 10.6   RBC AUTO x10*6/uL 2.54* 2.49* 2.18*   HEMOGLOBIN g/dL 7.4* 7.2* 6.2*   HEMATOCRIT % 21.9* 20.9* 18.4*   MCV fL 86 84 84   PLATELETS AUTO x10*3/uL 50* 69* 140*    , A1C:  Lab Results   Component Value Date    HGBA1C 5.7 11/20/2023   , BG POCT trend:   Results from last 7 days   Lab Units 05/18/24  0603 05/18/24  0013 05/17/24  1930   POCT GLUCOSE mg/dL 89 101* 75    , Liver Function Trend:   Results from last 7 days   Lab Units 05/17/24  0134 05/16/24  1421   ALK PHOS U/L  --  302*   AST U/L  --  168*   ALT U/L  --  41   BILIRUBIN TOTAL mg/dL 0.7 0.4    , Renal Lab Trend:   Results from last 7 days   Lab Units 05/17/24  0719 05/17/24  0134 05/16/24  1421 05/16/24  1421   POTASSIUM mmol/L 4.7 5.1  --  5.1   PHOSPHORUS mg/dL 3.7 3.4   < >  --    SODIUM mmol/L 133* 134*  --  132*   MAGNESIUM mg/dL 1.92  --   --   --    EGFR mL/min/1.73m*2 16* 16*  --  16*   BUN mg/dL 99* 105*  105*  --  107*   CREATININE mg/dL 3.76* 3.67*  --  3.64*    < > = values in this interval not displayed.      , Vit D:   Lab Results   Component Value Date    VITD25 63 01/24/2024        Nutrition Specific Medications:  Scheduled medications  carbidopa-levodopa, 1.5 tablet, oral, 4x daily  [Held by provider]  clopidogrel, 75 mg, oral, Daily  finasteride, 5 mg, oral, Daily  menthol-zinc oxide, 1 Application, Topical, 4x daily  meropenem, 1,000 mg, intravenous, q12h  metoprolol tartrate, 25 mg, oral, Daily  pantoprazole, 40 mg, intravenous, BID  tamsulosin, 0.4 mg, oral, Daily      I/O:    ;      Dietary Orders (From admission, onward)       Start     Ordered    05/17/24 1748  Enteral feeding with NPO Nepro; 35 (start at 15 cc/hr and advance by 10 cc every 6 hours as tolerated to goal rate of 35 cc /hr); 60; Water; Tap water; Every 6 hours  Diet effective now        Question Answer Comment   Tube feeding formula: Nepro    Tube feeding continuous rate (mL/hr): 35 start at 15 cc/hr and advance by 10 cc every 6 hours as tolerated to goal rate of 35 cc /hr   Tube feeding flush (mL): 60    Flush type: Water    Water type: Tap water    Flush frequency: Every 6 hours        05/17/24 1750                     Estimated Needs:   Total Energy Estimated Needs (kCal): 1550 kCal  Method for Estimating Needs: 25 kcals/kg using actual BW 62.6 kg  Total Protein Estimated Needs (g):  (~65-75)  Method for Estimating Needs: ~1.0-1.2 gm/kg using acutal BW 62.6 kg  Total Fluid Estimated Needs (mL):  (1ml/kcal or per MD)           Nutrition Diagnosis   Malnutrition Diagnosis  Patient has Malnutrition Diagnosis: Yes  Diagnosis Status: New  Malnutrition Diagnosis: Severe malnutrition related to chronic disease or condition  As Evidenced by: areas of severe muscle wasting and adipose loss visually evident on exam, BMI 19.8 and 83% ideal/desired BW            Nutrition Interventions/Recommendations         Nutrition Prescription:  1) Nepro goal @ 35 mls/hr continuous   = 1487 kcals, 68 gm protein with 638 mls free water.    2) additional FWF per MD/AYAAN  3) diarrhea baseline issue per wife -- if n/a concern for infectious etiology, can add Nutrisource fiber 1-2 pkts per day.         Nutrition Interventions:   Interventions: Enteral intake  Enteral  Intake: Other (Comment)  Goal: as above    Goal: bedside RN, attending MD rounding -- possible d/c back to SNF         Nutrition Monitoring and Evaluation   Food/Nutrient Related History Monitoring  Monitoring and Evaluation Plan: Enteral and parenteral nutrition intake  Enteral and Parenteral Nutrition Intake: Enteral nutrition intake  Criteria: continue to titrate TF to goal rate  Additional Plans: meet >75% est needs                      Time Spent/Follow-up Reminder:   Time Spent (min): 45 minutes  Last Date of Nutrition Visit: 05/18/24  Nutrition Follow-Up Needed?: Dietitian to reassess per policy  Follow up Comment: +PEG

## 2024-05-18 NOTE — PROGRESS NOTES
Speech-Language Pathology    SLP Adult Inpatient Speech-Language Pathology Clinical Swallow Evaluation    Patient Name: Markie Nobles  MRN: [id number]  Today's Date: 5/18/2024   Time Calculation  Start Time: 1005  Stop Time: 1020  Time Calculation (min): 15 min         Assessment:  Severe oropharyngeal dysphagia per MBSS completed 4/26/24  High risk for aspiration w/ PO intake is apparent  Swallowing function impacted by Parkinson's Disease      Recommendations:  NPO  Continued alternative means of nutrition/hydration/medication support; PEG placed 4/29/24.    Frequent, aggressive oral care as tolerated to improve infection control.    OK for small amounts of ice chips (one at a time, 3-4x/hour) for oral comfort and to prevent swallow disuse atrophy, but only after aggressive oral care to avoid colonization of bacteria within the oral cavity.        Plan:  SLP Plan: Skilled SLP  SLP Frequency: 2x per week  Duration:  4 weeks  Discussed POC: Pt, family  *Trial period of therapy.  Explained progressive nature of dysphagia r/t PD and possibility that therapy may not functionally/meaningfully improve swallowing function with family.  Understanding indicated.       Goals:  Goals created by previous SLP following MBSS.  Pt had been participating in these goals w/ HC ST, ST during previous hospitalization, and SNF ST.      Pt will complete pharyngeal strengthening exercises x30 per session with good effort and efficiency (effortful swallows, tongue pullbacks, tongue raises).     Date initiate: 5/18/24   Status: Goal initiated    Pt/family will indicate understanding of dysphagia, risk for aspiration, strategies to mitigate aspiration PNA, and overall SLP POC via teach back method with > 90% accuracy.   Date initiated: 5/18/24   Status: Goal initiated           Subjective   RN cleared pt for evaluation.  Pt properly identified and evaluated bedside.  Awake and alert, with no c/o pain.  Room air.  Family present x3.  Flat  affect.     Pt admitted with PNA and acute on chronic anemia.  PMHx pertinent to SLP: Parkinson's Disease, dementia, recurrent PNA.      Pt completed an MBSS on 4/26/24 that showed silent aspiration with all liquids; risk apparent for aspiration with solids.  Recommended NPO w/ pharyngeal strengthening tx.  PEG placed 4/29/24.  Pt was doing ST at SNF prior to admission.         Objective     Cognition:  Command Following: WFL  Attention: WFL   Orientation: Oriented x2       Oral/Motor Assessment:  Oral Hygiene: WFL  Dentition: present and adequate   Oral Motor: Mildly reduced lingual ROM; fasciculations w/ lateralization   Voice (Perceptually): WFL           Consistencies Trialed:  Ice chips.  Not appropriate for additional trials given severity of swallowing deficits.         Clinical Observations:  Patient Positioning: Upright in Bed  Management of Oral Secretions: WFL  Was The 3 oz Swallow Protocol Completed: No (Deferred at this time 2' to known dysphagia with SILENT aspiration)       Clinical bedside swallow evaluation completed.  Long conversation had w/ pt and family re: results of previous MBSS, SLP POC, and importance of aspiration PNA mitigation strategies (good oral care, OOB as tolerated).  Pt presented with small ice chips x4.  Able to initiate effortful swallows for pharyngeal strengthening in 4/4 trials.  No s/s of aspiration, stable respiratory status; however, was noted to silently aspirate on MBSS.  This was also d/w pt and family.  Wife able to indicate understanding of all education via teach back method with ~90% accuracy.  No needs voiced at end of session.  Call bell within reach.        05/18/24 at 11:04 AM - Lilia Abraham, SLP

## 2024-05-19 LAB
ALBUMIN SERPL BCP-MCNC: 2.5 G/DL (ref 3.4–5)
ALP SERPL-CCNC: 158 U/L (ref 33–136)
ALT SERPL W P-5'-P-CCNC: 27 U/L (ref 10–52)
ANION GAP SERPL CALC-SCNC: 15 MMOL/L (ref 10–20)
AST SERPL W P-5'-P-CCNC: 60 U/L (ref 9–39)
BASOPHILS # BLD AUTO: 0.02 X10*3/UL (ref 0–0.1)
BASOPHILS NFR BLD AUTO: 0.3 %
BILIRUB DIRECT SERPL-MCNC: 0 MG/DL (ref 0–0.3)
BILIRUB SERPL-MCNC: 0.3 MG/DL (ref 0–1.2)
BUN SERPL-MCNC: 108 MG/DL (ref 6–23)
CALCIUM SERPL-MCNC: 8.2 MG/DL (ref 8.6–10.6)
CHLORIDE SERPL-SCNC: 108 MMOL/L (ref 98–107)
CO2 SERPL-SCNC: 21 MMOL/L (ref 21–32)
CREAT SERPL-MCNC: 3.79 MG/DL (ref 0.5–1.3)
EGFRCR SERPLBLD CKD-EPI 2021: 15 ML/MIN/1.73M*2
EOSINOPHIL # BLD AUTO: 0.42 X10*3/UL (ref 0–0.4)
EOSINOPHIL NFR BLD AUTO: 5.3 %
ERYTHROCYTE [DISTWIDTH] IN BLOOD BY AUTOMATED COUNT: 14.9 % (ref 11.5–14.5)
ERYTHROCYTE [DISTWIDTH] IN BLOOD BY AUTOMATED COUNT: 15.3 % (ref 11.5–14.5)
GLUCOSE BLD MANUAL STRIP-MCNC: 108 MG/DL (ref 74–99)
GLUCOSE BLD MANUAL STRIP-MCNC: 108 MG/DL (ref 74–99)
GLUCOSE BLD MANUAL STRIP-MCNC: 113 MG/DL (ref 74–99)
GLUCOSE SERPL-MCNC: 108 MG/DL (ref 74–99)
HCT VFR BLD AUTO: 20.6 % (ref 41–52)
HCT VFR BLD AUTO: 23.6 % (ref 41–52)
HGB BLD-MCNC: 6.9 G/DL (ref 13.5–17.5)
HGB BLD-MCNC: 7.4 G/DL (ref 13.5–17.5)
IMM GRANULOCYTES # BLD AUTO: 0.03 X10*3/UL (ref 0–0.5)
IMM GRANULOCYTES NFR BLD AUTO: 0.4 % (ref 0–0.9)
LYMPHOCYTES # BLD AUTO: 1.24 X10*3/UL (ref 0.8–3)
LYMPHOCYTES NFR BLD AUTO: 15.8 %
MAGNESIUM SERPL-MCNC: 1.96 MG/DL (ref 1.6–2.4)
MCH RBC QN AUTO: 28.7 PG (ref 26–34)
MCH RBC QN AUTO: 29.2 PG (ref 26–34)
MCHC RBC AUTO-ENTMCNC: 31.4 G/DL (ref 32–36)
MCHC RBC AUTO-ENTMCNC: 33.5 G/DL (ref 32–36)
MCV RBC AUTO: 87 FL (ref 80–100)
MCV RBC AUTO: 92 FL (ref 80–100)
MONOCYTES # BLD AUTO: 1.18 X10*3/UL (ref 0.05–0.8)
MONOCYTES NFR BLD AUTO: 15 %
NEUTROPHILS # BLD AUTO: 4.98 X10*3/UL (ref 1.6–5.5)
NEUTROPHILS NFR BLD AUTO: 63.2 %
NRBC BLD-RTO: 0 /100 WBCS (ref 0–0)
NRBC BLD-RTO: 0 /100 WBCS (ref 0–0)
PHOSPHATE SERPL-MCNC: 3.4 MG/DL (ref 2.5–4.9)
PLATELET # BLD AUTO: 72 X10*3/UL (ref 150–450)
PLATELET # BLD AUTO: 83 X10*3/UL (ref 150–450)
POTASSIUM SERPL-SCNC: 4.5 MMOL/L (ref 3.5–5.3)
PROT SERPL-MCNC: 5.8 G/DL (ref 6.4–8.2)
RBC # BLD AUTO: 2.36 X10*6/UL (ref 4.5–5.9)
RBC # BLD AUTO: 2.58 X10*6/UL (ref 4.5–5.9)
SODIUM SERPL-SCNC: 139 MMOL/L (ref 136–145)
WBC # BLD AUTO: 7.6 X10*3/UL (ref 4.4–11.3)
WBC # BLD AUTO: 7.9 X10*3/UL (ref 4.4–11.3)

## 2024-05-19 PROCEDURE — 36415 COLL VENOUS BLD VENIPUNCTURE: CPT

## 2024-05-19 PROCEDURE — 85025 COMPLETE CBC W/AUTO DIFF WBC: CPT

## 2024-05-19 PROCEDURE — 1200000002 HC GENERAL ROOM WITH TELEMETRY DAILY

## 2024-05-19 PROCEDURE — 99232 SBSQ HOSP IP/OBS MODERATE 35: CPT

## 2024-05-19 PROCEDURE — 83735 ASSAY OF MAGNESIUM: CPT

## 2024-05-19 PROCEDURE — 2500000004 HC RX 250 GENERAL PHARMACY W/ HCPCS (ALT 636 FOR OP/ED)

## 2024-05-19 PROCEDURE — 82947 ASSAY GLUCOSE BLOOD QUANT: CPT

## 2024-05-19 PROCEDURE — 84100 ASSAY OF PHOSPHORUS: CPT

## 2024-05-19 PROCEDURE — 85027 COMPLETE CBC AUTOMATED: CPT

## 2024-05-19 PROCEDURE — 82248 BILIRUBIN DIRECT: CPT

## 2024-05-19 PROCEDURE — C9113 INJ PANTOPRAZOLE SODIUM, VIA: HCPCS

## 2024-05-19 PROCEDURE — 80048 BASIC METABOLIC PNL TOTAL CA: CPT

## 2024-05-19 PROCEDURE — 2500000001 HC RX 250 WO HCPCS SELF ADMINISTERED DRUGS (ALT 637 FOR MEDICARE OP)

## 2024-05-19 PROCEDURE — 2500000006 HC RX 250 W HCPCS SELF ADMINISTERED DRUGS (ALT 637 FOR ALL PAYERS): Mod: MUE

## 2024-05-19 PROCEDURE — 97161 PT EVAL LOW COMPLEX 20 MIN: CPT | Mod: GP

## 2024-05-19 PROCEDURE — 97165 OT EVAL LOW COMPLEX 30 MIN: CPT | Mod: GO

## 2024-05-19 RX ORDER — PANTOPRAZOLE SODIUM 40 MG/1
40 TABLET, DELAYED RELEASE ORAL
Status: DISCONTINUED | OUTPATIENT
Start: 2024-05-20 | End: 2024-05-21

## 2024-05-19 RX ADMIN — Medication 1 APPLICATION: at 13:36

## 2024-05-19 RX ADMIN — Medication 1 APPLICATION: at 08:40

## 2024-05-19 RX ADMIN — PANTOPRAZOLE SODIUM 40 MG: 40 INJECTION, POWDER, FOR SOLUTION INTRAVENOUS at 08:41

## 2024-05-19 RX ADMIN — TAMSULOSIN HYDROCHLORIDE 0.4 MG: 0.4 CAPSULE ORAL at 08:41

## 2024-05-19 RX ADMIN — Medication 1 APPLICATION: at 17:11

## 2024-05-19 RX ADMIN — CARBIDOPA AND LEVODOPA 1.5 TABLET: 25; 100 TABLET ORAL at 17:11

## 2024-05-19 RX ADMIN — CARBIDOPA AND LEVODOPA 1.5 TABLET: 25; 100 TABLET ORAL at 20:05

## 2024-05-19 RX ADMIN — CARBIDOPA AND LEVODOPA 1.5 TABLET: 25; 100 TABLET ORAL at 08:40

## 2024-05-19 RX ADMIN — CARBIDOPA AND LEVODOPA 1.5 TABLET: 25; 100 TABLET ORAL at 13:36

## 2024-05-19 RX ADMIN — ACETAMINOPHEN 650 MG: 325 TABLET ORAL at 19:35

## 2024-05-19 RX ADMIN — METOPROLOL TARTRATE 25 MG: 50 TABLET, FILM COATED ORAL at 08:41

## 2024-05-19 RX ADMIN — FINASTERIDE 5 MG: 5 TABLET, FILM COATED ORAL at 08:41

## 2024-05-19 RX ADMIN — MEROPENEM 1000 MG: 1 INJECTION, POWDER, FOR SOLUTION INTRAVENOUS at 08:40

## 2024-05-19 ASSESSMENT — COGNITIVE AND FUNCTIONAL STATUS - GENERAL
MOVING FROM LYING ON BACK TO SITTING ON SIDE OF FLAT BED WITH BEDRAILS: TOTAL
MOBILITY SCORE: 6
TOILETING: TOTAL
PERSONAL GROOMING: TOTAL
MOVING TO AND FROM BED TO CHAIR: TOTAL
TURNING FROM BACK TO SIDE WHILE IN FLAT BAD: TOTAL
STANDING UP FROM CHAIR USING ARMS: TOTAL
DRESSING REGULAR LOWER BODY CLOTHING: TOTAL
TURNING FROM BACK TO SIDE WHILE IN FLAT BAD: A LOT
MOBILITY SCORE: 8
TOILETING: TOTAL
MOVING FROM LYING ON BACK TO SITTING ON SIDE OF FLAT BED WITH BEDRAILS: TOTAL
MOVING TO AND FROM BED TO CHAIR: TOTAL
PERSONAL GROOMING: TOTAL
DRESSING REGULAR LOWER BODY CLOTHING: TOTAL
HELP NEEDED FOR BATHING: TOTAL
CLIMB 3 TO 5 STEPS WITH RAILING: TOTAL
TURNING FROM BACK TO SIDE WHILE IN FLAT BAD: TOTAL
DRESSING REGULAR UPPER BODY CLOTHING: TOTAL
CLIMB 3 TO 5 STEPS WITH RAILING: TOTAL
STANDING UP FROM CHAIR USING ARMS: TOTAL
DAILY ACTIVITIY SCORE: 6
CLIMB 3 TO 5 STEPS WITH RAILING: TOTAL
MOBILITY SCORE: 6
DAILY ACTIVITIY SCORE: 6
WALKING IN HOSPITAL ROOM: TOTAL
TOILETING: TOTAL
DAILY ACTIVITIY SCORE: 6
MOVING TO AND FROM BED TO CHAIR: TOTAL
MOVING TO AND FROM BED TO CHAIR: TOTAL
DAILY ACTIVITIY SCORE: 6
DRESSING REGULAR UPPER BODY CLOTHING: TOTAL
DRESSING REGULAR UPPER BODY CLOTHING: TOTAL
STANDING UP FROM CHAIR USING ARMS: TOTAL
DRESSING REGULAR LOWER BODY CLOTHING: TOTAL
EATING MEALS: TOTAL
MOVING FROM LYING ON BACK TO SITTING ON SIDE OF FLAT BED WITH BEDRAILS: A LOT
MOVING FROM LYING ON BACK TO SITTING ON SIDE OF FLAT BED WITH BEDRAILS: TOTAL
STANDING UP FROM CHAIR USING ARMS: TOTAL
WALKING IN HOSPITAL ROOM: TOTAL
CLIMB 3 TO 5 STEPS WITH RAILING: TOTAL
PERSONAL GROOMING: TOTAL
HELP NEEDED FOR BATHING: TOTAL
DRESSING REGULAR LOWER BODY CLOTHING: TOTAL
HELP NEEDED FOR BATHING: TOTAL
EATING MEALS: TOTAL
PERSONAL GROOMING: TOTAL
WALKING IN HOSPITAL ROOM: TOTAL
MOBILITY SCORE: 6
TOILETING: TOTAL
HELP NEEDED FOR BATHING: TOTAL
WALKING IN HOSPITAL ROOM: TOTAL
EATING MEALS: TOTAL
DRESSING REGULAR UPPER BODY CLOTHING: TOTAL
EATING MEALS: TOTAL
TURNING FROM BACK TO SIDE WHILE IN FLAT BAD: TOTAL

## 2024-05-19 ASSESSMENT — PAIN SCALES - GENERAL
PAINLEVEL_OUTOF10: 0 - NO PAIN
PAINLEVEL_OUTOF10: 5 - MODERATE PAIN
PAINLEVEL_OUTOF10: 0 - NO PAIN
PAINLEVEL_OUTOF10: 0 - NO PAIN

## 2024-05-19 ASSESSMENT — PAIN - FUNCTIONAL ASSESSMENT
PAIN_FUNCTIONAL_ASSESSMENT: PAINAD (PAIN ASSESSMENT IN ADVANCED DEMENTIA SCALE)
PAIN_FUNCTIONAL_ASSESSMENT: 0-10
PAIN_FUNCTIONAL_ASSESSMENT: 0-10
PAIN_FUNCTIONAL_ASSESSMENT: WONG-BAKER FACES

## 2024-05-19 ASSESSMENT — PAIN DESCRIPTION - LOCATION: LOCATION: ARM

## 2024-05-19 ASSESSMENT — PAIN DESCRIPTION - ORIENTATION: ORIENTATION: LEFT

## 2024-05-19 ASSESSMENT — ACTIVITIES OF DAILY LIVING (ADL)
ADL_ASSISTANCE: NEEDS ASSISTANCE
BATHING_ASSISTANCE: TOTAL
ADL_ASSISTANCE: NEEDS ASSISTANCE

## 2024-05-19 ASSESSMENT — PAIN SCALES - WONG BAKER: WONGBAKER_NUMERICALRESPONSE: HURTS EVEN MORE

## 2024-05-19 NOTE — PROGRESS NOTES
"Markie Nobles is a 79 y.o. male on day 3 of admission presenting with NSTEMI (non-ST elevated myocardial infarction) (Multi).    Subjective   No acute complaints.     Objective     Vitals:    05/19/24 0808   BP: 137/72   Pulse: 78   Resp: 16   Temp: 36.7 °C (98.1 °F)   SpO2: 98%         Physical Exam  GEN: Ill appearing, cachectic  HEENT: AT/NC, PERRL, EOMI  CARDIO: RRR, normal S1 and S2  PULM: Rhonchorus RLL, no increased WOB  ABD: Soft, NT/ND, bowel sounds +, PEG in place w/out purulence or erythema  : No grayson  EXTR: Warm/well perfused; left wrist swollen and warm  NEURO: Responds to verbal and tactile stimuli, does not follow all commands, AO x1  PSYCH: Appropriate mood/affect    Last Recorded Vitals  Blood pressure 137/72, pulse 78, temperature 36.7 °C (98.1 °F), temperature source Temporal, resp. rate 16, height 1.778 m (5' 10\"), weight 62.6 kg (138 lb), SpO2 98%.  Intake/Output last 3 Shifts:  I/O last 3 completed shifts:  In: 934 (14.9 mL/kg) [Blood:364; NG/GT:470; IV Piggyback:100]  Out: 1000 (16 mL/kg) [Urine:1000 (0.4 mL/kg/hr)]  Weight: 62.6 kg       Assessment/Plan   Principal Problem:    NSTEMI (non-ST elevated myocardial infarction) (Multi)    Mr. Nobles is an 79 year old Male w/ a PMHx MGUS, CAD (s/p CABG x3 in 2022), HTN, CKD IV, Anemia, ABEBE, hyperlipidemia, obesity, BPH, gout, NIDDM, HLD, Parkinsons (w/ dementia), dysphagia, s/p peg tube (04/2024), sacral pressure sores, s/p hospitalization for aspiration PNA (DC'd 5/1/24) initially admitted for PNA and AOCA. Patient found to have lethargy and mild leukocytosis (WBC 10), likely infectious etiology. CT shows b/l lower lung findings suggestive of aspiration PNA, and given the patient's recent admission, this seems like the likely source of his infection. Additional sources include his sacral ulcer (not visualized) vs. PEG tube site (low suspicion) vs. UTI (ruled out w/ negative UA). He was found to be anemic and given 1x unit of blood " transfusion with appropriate increment. BUN very elevated as well and GI was consulted for concerns of GI bleed from the PEG. 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 Patient's left wrist was swollen as well. Wife this say that patient has had gout flares in the past (last one a year ago). Xray left wrist was obtained and showed scaphoid fracture. Ortho was consulted for this. Palliative care was consulted for goals of care and patient changed code status to DNR/DNI on 5/17. Per GI, resumed tube feeds on 5/17 with nutrition consulted placed. Active issues include: monitor for bleeding, left scaphoid fracture, tube feeds, encephalopathy.     Changes 05/19/24  - Hgb 6.9, moderately stable  - Plavix still on hold; resume 5/20 if Hgb stable; no evidence of GIB  - pantoprazole 40mg IV BID -> 40mg PO QD  - Bcx NGTD. Will consider tapering off aracely on 5/20.    PLAN:    # AMS, resolved  # Parkinson's Disease w/ Dementia  :: Mentation improving after blood transfusion and abx  -Continue home medication: Carbidopa-Levodopa 1.5tab QID  - MRI brain obtained on 5/17 demonstrated old vascular lesions but also ventricular dilation possibly consistent with NPH per radiology read (day team to discuss finding)     # Aspiration PNA  :: WBC 10 on admission (up from 7.9 on 5/1)  :: CT Chest: Tree-in-bud b/l lower lungs and secretions in trachea. C/f acute/chronic aspiration and/or PNA  :: Low concern for systemic infection (afebrile, HDS)  :: S/p Vanc/Zosyn in ED  -Vancomycin (pharmacy to dose):  (5/16- )  -Meropenem 1g q12 (renal dosing): (5/16- )  -Aspiration precautions  -F/u urine strep and legionella  -F/u MRSA nares, consider DC Vanc in AM     # AOCA  # Possible slow GIB, most likely d/t mucosal disruption of PEG tube, now loosened   :: Hgb 7.8 -> 6.2 over two weeks  :: No objective evidence of bleed  :: Author witnessed a bowel movement in  the ED, was not melenic  :: 2019: EGD/Colonoscopy - friable gastric mucosal atrophy, duodenal erythema, and diverticulosis  :: S/p 1u pRBC in ED completed 5/16 at 23:00; 1U PLT on 5/18  -Trend Hgb  -Maintain active T&S  -tube feeds  - GI consulted  - Plavix briefly held, resumed on 5/19    #Scaphoid Fracture  :: Xrays of left wrist obtained  - ortho consulted     # Sacral Ulcer  -Wound care c/s     # CAD (S/p CABG x3 in 2022)  # Troponinemia  :: Troponin I  -> 516 w/out EKG changes, likely Type II MI  -hold home medication: Clopidogrel 75mg daily  -Continue home medication: Metoprolol tartrate 25mg daily (unclear why patient is on this dose frequency, however it populated in prior cardiologist note)  - splint provided for L scaphoid (moderately displaced) fracture, pt will followup with ortho on 6/11 at 3:15PM with Dr. Agudelo (appointment scheduled)     #BPH  -Continue home medication: Finasteride 5mg daily  -Continue home medication: Tamsulosin 0.4mg daily     # Severe Protein Calorie Malnutrition  -Tube feeds initiated in anticipation of Nutrition recs  -FWF 250ml q6     F: S/p 1.5L NS  E: PRN  N: Tube feeds via PEG  A: PEG, PIV     Code Status: DNR/DNI (per palliative care discussion on 5/17)     NOK: Bree Nobles (Wife) - [phone]           Jacky Ya MD

## 2024-05-19 NOTE — PROGRESS NOTES
Physical Therapy    Physical Therapy Evaluation    Patient Name: Markie Nobles  MRN: [id number]  Today's Date: 5/19/2024   Time Calculation  Start Time: 1036  Stop Time: 1100  Time Calculation (min): 24 min    Assessment/Plan   PT Assessment  PT Assessment Results: Decreased strength, Decreased endurance, Impaired balance, Decreased mobility  Rehab Prognosis: Good  Evaluation/Treatment Tolerance: Patient limited by fatigue, Patient limited by pain  Medical Staff Made Aware: Yes  Strengths: Support of Caregivers  End of Session Communication: Bedside nurse  Assessment Comment: Pt with decreased tolerance for upright activity. Pt able to follow 75% of commands and with eyes closed 50% of session. Pt was dependent for bed mobility and max A for sitting balance. Pt able to maintain seated position x 5 min. Patient would benefit from skilled physical therapy to maximize functional mobility and safety. Pt is appropriate for mod intensity therapy when medically appropriate for DC.  End of Session Patient Position: Bed, 3 rail up, Alarm on (HOB > 30 degrees)  IP OR SWING BED PT PLAN  Inpatient or Swing Bed: Inpatient  PT Plan  Treatment/Interventions: Bed mobility, Transfer training, Balance training, Endurance training, Therapeutic exercise, Therapeutic activity, Neuromuscular re-education  PT Plan: Skilled PT  PT Frequency: 2 times per week  PT Discharge Recommendations: Moderate intensity level of continued care  PT Recommended Transfer Status: Total assist  PT - OK to Discharge: Yes (meaning pt seen and dc rec made)  RN cleared prior to session    Subjective   General Visit Information:  General  Reason for Referral: 79 y.o M brought to ER on 5/16/2024 for becoming unresponsive and for hypotension at his SNF ([facility]). Admitted for PNA and AOCA. Pt found to have L scaphoid fx.  Pt recently hospitalized (4/25 - 5/1) and treated for aspiration PNA and suspected UTI.  Past Medical History Relevant to Rehab: MGUS, CAD (s/p  CABG x3 in 2022), HTN, CKD IV, Anemia, ABEBE, hyperlipidemia, obesity, BPH, gout, NIDDM, HLD, Parkinsons (w/ dementia), dysphagia, s/p peg tube (04/2024), sacral pressure sores  Family/Caregiver Present: Yes  Caregiver Feedback: wife present and supportive; able to give history  Prior to Session Communication: Bedside nurse  Patient Position Received: Bed, 3 rail up, Alarm on  Preferred Learning Style: auditory, verbal  General Comment: Pt with eyes closed but agreeable to therapy  Home Living:  Home Living  Type of Home: Skilled Nursing facility  Home Adaptive Equipment: None  Home Layout: One level  Home Living Comments: Pt from [facility]  Prior Level of Function:  Prior Function Per Pt/Caregiver Report  Level of [place]: Needs assistance with ADLs, Needs assistance with homemaking  Receives Help From:  (facility staff)  ADL Assistance: Needs assistance (SNF staff performs)  Homemaking Assistance: Needs assistance (SNF staff performs)  Ambulatory Assistance: Needs assistance (per wife he has not ambulated since being at [facility])  Prior Function Comments: SNF staff perform ADLs and IADLs. Per pt's wife pt has not ambulated - he had been in bed for 2 weeks and the week prior to admit had transfered to [facility] [place] assist; Per with second time pt was in [facility] he slid out.  Precautions:  Precautions  Medical Precautions: Fall precautions  Post-Surgical Precautions: Abdominal surgery precautions (from previous encounter)  Precautions Comment: L wrist in cast 2/2 fx - no WB restrictions; per pt's wife pt at times chooses to not talk/respond to questions    Objective   Pain:  Pain Assessment  Pain Assessment: Neumann-Baker FACES  Neumann-Baker FACES Pain Rating: Hurts even more  Pain Type: Acute pain  Pain Location: Buttocks (L wrist)  Pain Interventions: Repositioned  Response to Interventions: no change  Cognition:  Cognition  Overall Cognitive Status: Impaired  Arousal/Alertness: Inconsistent responses to  stimuli  Orientation Level: Disoriented to time, Disoriented to situation (pt able to correctly answer place with options;)  Following Commands: Follows one step commands with increased time (and repetition)    General Assessments:  Activity Tolerance  Endurance: Decreased tolerance for upright activites    Sensation  Light Touch: No apparent deficits    Strength  Strength Comments: unable to formally test 2/2 command following  Coordination  Movements are Fluid and Coordinated: No    Postural Control  Postural Control: Impaired    Static Sitting Balance  Static Sitting-Balance Support: Right upper extremity supported, Feet unsupported  Static Sitting-Level of Assistance: Maximum assistance  Static Sitting-Comment/Number of Minutes: pt retropulsive and leaning R; tolerated x 5 min       Functional Assessments:  Bed Mobility  Bed Mobility: Yes  Bed Mobility 1  Bed Mobility 1: Supine to sitting, Sitting to supine  Level of Assistance 1: Dependent  Bed Mobility Comments 1: max vc for hand placement, sequencing, and safety    Transfers  Transfer: No (deferred for safety 2/2 pt poor alertness and sitting balance)  Extremity/Trunk Assessments:  RLE   RLE :  (unable to formally test 2/2 command following; knee ext and ankle AP/DF at least 3/5 - may also be limited by stiffness)  LLE   LLE : Exceptions to WFL (unable to formally test 2/2 command following; knee ext and ankle AP/DF at least 3/5 - may also be limited by stiffness)  Outcome Measures:  [facility] Basic Mobility  Turning from your back to your side while in a flat bed without using bedrails: Total  Moving from lying on your back to sitting on the side of a flat bed without using bedrails: Total  Moving to and from bed to chair (including a wheelchair): Total  Standing up from a chair using your arms (e.g. wheelchair or bedside chair): Total  To walk in hospital room: Total  Climbing 3-5 steps with railing: Total  Basic Mobility - Total Score: 6    Encounter Problems        Encounter Problems (Active)       PT Problem       Patient will complete supine to sit and sit to supine Mod Assist x2       Start:  05/19/24    Expected End:  06/03/24            Pt will demo static/dynamic sitting with mod A x 8 min       Start:  05/19/24    Expected End:  06/03/24            Pt will demo rolling with mod A L and R       Start:  05/19/24    Expected End:  06/03/24            Pt will perform HEP with min verbal and tactile cueing       Start:  05/19/24    Expected End:  06/03/24                   Education Documentation  Precautions, taught by Vernell Anderson, PT at 5/19/2024 11:58 AM.  Learner: Patient  Readiness: Acceptance  Method: Explanation, Demonstration  Response: Needs Reinforcement    Body Mechanics, taught by Vernell Anderson PT at 5/19/2024 11:58 AM.  Learner: Patient  Readiness: Acceptance  Method: Explanation, Demonstration  Response: Needs Reinforcement    Mobility Training, taught by Vernell Anderson, PT at 5/19/2024 11:58 AM.  Learner: Patient  Readiness: Acceptance  Method: Explanation, Demonstration  Response: Needs Reinforcement    Education Comments  No comments found.

## 2024-05-19 NOTE — PROGRESS NOTES
Occupational Therapy    Evaluation    Patient Name: Markie Nobles  MRN: [id number]  Today's Date: 5/19/2024  Time Calculation  Start Time: 1114  Stop Time: 1122  Time Calculation (min): 8 min    Assessment  IP OT Assessment  OT Assessment: impaired ADLs, weakness  Evaluation/Treatment Tolerance: Patient limited by pain, Patient limited by fatigue  Medical Staff Made Aware: Yes  End of Session Communication: Bedside nurse  End of Session Patient Position: Bed, 3 rail up, Alarm on  Plan:  Treatment Interventions: ADL retraining, Functional transfer training, UE strengthening/ROM  OT Frequency: 3 times per week  OT Discharge Recommendations: Moderate intensity level of continued care  OT Recommended Transfer Status: Dependent  OT - OK to Discharge: Yes    Subjective   Current Problem:  1. NSTEMI (non-ST elevated myocardial infarction) (Multi)        2. WARREN (acute kidney injury) (CMS-HCC)        3. Anemia, unspecified type          General:  General  Reason for Referral: 79 y.o M brought to ER on 5/16/2024 for becoming unresponsive and for hypotension at his SNF ([facility]). Admitted for PNA and AOCA. Pt found to have L scaphoid fx.  Pt recently hospitalized (4/25 - 5/1) and treated for aspiration PNA and suspected UTI.  Past Medical History Relevant to Rehab: MGUS, CAD (s/p CABG x3 in 2022), HTN, CKD IV, Anemia, ABEBE, hyperlipidemia, obesity, BPH, gout, NIDDM, HLD, Parkinsons (w/ dementia), dysphagia, s/p peg tube (04/2024), sacral pressure sores  Family/Caregiver Present: Yes  Caregiver Feedback: Wife present, provided all of info  Prior to Session Communication: Bedside nurse  Patient Position Received: Bed, 3 rail up, Alarm on  General Comment: Pt did not open eyes during OT eval, only shook head yes/no to about 50% of questions. Refusing any bed mobility/EOB activity due to pain.  Precautions:  Medical Precautions: Fall precautions  Post-Surgical Precautions: Abdominal surgery precautions  Precautions Comment: SANDRA  in thumb spica splint due to L scaphoid fx  Vital Signs:     Pain:       Objective   Cognition:  Overall Cognitive Status: Impaired at baseline  Arousal/Alertness:  (did not open eyes during session)  Orientation Level: Unable to assess  Following Commands:  (Followed about 25% simple one step commands)           Home Living:  Type of Home: Skilled Nursing facility  Home Living Comments: [place]   Prior Function:  Level of [place]: Needs assistance with ADLs, Needs assistance with homemaking  ADL Assistance: Needs assistance  Homemaking Assistance: Needs assistance  Ambulatory Assistance: Needs assistance  Prior Function Comments: Pt from SNF, assist with all ADLs. Prior to admission wife reports she helps with most ADLs but pt was able to self feed (*prior to PEG tube)  IADL History:     ADL:  Eating Assistance: Not performed  Eating Deficit: Feeding tube  Grooming Assistance: Total  Grooming Deficit:  (anticipated)  Bathing Assistance: Total  Bathing Deficit:  (anticipated)  UE Dressing Assistance: Total  UE Dressing Deficit:  (anticipated)  LE Dressing Assistance: Total  LE Dressing Deficit:  (anticipated)  Toileting Assistance with Device: Total  Toileting Deficit: Incontinent  Activity Tolerance:     Bed Mobility/Transfers: Bed Mobility  Bed Mobility: No (Pt just evaluated by PT, pt refusing any bed mobility due ot fatigue and pain. Dependent for bed mobility per PT eval)    Vision: Vision - Basic Assessment  Current Vision:  (unable to assess, pt not opening eyes for OT eval)  Sensation:  Light Touch:  (Pt reports ability to feel BUE)  Strength:  Strength Comments:  (not formally assessed, overall RUE stronger than LUE)    Coordination:  Movements are Fluid and Coordinated: No   Hand Function:  Hand Function  Gross Grasp:  (RUE fxl, LUE impaired)  Coordination: Impaired  Extremities: RUE   RUE :  (RUE ROM grossly WFL, able to grasp, touch top of head, move purposefully.) and LUE   LUE:  (LUE only with  minimal ROM, pt guarding due to pain. Educ wife on ROM of fingers to maintain full ROM, pt allowed OT to minimally flex elbow and shoulder.)      Outcome Measures: [facility] Daily Activity  Putting on and taking off regular lower body clothing: Total  Bathing (including washing, rinsing, drying): Total  Putting on and taking off regular upper body clothing: Total  Toileting, which includes using toilet, bedpan or urinal: Total  Taking care of personal grooming such as brushing teeth: Total  Eating Meals: Total  Daily Activity - Total Score: 6      Education Documentation  Home Exercise Program, taught by Cassandra Jimenez OT at 5/19/2024 12:20 PM.  Learner: Patient  Readiness: Nonacceptance  Method: Explanation  Response: Needs Reinforcement    Body Mechanics, taught by Cassandra Jimenez OT at 5/19/2024 12:20 PM.  Learner: Patient  Readiness: Nonacceptance  Method: Explanation  Response: Needs Reinforcement    Precautions, taught by Cassandra Jimenez OT at 5/19/2024 12:20 PM.  Learner: Patient  Readiness: Nonacceptance  Method: Explanation  Response: Needs Reinforcement    ADL Training, taught by Cassandra Jimenez OT at 5/19/2024 12:20 PM.  Learner: Patient  Readiness: Nonacceptance  Method: Explanation  Response: Needs Reinforcement    Education Comments  No comments found.      Goals:   Encounter Problems       Encounter Problems (Active)       ADLs       Patient will complete daily grooming tasks brushing teeth and washing face/hair with moderate assist level of assistance and PRN adaptive equipment while supported sitting. (Not Progressing)       Start:  05/19/24    Expected End:  06/02/24               COGNITION/SAFETY       Patient will follow 75% Simple commands to allow improved ADL performance. (Not Progressing)       Start:  05/19/24    Expected End:  06/02/24               TRANSFERS       Patient will perform bed mobility moderate assist level of assistance and bed rails and draw sheet in order to improve safety  and independence with mobility (Not Progressing)       Start:  05/19/24    Expected End:  06/02/24            Patient will complete sit to stand transfer with moderate assist level of assistance in order to improve safety and prepare for out of bed mobility. (Not Progressing)       Start:  05/19/24    Expected End:  06/02/24                    Cassandra Jimenez, OT  5/19/2024

## 2024-05-20 ENCOUNTER — APPOINTMENT (OUTPATIENT)
Dept: HEMATOLOGY/ONCOLOGY | Facility: HOSPITAL | Age: 79
End: 2024-05-20
Payer: MEDICARE

## 2024-05-20 LAB
ALBUMIN SERPL BCP-MCNC: 2.6 G/DL (ref 3.4–5)
ALP SERPL-CCNC: 161 U/L (ref 33–136)
ALT SERPL W P-5'-P-CCNC: 25 U/L (ref 10–52)
ANION GAP SERPL CALC-SCNC: 15 MMOL/L (ref 10–20)
AST SERPL W P-5'-P-CCNC: 43 U/L (ref 9–39)
BACTERIA BLD CULT: NORMAL
BACTERIA BLD CULT: NORMAL
BILIRUB DIRECT SERPL-MCNC: 0.2 MG/DL (ref 0–0.3)
BILIRUB SERPL-MCNC: 0.3 MG/DL (ref 0–1.2)
BUN SERPL-MCNC: 103 MG/DL (ref 6–23)
CALCIUM SERPL-MCNC: 8.3 MG/DL (ref 8.6–10.6)
CHLORIDE SERPL-SCNC: 108 MMOL/L (ref 98–107)
CO2 SERPL-SCNC: 22 MMOL/L (ref 21–32)
CREAT SERPL-MCNC: 3.68 MG/DL (ref 0.5–1.3)
EGFRCR SERPLBLD CKD-EPI 2021: 16 ML/MIN/1.73M*2
ERYTHROCYTE [DISTWIDTH] IN BLOOD BY AUTOMATED COUNT: 15.1 % (ref 11.5–14.5)
GLUCOSE BLD MANUAL STRIP-MCNC: 109 MG/DL (ref 74–99)
GLUCOSE SERPL-MCNC: 106 MG/DL (ref 74–99)
HCT VFR BLD AUTO: 23.6 % (ref 41–52)
HGB BLD-MCNC: 7.8 G/DL (ref 13.5–17.5)
MAGNESIUM SERPL-MCNC: 1.98 MG/DL (ref 1.6–2.4)
MCH RBC QN AUTO: 29.3 PG (ref 26–34)
MCHC RBC AUTO-ENTMCNC: 33.1 G/DL (ref 32–36)
MCV RBC AUTO: 89 FL (ref 80–100)
NRBC BLD-RTO: 0 /100 WBCS (ref 0–0)
PHOSPHATE SERPL-MCNC: 3.3 MG/DL (ref 2.5–4.9)
PLATELET # BLD AUTO: 81 X10*3/UL (ref 150–450)
POTASSIUM SERPL-SCNC: 4.6 MMOL/L (ref 3.5–5.3)
PROT SERPL-MCNC: 6 G/DL (ref 6.4–8.2)
RBC # BLD AUTO: 2.66 X10*6/UL (ref 4.5–5.9)
SODIUM SERPL-SCNC: 140 MMOL/L (ref 136–145)
WBC # BLD AUTO: 8.3 X10*3/UL (ref 4.4–11.3)

## 2024-05-20 PROCEDURE — 84075 ASSAY ALKALINE PHOSPHATASE: CPT

## 2024-05-20 PROCEDURE — 1200000002 HC GENERAL ROOM WITH TELEMETRY DAILY

## 2024-05-20 PROCEDURE — 99232 SBSQ HOSP IP/OBS MODERATE 35: CPT

## 2024-05-20 PROCEDURE — 86803 HEPATITIS C AB TEST: CPT

## 2024-05-20 PROCEDURE — 2500000001 HC RX 250 WO HCPCS SELF ADMINISTERED DRUGS (ALT 637 FOR MEDICARE OP)

## 2024-05-20 PROCEDURE — 85027 COMPLETE CBC AUTOMATED: CPT

## 2024-05-20 PROCEDURE — 82947 ASSAY GLUCOSE BLOOD QUANT: CPT

## 2024-05-20 PROCEDURE — 83735 ASSAY OF MAGNESIUM: CPT

## 2024-05-20 PROCEDURE — 84100 ASSAY OF PHOSPHORUS: CPT

## 2024-05-20 PROCEDURE — 2500000006 HC RX 250 W HCPCS SELF ADMINISTERED DRUGS (ALT 637 FOR ALL PAYERS): Mod: MUE

## 2024-05-20 PROCEDURE — 87389 HIV-1 AG W/HIV-1&-2 AB AG IA: CPT

## 2024-05-20 PROCEDURE — 2500000004 HC RX 250 GENERAL PHARMACY W/ HCPCS (ALT 636 FOR OP/ED)

## 2024-05-20 PROCEDURE — 36415 COLL VENOUS BLD VENIPUNCTURE: CPT

## 2024-05-20 RX ADMIN — MEROPENEM 1000 MG: 1 INJECTION, POWDER, FOR SOLUTION INTRAVENOUS at 08:57

## 2024-05-20 RX ADMIN — TAMSULOSIN HYDROCHLORIDE 0.4 MG: 0.4 CAPSULE ORAL at 09:00

## 2024-05-20 RX ADMIN — CLOPIDOGREL BISULFATE 75 MG: 75 TABLET ORAL at 12:05

## 2024-05-20 RX ADMIN — ACETAMINOPHEN 650 MG: 325 TABLET ORAL at 15:06

## 2024-05-20 RX ADMIN — Medication 1 APPLICATION: at 17:00

## 2024-05-20 RX ADMIN — FINASTERIDE 5 MG: 5 TABLET, FILM COATED ORAL at 08:29

## 2024-05-20 RX ADMIN — Medication 1 APPLICATION: at 13:23

## 2024-05-20 RX ADMIN — Medication 1 APPLICATION: at 21:54

## 2024-05-20 RX ADMIN — MEROPENEM 1000 MG: 1 INJECTION, POWDER, FOR SOLUTION INTRAVENOUS at 21:39

## 2024-05-20 RX ADMIN — METOPROLOL TARTRATE 25 MG: 50 TABLET, FILM COATED ORAL at 08:29

## 2024-05-20 RX ADMIN — ACETAMINOPHEN 650 MG: 325 TABLET ORAL at 23:08

## 2024-05-20 RX ADMIN — CARBIDOPA AND LEVODOPA 1.5 TABLET: 25; 100 TABLET ORAL at 08:29

## 2024-05-20 RX ADMIN — CARBIDOPA AND LEVODOPA 1.5 TABLET: 25; 100 TABLET ORAL at 13:03

## 2024-05-20 RX ADMIN — CARBIDOPA AND LEVODOPA 1.5 TABLET: 25; 100 TABLET ORAL at 18:11

## 2024-05-20 RX ADMIN — Medication 1 APPLICATION: at 08:57

## 2024-05-20 RX ADMIN — PANTOPRAZOLE SODIUM 40 MG: 40 TABLET, DELAYED RELEASE ORAL at 08:29

## 2024-05-20 RX ADMIN — CLOPIDOGREL BISULFATE 75 MG: 75 TABLET ORAL at 12:12

## 2024-05-20 ASSESSMENT — PAIN SCALES - PAIN ASSESSMENT IN ADVANCED DEMENTIA (PAINAD)
BREATHING: NORMAL
CONSOLABILITY: NO NEED TO CONSOLE
TOTALSCORE: 3
BODYLANGUAGE: RELAXED
NEGVOCALIZATION: OCCASIONAL MOAN/GROAN, LOW SPEECH, NEGATIVE/DISAPPROVING QUALITY
CONSOLABILITY: NO NEED TO CONSOLE
BODYLANGUAGE: RELAXED
TOTALSCORE: 0
FACIALEXPRESSION: SMILING OR INEXPRESSIVE
TOTALSCORE: MEDICATION (SEE MAR)
FACIALEXPRESSION: FACIAL GRIMACING
BREATHING: NORMAL

## 2024-05-20 ASSESSMENT — PAIN - FUNCTIONAL ASSESSMENT
PAIN_FUNCTIONAL_ASSESSMENT: PAINAD (PAIN ASSESSMENT IN ADVANCED DEMENTIA SCALE)
PAIN_FUNCTIONAL_ASSESSMENT: PAINAD (PAIN ASSESSMENT IN ADVANCED DEMENTIA SCALE)

## 2024-05-20 ASSESSMENT — COGNITIVE AND FUNCTIONAL STATUS - GENERAL
DRESSING REGULAR UPPER BODY CLOTHING: TOTAL
TURNING FROM BACK TO SIDE WHILE IN FLAT BAD: TOTAL
MOVING TO AND FROM BED TO CHAIR: TOTAL
PERSONAL GROOMING: TOTAL
MOBILITY SCORE: 6
DAILY ACTIVITIY SCORE: 6
DRESSING REGULAR LOWER BODY CLOTHING: TOTAL
WALKING IN HOSPITAL ROOM: TOTAL
CLIMB 3 TO 5 STEPS WITH RAILING: TOTAL
EATING MEALS: TOTAL
MOVING FROM LYING ON BACK TO SITTING ON SIDE OF FLAT BED WITH BEDRAILS: TOTAL
HELP NEEDED FOR BATHING: TOTAL
TOILETING: TOTAL
STANDING UP FROM CHAIR USING ARMS: TOTAL

## 2024-05-20 NOTE — CARE PLAN
The patient's goals for the shift include        Problem: Safety  Goal: Patient will be injury free during hospitalization  Outcome: Progressing     Problem: Discharge Barriers  Goal: My discharge needs are met  Outcome: Progressing

## 2024-05-20 NOTE — PROGRESS NOTES
"Markie Nobles is a 79 y.o. male on day 4 of admission presenting with NSTEMI (non-ST elevated myocardial infarction) (Multi).    Subjective   No acute complaints.     Objective     Vitals:    05/19/24 2054   BP: 140/69   Pulse: 67   Resp: 18   Temp: 37 °C (98.6 °F)   SpO2: 100%         Physical Exam  GEN: cachectic  HEENT: AT/NC  CARDIO: RRR, normal S1 and S2  PULM: clear to auscultation bilaterally  ABD: Soft, NT/ND, bowel sounds +, PEG in place w/out purulence or erythema  EXTR: Warm/well perfused; left wrist has cast in place  PSYCH: Appropriate mood/affect    Last Recorded Vitals  Blood pressure 140/69, pulse 67, temperature 37 °C (98.6 °F), resp. rate 18, height 1.778 m (5' 10\"), weight 62.6 kg (138 lb), SpO2 100%.  Intake/Output last 3 Shifts:  I/O last 3 completed shifts:  In: 610 (9.7 mL/kg) [NG/GT:510; IV Piggyback:100]  Out: 700 (11.2 mL/kg) [Urine:700 (0.3 mL/kg/hr)]  Weight: 62.6 kg       Assessment/Plan   Principal Problem:    NSTEMI (non-ST elevated myocardial infarction) (Multi)    Mr. Nobles is an 79 year old Male w/ a PMHx MGUS, CAD (s/p CABG x3 in 2022), HTN, CKD IV, Anemia, ABEBE, hyperlipidemia, obesity, BPH, gout, NIDDM, HLD, Parkinsons (w/ dementia), dysphagia, s/p peg tube (04/2024), sacral pressure sores, s/p hospitalization for aspiration PNA (DC'd 5/1/24) initially admitted for PNA and AOCA. Patient found to have lethargy and mild leukocytosis (WBC 10), likely infectious etiology. CT shows b/l lower lung findings suggestive of aspiration PNA, and given the patient's recent admission, this seems like the likely source of his infection. Additional sources include his sacral ulcer (not visualized) vs. PEG tube site (low suspicion) vs. UTI (ruled out w/ negative UA). He was found to be anemic and given 1x unit of blood transfusion with appropriate increment. BUN very elevated as well and GI was consulted for concerns of GI bleed from the PEG. PEG clamp was loosened and there was possible " buried bumper syndrome causing bleed but minimal to no blood was aspirated from PEG. Patient's left wrist was swollen as well. Wife this say that patient has had gout flares in the past (last one a year ago). Xray left wrist was obtained and showed scaphoid fracture. Ortho was consulted for this. Palliative care was consulted for goals of care and patient changed code status to DNR/DNI on 5/17. Per GI, resumed tube feeds on 5/17 with nutrition consulted placed. Active issues include: monitor for bleeding, left scaphoid fracture, tube feeds, encephalopathy.     Changes 05/20/24  - medically cleared for discharge  - PT recommending moderate intensity care  - BUN improving  - Hgb stable, unheld plavix  - Bcx NGTD. Will consider tapering off aracely on 5/20.    PLAN:    # AMS, resolved  # Parkinson's Disease w/ Dementia  :: Mentation improving after blood transfusion and abx  -Continue home medication: Carbidopa-Levodopa 1.5tab QID  - MRI brain obtained on 5/17 demonstrated old vascular lesions but also ventricular dilation possibly consistent with NPH per radiology read (day team to discuss finding)     # Aspiration PNA  :: WBC 10 on admission (up from 7.9 on 5/1)  :: CT Chest: Tree-in-bud b/l lower lungs and secretions in trachea. C/f acute/chronic aspiration and/or PNA  :: Low concern for systemic infection (afebrile, HDS)  :: S/p Vanc/Zosyn in ED  -Vancomycin (pharmacy to dose):  (5/16-5/17)  -Meropenem 1g q12 (renal dosing): (5/16- )  -Aspiration precautions  -F/u urine strep and legionella  -F/u MRSA nares, consider DC Vanc in AM     # AOCA  # Possible slow GIB, most likely d/t mucosal disruption of PEG tube, now loosened   :: Hgb 7.8 -> 6.2 over two weeks  :: No objective evidence of bleed  :: Author witnessed a bowel movement in the ED, was not melenic  :: 2019: EGD/Colonoscopy - friable gastric mucosal atrophy, duodenal erythema, and diverticulosis  :: S/p 1u pRBC in ED completed 5/16 at 23:00; 1U PLT on  5/18  -Trend Hgb  -Maintain active T&S  -tube feeds  - GI consulted  - Plavix briefly held, resumed on 5/19    #Scaphoid Fracture  :: Xrays of left wrist obtained  - ortho consulted  - splint provided for L scaphoid (moderately displaced) fracture, pt will followup with ortho on 6/11 at 3:15PM with Dr. Agudelo (appointment scheduled)     # Sacral Ulcer  -Wound care c/s     # CAD (S/p CABG x3 in 2022)  # Troponinemia  :: Troponin I  -> 516 w/out EKG changes, likely Type II MI  -hold home medication: Clopidogrel 75mg daily  -Continue home medication: Metoprolol tartrate 25mg daily (unclear why patient is on this dose frequency, however it populated in prior cardiologist note)       #BPH  -Continue home medication: Finasteride 5mg daily  -Continue home medication: Tamsulosin 0.4mg daily     # Severe Protein Calorie Malnutrition  -Tube feeds initiated in anticipation of Nutrition recs  -FWF 250ml q6     F: S/p 1.5L NS  E: PRN  N: Tube feeds via PEG  A: PEG, PIV     Code Status: DNR/DNI (per palliative care discussion on 5/17)     NOK: Bree Nobles (Wife) - [phone]           Deondre Grimes MD PhD       No

## 2024-05-20 NOTE — PROGRESS NOTES
Spiritual Care Visit    Clinical Encounter Type  Visited With: Patient not available (Pt's wife and a visitor present, having a robust conversation. Did not disturb.)  Routine Visit: Introduction  Continue Visiting: Yes  Referral From: Other (Comment)  Referral To:

## 2024-05-20 NOTE — NURSING NOTE
END OF SHIFT NOTE     Patient remained safe and free from falls during the shift.  Patient has been pleasant and cooperative.  Patient peg tube clogged twice during the shift...   60-120cc of Diet Pepsi was used to unclog the tube each time.  Patient wife and son came to visit today.  VSS  Call light within reach.  No other events occurred throughout shift.   Nurse to nurse report was given.  Oncoming nurse will continue to monitor.     Sebas Mason LPN

## 2024-05-20 NOTE — PROGRESS NOTES
"Music Therapy Note    Markie MISHRA Giuliano     Therapy Session  Referral Type: New referral this admission  Visit Type: New visit  Session Start Time: 1128  Session End Time: 1151  Intervention Delivery: In-person  Conflict of Service: None  Number of family members present: 1  Family Present for Session: Spouse/Significant Other  Family Participation: Interactive     Pre-assessment  Unable to Assess Reason: Cognitive limitation  Mood/Affect: Flat/blunted, Calm, Cooperative  Verbalized Emotional State:  (\"excellent\")         Treatment/Interventions  Areas of Focus: Family bonding, Family/caregiver support, Self-expression, Normalization  Music Therapy Interventions: Assessment, Iso-principle, Music sharing/discussion, Neurologic techniques, Live music listening  Interruption: No  Patient Fell Asleep at End of Session: Yes    Post-assessment  Unable to Assess Reason: Patient sleeping  Mood/Affect: Tired/lethargic, Calm  Continue Visiting: Yes  Total Session Time (min): 23 minutes    Narrative  Assessment Detail: Pt presented in bed with wife Bree present at bedside. Pt arousable and responsive to wife's voice. He claimed to feel excellent. Pt periodically stares off and stops speaking. Pt wife agreed to music intervention this day.  Plan: MTI engaged pt and family in live listening and discussion for assessment, bonding, and normalization.  Intervention: MTI provided familiar and preferred music via guitar and voice  Evaluation: Pt engaged in music related discussion recalling support systems, smiling, and confirming he knew the song \"Stand By Me\". Pt wife requested Niranjan Steinberg and pt smiled throughout the song before falling asleep. Kyle remained asleep throughout the end of the session. Wife expressed gratitude and agreed to follow up.  Follow-up: MTI to follow up          "

## 2024-05-20 NOTE — HOSPITAL COURSE
79 year old Male w/ a PMHx MGUS, CAD (s/p CABG x3 in 2022), HTN, CKD IV, Anemia, ABEBE, hyperlipidemia, obesity, BPH, gout, NIDDM, HLD, Parkinsons (w/ dementia), dysphagia, s/p peg tube (04/2024), sacral pressure sores, s/p hospitalization for aspiration PNA (DC'd 5/1/24) who was admitted for PNA. Patient found to have lethargy and mild leukocytosis (WBC 10). CT shows b/l lower lung findings suggestive of aspiration PNA. Given the patient's recent admission and CT findings, suspected that aspiration PNA was source of infection on presentation. He was given vancomycin and zosyn in the ED, switched to Meropenem (5/16-5/22), and then transitioned to Augmentin (5/22-5/25) for completion of aspiration PNA treatment with sx resolution. He also presented with acute on chronic normocytic anemia and was given 1x unit of RBC with appropriate increment. Anemia believed to be d/t anemia of chronic disease/CKD with acute portion likely d/t infection +/- abx usage. BUN very elevated as well and GI was consulted for concerns of GI bleed from the PEG. 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 Patient's left wrist was noted to be swollen as well. Xray left wrist showed scaphoid fracture. Ortho was consulted. Left scaphoid fracture was splinted. Palliative care consulted for goals of care and patient changed code status to DNR/DNI on 5/17. Pt developed worsening anemia and thrombocytopenia on 5/20 after free water flushes increased. 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 Pt received IVIG (5/23 - 5/27) for 5 day course and Dexamethasone 40 mg IV daily (5/23 - 5/26) for 4 day course. Pt was then transitioned to Prednisone 62.5 mg PO daily (1 mg/kg/day) on 5/27 per heme recs. Pt had initial improvement in platelets to 8, then worsened on 5/26 to platelet count of 1.     Thrombocytopenia  laboratory workup included: Hep B/C and HIV (-) for acute infection. Parvovirus IgG (+) but PCR (-). EBV nuclear antigen IgG (+), EBV early antigen IgG (+), EBV VCA IgG (+), but EBV VCA IgM (-). CMV DNA PCR (-). Drug-dependent anti-PLT Ab with presence of PLT-reactive Ab and/or immune complexes but not supportive of drug-induced ITP however cannot completely r/o per lab. Pt also with continued to have melena which started on 5/25. GI re-engaged, believe dark stools were a result of slow oozing in setting of low platelets and did not want to scope given significantly low platelets and hemodynamic stability. Given 1 unit RBC on 5/26. Given 1 unit of PLT on 5/25 and 5/26. WARREN on CKD also started to worsen with Cr 3.61 on 5/25 (slowly up-trended from 3.42 on 5/22). FeNa 2.6% (intrinsic)- suspect d/t recent abx usage. Less likely obstructive given PVR 17 ml. UPEP with marked proteinuria but no monoclonal protein detected. SPEP with no monoclonal protein detected. Immunoglobulin free LT chain kappa:lambda ratio 1.33 (same as prior 4/17/24). Renal US with relative atrophic Rt kidney, signs of medical renal disease, no hydronephrosis, bilateral renal cysts, and prostamegaly. Heme suspecting persistent thrombocytopenia may be post-transfusion purpura rather than drug-induced ITP as thrombocytopenia persisted despite having not received abx for a few days. Received 1 unit PLT overnight (5/27)  in setting of bleeding from left arm phlebotomy site. CBC 1 hr post-PLT transfusion with Hgb 7.1 (from 9.2 in AM) and PLT 3 (from 2 in AM). Received first dose of romiplostim 190 mcg on 5/28 (plan for weekly dosing). Spleen US with borderline splenomegaly, diffuse heterogenicity of splenic parenchyma with areas of peripheral wedge-shaped hyperechogenicity which may represent peripheral calcifications and chronic scarring although infarcts can't be excluded per radiology. Obtained left iliac bone marrow bx on 5/30 with IR.     Nephrology  consulted on 6/1 given worsening WARREN on CKD. On 6/2, mental status appeared to change from Aox2 to Aox1. CT head on 6/2 with no new changes from prior. Suspect change in mental status d/t uremia. Repeat UA (6/1) with 2+ protein, trace glucose, trace blood, (-) leuk esterase/nitrite, budding yeast present. FeNa 2.7% (intrinsic). PLT improved to 48 on 6/3. Received lokelma 10 gm on 6/3 for K 5.6. Given another 2 doses of lokelma 10 gm on 6/4 as K still 5.6.     Rapid response overnight on the evening of 6/5. Pt had emesis and desatted to mid 80s. Suspected aspiration. Pt was placed on non-rebreather. Hypotensive to 80s/50s then down to 70s/40s. Night team discussed with patient's wife and wife decided to change code status from DNR/DNI to DNR comfort measures only. Pt passed away the following morning, on 6/6/24.

## 2024-05-21 ENCOUNTER — DOCUMENTATION (OUTPATIENT)
Dept: RESEARCH | Age: 79
End: 2024-05-21
Payer: MEDICARE

## 2024-05-21 LAB
ABO GROUP (TYPE) IN BLOOD: NORMAL
ALBUMIN SERPL BCP-MCNC: 2.4 G/DL (ref 3.4–5)
ALP SERPL-CCNC: 152 U/L (ref 33–136)
ALT SERPL W P-5'-P-CCNC: 17 U/L (ref 10–52)
ANION GAP SERPL CALC-SCNC: 12 MMOL/L (ref 10–20)
ANTIBODY SCREEN: NORMAL
APTT PPP: 31 SECONDS (ref 27–38)
AST SERPL W P-5'-P-CCNC: 33 U/L (ref 9–39)
BILIRUB DIRECT SERPL-MCNC: 0 MG/DL (ref 0–0.3)
BILIRUB DIRECT SERPL-MCNC: 0 MG/DL (ref 0–0.3)
BILIRUB SERPL-MCNC: 0.3 MG/DL (ref 0–1.2)
BILIRUB SERPL-MCNC: 0.4 MG/DL (ref 0–1.2)
BUN SERPL-MCNC: 96 MG/DL (ref 6–23)
BUN SERPL-MCNC: 97 MG/DL (ref 6–23)
CALCIUM SERPL-MCNC: 8 MG/DL (ref 8.6–10.6)
CHLORIDE SERPL-SCNC: 107 MMOL/L (ref 98–107)
CO2 SERPL-SCNC: 22 MMOL/L (ref 21–32)
CREAT SERPL-MCNC: 3.55 MG/DL (ref 0.5–1.3)
EGFRCR SERPLBLD CKD-EPI 2021: 17 ML/MIN/1.73M*2
ERYTHROCYTE [DISTWIDTH] IN BLOOD BY AUTOMATED COUNT: 14.8 % (ref 11.5–14.5)
FERRITIN SERPL-MCNC: 1535 NG/ML (ref 20–300)
FIBRINOGEN PPP-MCNC: 454 MG/DL (ref 200–400)
FOLATE SERPL-MCNC: 20.8 NG/ML
GLUCOSE BLD MANUAL STRIP-MCNC: 101 MG/DL (ref 74–99)
GLUCOSE BLD MANUAL STRIP-MCNC: 90 MG/DL (ref 74–99)
GLUCOSE SERPL-MCNC: 81 MG/DL (ref 74–99)
H PYLORI AG STL QL IA: NEGATIVE
HCT VFR BLD AUTO: 19.6 % (ref 41–52)
HCV AB SER QL: NONREACTIVE
HEMOGLOBIN A2: 3.1 % (ref 2–3.5)
HEMOGLOBIN A: 66.4 % (ref 95.8–98)
HEMOGLOBIN F: 0.2 % (ref 0–2)
HEMOGLOBIN IDENTIFICATION INTERPRETATION: ABNORMAL
HEMOGLOBIN S: 30.3 %
HGB BLD-MCNC: 6.9 G/DL (ref 13.5–17.5)
HGB RETIC QN: 34 PG (ref 28–38)
HGB RETIC QN: 35 PG (ref 28–38)
HIV 1+2 AB+HIV1 P24 AG SERPL QL IA: NONREACTIVE
IMMATURE RETIC FRACTION: 7.9 %
IMMATURE RETIC FRACTION: 8 %
INR PPP: 1.1 (ref 0.9–1.1)
IRON SATN MFR SERPL: 26 % (ref 25–45)
IRON SERPL-MCNC: 38 UG/DL (ref 35–150)
LACTATE SERPL-SCNC: 0.4 MMOL/L (ref 0.4–2)
LDH SERPL L TO P-CCNC: 146 U/L (ref 84–246)
MAGNESIUM SERPL-MCNC: 1.86 MG/DL (ref 1.6–2.4)
MCH RBC QN AUTO: 29.9 PG (ref 26–34)
MCHC RBC AUTO-ENTMCNC: 35.2 G/DL (ref 32–36)
MCV RBC AUTO: 85 FL (ref 80–100)
NRBC BLD-RTO: 0 /100 WBCS (ref 0–0)
PATH REVIEW-HGB IDENTIFICATION: ABNORMAL
PHOSPHATE SERPL-MCNC: 3.6 MG/DL (ref 2.5–4.9)
PLATELET # BLD AUTO: 20 X10*3/UL (ref 150–450)
POTASSIUM SERPL-SCNC: 4.4 MMOL/L (ref 3.5–5.3)
PROT SERPL-MCNC: 5.7 G/DL (ref 6.4–8.2)
PROTHROMBIN TIME: 12 SECONDS (ref 9.8–12.8)
RBC # BLD AUTO: 2.31 X10*6/UL (ref 4.5–5.9)
RETICS #: 0.02 X10*6/UL (ref 0.02–0.11)
RETICS #: 0.02 X10*6/UL (ref 0.02–0.11)
RETICS/RBC NFR AUTO: 0.9 % (ref 0.5–2)
RETICS/RBC NFR AUTO: 0.9 % (ref 0.5–2)
RH FACTOR (ANTIGEN D): NORMAL
SODIUM SERPL-SCNC: 137 MMOL/L (ref 136–145)
TIBC SERPL-MCNC: 145 UG/DL (ref 240–445)
UIBC SERPL-MCNC: 107 UG/DL (ref 110–370)
VIT B12 SERPL-MCNC: 956 PG/ML (ref 211–911)
WBC # BLD AUTO: 7 X10*3/UL (ref 4.4–11.3)

## 2024-05-21 PROCEDURE — 83020 HEMOGLOBIN ELECTROPHORESIS: CPT

## 2024-05-21 PROCEDURE — 83010 ASSAY OF HAPTOGLOBIN QUANT: CPT

## 2024-05-21 PROCEDURE — 82248 BILIRUBIN DIRECT: CPT | Mod: 91

## 2024-05-21 PROCEDURE — 86706 HEP B SURFACE ANTIBODY: CPT

## 2024-05-21 PROCEDURE — 83615 LACTATE (LD) (LDH) ENZYME: CPT

## 2024-05-21 PROCEDURE — 84075 ASSAY ALKALINE PHOSPHATASE: CPT

## 2024-05-21 PROCEDURE — 85362 FIBRIN DEGRADATION PRODUCTS: CPT

## 2024-05-21 PROCEDURE — 85045 AUTOMATED RETICULOCYTE COUNT: CPT

## 2024-05-21 PROCEDURE — 99233 SBSQ HOSP IP/OBS HIGH 50: CPT

## 2024-05-21 PROCEDURE — 85027 COMPLETE CBC AUTOMATED: CPT

## 2024-05-21 PROCEDURE — 85610 PROTHROMBIN TIME: CPT

## 2024-05-21 PROCEDURE — 2500000004 HC RX 250 GENERAL PHARMACY W/ HCPCS (ALT 636 FOR OP/ED)

## 2024-05-21 PROCEDURE — 36415 COLL VENOUS BLD VENIPUNCTURE: CPT

## 2024-05-21 PROCEDURE — 84100 ASSAY OF PHOSPHORUS: CPT

## 2024-05-21 PROCEDURE — 82728 ASSAY OF FERRITIN: CPT

## 2024-05-21 PROCEDURE — 2500000001 HC RX 250 WO HCPCS SELF ADMINISTERED DRUGS (ALT 637 FOR MEDICARE OP)

## 2024-05-21 PROCEDURE — 80074 ACUTE HEPATITIS PANEL: CPT | Mod: WESLAB

## 2024-05-21 PROCEDURE — 85045 AUTOMATED RETICULOCYTE COUNT: CPT | Mod: 91,MUE | Performed by: INTERNAL MEDICINE

## 2024-05-21 PROCEDURE — 86704 HEP B CORE ANTIBODY TOTAL: CPT

## 2024-05-21 PROCEDURE — 84520 ASSAY OF UREA NITROGEN: CPT

## 2024-05-21 PROCEDURE — 82247 BILIRUBIN TOTAL: CPT

## 2024-05-21 PROCEDURE — 1100000001 HC PRIVATE ROOM DAILY

## 2024-05-21 PROCEDURE — 86901 BLOOD TYPING SEROLOGIC RH(D): CPT

## 2024-05-21 PROCEDURE — 2500000006 HC RX 250 W HCPCS SELF ADMINISTERED DRUGS (ALT 637 FOR ALL PAYERS): Mod: MUE

## 2024-05-21 PROCEDURE — 85384 FIBRINOGEN ACTIVITY: CPT

## 2024-05-21 PROCEDURE — 80069 RENAL FUNCTION PANEL: CPT

## 2024-05-21 PROCEDURE — 82947 ASSAY GLUCOSE BLOOD QUANT: CPT

## 2024-05-21 PROCEDURE — 92526 ORAL FUNCTION THERAPY: CPT | Mod: GN

## 2024-05-21 PROCEDURE — C9113 INJ PANTOPRAZOLE SODIUM, VIA: HCPCS

## 2024-05-21 PROCEDURE — 86850 RBC ANTIBODY SCREEN: CPT | Mod: 91

## 2024-05-21 PROCEDURE — 86922 COMPATIBILITY TEST ANTIGLOB: CPT | Mod: 91

## 2024-05-21 PROCEDURE — 83605 ASSAY OF LACTIC ACID: CPT

## 2024-05-21 PROCEDURE — 82248 BILIRUBIN DIRECT: CPT

## 2024-05-21 PROCEDURE — 83735 ASSAY OF MAGNESIUM: CPT

## 2024-05-21 PROCEDURE — 83021 HEMOGLOBIN CHROMOTOGRAPHY: CPT

## 2024-05-21 PROCEDURE — 87340 HEPATITIS B SURFACE AG IA: CPT | Mod: WESLAB

## 2024-05-21 PROCEDURE — 86747 PARVOVIRUS ANTIBODY: CPT

## 2024-05-21 PROCEDURE — 99222 1ST HOSP IP/OBS MODERATE 55: CPT | Performed by: INTERNAL MEDICINE

## 2024-05-21 PROCEDURE — 83540 ASSAY OF IRON: CPT

## 2024-05-21 RX ORDER — PANTOPRAZOLE SODIUM 40 MG/10ML
40 INJECTION, POWDER, LYOPHILIZED, FOR SOLUTION INTRAVENOUS DAILY
Status: DISCONTINUED | OUTPATIENT
Start: 2024-05-21 | End: 2024-05-26

## 2024-05-21 RX ADMIN — ACETAMINOPHEN 650 MG: 325 TABLET ORAL at 12:29

## 2024-05-21 RX ADMIN — FINASTERIDE 5 MG: 5 TABLET, FILM COATED ORAL at 09:47

## 2024-05-21 RX ADMIN — CARBIDOPA AND LEVODOPA 1.5 TABLET: 25; 100 TABLET ORAL at 16:18

## 2024-05-21 RX ADMIN — Medication 1 APPLICATION: at 21:03

## 2024-05-21 RX ADMIN — CARBIDOPA AND LEVODOPA 1.5 TABLET: 25; 100 TABLET ORAL at 09:48

## 2024-05-21 RX ADMIN — CARBIDOPA AND LEVODOPA 1.5 TABLET: 25; 100 TABLET ORAL at 12:29

## 2024-05-21 RX ADMIN — MEROPENEM 1000 MG: 1 INJECTION, POWDER, FOR SOLUTION INTRAVENOUS at 21:03

## 2024-05-21 RX ADMIN — Medication 1 APPLICATION: at 07:03

## 2024-05-21 RX ADMIN — CARBIDOPA AND LEVODOPA 1.5 TABLET: 25; 100 TABLET ORAL at 21:04

## 2024-05-21 RX ADMIN — CLOPIDOGREL BISULFATE 75 MG: 75 TABLET ORAL at 09:47

## 2024-05-21 RX ADMIN — Medication 1 APPLICATION: at 16:18

## 2024-05-21 RX ADMIN — MEROPENEM 1000 MG: 1 INJECTION, POWDER, FOR SOLUTION INTRAVENOUS at 09:47

## 2024-05-21 RX ADMIN — METOPROLOL TARTRATE 25 MG: 50 TABLET, FILM COATED ORAL at 09:47

## 2024-05-21 RX ADMIN — Medication 1 APPLICATION: at 12:30

## 2024-05-21 RX ADMIN — TAMSULOSIN HYDROCHLORIDE 0.4 MG: 0.4 CAPSULE ORAL at 09:48

## 2024-05-21 RX ADMIN — CARBIDOPA AND LEVODOPA 1.5 TABLET: 25; 100 TABLET ORAL at 04:39

## 2024-05-21 RX ADMIN — PANTOPRAZOLE SODIUM 40 MG: 40 INJECTION, POWDER, FOR SOLUTION INTRAVENOUS at 09:47

## 2024-05-21 ASSESSMENT — COGNITIVE AND FUNCTIONAL STATUS - GENERAL
MOVING TO AND FROM BED TO CHAIR: TOTAL
MOVING FROM LYING ON BACK TO SITTING ON SIDE OF FLAT BED WITH BEDRAILS: A LOT
CLIMB 3 TO 5 STEPS WITH RAILING: TOTAL
MOBILITY SCORE: 6
HELP NEEDED FOR BATHING: A LOT
CLIMB 3 TO 5 STEPS WITH RAILING: TOTAL
DRESSING REGULAR LOWER BODY CLOTHING: A LOT
DAILY ACTIVITIY SCORE: 12
WALKING IN HOSPITAL ROOM: TOTAL
WALKING IN HOSPITAL ROOM: TOTAL
DRESSING REGULAR LOWER BODY CLOTHING: TOTAL
TOILETING: A LOT
EATING MEALS: TOTAL
MOVING FROM LYING ON BACK TO SITTING ON SIDE OF FLAT BED WITH BEDRAILS: TOTAL
DRESSING REGULAR UPPER BODY CLOTHING: TOTAL
DRESSING REGULAR UPPER BODY CLOTHING: A LOT
TURNING FROM BACK TO SIDE WHILE IN FLAT BAD: A LOT
DAILY ACTIVITIY SCORE: 6
STANDING UP FROM CHAIR USING ARMS: TOTAL
PERSONAL GROOMING: TOTAL
STANDING UP FROM CHAIR USING ARMS: A LOT
TOILETING: TOTAL
EATING MEALS: A LOT
MOVING TO AND FROM BED TO CHAIR: A LOT
TURNING FROM BACK TO SIDE WHILE IN FLAT BAD: TOTAL
HELP NEEDED FOR BATHING: TOTAL
MOBILITY SCORE: 10
PERSONAL GROOMING: A LOT

## 2024-05-21 ASSESSMENT — PAIN SCALES - PAIN ASSESSMENT IN ADVANCED DEMENTIA (PAINAD)
TOTALSCORE: 3
BODYLANGUAGE: RELAXED
BREATHING: NORMAL
FACIALEXPRESSION: SMILING OR INEXPRESSIVE
FACIALEXPRESSION: SMILING OR INEXPRESSIVE
CONSOLABILITY: NO NEED TO CONSOLE
TOTALSCORE: 0
TOTALSCORE: 2
FACIALEXPRESSION: SAD, FRIGHTENED, FROWN
BODYLANGUAGE: TENSE, DISTRESSED PACING, FIDGETING
BODYLANGUAGE: RELAXED
FACIALEXPRESSION: SAD, FRIGHTENED, FROWN
BREATHING: NORMAL
NEGVOCALIZATION: OCCASIONAL MOAN/GROAN, LOW SPEECH, NEGATIVE/DISAPPROVING QUALITY
BREATHING: NORMAL
CONSOLABILITY: NO NEED TO CONSOLE
NEGVOCALIZATION: OCCASIONAL MOAN/GROAN, LOW SPEECH, NEGATIVE/DISAPPROVING QUALITY
BREATHING: NORMAL
FACIALEXPRESSION: SMILING OR INEXPRESSIVE
CONSOLABILITY: NO NEED TO CONSOLE
TOTALSCORE: 0
CONSOLABILITY: NO NEED TO CONSOLE
BODYLANGUAGE: TENSE, DISTRESSED PACING, FIDGETING
BREATHING: NORMAL
CONSOLABILITY: NO NEED TO CONSOLE
TOTALSCORE: 3
BODYLANGUAGE: TENSE, DISTRESSED PACING, FIDGETING
NEGVOCALIZATION: OCCASIONAL MOAN/GROAN, LOW SPEECH, NEGATIVE/DISAPPROVING QUALITY

## 2024-05-21 ASSESSMENT — PAIN SCALES - GENERAL
PAINLEVEL_OUTOF10: 3
PAINLEVEL_OUTOF10: 0 - NO PAIN
PAINLEVEL_OUTOF10: 3
PAINLEVEL_OUTOF10: 2

## 2024-05-21 ASSESSMENT — PAIN - FUNCTIONAL ASSESSMENT
PAIN_FUNCTIONAL_ASSESSMENT: PAINAD (PAIN ASSESSMENT IN ADVANCED DEMENTIA SCALE)

## 2024-05-21 ASSESSMENT — ACTIVITIES OF DAILY LIVING (ADL): LACK_OF_TRANSPORTATION: NO

## 2024-05-21 NOTE — PROGRESS NOTES
05/21/24 1500   Discharge Planning   Living Arrangements Spouse/significant other   Support Systems Spouse/significant other   Assistance Needed SNF   Type of Residence Skilled nursing facility;Inpatient rehab facility   Number of Stairs to Enter Residence 0   Number of Stairs Within Residence 0   Do you have animals or pets at home? No   Who is requesting discharge planning? Provider   Home or Post Acute Services Post acute facilities (Rehab/SNF/etc)   Type of Post Acute Facility Services Rehab;Skilled nursing   Patient expects to be discharged to: SNF / Rehab   Does the patient need discharge transport arranged? Yes   RoundTrip coordination needed? Yes   Financial Resource Strain   How hard is it for you to pay for the very basics like food, housing, medical care, and heating? Not hard   Housing Stability   In the last 12 months, was there a time when you were not able to pay the mortgage or rent on time? N   In the last 12 months, how many places have you lived? 1   In the last 12 months, was there a time when you did not have a steady place to sleep or slept in a shelter (including now)? N   Transportation Needs   In the past 12 months, has lack of transportation kept you from medical appointments or from getting medications? no   In the past 12 months, has lack of transportation kept you from meetings, work, or from getting things needed for daily living? No     Assessment Note:  Met with patient and Introduced myself as care coordinator and member of the Care Transitions team for discharge planning. Pt feels safe at home.   Transportation: Patient will need transport to  SNF of choice  Pharmacy: [facility]  DME: Has Walker and Wheelchair. Usually walks with walker at home.  Previous home care: No  Falls: Yes  PCP: Leslie Watson  Dialysis: No    Address, phone number and Emergency contact verified. All questions and concerns answered. Will continue to follow patient for discharge needs.

## 2024-05-21 NOTE — PROGRESS NOTES
"Markie Nobles is a 79 y.o. male on day 5 of admission presenting with NSTEMI (non-ST elevated myocardial infarction) (Multi).    Subjective   No acute complaints. No fevers or chills.    Objective     Vitals:    05/20/24 2155   BP: 142/63   Pulse: 70   Resp: 18   Temp: 37 °C (98.6 °F)   SpO2: 100%         Physical Exam  GEN: cachectic  HEENT: AT/NC  CARDIO: RRR, normal S1 and S2  PULM: clear to auscultation bilaterally  ABD: Soft, NT/ND, bowel sounds +, PEG in place  EXTR: Warm/well perfused; left wrist has cast in place  PSYCH: Appropriate mood/affect    Last Recorded Vitals  Blood pressure 142/63, pulse 70, temperature 37 °C (98.6 °F), resp. rate 18, height 1.778 m (5' 10\"), weight 62.6 kg (138 lb), SpO2 100%.  Intake/Output last 3 Shifts:  I/O last 3 completed shifts:  In: 720 (11.5 mL/kg) [NG/GT:620; IV Piggyback:100]  Out: 1700 (27.2 mL/kg) [Urine:1700 (0.8 mL/kg/hr)]  Weight: 62.6 kg       Assessment/Plan   Principal Problem:    NSTEMI (non-ST elevated myocardial infarction) (Multi)    Mr. Nobles is an 79 year old Male w/ a PMHx MGUS, CAD (s/p CABG x3 in 2022), HTN, CKD IV, Anemia, ABEBE, hyperlipidemia, obesity, BPH, gout, NIDDM, HLD, Parkinsons (w/ dementia), dysphagia, s/p peg tube (04/2024), sacral pressure sores, s/p hospitalization for aspiration PNA (DC'd 5/1/24) initially admitted for PNA and AOCA. Patient found to have lethargy and mild leukocytosis (WBC 10), likely infectious etiology. CT shows b/l lower lung findings suggestive of aspiration PNA, and given the patient's recent admission, this seems like the likely source of his infection. Additional sources include his sacral ulcer (not visualized) vs. PEG tube site (low suspicion) vs. UTI (ruled out w/ negative UA). He was found to be anemic and given 1x unit of blood transfusion with appropriate increment. BUN very elevated as well and GI was consulted for concerns of GI bleed from the PEG. PEG clamp was loosened and there was possible buried " bumper syndrome causing bleed but minimal to no blood was aspirated from PEG. Patient's left wrist was swollen as well. Wife this say that patient has had gout flares in the past (last one a year ago). Xray left wrist was obtained and showed scaphoid fracture. Ortho was consulted for this. Palliative care was consulted for goals of care and patient changed code status to DNR/DNI on 5/17. Per GI, resumed tube feeds on 5/17 with nutrition consulted placed. Active issues include: monitor for bleeding, left scaphoid fracture, tube feeds, encephalopathy.     Changes 05/21/24  - medically cleared for discharge  - PT recommending moderate intensity care  - Bcx NGTD. Plan to continue meropenem and discharge on augmentin    PLAN:    # AMS, resolved  # Parkinson's Disease w/ Dementia  :: Mentation improving after blood transfusion and abx  -Continue home medication: Carbidopa-Levodopa 1.5tab QID  - MRI brain obtained on 5/17 demonstrated old vascular lesions but also ventricular dilation possibly consistent with NPH per radiology read (day team to discuss finding)     # Aspiration PNA  :: WBC 10 on admission (up from 7.9 on 5/1)  :: CT Chest: Tree-in-bud b/l lower lungs and secretions in trachea. C/f acute/chronic aspiration and/or PNA  :: Low concern for systemic infection (afebrile, HDS)  :: S/p Vanc/Zosyn in ED  -Vancomycin (pharmacy to dose):  (5/16-5/17)  -Meropenem 1g q12 (renal dosing): (5/16- )  -Aspiration precautions  -F/u urine strep and legionella  -F/u MRSA nares, consider DC Vanc in AM     # AOCA  # Possible slow GIB, most likely d/t mucosal disruption of PEG tube, now loosened   :: Hgb 7.8 -> 6.2 over two weeks  :: No objective evidence of bleed  :: Author witnessed a bowel movement in the ED, was not melenic  :: 2019: EGD/Colonoscopy - friable gastric mucosal atrophy, duodenal erythema, and diverticulosis  :: S/p 1u pRBC in ED completed 5/16 at 23:00; 1U PLT on 5/18  -Trend Hgb  -Maintain active T&S  -tube  feeds  - GI consulted  - Plavix briefly held, resumed on 5/19    #Scaphoid Fracture  :: Xrays of left wrist obtained  - ortho consulted  - splint provided for L scaphoid (moderately displaced) fracture, pt will followup with ortho on 6/11 at 3:15PM with Dr. Agudelo (appointment scheduled)     # Sacral Ulcer  -Wound care c/s     # CAD (S/p CABG x3 in 2022)  # Troponinemia  :: Troponin I  -> 516 w/out EKG changes, likely Type II MI  -hold home medication: Clopidogrel 75mg daily  -Continue home medication: Metoprolol tartrate 25mg daily (unclear why patient is on this dose frequency, however it populated in prior cardiologist note)       #BPH  -Continue home medication: Finasteride 5mg daily  -Continue home medication: Tamsulosin 0.4mg daily     # Severe Protein Calorie Malnutrition  -Tube feeds initiated in anticipation of Nutrition recs  -FWF 250ml q6     F: S/p 1.5L NS  E: PRN  N: Tube feeds via PEG  A: PEG, PIV     Code Status: DNR/DNI (per palliative care discussion on 5/17)     NOK: Bree Nobles (Wife) - [phone]           Deondre Grimes MD PhD

## 2024-05-21 NOTE — CONSULTS
Name: Markie Nobles  MRN: [id number]  Encounter Date: 5/21/2024  PCP: Leslie Watson MD    Reason for consult: Thrombocytopenia, Anemia  Attending Provider: Dr. Samuels    Hematology/ Oncology Consult Note      History of Present Illness   Markie Nobles is a 79 y.o. male with a history of MGUS, CAD (s/p CABG x3 in 2022), HTN, CKD IV, Anemia, sickle cell trait, ABEBE, hyperlipidemia, obesity, BPH, gout, NIDDM, HLD, Parkinsons (w/ dementia), dysphagia, s/p peg tube (04/2024), sacral pressure sores, s/p recent hospitalization for aspiration PNA  (5/1/24) and now admitted for and being treated for aspiration pneumonia. Hematology is consulted for anemia and thrombocytopenia.     Patient follows with Rosanne Casal, APRN for MGUS and anemia of CKD - per her last evaluation the kappa light chain elevation is mild and stable and patient was continued on Procrit 10k units q2 weeks.     Patient received 1 unit pRBC transfusion on 5/16/24. He has been treated with meropenem since 5/16 and received vancomycin on 5/16 and 5/17. There was a concern for GI bleeding from the PEG, but per evaluation with GI this does not appear to be the case.     Baseline Hb appears closer to 10. Platelet count normal in recent past.     5/1/24 CBC: 7.9/7.8/136  5/16/24 10.6/6.2/140  5/21/24 7/6.9/20    5/17 Ferritin 2529, TIBC 187, TSAT 19, iron 36  , haptoglobin 281  Past Medical history     Past Medical History:   Diagnosis Date    Encounter for immunization 09/29/2016    Encounter for administration of vaccine    Encounter for screening, unspecified 04/28/2014    Screening    Gout, unspecified 10/26/2017    Acute gout    Idiopathic gout, right hand 01/17/2020    Acute idiopathic gout of right hand    Localized edema 07/06/2015    Pedal edema    Other conditions influencing health status 08/03/2015    Paronychia    Other symptoms and signs involving cognitive functions and awareness 06/09/2016    Cognitive changes    Pain in unspecified  foot 08/03/2015    Foot pain    Personal history of other diseases of the circulatory system     History of hypertension    Personal history of other diseases of the digestive system 08/13/2019    History of acute gastritis    Personal history of other drug therapy     History of influenza vaccination    Personal history of other specified conditions 03/17/2016    History of diarrhea    Personal history of other specified conditions 02/19/2015    History of elevated prostate specific antigen (PSA)    Strain of muscle and tendon of unspecified wall of thorax, initial encounter 12/05/2016    Strain of thoracic region, initial encounter         Past Surgical History     Past Surgical History:   Procedure Laterality Date    CHOLECYSTECTOMY  04/20/2018    Cholecystectomy    CORONARY ARTERY BYPASS GRAFT  12/16/2022    CABG    CT ANGIO NECK  07/29/2019    CT NECK ANGIO W AND WO IV CONTRAST 7/29/2019 [facility]    CT HEAD ANGIO W AND WO IV CONTRAST  07/29/2019    CT HEAD ANGIO W AND WO IV CONTRAST 7/29/2019 [facility] CL[facility]GAC[facility]    GASTROSTOMY TUBE PLACEMENT  04/29/2024         Family History      Family History   Problem Relation Name Age of Onset    Other (ESRD) Mother          on dialysis    Hypertension Father      Dementia Other      Diabetes Other      Liver cancer Other      Kidney disease Other          Reported prior         Social History     Social History     Socioeconomic History    Marital status:      Spouse name: None    Number of children: None    Years of education: None    Highest education level: None   Occupational History    None   Tobacco Use    Smoking status: Former     Types: Cigarettes     Passive exposure: Never    Smokeless tobacco: Never   Vaping Use    Vaping status: Never Used   Substance and Sexual Activity    Alcohol use: Not Currently    Drug use: Not Currently    Sexual activity: None   Other Topics Concern    None   Social History Narrative    None     Social Determinants  "of Health     Financial Resource Strain: Low Risk  (5/17/2024)    Overall Financial Resource Strain (CARDIA)     Difficulty of Paying Living Expenses: Not hard at all   Food Insecurity: Not on file   Transportation Needs: Unmet Transportation Needs (5/17/2024)    PRAPARE - Transportation     Lack of Transportation (Medical): Yes     Lack of Transportation (Non-Medical): No   Physical Activity: Not on file   Stress: Not on file   Social Connections: Feeling Socially Integrated (3/27/2024)    OASIS : Social Isolation     Frequency of experiencing loneliness or isolation: Never   Intimate Partner Violence: Not on file   Housing Stability: Low Risk  (5/17/2024)    Housing Stability Vital Sign     Unable to Pay for Housing in the Last Year: No     Number of Places Lived in the Last Year: 1     Unstable Housing in the Last Year: No         Allergies   No Known Allergies    Medications   carbidopa-levodopa, 1.5 tablet, 4x daily  [Held by provider] clopidogrel, 75 mg, Daily  finasteride, 5 mg, Daily  menthol-zinc oxide, 1 Application, 4x daily  meropenem, 1,000 mg, q12h  metoprolol tartrate, 25 mg, Daily  pantoprazole, 40 mg, Daily  tamsulosin, 0.4 mg, Daily         acetaminophen, 650 mg, q8h PRN  benzonatate, 100 mg, TID PRN        Review of Systems   Review of Systems   10 pt ROS reviewed and negative aside from above    Physical Exam   Blood pressure 147/70, pulse 69, temperature 36.5 °C (97.7 °F), temperature source Temporal, resp. rate 14, height 1.778 m (5' 10\"), weight 62.6 kg (138 lb), SpO2 100%.    General: chronically ill appearing, NAD  Eyes: anicteric  ENT: MMM  Neck: Supple, no LAD  CV: RRR  Resp: scattered rhonchi, non-labored  Abd: Soft, NT, ND  Ext: wwp, no edema  Neuro: follows commands, moving all extremities  Skin: no rash     Labs     Lab Results   Component Value Date    GLUCOSE 81 05/21/2024    CALCIUM 8.0 (L) 05/21/2024     05/21/2024    K 4.4 05/21/2024    CO2 22 05/21/2024     " 05/21/2024    BUN 97 (HH) 05/21/2024    CREATININE 3.55 (H) 05/21/2024       Lab Results   Component Value Date    WBC 7.0 05/21/2024    HGB 6.9 (L) 05/21/2024    HCT 19.6 (L) 05/21/2024    MCV 85 05/21/2024    PLT 20 (LL) 05/21/2024       Lab Results   Component Value Date    ALT 17 05/21/2024    AST 33 05/21/2024    ALKPHOS 152 (H) 05/21/2024    BILITOT 0.3 05/21/2024       Imaging   5/16/24 CT CAP    FINDINGS:  Please note that the study is limited without intravenous contrast.      CHEST:      LUNG/PLEURA/LARGE AIRWAYS:  Nonspecific secretions within the trachea. Mild predominantly lower lung peribronchial wall thickening. Tree-in-bud opacities of the left and right lower lungs and posterior right middle lobe. Small scattered more consolidative bibasilar opacities. No pneumothorax or pleural effusion. Scattered pulmonary nodules which are stable from prior exam. For example a left lower lobe pulmonary nodule measures up to 0.7 cm (series 204, image 184, similar to prior exam.      VESSELS:  Aorta and pulmonary arteries are normal caliber.  Mild  atherosclerotic changes are noted of the aorta and branching vessels.  Severe coronary artery calcifications are present.      HEART:  Status post CABG. Normal size.  No pericardial effusion      MEDIASTINUM AND EREN:  No mediastinal, hilar or axillary lymph nodes are present.  The  esophagus appears normal.      CHEST WALL AND LOWER NECK:  Within normal limits.  The visualized thyroid gland appears within normal limits.      ABDOMEN:      LIVER:  The liver is normal in size without evidence of focal liver lesions.      BILE DUCTS:  The bile ducts are not dilated.      GALLBLADDER:  The gallbladder is surgically absent.      PANCREAS:  The pancreas appears unremarkable.      SPLEEN:  Redemonstration of marked nodular contour of the spleen and sequelae  of previous splenic infarcts.      ADRENAL GLANDS:  Within normal limits.      KIDNEYS AND URETERS:  Within normal  limits.  No hydroureteronephrosis or  nephroureterolithiasis is present.      PELVIS:  BLADDER:  Within normal limits.      REPRODUCTIVE ORGANS:  The prostate is not enlarged.      BOWEL:  PEG tube in place. The stomach is unremarkable.  The small and large bowel are normal in caliber and demonstrate no wall thickening.  The appendix is not definitely visualized. There is however no pericecal stranding or fluid.      VESSELS:  Severe aortoiliac atheromatous calcification. There is no aneurysmal dilatation of the abdominal aorta. The IVC is within normal limits.      PERITONEUM/RETROPERITONEUM/LYMPH NODES:  No ascites or free air, no fluid collection.  The retroperitoneum  appears unremarkable.  No abdominopelvic lymphadenopathy is present.      ABDOMINAL WALL:  The abdominal wall soft tissues appear normal. Bilateral inguinal hernial sacs containing fluid.      BONES:  Status post median sternotomy. No suspicious osseous lesions are present. Degenerative discogenic disease is noted in the lower thoracic and lumbar spine.    Assessment/Plan   Markie Nobles is a 79 y.o. male with a history of MGUS, CAD (s/p CABG x3 in 2022), HTN, CKD IV, Anemia, sickle cell trait, ABEBE, hyperlipidemia, obesity, BPH, gout, NIDDM, HLD, Parkinsons (w/ dementia), dysphagia, s/p peg tube (04/2024), sacral pressure sores, s/p recent hospitalization for aspiration PNA  (5/1/24) and now admitted for and being treated for aspiration pneumonia. Hematology is consulted for anemia and thrombocytopenia.     Peripheral Smear Reviewed   WBCs: unremarkable  RBCs: Normochronic, anisocytosis, <1 RBC frag/hpf  Plt: reduced in number, no clumping    Thrombocytopenia is likely multifactorial in the setting of infection and antibiotic use. The acute component of his anemia is likely from the same. The chronic portion is from anemia of chronic disease and CKD. He is on EPO as an outpatient. No e/o hemolysis by smear or labs. DIC is not supported. No  hepatomegaly, liver disease or splenomegaly. He has not been exposed to heparin, which excludes HIT.     Recommend:  - trend CBC daily  - check Hep C, B, HIV serologies (added on)  - check folate, B12 (added on)    Thank you for this consult, we will continue to follow. Pt seen and discussed with Dr. Crabtree.    Jaycob Eddy MD  Hematology- Oncology Fellow, PGY-5  Hematology Consult Pager: [phone]

## 2024-05-21 NOTE — CARE PLAN
Problem: Safety  Goal: Patient will be injury free during hospitalization  Outcome: Progressing     Problem: Discharge Barriers  Goal: My discharge needs are met  Outcome: Progressing     Problem: Skin  Goal: Decreased wound size/increased tissue granulation at next dressing change  Outcome: Progressing  Goal: Participates in plan/prevention/treatment measures  Outcome: Progressing  Goal: Prevent/manage excess moisture  Outcome: Progressing  Goal: Promote skin healing  Outcome: Progressing   The patient's goals for the shift include      The clinical goals for the shift include Pt will remain free of fall this shift

## 2024-05-21 NOTE — PROGRESS NOTES
Speech-Language Pathology    SLP Adult Inpatient  Speech-Language Pathology Treatment     Patient Name: Markie Nobles  MRN: [id number]  Today's Date: 5/21/2024  Time Calculation  Start Time: 0930  Stop Time: 0947  Time Calculation (min): 17 min         Assessment:  Severe oropharyngeal dysphagia per MBSS completed 4/26/24  High risk for aspiration w/ PO intake is apparent  Swallowing function impacted by Parkinson's Disease      Recommendations:  NPO  Continued alternative means of nutrition/hydration/medication support; PEG placed 4/29/24.     Frequent, aggressive oral care as tolerated to improve infection control.     OK for small amounts of ice chips (one at a time, 4-5x/hour) for oral comfort and to prevent swallow disuse atrophy, but only after aggressive oral care to avoid colonization of bacteria within the oral cavity.        Plan:  SLP Plan: Skilled SLP  SLP Frequency: 2x per week  Duration:  4 weeks  Discussed POC: Pt, family  *Trial period of therapy.  Reviewed progressive nature of dysphagia r/t PD and possibility that therapy may not functionally/meaningfully improve swallowing function with family.  Understanding indicated.       Goals:  Goals created by previous SLP following MBSS.  Pt had been participating in these goals w/ HC ST, ST during previous hospitalization, and SNF ST.       Pt will complete pharyngeal strengthening exercises x30 per session with good effort and efficiency (effortful swallows, tongue retractions, tongue raises).                Date initiate: 5/18/24              Status: Progressing- see note below     Pt/family will indicate understanding of dysphagia, risk for aspiration, strategies to mitigate aspiration PNA, and overall SLP POC via teach back method with > 90% accuracy.              Date initiated: 5/18/24              Status: Progressing- see note below              Subjective:  Pt properly identified and treated bedside.  Awake and alert, with no c/o pain.  Room air.   Wife present.  Flat affect.      Pt admitted with PNA and acute on chronic anemia.  PMHx pertinent to SLP: Parkinson's Disease, dementia, recurrent PNA.       Pt completed an MBSS on 4/26/24 that showed silent aspiration with all liquids; risk apparent for aspiration with solids.  Recommended NPO w/ pharyngeal strengthening tx.  PEG placed 4/29/24.  Pt was doing ST at SNF prior to admission.         Objective:   Pt required verbal cues and prompting for participation in Speech Therapy this AM.  Completed effortful swallows with small ice chips x20 for pharyngeal strengthening.  Fair effort and efficiency.  No s/s of aspiration (noted to silently aspirate on MBSS, so this is not necessarily an indication of improved swallowing function).  Pt also completed lingual retraction exercise x5.  Politely declined further participation in therapy.  SLP reviewed POC.  Understanding indicated.  No needs voiced at end of session.  Call bell within reach.        05/21/24 at 2:59 PM - Lilia Abraham, SLP

## 2024-05-21 NOTE — PROGRESS NOTES
Markie Nobles is a 79 y.o. male on day 5 of admission presenting with NSTEMI (non-ST elevated myocardial infarction) (Multi).    Transitional Care Coordination Progress Note:  Patient discussed during interdisciplinary rounds.   Team members present: MD and TCC  Plan per Medical/Surgical team: Patient has developed Thrombocytopenia.  Payor: [employer]  Discharge disposition: Patient was recommended for SNF. Sp[facility] picked [facility]. [facility]. [facility]. [facility] 4. [facility]. The only 2 facilities that said they can accept is [facility] and [facility] but want to do onsite. Waiting on FOC from spouse.  Potential Barriers: None  ADOD: 05/23/24    BRIAN BRODERICK RN

## 2024-05-21 NOTE — RESEARCH NOTES
Artificial Intelligence Monitoring in Nursing (AIMS Nursing) Study    Principle Investigator - Dr. Se Lomas  Research Coordinator - Shaila Galvez     Patient Name - Markie Nobles  Date - 5/21/2024 3:41 PM  Location - [place] [id number]-[id number]    Markie Nobles was approached by Shaila Galvez to talk about participating in the AIMS Nursing Study. The patient was not able to be approached, a research coordinator will come back at a later time. Study protocol was followed and patient was given study contact information.     Shaila Galvez

## 2024-05-22 LAB
ALBUMIN SERPL BCP-MCNC: 2.6 G/DL (ref 3.4–5)
ALP SERPL-CCNC: 161 U/L (ref 33–136)
ALT SERPL W P-5'-P-CCNC: 12 U/L (ref 10–52)
ANION GAP SERPL CALC-SCNC: 11 MMOL/L (ref 10–20)
AST SERPL W P-5'-P-CCNC: 32 U/L (ref 9–39)
BASOPHILS # BLD AUTO: 0.02 X10*3/UL (ref 0–0.1)
BASOPHILS # BLD AUTO: 0.03 X10*3/UL (ref 0–0.1)
BASOPHILS NFR BLD AUTO: 0.3 %
BASOPHILS NFR BLD AUTO: 0.4 %
BILIRUB DIRECT SERPL-MCNC: 0.1 MG/DL (ref 0–0.3)
BILIRUB SERPL-MCNC: 0.4 MG/DL (ref 0–1.2)
BLOOD EXPIRATION DATE: NORMAL
BUN SERPL-MCNC: 95 MG/DL (ref 6–23)
CALCIUM SERPL-MCNC: 8.3 MG/DL (ref 8.6–10.6)
CHLORIDE SERPL-SCNC: 107 MMOL/L (ref 98–107)
CO2 SERPL-SCNC: 24 MMOL/L (ref 21–32)
CREAT SERPL-MCNC: 3.42 MG/DL (ref 0.5–1.3)
DAT-POLYSPECIFIC: NORMAL
DISPENSE STATUS: NORMAL
EGFRCR SERPLBLD CKD-EPI 2021: 17 ML/MIN/1.73M*2
EOSINOPHIL # BLD AUTO: 0.67 X10*3/UL (ref 0–0.4)
EOSINOPHIL # BLD AUTO: 0.74 X10*3/UL (ref 0–0.4)
EOSINOPHIL NFR BLD AUTO: 12.2 %
EOSINOPHIL NFR BLD AUTO: 9.6 %
ERYTHROCYTE [DISTWIDTH] IN BLOOD BY AUTOMATED COUNT: 14.5 % (ref 11.5–14.5)
ERYTHROCYTE [DISTWIDTH] IN BLOOD BY AUTOMATED COUNT: 14.8 % (ref 11.5–14.5)
ERYTHROCYTE [DISTWIDTH] IN BLOOD BY AUTOMATED COUNT: 15 % (ref 11.5–14.5)
FOLATE SERPL-MCNC: 15 NG/ML
FSP PPP-MCNC: < 5 UG/ML
GLUCOSE SERPL-MCNC: 105 MG/DL (ref 74–99)
HAPTOGLOB SERPL-MCNC: 298 MG/DL (ref 37–246)
HAV IGM SER QL: NONREACTIVE
HBV CORE AB SER QL: NONREACTIVE
HBV CORE IGM SER QL: NONREACTIVE
HBV SURFACE AB SER-ACNC: 13.6 MIU/ML
HBV SURFACE AG SERPL QL IA: NONREACTIVE
HBV SURFACE AG SERPL QL IA: NONREACTIVE
HCT VFR BLD AUTO: 20.2 % (ref 41–52)
HCT VFR BLD AUTO: 20.9 % (ref 41–52)
HCT VFR BLD AUTO: 21.4 % (ref 41–52)
HCV AB SER QL: NONREACTIVE
HGB BLD-MCNC: 6.6 G/DL (ref 13.5–17.5)
HGB BLD-MCNC: 6.9 G/DL (ref 13.5–17.5)
HGB BLD-MCNC: 7.5 G/DL (ref 13.5–17.5)
IMM GRANULOCYTES # BLD AUTO: 0.02 X10*3/UL (ref 0–0.5)
IMM GRANULOCYTES # BLD AUTO: 0.02 X10*3/UL (ref 0–0.5)
IMM GRANULOCYTES NFR BLD AUTO: 0.3 % (ref 0–0.9)
IMM GRANULOCYTES NFR BLD AUTO: 0.3 % (ref 0–0.9)
LYMPHOCYTES # BLD AUTO: 1.14 X10*3/UL (ref 0.8–3)
LYMPHOCYTES # BLD AUTO: 1.29 X10*3/UL (ref 0.8–3)
LYMPHOCYTES NFR BLD AUTO: 18.4 %
LYMPHOCYTES NFR BLD AUTO: 18.7 %
MAGNESIUM SERPL-MCNC: 1.98 MG/DL (ref 1.6–2.4)
MCH RBC QN AUTO: 29.2 PG (ref 26–34)
MCHC RBC AUTO-ENTMCNC: 32.7 G/DL (ref 32–36)
MCHC RBC AUTO-ENTMCNC: 33 G/DL (ref 32–36)
MCHC RBC AUTO-ENTMCNC: 35 G/DL (ref 32–36)
MCV RBC AUTO: 83 FL (ref 80–100)
MCV RBC AUTO: 89 FL (ref 80–100)
MCV RBC AUTO: 89 FL (ref 80–100)
MONOCYTES # BLD AUTO: 0.62 X10*3/UL (ref 0.05–0.8)
MONOCYTES # BLD AUTO: 0.86 X10*3/UL (ref 0.05–0.8)
MONOCYTES NFR BLD AUTO: 10.2 %
MONOCYTES NFR BLD AUTO: 12.3 %
NEUTROPHILS # BLD AUTO: 3.55 X10*3/UL (ref 1.6–5.5)
NEUTROPHILS # BLD AUTO: 4.14 X10*3/UL (ref 1.6–5.5)
NEUTROPHILS NFR BLD AUTO: 58.3 %
NEUTROPHILS NFR BLD AUTO: 59 %
NRBC BLD-RTO: 0 /100 WBCS (ref 0–0)
PATH REVIEW-CBC DIFFERENTIAL: NORMAL
PHOSPHATE SERPL-MCNC: 3.3 MG/DL (ref 2.5–4.9)
PLATELET # BLD AUTO: 11 X10*3/UL (ref 150–450)
PLATELET # BLD AUTO: 11 X10*3/UL (ref 150–450)
PLATELET # BLD AUTO: 9 X10*3/UL (ref 150–450)
POTASSIUM SERPL-SCNC: 4.4 MMOL/L (ref 3.5–5.3)
PRODUCT BLOOD TYPE: 5100
PRODUCT CODE: NORMAL
PROT SERPL-MCNC: 5.9 G/DL (ref 6.4–8.2)
RBC # BLD AUTO: 2.26 X10*6/UL (ref 4.5–5.9)
RBC # BLD AUTO: 2.36 X10*6/UL (ref 4.5–5.9)
RBC # BLD AUTO: 2.57 X10*6/UL (ref 4.5–5.9)
RBC MORPH BLD: NORMAL
SODIUM SERPL-SCNC: 138 MMOL/L (ref 136–145)
UNIT ABO: NORMAL
UNIT NUMBER: NORMAL
UNIT RH: NORMAL
UNIT VOLUME: 259
UNIT VOLUME: 285
UNIT VOLUME: 372
WBC # BLD AUTO: 6.1 X10*3/UL (ref 4.4–11.3)
WBC # BLD AUTO: 7 X10*3/UL (ref 4.4–11.3)
WBC # BLD AUTO: 7.7 X10*3/UL (ref 4.4–11.3)
XM INTEP: NORMAL

## 2024-05-22 PROCEDURE — 80048 BASIC METABOLIC PNL TOTAL CA: CPT

## 2024-05-22 PROCEDURE — 36430 TRANSFUSION BLD/BLD COMPNT: CPT

## 2024-05-22 PROCEDURE — 36415 COLL VENOUS BLD VENIPUNCTURE: CPT | Performed by: INTERNAL MEDICINE

## 2024-05-22 PROCEDURE — 86880 COOMBS TEST DIRECT: CPT

## 2024-05-22 PROCEDURE — P9016 RBC LEUKOCYTES REDUCED: HCPCS

## 2024-05-22 PROCEDURE — 85025 COMPLETE CBC W/AUTO DIFF WBC: CPT

## 2024-05-22 PROCEDURE — 82248 BILIRUBIN DIRECT: CPT

## 2024-05-22 PROCEDURE — 2500000004 HC RX 250 GENERAL PHARMACY W/ HCPCS (ALT 636 FOR OP/ED)

## 2024-05-22 PROCEDURE — 99233 SBSQ HOSP IP/OBS HIGH 50: CPT

## 2024-05-22 PROCEDURE — 82746 ASSAY OF FOLIC ACID SERUM: CPT

## 2024-05-22 PROCEDURE — 2500000001 HC RX 250 WO HCPCS SELF ADMINISTERED DRUGS (ALT 637 FOR MEDICARE OP)

## 2024-05-22 PROCEDURE — C9113 INJ PANTOPRAZOLE SODIUM, VIA: HCPCS

## 2024-05-22 PROCEDURE — 83735 ASSAY OF MAGNESIUM: CPT

## 2024-05-22 PROCEDURE — 83010 ASSAY OF HAPTOGLOBIN QUANT: CPT | Mod: WESLAB | Performed by: INTERNAL MEDICINE

## 2024-05-22 PROCEDURE — 36415 COLL VENOUS BLD VENIPUNCTURE: CPT

## 2024-05-22 PROCEDURE — 85025 COMPLETE CBC W/AUTO DIFF WBC: CPT | Mod: 91 | Performed by: INTERNAL MEDICINE

## 2024-05-22 PROCEDURE — 84100 ASSAY OF PHOSPHORUS: CPT

## 2024-05-22 PROCEDURE — 85027 COMPLETE CBC AUTOMATED: CPT

## 2024-05-22 PROCEDURE — 2500000006 HC RX 250 W HCPCS SELF ADMINISTERED DRUGS (ALT 637 FOR ALL PAYERS): Mod: MUE

## 2024-05-22 PROCEDURE — 87798 DETECT AGENT NOS DNA AMP: CPT

## 2024-05-22 PROCEDURE — A4217 STERILE WATER/SALINE, 500 ML: HCPCS

## 2024-05-22 PROCEDURE — 85060 BLOOD SMEAR INTERPRETATION: CPT | Performed by: PATHOLOGY

## 2024-05-22 PROCEDURE — 1100000001 HC PRIVATE ROOM DAILY

## 2024-05-22 PROCEDURE — P9073 PLATELETS PHERESIS PATH REDU: HCPCS

## 2024-05-22 PROCEDURE — 82270 OCCULT BLOOD FECES: CPT

## 2024-05-22 RX ORDER — AMOXICILLIN AND CLAVULANATE POTASSIUM 600; 42.9 MG/5ML; MG/5ML
500 POWDER, FOR SUSPENSION ORAL EVERY 12 HOURS SCHEDULED
Status: DISCONTINUED | OUTPATIENT
Start: 2024-05-22 | End: 2024-05-22

## 2024-05-22 RX ORDER — AMOXICILLIN AND CLAVULANATE POTASSIUM 400; 57 MG/5ML; MG/5ML
500 POWDER, FOR SUSPENSION ORAL EVERY 12 HOURS SCHEDULED
Status: DISCONTINUED | OUTPATIENT
Start: 2024-05-22 | End: 2024-05-25

## 2024-05-22 RX ADMIN — Medication 1 APPLICATION: at 13:13

## 2024-05-22 RX ADMIN — CARBIDOPA AND LEVODOPA 1.5 TABLET: 25; 100 TABLET ORAL at 13:13

## 2024-05-22 RX ADMIN — Medication 1 APPLICATION: at 06:14

## 2024-05-22 RX ADMIN — FINASTERIDE 5 MG: 5 TABLET, FILM COATED ORAL at 09:33

## 2024-05-22 RX ADMIN — CARBIDOPA AND LEVODOPA 1.5 TABLET: 25; 100 TABLET ORAL at 21:22

## 2024-05-22 RX ADMIN — Medication 1 APPLICATION: at 17:13

## 2024-05-22 RX ADMIN — METOPROLOL TARTRATE 25 MG: 50 TABLET, FILM COATED ORAL at 09:33

## 2024-05-22 RX ADMIN — PANTOPRAZOLE SODIUM 40 MG: 40 INJECTION, POWDER, FOR SOLUTION INTRAVENOUS at 09:33

## 2024-05-22 RX ADMIN — CARBIDOPA AND LEVODOPA 1.5 TABLET: 25; 100 TABLET ORAL at 06:14

## 2024-05-22 RX ADMIN — ACETAMINOPHEN 650 MG: 325 TABLET ORAL at 09:33

## 2024-05-22 RX ADMIN — CARBIDOPA AND LEVODOPA 1.5 TABLET: 25; 100 TABLET ORAL at 17:13

## 2024-05-22 RX ADMIN — Medication 1 APPLICATION: at 21:22

## 2024-05-22 RX ADMIN — MEROPENEM 1000 MG: 1 INJECTION, POWDER, FOR SOLUTION INTRAVENOUS at 09:34

## 2024-05-22 RX ADMIN — AMOXICILLIN AND CLAVULANATE POTASSIUM 500 MG: 400; 57 POWDER, FOR SUSPENSION ORAL at 21:22

## 2024-05-22 RX ADMIN — TAMSULOSIN HYDROCHLORIDE 0.4 MG: 0.4 CAPSULE ORAL at 09:33

## 2024-05-22 ASSESSMENT — PAIN SCALES - PAIN ASSESSMENT IN ADVANCED DEMENTIA (PAINAD)
FACIALEXPRESSION: SMILING OR INEXPRESSIVE
NEGVOCALIZATION: OCCASIONAL MOAN/GROAN, LOW SPEECH, NEGATIVE/DISAPPROVING QUALITY
CONSOLABILITY: NO NEED TO CONSOLE
BODYLANGUAGE: TENSE, DISTRESSED PACING, FIDGETING
BREATHING: NORMAL
BODYLANGUAGE: TENSE, DISTRESSED PACING, FIDGETING
BREATHING: NORMAL
TOTALSCORE: 0
TOTALSCORE: 2
CONSOLABILITY: NO NEED TO CONSOLE
BREATHING: NORMAL
BODYLANGUAGE: TENSE, DISTRESSED PACING, FIDGETING
TOTALSCORE: 2
BREATHING: NORMAL
FACIALEXPRESSION: SMILING OR INEXPRESSIVE
TOTALSCORE: 2
FACIALEXPRESSION: SMILING OR INEXPRESSIVE
FACIALEXPRESSION: SMILING OR INEXPRESSIVE
CONSOLABILITY: NO NEED TO CONSOLE
CONSOLABILITY: NO NEED TO CONSOLE
FACIALEXPRESSION: SMILING OR INEXPRESSIVE
TOTALSCORE: 2
NEGVOCALIZATION: OCCASIONAL MOAN/GROAN, LOW SPEECH, NEGATIVE/DISAPPROVING QUALITY
BODYLANGUAGE: RELAXED
NEGVOCALIZATION: OCCASIONAL MOAN/GROAN, LOW SPEECH, NEGATIVE/DISAPPROVING QUALITY
CONSOLABILITY: NO NEED TO CONSOLE
BREATHING: NORMAL
NEGVOCALIZATION: OCCASIONAL MOAN/GROAN, LOW SPEECH, NEGATIVE/DISAPPROVING QUALITY
BODYLANGUAGE: TENSE, DISTRESSED PACING, FIDGETING

## 2024-05-22 ASSESSMENT — PAIN SCALES - GENERAL
PAINLEVEL_OUTOF10: 2
PAINLEVEL_OUTOF10: 0 - NO PAIN

## 2024-05-22 ASSESSMENT — PAIN - FUNCTIONAL ASSESSMENT
PAIN_FUNCTIONAL_ASSESSMENT: PAINAD (PAIN ASSESSMENT IN ADVANCED DEMENTIA SCALE)

## 2024-05-22 ASSESSMENT — COGNITIVE AND FUNCTIONAL STATUS - GENERAL
TURNING FROM BACK TO SIDE WHILE IN FLAT BAD: A LOT
WALKING IN HOSPITAL ROOM: TOTAL
EATING MEALS: A LOT
MOBILITY SCORE: 10
DRESSING REGULAR LOWER BODY CLOTHING: A LOT
TOILETING: A LOT
CLIMB 3 TO 5 STEPS WITH RAILING: TOTAL
PERSONAL GROOMING: A LOT
HELP NEEDED FOR BATHING: A LOT
DAILY ACTIVITIY SCORE: 12
MOVING FROM LYING ON BACK TO SITTING ON SIDE OF FLAT BED WITH BEDRAILS: A LOT
DRESSING REGULAR UPPER BODY CLOTHING: A LOT
STANDING UP FROM CHAIR USING ARMS: A LOT
MOVING TO AND FROM BED TO CHAIR: A LOT

## 2024-05-22 NOTE — PROGRESS NOTES
Markie Nobles is a 79 y.o. male on day 6 of admission presenting with NSTEMI (non-ST elevated myocardial infarction) (Multi).    Transitional Care Coordination Progress Note:  Patient discussed during interdisciplinary rounds.   Team members present: MD and TCC  Plan per Medical/Surgical team: Pending final recommendations from Hematology for acute Thrombocytopenia.  Payor: [employer]  Discharge disposition: SNF-FOC- [facility]. Pre-cert started 05/22/24.  Potential Barriers: None  ADOD: 05/24/24    BRIAN BRODERICK RN

## 2024-05-22 NOTE — SIGNIFICANT EVENT
Palliative medicine has been following for complex medical decision making / goals of care, symptom management, and pt/family support.  Met with wife at bedside today for supportive visit, states she has been satisfied with care.   Goals are clear, symptoms being managed by primary team at this time. We will sign off, please reconsult if needed.     Viridiana Davis DNP, APRN-CNP  Palliative Medicine

## 2024-05-22 NOTE — PROGRESS NOTES
"Markie Nobles is a 79 y.o. male on day 6 of admission presenting with NSTEMI (non-ST elevated myocardial infarction) (Multi).    Subjective   Required 1x unit of platelets and 1x unit of blood last night. Denies any sources of bleeding. No CP, palpitations, SOB. Says he is feeling fine.    Objective     Vitals:    05/22/24 0548   BP: 169/80   Pulse: 74   Resp: 18   Temp: 36 °C (96.8 °F)   SpO2: 100%         Physical Exam  GEN: cachectic  HEENT: AT/NC  CARDIO: RRR, normal S1 and S2  PULM: clear to auscultation bilaterally  ABD: Soft, NT/ND, bowel sounds +, PEG in place  EXTR: Warm/well perfused; left wrist has cast in place  PSYCH: Appropriate mood/affect    Last Recorded Vitals  Blood pressure 169/80, pulse 74, temperature 36 °C (96.8 °F), temperature source Temporal, resp. rate 18, height 1.778 m (5' 10\"), weight 62.6 kg (138 lb), SpO2 100%.  Intake/Output last 3 Shifts:  I/O last 3 completed shifts:  In: 2110 (33.7 mL/kg) [Blood:610; NG/GT:1300; IV Piggyback:200]  Out: 2950 (47.1 mL/kg) [Urine:2950 (1.3 mL/kg/hr)]  Weight: 62.6 kg       Assessment/Plan   Principal Problem:    NSTEMI (non-ST elevated myocardial infarction) (Multi)    Mr. Nobles is an 79 year old Male w/ a PMHx MGUS, CAD (s/p CABG x3 in 2022), HTN, CKD IV, Anemia, ABEBE, hyperlipidemia, obesity, BPH, gout, NIDDM, HLD, Parkinsons (w/ dementia), sickle cell trait, dysphagia, s/p peg tube (04/2024), sacral pressure sores, s/p hospitalization for aspiration PNA (DC'd 5/1/24) initially admitted for PNA and AOCA. Patient found to have lethargy and mild leukocytosis (WBC 10), likely infectious etiology. CT shows b/l lower lung findings suggestive of aspiration PNA, and given the patient's recent admission, this seems like the likely source of his infection. Additional sources include his sacral ulcer (not visualized) vs. PEG tube site (low suspicion) vs. UTI (ruled out w/ negative UA). He was found to be anemic and given 1x unit of blood transfusion with " "appropriate increment. BUN very elevated as well and GI was consulted for concerns of GI bleed from the PEG. 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 Patient's left wrist was swollen as well. Wife this say that patient has had gout flares in the past (last one a year ago). Xray left wrist was obtained and showed scaphoid fracture. Ortho was consulted for this. Palliative care was consulted for goals of care and patient changed code status to DNR/DNI on 5/17. Per GI, resumed tube feeds on 5/17 with nutrition consulted placed. Active issues include: monitor for bleeding, left scaphoid fracture, tube feeds, anemia.    Changes 05/22/24  - working up anemia and thrombocytopenia  - PT recommending moderate intensity care  - heme consulted, appreciate recs => ordered labs they recommended  - required 1x blood and 1x platelet transfusion last night  - holding plavix  - PM CBC ordered  - reached out to heme to ask if we should stop meropenem; they are equivocal about this    PLAN:    #Anemia  #Thrombocytopenia  #Sickle Cell Trait  - Hgb and platelets started down trending after free water flushes increased and also restarted plavix on 5/20  - Heme consulted: \"Thrombocytopenia is likely multifactorial in the setting of infection and antibiotic use. The acute component of his anemia is likely from the same. The chronic portion is from anemia of chronic disease and CKD. He is on EPO as an outpatient. No e/o hemolysis by smear or labs. DIC is not supported. No hepatomegaly, liver disease or splenomegaly. He has not been exposed to heparin, which excludes HIT.\"  -  follows with heme outpatient: Rosanne Casal, APRN for MGUS and anemia of CKD - per her last evaluation the kappa light chain elevation is mild and stable and patient was continued on Procrit 10k units q2 weeks.     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 C/f acute/chronic aspiration and/or PNA  :: Low concern for systemic infection (afebrile, HDS)  :: S/p Vanc/Zosyn in ED  -Vancomycin (pharmacy to dose):  (5/16-5/17)  -Meropenem 1g q12 (renal dosing): (5/16- )  -Aspiration precautions  -F/u urine strep and legionella  -F/u MRSA nares, consider DC Vanc in AM     # Possible slow GIB, most likely d/t mucosal disruption of PEG tube, now loosened   :: Hgb 7.8 -> 6.2 over two weeks  :: No objective evidence of bleed  ::  witnessed a bowel movement in the ED, was not melenic  :: 2019: EGD/Colonoscopy - friable gastric mucosal atrophy, duodenal erythema, and diverticulosis  :: S/p 1u pRBC in ED completed 5/16 at 23:00; 1U PLT on 5/18  -Trend Hgb  -Maintain active T&S  -tube feeds  - GI consulted  - Plavix held    #Scaphoid Fracture  :: Xrays of left wrist obtained  - ortho consulted  - splint provided for L scaphoid (moderately displaced) fracture, pt will followup with ortho on 6/11 at 3:15PM with Dr. Agudelo (appointment scheduled)     # Sacral Ulcer  -Wound care c/s     # CAD (S/p CABG x3 in 2022)  # Troponinemia  :: Troponin I  -> 516 w/out EKG changes, likely Type II MI  -hold home medication: Clopidogrel 75mg daily  -Continue home medication: Metoprolol tartrate 25mg daily (unclear why patient is on this dose frequency, however it populated in prior cardiologist note)       #BPH  -Continue home medication: Finasteride 5mg daily  -Continue home medication: Tamsulosin 0.4mg daily     # Severe Protein Calorie Malnutrition  -Tube feeds initiated in anticipation of  Nutrition recs  -FWF 250ml q6     F: S/p 1.5L NS  E: PRN  N: Tube feeds via PEG  A: PEG, PIV     Code Status: DNR/DNI (per palliative care discussion on 5/17)     NOK: Bree Nobles (Wife) - [phone]           Deondre Grimes MD PhD

## 2024-05-22 NOTE — CARE PLAN
Problem: Skin  Goal: Decreased wound size/increased tissue granulation at next dressing change  Outcome: Progressing  Goal: Participates in plan/prevention/treatment measures  Outcome: Progressing  Goal: Prevent/manage excess moisture  Outcome: Progressing  Goal: Prevent/minimize sheer/friction injuries  5/22/2024 0010 by Yaron Rodas RN  Outcome: Progressing  5/22/2024 0009 by Yaron Rodas RN  Flowsheets (Taken 5/22/2024 0009)  Prevent/minimize sheer/friction injuries:   Complete micro-shifts as needed if patient unable. Adjust patient position to relieve pressure points, not a full turn   HOB 30 degrees or less   Turn/reposition every 2 hours/use positioning/transfer devices   Use pull sheet   Utilize specialty bed per algorithm  Goal: Promote/optimize nutrition  Outcome: Progressing  Goal: Promote skin healing  Outcome: Progressing     Problem: Discharge Barriers  Goal: My discharge needs are met  Outcome: Progressing   The patient's goals for the shift include      The clinical goals for the shift include Patient will be HDS this shift    Over the shift, the patient did not make progress toward the following goals. Barriers to progression include low hemoglobin. Recommendations to address these barriers include give blood per MD order.

## 2024-05-23 LAB
ALBUMIN SERPL BCP-MCNC: 2.5 G/DL (ref 3.4–5)
ALP SERPL-CCNC: 151 U/L (ref 33–136)
ALT SERPL W P-5'-P-CCNC: 17 U/L (ref 10–52)
ANION GAP SERPL CALC-SCNC: 14 MMOL/L (ref 10–20)
AST SERPL W P-5'-P-CCNC: 27 U/L (ref 9–39)
BASOPHILS # BLD AUTO: 0.04 X10*3/UL (ref 0–0.1)
BASOPHILS NFR BLD AUTO: 0.6 %
BILIRUB DIRECT SERPL-MCNC: 0.1 MG/DL (ref 0–0.3)
BILIRUB SERPL-MCNC: 0.4 MG/DL (ref 0–1.2)
BLOOD EXPIRATION DATE: NORMAL
BLOOD EXPIRATION DATE: NORMAL
BUN SERPL-MCNC: 94 MG/DL (ref 6–23)
CALCIUM SERPL-MCNC: 8.3 MG/DL (ref 8.6–10.6)
CHLORIDE SERPL-SCNC: 110 MMOL/L (ref 98–107)
CO2 SERPL-SCNC: 21 MMOL/L (ref 21–32)
CREAT SERPL-MCNC: 3.45 MG/DL (ref 0.5–1.3)
DISPENSE STATUS: NORMAL
DISPENSE STATUS: NORMAL
EGFRCR SERPLBLD CKD-EPI 2021: 17 ML/MIN/1.73M*2
EOSINOPHIL # BLD AUTO: 0.81 X10*3/UL (ref 0–0.4)
EOSINOPHIL NFR BLD AUTO: 11.4 %
ERYTHROCYTE [DISTWIDTH] IN BLOOD BY AUTOMATED COUNT: 14.6 % (ref 11.5–14.5)
ERYTHROCYTE [DISTWIDTH] IN BLOOD BY AUTOMATED COUNT: 14.9 % (ref 11.5–14.5)
FSP PPP-MCNC: < 5 UG/ML
GLUCOSE BLD MANUAL STRIP-MCNC: 77 MG/DL (ref 74–99)
GLUCOSE BLD MANUAL STRIP-MCNC: 84 MG/DL (ref 74–99)
GLUCOSE SERPL-MCNC: 93 MG/DL (ref 74–99)
HCT VFR BLD AUTO: 26.6 % (ref 41–52)
HCT VFR BLD AUTO: 27 % (ref 41–52)
HEMOCCULT SP1 STL QL: NEGATIVE
HEMOGLOBIN A2: 3.2 % (ref 2–3.5)
HEMOGLOBIN A: 66 % (ref 95.8–98)
HEMOGLOBIN F: 0.3 % (ref 0–2)
HEMOGLOBIN IDENTIFICATION INTERPRETATION: ABNORMAL
HEMOGLOBIN S: 30.5 %
HGB BLD-MCNC: 8.7 G/DL (ref 13.5–17.5)
HGB BLD-MCNC: 8.9 G/DL (ref 13.5–17.5)
IMM GRANULOCYTES # BLD AUTO: 0.02 X10*3/UL (ref 0–0.5)
IMM GRANULOCYTES NFR BLD AUTO: 0.3 % (ref 0–0.9)
LYMPHOCYTES # BLD AUTO: 1.37 X10*3/UL (ref 0.8–3)
LYMPHOCYTES NFR BLD AUTO: 19.3 %
MAGNESIUM SERPL-MCNC: 2 MG/DL (ref 1.6–2.4)
MCH RBC QN AUTO: 28.5 PG (ref 26–34)
MCH RBC QN AUTO: 29.4 PG (ref 26–34)
MCHC RBC AUTO-ENTMCNC: 32.7 G/DL (ref 32–36)
MCHC RBC AUTO-ENTMCNC: 33 G/DL (ref 32–36)
MCV RBC AUTO: 87 FL (ref 80–100)
MCV RBC AUTO: 89 FL (ref 80–100)
MONOCYTES # BLD AUTO: 0.85 X10*3/UL (ref 0.05–0.8)
MONOCYTES NFR BLD AUTO: 12 %
NEUTROPHILS # BLD AUTO: 4 X10*3/UL (ref 1.6–5.5)
NEUTROPHILS NFR BLD AUTO: 56.4 %
NRBC BLD-RTO: 0 /100 WBCS (ref 0–0)
NRBC BLD-RTO: 0 /100 WBCS (ref 0–0)
PATH REVIEW-HGB IDENTIFICATION: ABNORMAL
PHOSPHATE SERPL-MCNC: 3.9 MG/DL (ref 2.5–4.9)
PLATELET # BLD AUTO: 2 X10*3/UL (ref 150–450)
PLATELET # BLD AUTO: 3 X10*3/UL (ref 150–450)
POTASSIUM SERPL-SCNC: 4.4 MMOL/L (ref 3.5–5.3)
PRODUCT BLOOD TYPE: 5100
PRODUCT BLOOD TYPE: 5100
PRODUCT CODE: NORMAL
PRODUCT CODE: NORMAL
PROT SERPL-MCNC: 6 G/DL (ref 6.4–8.2)
RBC # BLD AUTO: 3.03 X10*6/UL (ref 4.5–5.9)
RBC # BLD AUTO: 3.05 X10*6/UL (ref 4.5–5.9)
SODIUM SERPL-SCNC: 141 MMOL/L (ref 136–145)
UNIT ABO: NORMAL
UNIT ABO: NORMAL
UNIT NUMBER: NORMAL
UNIT NUMBER: NORMAL
UNIT RH: NORMAL
UNIT RH: NORMAL
UNIT VOLUME: 326
UNIT VOLUME: 350
WBC # BLD AUTO: 7.1 X10*3/UL (ref 4.4–11.3)
WBC # BLD AUTO: 7.8 X10*3/UL (ref 4.4–11.3)
XM INTEP: NORMAL

## 2024-05-23 PROCEDURE — 36415 COLL VENOUS BLD VENIPUNCTURE: CPT

## 2024-05-23 PROCEDURE — 84100 ASSAY OF PHOSPHORUS: CPT

## 2024-05-23 PROCEDURE — P9016 RBC LEUKOCYTES REDUCED: HCPCS

## 2024-05-23 PROCEDURE — A4217 STERILE WATER/SALINE, 500 ML: HCPCS

## 2024-05-23 PROCEDURE — 83735 ASSAY OF MAGNESIUM: CPT

## 2024-05-23 PROCEDURE — 85027 COMPLETE CBC AUTOMATED: CPT

## 2024-05-23 PROCEDURE — P9073 PLATELETS PHERESIS PATH REDU: HCPCS

## 2024-05-23 PROCEDURE — 1100000001 HC PRIVATE ROOM DAILY

## 2024-05-23 PROCEDURE — 2500000004 HC RX 250 GENERAL PHARMACY W/ HCPCS (ALT 636 FOR OP/ED)

## 2024-05-23 PROCEDURE — 99233 SBSQ HOSP IP/OBS HIGH 50: CPT | Performed by: INTERNAL MEDICINE

## 2024-05-23 PROCEDURE — 2500000004 HC RX 250 GENERAL PHARMACY W/ HCPCS (ALT 636 FOR OP/ED): Performed by: INTERNAL MEDICINE

## 2024-05-23 PROCEDURE — 80069 RENAL FUNCTION PANEL: CPT | Mod: CCI

## 2024-05-23 PROCEDURE — 80048 BASIC METABOLIC PNL TOTAL CA: CPT

## 2024-05-23 PROCEDURE — 2500000006 HC RX 250 W HCPCS SELF ADMINISTERED DRUGS (ALT 637 FOR ALL PAYERS): Mod: MUE

## 2024-05-23 PROCEDURE — 82248 BILIRUBIN DIRECT: CPT

## 2024-05-23 PROCEDURE — 2500000001 HC RX 250 WO HCPCS SELF ADMINISTERED DRUGS (ALT 637 FOR MEDICARE OP)

## 2024-05-23 PROCEDURE — 99233 SBSQ HOSP IP/OBS HIGH 50: CPT

## 2024-05-23 PROCEDURE — 2500000004 HC RX 250 GENERAL PHARMACY W/ HCPCS (ALT 636 FOR OP/ED): Mod: JZ | Performed by: INTERNAL MEDICINE

## 2024-05-23 PROCEDURE — C9113 INJ PANTOPRAZOLE SODIUM, VIA: HCPCS

## 2024-05-23 PROCEDURE — 30233S1 TRANSFUSION OF NONAUTOLOGOUS GLOBULIN INTO PERIPHERAL VEIN, PERCUTANEOUS APPROACH: ICD-10-PCS | Performed by: INTERNAL MEDICINE

## 2024-05-23 PROCEDURE — 36430 TRANSFUSION BLD/BLD COMPNT: CPT

## 2024-05-23 PROCEDURE — 82947 ASSAY GLUCOSE BLOOD QUANT: CPT | Mod: 91

## 2024-05-23 PROCEDURE — 85025 COMPLETE CBC W/AUTO DIFF WBC: CPT

## 2024-05-23 RX ORDER — DIPHENHYDRAMINE HYDROCHLORIDE 50 MG/ML
25 INJECTION INTRAMUSCULAR; INTRAVENOUS ONCE
Qty: 0.5 ML | Refills: 0 | Status: COMPLETED | OUTPATIENT
Start: 2024-05-23 | End: 2024-05-23

## 2024-05-23 RX ORDER — ACETAMINOPHEN 325 MG/1
650 TABLET ORAL EVERY 8 HOURS
Status: DISCONTINUED | OUTPATIENT
Start: 2024-05-23 | End: 2024-06-06 | Stop reason: HOSPADM

## 2024-05-23 RX ADMIN — DEXAMETHASONE SODIUM PHOSPHATE 40 MG: 10 INJECTION, SOLUTION INTRAMUSCULAR; INTRAVENOUS at 21:00

## 2024-05-23 RX ADMIN — SODIUM BICARBONATE 650 MG TABLET: at 20:00

## 2024-05-23 RX ADMIN — CARBIDOPA AND LEVODOPA 1.5 TABLET: 25; 100 TABLET ORAL at 08:00

## 2024-05-23 RX ADMIN — Medication 1 APPLICATION: at 13:00

## 2024-05-23 RX ADMIN — IMMUNE GLOBULIN INFUSION (HUMAN) 20 G: 100 INJECTION, SOLUTION INTRAVENOUS; SUBCUTANEOUS at 21:03

## 2024-05-23 RX ADMIN — Medication 1 APPLICATION: at 21:04

## 2024-05-23 RX ADMIN — Medication 1 APPLICATION: at 08:00

## 2024-05-23 RX ADMIN — METOPROLOL TARTRATE 25 MG: 50 TABLET, FILM COATED ORAL at 10:39

## 2024-05-23 RX ADMIN — PANTOPRAZOLE SODIUM 40 MG: 40 INJECTION, POWDER, FOR SOLUTION INTRAVENOUS at 10:39

## 2024-05-23 RX ADMIN — AMOXICILLIN AND CLAVULANATE POTASSIUM 500 MG: 400; 57 POWDER, FOR SUSPENSION ORAL at 09:00

## 2024-05-23 RX ADMIN — FINASTERIDE 5 MG: 5 TABLET, FILM COATED ORAL at 10:39

## 2024-05-23 RX ADMIN — ACETAMINOPHEN 650 MG: 325 TABLET ORAL at 10:40

## 2024-05-23 RX ADMIN — AMOXICILLIN AND CLAVULANATE POTASSIUM 500 MG: 400; 57 POWDER, FOR SUSPENSION ORAL at 21:04

## 2024-05-23 RX ADMIN — CARBIDOPA AND LEVODOPA 1.5 TABLET: 25; 100 TABLET ORAL at 14:44

## 2024-05-23 RX ADMIN — CARBIDOPA AND LEVODOPA 1.5 TABLET: 25; 100 TABLET ORAL at 21:04

## 2024-05-23 RX ADMIN — Medication 1 APPLICATION: at 17:00

## 2024-05-23 RX ADMIN — DIPHENHYDRAMINE HYDROCHLORIDE 25 MG: 50 INJECTION INTRAMUSCULAR; INTRAVENOUS at 21:04

## 2024-05-23 RX ADMIN — TAMSULOSIN HYDROCHLORIDE 0.4 MG: 0.4 CAPSULE ORAL at 10:39

## 2024-05-23 ASSESSMENT — PAIN SCALES - PAIN ASSESSMENT IN ADVANCED DEMENTIA (PAINAD)
NEGVOCALIZATION: OCCASIONAL MOAN/GROAN, LOW SPEECH, NEGATIVE/DISAPPROVING QUALITY
CONSOLABILITY: NO NEED TO CONSOLE
TOTALSCORE: 2
TOTALSCORE: MEDICATION (SEE MAR)
BODYLANGUAGE: TENSE, DISTRESSED PACING, FIDGETING
FACIALEXPRESSION: SMILING OR INEXPRESSIVE
BREATHING: NORMAL

## 2024-05-23 ASSESSMENT — PAIN - FUNCTIONAL ASSESSMENT
PAIN_FUNCTIONAL_ASSESSMENT: FLACC (FACE, LEGS, ACTIVITY, CRY, CONSOLABILITY)
PAIN_FUNCTIONAL_ASSESSMENT: 0-10

## 2024-05-23 ASSESSMENT — COGNITIVE AND FUNCTIONAL STATUS - GENERAL
TURNING FROM BACK TO SIDE WHILE IN FLAT BAD: A LOT
DRESSING REGULAR UPPER BODY CLOTHING: A LOT
PERSONAL GROOMING: A LOT
EATING MEALS: TOTAL
TOILETING: A LOT
WALKING IN HOSPITAL ROOM: TOTAL
MOVING TO AND FROM BED TO CHAIR: A LOT
MOVING FROM LYING ON BACK TO SITTING ON SIDE OF FLAT BED WITH BEDRAILS: A LOT
DRESSING REGULAR LOWER BODY CLOTHING: A LOT
DAILY ACTIVITIY SCORE: 11
HELP NEEDED FOR BATHING: A LOT
MOBILITY SCORE: 10
CLIMB 3 TO 5 STEPS WITH RAILING: TOTAL
STANDING UP FROM CHAIR USING ARMS: A LOT

## 2024-05-23 ASSESSMENT — PAIN SCALES - GENERAL
PAINLEVEL_OUTOF10: 0 - NO PAIN

## 2024-05-23 NOTE — PROGRESS NOTES
"Name: Markie Nobles  MRN: [id number]  Encounter Date: 5/23/2024  PCP: Leslie Watson MD    Reason for consult: Thrombocytopenia, Anemia  Attending Provider: Dr. Samuels    Hematology/ Oncology Progress Note    Subjective   Worsening platelet count despite transfusions. Patient is off meropenem. More fatigued according to his wife.     Allergies   No Known Allergies    Medications   acetaminophen, 650 mg, q8h  amoxicillin-pot clavulanate, 500 mg, q12h MISTY  carbidopa-levodopa, 1.5 tablet, 4x daily  [Held by provider] clopidogrel, 75 mg, Daily  dexAMETHasone, 40 mg, q24h  diphenhydrAMINE, 25 mg, Once  finasteride, 5 mg, Daily  immune globulin (human), 0.4 g/kg, Daily  menthol-zinc oxide, 1 Application, 4x daily  metoprolol tartrate, 25 mg, Daily  pancrelipase (Lip-Prot-Amyl) 2 tablet, sodium bicarbonate 650 mg, , Once  pantoprazole, 40 mg, Daily  tamsulosin, 0.4 mg, Daily         benzonatate, 100 mg, TID PRN        Review of Systems   Review of Systems   10 pt ROS reviewed and negative aside from above    Physical Exam   Blood pressure 151/75, pulse 57, temperature 36.3 °C (97.3 °F), resp. rate 18, height 1.778 m (5' 10\"), weight 62.6 kg (138 lb), SpO2 100%.    General: chronically ill appearing, NAD  Eyes: anicteric  ENT: MMM  Neck: Supple, no LAD  CV: RRR  Resp: scattered rhonchi, non-labored  Abd: Soft, NT, ND  Ext: wwp, no edema  Neuro: follows commands, moving all extremities  Skin: no rash     Labs     Lab Results   Component Value Date    GLUCOSE 93 05/23/2024    CALCIUM 8.3 (L) 05/23/2024     05/23/2024    K 4.4 05/23/2024    CO2 21 05/23/2024     (H) 05/23/2024    BUN 94 (HH) 05/23/2024    CREATININE 3.45 (H) 05/23/2024       Lab Results   Component Value Date    WBC 7.8 05/23/2024    HGB 8.7 (L) 05/23/2024    HCT 26.6 (L) 05/23/2024    MCV 87 05/23/2024    PLT 3 (LL) 05/23/2024       Lab Results   Component Value Date    ALT 17 05/23/2024    AST 27 05/23/2024    ALKPHOS 151 (H) 05/23/2024    BILITOT " 0.4 05/23/2024       Imaging   5/16/24 CT CAP    FINDINGS:  Please note that the study is limited without intravenous contrast.      CHEST:      LUNG/PLEURA/LARGE AIRWAYS:  Nonspecific secretions within the trachea. Mild predominantly lower lung peribronchial wall thickening. Tree-in-bud opacities of the left and right lower lungs and posterior right middle lobe. Small scattered more consolidative bibasilar opacities. No pneumothorax or pleural effusion. Scattered pulmonary nodules which are stable from prior exam. For example a left lower lobe pulmonary nodule measures up to 0.7 cm (series 204, image 184, similar to prior exam.      VESSELS:  Aorta and pulmonary arteries are normal caliber.  Mild  atherosclerotic changes are noted of the aorta and branching vessels.  Severe coronary artery calcifications are present.      HEART:  Status post CABG. Normal size.  No pericardial effusion      MEDIASTINUM AND EREN:  No mediastinal, hilar or axillary lymph nodes are present.  The  esophagus appears normal.      CHEST WALL AND LOWER NECK:  Within normal limits.  The visualized thyroid gland appears within normal limits.      ABDOMEN:      LIVER:  The liver is normal in size without evidence of focal liver lesions.      BILE DUCTS:  The bile ducts are not dilated.      GALLBLADDER:  The gallbladder is surgically absent.      PANCREAS:  The pancreas appears unremarkable.      SPLEEN:  Redemonstration of marked nodular contour of the spleen and sequelae  of previous splenic infarcts.      ADRENAL GLANDS:  Within normal limits.      KIDNEYS AND URETERS:  Within normal limits.  No hydroureteronephrosis or  nephroureterolithiasis is present.      PELVIS:  BLADDER:  Within normal limits.      REPRODUCTIVE ORGANS:  The prostate is not enlarged.      BOWEL:  PEG tube in place. The stomach is unremarkable.  The small and large bowel are normal in caliber and demonstrate no wall thickening.  The appendix is not definitely  visualized. There is however no pericecal stranding or fluid.      VESSELS:  Severe aortoiliac atheromatous calcification. There is no aneurysmal dilatation of the abdominal aorta. The IVC is within normal limits.      PERITONEUM/RETROPERITONEUM/LYMPH NODES:  No ascites or free air, no fluid collection.  The retroperitoneum  appears unremarkable.  No abdominopelvic lymphadenopathy is present.      ABDOMINAL WALL:  The abdominal wall soft tissues appear normal. Bilateral inguinal hernial sacs containing fluid.      BONES:  Status post median sternotomy. No suspicious osseous lesions are present. Degenerative discogenic disease is noted in the lower thoracic and lumbar spine.    Assessment/Plan   Markie Nobles is a 79 y.o. male with a history of MGUS, CAD (s/p CABG x3 in 2022), HTN, CKD IV, Anemia, sickle cell trait, ABEBE, hyperlipidemia, obesity, BPH, gout, NIDDM, HLD, Parkinsons (w/ dementia), dysphagia, s/p peg tube (04/2024), sacral pressure sores, s/p recent hospitalization for aspiration PNA  (5/1/24) and now admitted for and being treated for aspiration pneumonia. Hematology is consulted for anemia and thrombocytopenia.     Peripheral Smear Reviewed 5/21  WBCs: unremarkable  RBCs: Normochronic, anisocytosis, <1 RBC frag/hpf  Plt: reduced in number, no clumping    Patient's thrombocytopenia initially thought to be multifactorial from infection and antibiotic use. he acute component of his anemia is likely from the same. The chronic portion is from anemia of chronic disease and CKD. He is on EPO as an outpatient. No e/o hemolysis by smear or labs. DIC is not supported. Folate, B12 nl. No hepatomegaly, liver disease or splenomegaly. Hepatitis B, C, HIV negative. He has not been exposed to heparin, which excludes HIT. No recent transfusions to support post-transfusion purpura.    Worsening thrombocytopenia to the single digits suggests an immune thrombocytopenia, which may be drug-induced (meropenem vs.  Vancomycin). Today, discussed the risks and benefits of IVIG with patient and his wife Bree Nobles who as his POA and decision maker provided consent for IVIG.     Recommend:  - trend CBC daily  - start dexamethasone 40mg IV daily for 4 days  - start IVIG 400mg/kg daily for 5 days  - will send for drug-dependent anti-platelet antibodies  - until these return, please refrain from using meropenem or vancomycin     Thank you for this consult, we will continue to follow. Pt seen and discussed with Dr. Crabtree.    Jaycob Eddy MD  Hematology- Oncology Fellow, PGY-5  Hematology Consult Pager: [phone]

## 2024-05-23 NOTE — PROGRESS NOTES
Physical Therapy                 Therapy Communication Note    Patient Name: Markie Nobles  MRN: [id number]  Today's Date: 5/23/2024     Discipline: Physical Therapy    Missed Visit Reason: Missed Visit Reason: Other (Comment) (CHARITO Ervin said hold due to current issues with PEG tube)    Missed Time: Attempt    Comment:  15:00pm

## 2024-05-23 NOTE — PROGRESS NOTES
"Markie Nobles is a 79 y.o. male on day 7 of admission presenting with NSTEMI (non-ST elevated myocardial infarction) (Multi).    Subjective   Required 1x unit of platelets and 1x unit of blood last night. Denies any sources of bleeding. No CP, palpitations, SOB. No fevers and chills. Feels stable compared to yesterday.     Objective     Vitals:    05/23/24 0535   BP: 166/78   Pulse: 61   Resp: 18   Temp: 35.7 °C (96.3 °F)   SpO2: 99%         Physical Exam  GEN: cachectic  HEENT: AT/NC  CARDIO: RRR, normal S1 and S2  PULM: clear to auscultation bilaterally  ABD: Soft, NT/ND, bowel sounds +, PEG in place  EXTR: Warm/well perfused; left wrist has cast in place  PSYCH: Appropriate mood/affect    Last Recorded Vitals  Blood pressure 166/78, pulse 61, temperature 35.7 °C (96.3 °F), temperature source Temporal, resp. rate 18, height 1.778 m (5' 10\"), weight 62.6 kg (138 lb), SpO2 99%.  Intake/Output last 3 Shifts:  I/O last 3 completed shifts:  In: 3067 (49 mL/kg) [Blood:1487; NG/GT:1380; IV Piggyback:200]  Out: 1900 (30.4 mL/kg) [Urine:1900 (0.8 mL/kg/hr)]  Weight: 62.6 kg       Assessment/Plan   Principal Problem:    NSTEMI (non-ST elevated myocardial infarction) (Multi)    Mr. Nobles is an 79 year old Male w/ a PMHx MGUS, CAD (s/p CABG x3 in 2022), HTN, CKD IV, Anemia, ABEBE, hyperlipidemia, obesity, BPH, gout, NIDDM, HLD, Parkinsons (w/ dementia), sickle cell trait, dysphagia, s/p peg tube (04/2024), sacral pressure sores, s/p hospitalization for aspiration PNA (DC'd 5/1/24) initially admitted for PNA and AOCA. Patient found to have lethargy and mild leukocytosis (WBC 10), likely infectious etiology. CT shows b/l lower lung findings suggestive of aspiration PNA, and given the patient's recent admission, this seems like the likely source of his infection. Additional sources include his sacral ulcer (not visualized) vs. PEG tube site (low suspicion) vs. UTI (ruled out w/ negative UA). He was found to be anemic and given " "1x unit of blood transfusion with appropriate increment. BUN very elevated as well and GI was consulted for concerns of GI bleed from the PEG. 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 Patient's left wrist was swollen as well. Wife this say that patient has had gout flares in the past (last one a year ago). Xray left wrist was obtained and showed scaphoid fracture. Ortho was consulted for this. Palliative care was consulted for goals of care and patient changed code status to DNR/DNI on 5/17. Per GI, resumed tube feeds on 5/17 with nutrition consulted placed. Developed worsening anemia and thrombocytopenia on 5/20 after free water flushes increased and plavix resumed. Hematology consulted and think etiology is likely ITP vs medication/infection. Active issues include: monitor for bleeding, left scaphoid fracture, tube feeds, anemia.    Changes 05/23/24  - working up anemia and thrombocytopenia  - PT recommending moderate intensity care  - heme consulted, appreciate recs (etiology likely ITP vs medication/infection)  - required 1x blood and 1x platelet transfusion last night  - holding plavix  - PM CBC ordered  - asked nurse to perform thorough skin evaluation for any signs of infection  - stool occult blood test pending  - awaiting heme recs on potentially starting steroids today      PLAN:    #Anemia  #Thrombocytopenia  #Sickle Cell Trait  - Hgb and platelets started down trending after free water flushes increased and also restarted plavix on 5/20  - Heme consulted: \"Thrombocytopenia is likely multifactorial in the setting of infection and antibiotic use. The acute component of his anemia is likely from the same. The chronic portion is from anemia of chronic disease and CKD. He is on EPO as an outpatient. No e/o hemolysis by smear or labs. DIC is not supported. No hepatomegaly, liver disease or splenomegaly. He has not been " "exposed to heparin, which excludes HIT.\"  -  follows with heme outpatient: Rosanne Casal, APRN for MGUS and anemia of CKD - per her last evaluation the kappa light chain elevation is mild and stable and patient was continued on Procrit 10k units q2 weeks.     # AMS, resolved  # Parkinson's Disease w/ Dementia  :: Mentation improving after blood transfusion and abx  -Continue home medication: Carbidopa-Levodopa 1.5tab QID  - MRI brain obtained on 5/17 demonstrated old vascular lesions but also ventricular dilation possibly consistent with NPH per radiology read (day team to discuss finding)     # Aspiration PNA  :: WBC 10 on admission (up from 7.9 on 5/1)  :: CT Chest: Tree-in-bud b/l lower lungs and secretions in trachea. C/f acute/chronic aspiration and/or PNA  :: Low concern for systemic infection (afebrile, HDS)  :: S/p Vanc/Zosyn in ED  -Vancomycin (pharmacy to dose):  (5/16-5/17)  -Meropenem 1g q12 (renal dosing): (5/16- 5/22)  - Augmentin (5/22 - )  -Aspiration precautions  -F/u urine strep and legionella  -F/u MRSA nares, consider DC Vanc in AM     # Possible slow GIB, most likely d/t mucosal disruption of PEG tube, now loosened   :: Hgb 7.8 -> 6.2 over two weeks  :: No objective evidence of bleed  ::  witnessed a bowel movement in the ED, was not melenic  :: 2019: EGD/Colonoscopy - friable gastric mucosal atrophy, duodenal erythema, and diverticulosis  :: S/p 1u pRBC in ED completed 5/16 at 23:00; 1U PLT on 5/18  -Trend Hgb  -Maintain active T&S  -tube feeds  - GI consulted  - Plavix held    #Scaphoid Fracture  :: Xrays of left wrist obtained  - ortho consulted  - splint provided for L scaphoid (moderately displaced) fracture, pt will followup with ortho on 6/11 at 3:15PM with Dr. Agudelo (appointment scheduled)     # Sacral Ulcer  -Wound care c/s     # CAD (S/p CABG x3 in 2022)  # Troponinemia  :: Troponin I  -> 516 w/out EKG changes, likely Type II MI  -hold home medication: Clopidogrel 75mg " daily  -Continue home medication: Metoprolol tartrate 25mg daily (unclear why patient is on this dose frequency, however it populated in prior cardiologist note)       #BPH  -Continue home medication: Finasteride 5mg daily  -Continue home medication: Tamsulosin 0.4mg daily     # Severe Protein Calorie Malnutrition  -Tube feeds initiated in anticipation of Nutrition recs  -FWF 250ml q6     F: S/p 1.5L NS  E: PRN  N: Tube feeds via PEG  A: PEG, PIV     Code Status: DNR/DNI (per palliative care discussion on 5/17)     NOK: Bree Nobles (Wife) - [phone]           Deondre Grimes MD PhD

## 2024-05-23 NOTE — PROGRESS NOTES
Speech-Language Pathology                 Therapy Communication Note    Patient Name: Markie Nobles  MRN: [id number]  Today's Date: 5/23/2024     Discipline: Speech Language Pathology    Pt politely declined participation in Speech Therapy this date.  Will continue to follow.        05/23/24 at 5:13 PM - NICHOLE New

## 2024-05-23 NOTE — PROGRESS NOTES
Markie Nobles is a 79 y.o. male on day 7 of admission presenting with NSTEMI (non-ST elevated myocardial infarction) (Multi).    Transitional Care Coordination Progress Note:  Patient discussed during interdisciplinary rounds.   Team members present: MD and TCC  Plan per Medical/Surgical team: Thrombocytopenia getting worse, going to start on steroids today.  Payor: [employer]  Discharge disposition: [facility]- Have auth through 05/24. Will need new Auth once patient is ready.  Potential Barriers: None  ADOD: 05/26/24    BRIAN BRODERICK RN

## 2024-05-24 LAB
ALBUMIN SERPL BCP-MCNC: 2.7 G/DL (ref 3.4–5)
ALP SERPL-CCNC: 141 U/L (ref 33–136)
ALT SERPL W P-5'-P-CCNC: 22 U/L (ref 10–52)
ANION GAP SERPL CALC-SCNC: 16 MMOL/L (ref 10–20)
AST SERPL W P-5'-P-CCNC: 34 U/L (ref 9–39)
BILIRUB DIRECT SERPL-MCNC: 0.1 MG/DL (ref 0–0.3)
BILIRUB SERPL-MCNC: 0.4 MG/DL (ref 0–1.2)
BUN SERPL-MCNC: 100 MG/DL (ref 6–23)
CALCIUM SERPL-MCNC: 8.3 MG/DL (ref 8.6–10.6)
CHLORIDE SERPL-SCNC: 108 MMOL/L (ref 98–107)
CO2 SERPL-SCNC: 20 MMOL/L (ref 21–32)
CREAT SERPL-MCNC: 3.56 MG/DL (ref 0.5–1.3)
EGFRCR SERPLBLD CKD-EPI 2021: 17 ML/MIN/1.73M*2
ERYTHROCYTE [DISTWIDTH] IN BLOOD BY AUTOMATED COUNT: 14.6 % (ref 11.5–14.5)
GLUCOSE BLD MANUAL STRIP-MCNC: 110 MG/DL (ref 74–99)
GLUCOSE BLD MANUAL STRIP-MCNC: 114 MG/DL (ref 74–99)
GLUCOSE BLD MANUAL STRIP-MCNC: 116 MG/DL (ref 74–99)
GLUCOSE BLD MANUAL STRIP-MCNC: 116 MG/DL (ref 74–99)
GLUCOSE BLD MANUAL STRIP-MCNC: 150 MG/DL (ref 74–99)
GLUCOSE SERPL-MCNC: 159 MG/DL (ref 74–99)
HCT VFR BLD AUTO: 28.5 % (ref 41–52)
HGB BLD-MCNC: 9.3 G/DL (ref 13.5–17.5)
MAGNESIUM SERPL-MCNC: 2.05 MG/DL (ref 1.6–2.4)
MCH RBC QN AUTO: 28.9 PG (ref 26–34)
MCHC RBC AUTO-ENTMCNC: 32.6 G/DL (ref 32–36)
MCV RBC AUTO: 89 FL (ref 80–100)
NRBC BLD-RTO: 0 /100 WBCS (ref 0–0)
PHOSPHATE SERPL-MCNC: 3.5 MG/DL (ref 2.5–4.9)
PLATELET # BLD AUTO: 8 X10*3/UL (ref 150–450)
POTASSIUM SERPL-SCNC: 4.8 MMOL/L (ref 3.5–5.3)
PROT SERPL-MCNC: 6.6 G/DL (ref 6.4–8.2)
RBC # BLD AUTO: 3.22 X10*6/UL (ref 4.5–5.9)
SODIUM SERPL-SCNC: 139 MMOL/L (ref 136–145)
WBC # BLD AUTO: 4.2 X10*3/UL (ref 4.4–11.3)

## 2024-05-24 PROCEDURE — 84075 ASSAY ALKALINE PHOSPHATASE: CPT

## 2024-05-24 PROCEDURE — 2500000002 HC RX 250 W HCPCS SELF ADMINISTERED DRUGS (ALT 637 FOR MEDICARE OP, ALT 636 FOR OP/ED): Mod: MUE

## 2024-05-24 PROCEDURE — 83735 ASSAY OF MAGNESIUM: CPT

## 2024-05-24 PROCEDURE — 84100 ASSAY OF PHOSPHORUS: CPT

## 2024-05-24 PROCEDURE — 2500000004 HC RX 250 GENERAL PHARMACY W/ HCPCS (ALT 636 FOR OP/ED): Mod: JZ | Performed by: INTERNAL MEDICINE

## 2024-05-24 PROCEDURE — C9113 INJ PANTOPRAZOLE SODIUM, VIA: HCPCS

## 2024-05-24 PROCEDURE — 2500000001 HC RX 250 WO HCPCS SELF ADMINISTERED DRUGS (ALT 637 FOR MEDICARE OP)

## 2024-05-24 PROCEDURE — 82248 BILIRUBIN DIRECT: CPT

## 2024-05-24 PROCEDURE — 1100000001 HC PRIVATE ROOM DAILY

## 2024-05-24 PROCEDURE — 80053 COMPREHEN METABOLIC PANEL: CPT

## 2024-05-24 PROCEDURE — A4217 STERILE WATER/SALINE, 500 ML: HCPCS

## 2024-05-24 PROCEDURE — 85025 COMPLETE CBC W/AUTO DIFF WBC: CPT

## 2024-05-24 PROCEDURE — 2500000004 HC RX 250 GENERAL PHARMACY W/ HCPCS (ALT 636 FOR OP/ED)

## 2024-05-24 PROCEDURE — 86022 PLATELET ANTIBODIES: CPT | Performed by: INTERNAL MEDICINE

## 2024-05-24 PROCEDURE — 99233 SBSQ HOSP IP/OBS HIGH 50: CPT

## 2024-05-24 PROCEDURE — 99232 SBSQ HOSP IP/OBS MODERATE 35: CPT | Performed by: INTERNAL MEDICINE

## 2024-05-24 PROCEDURE — 36415 COLL VENOUS BLD VENIPUNCTURE: CPT

## 2024-05-24 PROCEDURE — 82947 ASSAY GLUCOSE BLOOD QUANT: CPT | Mod: 91

## 2024-05-24 PROCEDURE — 84132 ASSAY OF SERUM POTASSIUM: CPT

## 2024-05-24 RX ORDER — DEXTROSE 50 % IN WATER (D50W) INTRAVENOUS SYRINGE
25
Status: DISCONTINUED | OUTPATIENT
Start: 2024-05-24 | End: 2024-06-06 | Stop reason: HOSPADM

## 2024-05-24 RX ORDER — DEXTROSE 50 % IN WATER (D50W) INTRAVENOUS SYRINGE
12.5
Status: DISCONTINUED | OUTPATIENT
Start: 2024-05-24 | End: 2024-06-06 | Stop reason: HOSPADM

## 2024-05-24 RX ORDER — DIPHENHYDRAMINE HYDROCHLORIDE 50 MG/ML
25 INJECTION INTRAMUSCULAR; INTRAVENOUS DAILY PRN
Status: DISCONTINUED | OUTPATIENT
Start: 2024-05-24 | End: 2024-05-28

## 2024-05-24 RX ORDER — INSULIN LISPRO 100 [IU]/ML
0-5 INJECTION, SOLUTION INTRAVENOUS; SUBCUTANEOUS
Status: DISCONTINUED | OUTPATIENT
Start: 2024-05-24 | End: 2024-06-06

## 2024-05-24 RX ADMIN — DEXAMETHASONE SODIUM PHOSPHATE 40 MG: 10 INJECTION, SOLUTION INTRAMUSCULAR; INTRAVENOUS at 21:50

## 2024-05-24 RX ADMIN — ACETAMINOPHEN 650 MG: 325 TABLET ORAL at 17:45

## 2024-05-24 RX ADMIN — AMOXICILLIN AND CLAVULANATE POTASSIUM 500 MG: 400; 57 POWDER, FOR SUSPENSION ORAL at 21:51

## 2024-05-24 RX ADMIN — CARBIDOPA AND LEVODOPA 1.5 TABLET: 25; 100 TABLET ORAL at 13:33

## 2024-05-24 RX ADMIN — FINASTERIDE 5 MG: 5 TABLET, FILM COATED ORAL at 08:41

## 2024-05-24 RX ADMIN — DIPHENHYDRAMINE HYDROCHLORIDE 25 MG: 50 INJECTION INTRAMUSCULAR; INTRAVENOUS at 22:50

## 2024-05-24 RX ADMIN — CARBIDOPA AND LEVODOPA 1.5 TABLET: 25; 100 TABLET ORAL at 21:51

## 2024-05-24 RX ADMIN — CARBIDOPA AND LEVODOPA 1.5 TABLET: 25; 100 TABLET ORAL at 08:41

## 2024-05-24 RX ADMIN — Medication 1 APPLICATION: at 13:38

## 2024-05-24 RX ADMIN — AMOXICILLIN AND CLAVULANATE POTASSIUM 500 MG: 400; 57 POWDER, FOR SUSPENSION ORAL at 08:59

## 2024-05-24 RX ADMIN — Medication 1 APPLICATION: at 21:51

## 2024-05-24 RX ADMIN — ACETAMINOPHEN 650 MG: 325 TABLET ORAL at 10:11

## 2024-05-24 RX ADMIN — Medication 1 APPLICATION: at 17:48

## 2024-05-24 RX ADMIN — METOPROLOL TARTRATE 25 MG: 50 TABLET, FILM COATED ORAL at 08:41

## 2024-05-24 RX ADMIN — Medication 1 APPLICATION: at 08:53

## 2024-05-24 RX ADMIN — PANTOPRAZOLE SODIUM 40 MG: 40 INJECTION, POWDER, FOR SOLUTION INTRAVENOUS at 08:42

## 2024-05-24 RX ADMIN — CARBIDOPA AND LEVODOPA 1.5 TABLET: 25; 100 TABLET ORAL at 17:41

## 2024-05-24 RX ADMIN — TAMSULOSIN HYDROCHLORIDE 0.4 MG: 0.4 CAPSULE ORAL at 08:42

## 2024-05-24 RX ADMIN — IMMUNE GLOBULIN INFUSION (HUMAN) 20 G: 100 INJECTION, SOLUTION INTRAVENOUS; SUBCUTANEOUS at 21:50

## 2024-05-24 ASSESSMENT — COGNITIVE AND FUNCTIONAL STATUS - GENERAL
CLIMB 3 TO 5 STEPS WITH RAILING: TOTAL
MOBILITY SCORE: 6
PERSONAL GROOMING: A LOT
STANDING UP FROM CHAIR USING ARMS: A LOT
MOVING FROM LYING ON BACK TO SITTING ON SIDE OF FLAT BED WITH BEDRAILS: A LOT
DAILY ACTIVITIY SCORE: 6
TURNING FROM BACK TO SIDE WHILE IN FLAT BAD: A LOT
TOILETING: A LOT
DRESSING REGULAR UPPER BODY CLOTHING: TOTAL
WALKING IN HOSPITAL ROOM: TOTAL
DRESSING REGULAR LOWER BODY CLOTHING: TOTAL
MOVING TO AND FROM BED TO CHAIR: TOTAL
STANDING UP FROM CHAIR USING ARMS: TOTAL
DAILY ACTIVITIY SCORE: 11
TURNING FROM BACK TO SIDE WHILE IN FLAT BAD: TOTAL
MOBILITY SCORE: 10
EATING MEALS: TOTAL
HELP NEEDED FOR BATHING: A LOT
CLIMB 3 TO 5 STEPS WITH RAILING: TOTAL
DRESSING REGULAR LOWER BODY CLOTHING: A LOT
TOILETING: TOTAL
MOVING FROM LYING ON BACK TO SITTING ON SIDE OF FLAT BED WITH BEDRAILS: TOTAL
EATING MEALS: TOTAL
DRESSING REGULAR UPPER BODY CLOTHING: A LOT
WALKING IN HOSPITAL ROOM: TOTAL
PERSONAL GROOMING: TOTAL
MOVING TO AND FROM BED TO CHAIR: A LOT
HELP NEEDED FOR BATHING: TOTAL

## 2024-05-24 ASSESSMENT — PAIN - FUNCTIONAL ASSESSMENT
PAIN_FUNCTIONAL_ASSESSMENT: 0-10

## 2024-05-24 ASSESSMENT — PAIN SCALES - GENERAL
PAINLEVEL_OUTOF10: 0 - NO PAIN

## 2024-05-24 NOTE — PROGRESS NOTES
"Name: Markie Nobles  MRN: [id number]  Encounter Date: 5/24/2024  PCP: Leslie Watson MD    Reason for consult: Thrombocytopenia, Anemia  Attending Provider: Dr. Samuels    Hematology/ Oncology Progress Note    Subjective   Patient is more awake and alert today. He notes he is feeling well.     Allergies   No Known Allergies    Medications   acetaminophen, 650 mg, q8h  amoxicillin-pot clavulanate, 500 mg, q12h MISTY  carbidopa-levodopa, 1.5 tablet, 4x daily  [Held by provider] clopidogrel, 75 mg, Daily  dexAMETHasone, 40 mg, q24h  finasteride, 5 mg, Daily  immune globulin (human), 20 g, q24h  [START ON 5/26/2024] immune globulin (human), 30 g, q24h  insulin lispro, 0-5 Units, TID  menthol-zinc oxide, 1 Application, 4x daily  metoprolol tartrate, 25 mg, Daily  pantoprazole, 40 mg, Daily  tamsulosin, 0.4 mg, Daily         benzonatate, 100 mg, TID PRN  dextrose, 12.5 g, q15 min PRN  dextrose, 25 g, q15 min PRN  glucagon, 1 mg, q15 min PRN  glucagon, 1 mg, q15 min PRN        Review of Systems   Review of Systems   10 pt ROS reviewed and negative aside from above    Physical Exam   Blood pressure 155/80, pulse 64, temperature 36.3 °C (97.3 °F), resp. rate 16, height 1.778 m (5' 10\"), weight 62.6 kg (138 lb), SpO2 100%.    General: chronically ill appearing, NAD  Eyes: anicteric  Resp: breathing comfortably   Neuro: follows commands, moving all extremities  Skin: no rash     Labs     Lab Results   Component Value Date    GLUCOSE 159 (H) 05/24/2024    CALCIUM 8.3 (L) 05/24/2024     05/24/2024    K 4.8 05/24/2024    CO2 20 (L) 05/24/2024     (H) 05/24/2024     (HH) 05/24/2024    CREATININE 3.56 (H) 05/24/2024       Lab Results   Component Value Date    WBC 4.2 (L) 05/24/2024    HGB 9.3 (L) 05/24/2024    HCT 28.5 (L) 05/24/2024    MCV 89 05/24/2024    PLT 8 (LL) 05/24/2024       Lab Results   Component Value Date    ALT 22 05/24/2024    AST 34 05/24/2024    ALKPHOS 141 (H) 05/24/2024    BILITOT 0.4 05/24/2024 "       Imaging   5/16/24 CT CAP    FINDINGS:  Please note that the study is limited without intravenous contrast.      CHEST:      LUNG/PLEURA/LARGE AIRWAYS:  Nonspecific secretions within the trachea. Mild predominantly lower lung peribronchial wall thickening. Tree-in-bud opacities of the left and right lower lungs and posterior right middle lobe. Small scattered more consolidative bibasilar opacities. No pneumothorax or pleural effusion. Scattered pulmonary nodules which are stable from prior exam. For example a left lower lobe pulmonary nodule measures up to 0.7 cm (series 204, image 184, similar to prior exam.      VESSELS:  Aorta and pulmonary arteries are normal caliber.  Mild  atherosclerotic changes are noted of the aorta and branching vessels.  Severe coronary artery calcifications are present.      HEART:  Status post CABG. Normal size.  No pericardial effusion      MEDIASTINUM AND EREN:  No mediastinal, hilar or axillary lymph nodes are present.  The  esophagus appears normal.      CHEST WALL AND LOWER NECK:  Within normal limits.  The visualized thyroid gland appears within normal limits.      ABDOMEN:      LIVER:  The liver is normal in size without evidence of focal liver lesions.      BILE DUCTS:  The bile ducts are not dilated.      GALLBLADDER:  The gallbladder is surgically absent.      PANCREAS:  The pancreas appears unremarkable.      SPLEEN:  Redemonstration of marked nodular contour of the spleen and sequelae  of previous splenic infarcts.      ADRENAL GLANDS:  Within normal limits.      KIDNEYS AND URETERS:  Within normal limits.  No hydroureteronephrosis or  nephroureterolithiasis is present.      PELVIS:  BLADDER:  Within normal limits.      REPRODUCTIVE ORGANS:  The prostate is not enlarged.      BOWEL:  PEG tube in place. The stomach is unremarkable.  The small and large bowel are normal in caliber and demonstrate no wall thickening.  The appendix is not definitely visualized. There is  however no pericecal stranding or fluid.      VESSELS:  Severe aortoiliac atheromatous calcification. There is no aneurysmal dilatation of the abdominal aorta. The IVC is within normal limits.      PERITONEUM/RETROPERITONEUM/LYMPH NODES:  No ascites or free air, no fluid collection.  The retroperitoneum  appears unremarkable.  No abdominopelvic lymphadenopathy is present.      ABDOMINAL WALL:  The abdominal wall soft tissues appear normal. Bilateral inguinal hernial sacs containing fluid.      BONES:  Status post median sternotomy. No suspicious osseous lesions are present. Degenerative discogenic disease is noted in the lower thoracic and lumbar spine.    Assessment/Plan   Markie Nobles is a 79 y.o. male with a history of MGUS, CAD (s/p CABG x3 in 2022), HTN, CKD IV, Anemia, sickle cell trait, ABEBE, hyperlipidemia, obesity, BPH, gout, NIDDM, HLD, Parkinsons (w/ dementia), dysphagia, s/p peg tube (04/2024), sacral pressure sores, s/p recent hospitalization for aspiration PNA  (5/1/24) and now admitted for and being treated for aspiration pneumonia. Hematology is consulted for anemia and thrombocytopenia.     Peripheral Smear Reviewed 5/21  WBCs: unremarkable  RBCs: Normochronic, anisocytosis, <1 RBC frag/hpf  Plt: reduced in number, no clumping    Patient's thrombocytopenia initially thought to be multifactorial from infection and antibiotic use. he acute component of his anemia is likely from the same. The chronic portion is from anemia of chronic disease and CKD. He is on EPO as an outpatient. No e/o hemolysis by smear or labs. DIC is not supported. Folate, B12 nl. No hepatomegaly, liver disease or splenomegaly. Hepatitis B, C, HIV negative. He has not been exposed to heparin, which excludes HIT. No recent transfusions to support post-transfusion purpura.    Worsening thrombocytopenia to the single digits suggests an immune thrombocytopenia, which may be drug-induced (meropenem vs. Vancomycin). On 5/23,  discussed the risks and benefits of IVIG with patient and his wife Bree Nobles who as his POA and decision maker provided consent for IVIG.     Platelet count slightly improved today, expect more of an effect in the next 24 to 48 hours.     Recommend:  - trend CBC daily  - continue dexamethasone 40mg IV daily for 4 days  - continue IVIG 400mg/kg daily for 5 days  - follow up drug-dependent anti-platelet antibodies  - until these return, please refrain from using meropenem or vancomycin     Thank you for this consult, we will continue to follow. Pt seen and discussed with Dr. Crabtree.    Jaycob Eddy MD  Hematology- Oncology Fellow, PGY-5  Hematology Consult Pager: [phone]

## 2024-05-24 NOTE — PROGRESS NOTES
Markie Nobles is a 79 y.o. male on day 8 of admission presenting with NSTEMI (non-ST elevated myocardial infarction) (Multi).    Transitional Care Coordination Progress Note:  Patient discussed during interdisciplinary rounds.   Team members present: MD and TCC  Plan per Medical/Surgical team: Patient gettingIVIG and IV steroids for 4 days.  Payor: [employer]  Discharge disposition: [facility], Need new Auth after 05/24/24.  Potential Barriers: None  ADOD: 05/29/24      BRIAN BRODERICK RN

## 2024-05-24 NOTE — PROGRESS NOTES
"Markie Nobles is a 79 y.o. male on day 8 of admission presenting with NSTEMI (non-ST elevated myocardial infarction) (Multi).    Subjective   No transfusion last night. Hgb and platelets rebounding now after IVIG and IV steroids started. Patient denies symptoms of anaphylactic reaction (no GI upset, no rash or itchiness of skin, no palpitations or chest pain, no breathing problems,). Also denies fevers and chills.    Nursing reports that they have been having some issues with PEG clogging overnight. This morning, PEG was disconnected and feeding tube fluids were draining on bed. Concerns for clogged PEG. Asked bedside RN to try to use pepsi to clear PEG.    Objective     Vitals:    05/24/24 0123   BP: 163/78   Pulse: 59   Resp: 17   Temp: 35.8 °C (96.4 °F)   SpO2: 100%         Physical Exam  GEN: cachectic  HEENT: AT/NC  CARDIO: RRR, normal S1 and S2  PULM: clear to auscultation bilaterally  ABD: Soft, NT/ND, bowel sounds +, PEG in place  EXTR: Warm/well perfused; left wrist has cast in place  PSYCH: Appropriate mood/affect    Last Recorded Vitals  Blood pressure 163/78, pulse 59, temperature 35.8 °C (96.4 °F), temperature source Temporal, resp. rate 17, height 1.778 m (5' 10\"), weight 62.6 kg (138 lb), SpO2 100%.  Intake/Output last 3 Shifts:  I/O last 3 completed shifts:  In: 1415 (22.6 mL/kg) [Blood:615; NG/GT:800]  Out: 2350 (37.5 mL/kg) [Urine:2350 (1 mL/kg/hr)]  Weight: 62.6 kg       Assessment/Plan   Principal Problem:    NSTEMI (non-ST elevated myocardial infarction) (Multi)    Mr. Nobles is an 79 year old Male w/ a PMHx MGUS, CAD (s/p CABG x3 in 2022), HTN, CKD IV, Anemia, ABEBE, hyperlipidemia, obesity, BPH, gout, NIDDM, HLD, Parkinsons (w/ dementia), sickle cell trait, dysphagia, s/p peg tube (04/2024), sacral pressure sores, s/p hospitalization for aspiration PNA (DC'd 5/1/24) initially admitted for PNA and AOCA. Patient found to have lethargy and mild leukocytosis (WBC 10), likely infectious etiology. " CT shows b/l lower lung findings suggestive of aspiration PNA, and given the patient's recent admission, this seems like the likely source of his infection. Additional sources include his sacral ulcer (not visualized) vs. PEG tube site (low suspicion) vs. UTI (ruled out w/ negative UA). He was found to be anemic and given 1x unit of blood transfusion with appropriate increment. BUN very elevated as well and GI was consulted for concerns of GI bleed from the PEG. 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 Patient's left wrist was swollen as well. Wife this say that patient has had gout flares in the past (last one a year ago). Xray left wrist was obtained and showed scaphoid fracture. Ortho was consulted for this. Palliative care was consulted for goals of care and patient changed code status to DNR/DNI on 5/17. Per GI, resumed tube feeds on 5/17 with nutrition consulted placed. Developed worsening anemia and thrombocytopenia on 5/20 after free water flushes increased and plavix resumed. Hematology consulted and think etiology is likely ITP vs medication/infection. Started on IVIG and IV dex on 5/23 with platelets and hgb recovery afterwards. Active issues include: monitor for bleeding, left scaphoid fracture, tube feeds, anemia.    Changes 05/24/24  - PT recommending moderate intensity care  - holding plavix  - platelets and hgb improving => continue IVIG and IV dex  - stool occult blood test was negative  - starting sliding scale for as long as patient is on steroids  - reached out to nutrition to ask about if anything can be done to the tube feeds to prevent them from clogging the PEG so often => nutrition says that this usually happens from inadequate flushing will change flushing to 150 ml every 4 hours     PLAN:    #Anemia  #Thrombocytopenia  #Sickle Cell Trait  #? ITP  - Hgb and platelets started down trending after free  "water flushes increased and also restarted plavix on 5/20  - Heme consulted: \"Thrombocytopenia is likely multifactorial in the setting of infection and antibiotic use. The acute component of his anemia is likely from the same. The chronic portion is from anemia of chronic disease and CKD. He is on EPO as an outpatient. No e/o hemolysis by smear or labs. DIC is not supported. No hepatomegaly, liver disease or splenomegaly. He has not been exposed to heparin, which excludes HIT.\"  -  follows with heme outpatient: Rosanne Casal, APRN for MGUS and anemia of CKD - per her last evaluation the kappa light chain elevation is mild and stable and patient was continued on Procrit 10k units q2 weeks.   - IVIG (5/23 - ) planning for 4 day course  - Dex (5/23 - ) planning for 5 day course    #Steroid induced hyperglycemia  Started on steroids on 5/23 for possible ITP. Will place patient on sliding scale for as long as is on steroids.  - SSI (started 5/24) remove when patient stops steroids    # AMS, resolved  # Parkinson's Disease w/ Dementia  :: Mentation improving after blood transfusion and abx  -Continue home medication: Carbidopa-Levodopa 1.5tab QID  - MRI brain obtained on 5/17 demonstrated old vascular lesions but also ventricular dilation possibly consistent with NPH per radiology read (day team to discuss finding)     # Aspiration PNA  :: WBC 10 on admission (up from 7.9 on 5/1)  :: CT Chest: Tree-in-bud b/l lower lungs and secretions in trachea. C/f acute/chronic aspiration and/or PNA  :: Low concern for systemic infection (afebrile, HDS)  :: S/p Vanc/Zosyn in ED  -Vancomycin (pharmacy to dose):  (5/16-5/17)  -Meropenem 1g q12 (renal dosing): (5/16- 5/22)  - Augmentin (5/22 - )  -Aspiration precautions  -F/u urine strep and legionella  -F/u MRSA nares, consider DC Vanc in AM     # Possible slow GIB, most likely d/t mucosal disruption of PEG tube, now loosened   :: Hgb 7.8 -> 6.2 over two weeks  :: No objective evidence " of bleed  ::  witnessed a bowel movement in the ED, was not melenic  :: 2019: EGD/Colonoscopy - friable gastric mucosal atrophy, duodenal erythema, and diverticulosis  :: S/p 1u pRBC in ED completed 5/16 at 23:00; 1U PLT on 5/18  -Trend Hgb  -Maintain active T&S  -tube feeds  - GI consulted  - Plavix held    #Scaphoid Fracture  :: Xrays of left wrist obtained  - ortho consulted  - splint provided for L scaphoid (moderately displaced) fracture, pt will followup with ortho on 6/11 at 3:15PM with Dr. Agudelo (appointment scheduled)     # Sacral Ulcer  -Wound care c/s     # CAD (S/p CABG x3 in 2022)  # Troponinemia  :: Troponin I  -> 516 w/out EKG changes, likely Type II MI  -hold home medication: Clopidogrel 75mg daily  -Continue home medication: Metoprolol tartrate 25mg daily (unclear why patient is on this dose frequency, however it populated in prior cardiologist note)       #BPH  -Continue home medication: Finasteride 5mg daily  -Continue home medication: Tamsulosin 0.4mg daily     # Severe Protein Calorie Malnutrition  -Tube feeds initiated in anticipation of Nutrition recs  -FWF 250ml q6     F: S/p 1.5L NS  E: PRN  N: Tube feeds via PEG  A: PEG, PIV     Code Status: DNR/DNI (per palliative care discussion on 5/17)     NOK: Bree Nobles (Wife) - [phone]           Deondre Grimes MD PhD

## 2024-05-25 LAB
ABO GROUP (TYPE) IN BLOOD: NORMAL
ALBUMIN SERPL BCP-MCNC: 2.6 G/DL (ref 3.4–5)
ALBUMIN SERPL BCP-MCNC: 2.7 G/DL (ref 3.4–5)
ALBUMIN SERPL BCP-MCNC: 2.7 G/DL (ref 3.4–5)
ANION GAP SERPL CALC-SCNC: 16 MMOL/L (ref 10–20)
ANION GAP SERPL CALC-SCNC: 17 MMOL/L (ref 10–20)
ANION GAP SERPL CALC-SCNC: 19 MMOL/L (ref 10–20)
ANTIBODY SCREEN: NORMAL
B19V DNA SER QL NAA+PROBE: NOT DETECTED
BASOPHILS # BLD AUTO: 0.01 X10*3/UL (ref 0–0.1)
BASOPHILS # BLD AUTO: 0.02 X10*3/UL (ref 0–0.1)
BASOPHILS # BLD AUTO: 0.02 X10*3/UL (ref 0–0.1)
BASOPHILS NFR BLD AUTO: 0.1 %
BASOPHILS NFR BLD AUTO: 0.2 %
BASOPHILS NFR BLD AUTO: 0.5 %
BILIRUB DIRECT SERPL-MCNC: 0.1 MG/DL (ref 0–0.3)
BUN SERPL-MCNC: 103 MG/DL (ref 6–23)
BUN SERPL-MCNC: 122 MG/DL (ref 6–23)
BUN SERPL-MCNC: 95 MG/DL (ref 6–23)
CALCIUM SERPL-MCNC: 8.3 MG/DL (ref 8.6–10.6)
CALCIUM SERPL-MCNC: 8.3 MG/DL (ref 8.6–10.6)
CALCIUM SERPL-MCNC: 8.7 MG/DL (ref 8.6–10.6)
CHLORIDE SERPL-SCNC: 108 MMOL/L (ref 98–107)
CHLORIDE SERPL-SCNC: 108 MMOL/L (ref 98–107)
CHLORIDE SERPL-SCNC: 110 MMOL/L (ref 98–107)
CO2 SERPL-SCNC: 17 MMOL/L (ref 21–32)
CO2 SERPL-SCNC: 18 MMOL/L (ref 21–32)
CO2 SERPL-SCNC: 20 MMOL/L (ref 21–32)
CREAT SERPL-MCNC: 3.5 MG/DL (ref 0.5–1.3)
CREAT SERPL-MCNC: 3.56 MG/DL (ref 0.5–1.3)
CREAT SERPL-MCNC: 3.61 MG/DL (ref 0.5–1.3)
EGFRCR SERPLBLD CKD-EPI 2021: 16 ML/MIN/1.73M*2
EGFRCR SERPLBLD CKD-EPI 2021: 17 ML/MIN/1.73M*2
EGFRCR SERPLBLD CKD-EPI 2021: 17 ML/MIN/1.73M*2
EOSINOPHIL # BLD AUTO: 0 X10*3/UL (ref 0–0.4)
EOSINOPHIL # BLD AUTO: 0 X10*3/UL (ref 0–0.4)
EOSINOPHIL # BLD AUTO: 0.01 X10*3/UL (ref 0–0.4)
EOSINOPHIL NFR BLD AUTO: 0 %
EOSINOPHIL NFR BLD AUTO: 0 %
EOSINOPHIL NFR BLD AUTO: 0.2 %
ERYTHROCYTE [DISTWIDTH] IN BLOOD BY AUTOMATED COUNT: 14.5 % (ref 11.5–14.5)
ERYTHROCYTE [DISTWIDTH] IN BLOOD BY AUTOMATED COUNT: 14.6 % (ref 11.5–14.5)
ERYTHROCYTE [DISTWIDTH] IN BLOOD BY AUTOMATED COUNT: 14.6 % (ref 11.5–14.5)
GLUCOSE BLD MANUAL STRIP-MCNC: 110 MG/DL (ref 74–99)
GLUCOSE BLD MANUAL STRIP-MCNC: 111 MG/DL (ref 74–99)
GLUCOSE BLD MANUAL STRIP-MCNC: 116 MG/DL (ref 74–99)
GLUCOSE BLD MANUAL STRIP-MCNC: 118 MG/DL (ref 74–99)
GLUCOSE BLD MANUAL STRIP-MCNC: 151 MG/DL (ref 74–99)
GLUCOSE SERPL-MCNC: 159 MG/DL (ref 74–99)
GLUCOSE SERPL-MCNC: 94 MG/DL (ref 74–99)
GLUCOSE SERPL-MCNC: 96 MG/DL (ref 74–99)
HCT VFR BLD AUTO: 23.8 % (ref 41–52)
HCT VFR BLD AUTO: 23.9 % (ref 41–52)
HCT VFR BLD AUTO: 28.5 % (ref 41–52)
HGB BLD-MCNC: 7.6 G/DL (ref 13.5–17.5)
HGB BLD-MCNC: 7.8 G/DL (ref 13.5–17.5)
HGB BLD-MCNC: 9.3 G/DL (ref 13.5–17.5)
IMM GRANULOCYTES # BLD AUTO: 0.02 X10*3/UL (ref 0–0.5)
IMM GRANULOCYTES # BLD AUTO: 0.02 X10*3/UL (ref 0–0.5)
IMM GRANULOCYTES # BLD AUTO: 0.06 X10*3/UL (ref 0–0.5)
IMM GRANULOCYTES NFR BLD AUTO: 0.3 % (ref 0–0.9)
IMM GRANULOCYTES NFR BLD AUTO: 0.5 % (ref 0–0.9)
IMM GRANULOCYTES NFR BLD AUTO: 0.7 % (ref 0–0.9)
LYMPHOCYTES # BLD AUTO: 0.48 X10*3/UL (ref 0.8–3)
LYMPHOCYTES # BLD AUTO: 0.85 X10*3/UL (ref 0.8–3)
LYMPHOCYTES # BLD AUTO: 1.14 X10*3/UL (ref 0.8–3)
LYMPHOCYTES NFR BLD AUTO: 11.5 %
LYMPHOCYTES NFR BLD AUTO: 12.4 %
LYMPHOCYTES NFR BLD AUTO: 13.1 %
MAGNESIUM SERPL-MCNC: 1.99 MG/DL (ref 1.6–2.4)
MCH RBC QN AUTO: 28.7 PG (ref 26–34)
MCH RBC QN AUTO: 28.9 PG (ref 26–34)
MCH RBC QN AUTO: 29.5 PG (ref 26–34)
MCHC RBC AUTO-ENTMCNC: 31.8 G/DL (ref 32–36)
MCHC RBC AUTO-ENTMCNC: 32.6 G/DL (ref 32–36)
MCHC RBC AUTO-ENTMCNC: 32.8 G/DL (ref 32–36)
MCV RBC AUTO: 89 FL (ref 80–100)
MCV RBC AUTO: 90 FL (ref 80–100)
MCV RBC AUTO: 90 FL (ref 80–100)
MONOCYTES # BLD AUTO: 0.08 X10*3/UL (ref 0.05–0.8)
MONOCYTES # BLD AUTO: 0.38 X10*3/UL (ref 0.05–0.8)
MONOCYTES # BLD AUTO: 0.64 X10*3/UL (ref 0.05–0.8)
MONOCYTES NFR BLD AUTO: 1.9 %
MONOCYTES NFR BLD AUTO: 5.5 %
MONOCYTES NFR BLD AUTO: 7.3 %
NEUTROPHILS # BLD AUTO: 3.55 X10*3/UL (ref 1.6–5.5)
NEUTROPHILS # BLD AUTO: 5.61 X10*3/UL (ref 1.6–5.5)
NEUTROPHILS # BLD AUTO: 6.86 X10*3/UL (ref 1.6–5.5)
NEUTROPHILS NFR BLD AUTO: 78.7 %
NEUTROPHILS NFR BLD AUTO: 81.7 %
NEUTROPHILS NFR BLD AUTO: 85.4 %
NRBC BLD-RTO: 0 /100 WBCS (ref 0–0)
PHOSPHATE SERPL-MCNC: 3.5 MG/DL (ref 2.5–4.9)
PHOSPHATE SERPL-MCNC: 3.9 MG/DL (ref 2.5–4.9)
PHOSPHATE SERPL-MCNC: 3.9 MG/DL (ref 2.5–4.9)
PLATELET # BLD AUTO: 2 X10*3/UL (ref 150–450)
PLATELET # BLD AUTO: 5 X10*3/UL (ref 150–450)
PLATELET # BLD AUTO: 8 X10*3/UL (ref 150–450)
POTASSIUM SERPL-SCNC: 4.5 MMOL/L (ref 3.5–5.3)
POTASSIUM SERPL-SCNC: 4.8 MMOL/L (ref 3.5–5.3)
POTASSIUM SERPL-SCNC: 5.1 MMOL/L (ref 3.5–5.3)
RBC # BLD AUTO: 2.64 X10*6/UL (ref 4.5–5.9)
RBC # BLD AUTO: 2.65 X10*6/UL (ref 4.5–5.9)
RBC # BLD AUTO: 3.22 X10*6/UL (ref 4.5–5.9)
RH FACTOR (ANTIGEN D): NORMAL
SODIUM SERPL-SCNC: 137 MMOL/L (ref 136–145)
SODIUM SERPL-SCNC: 139 MMOL/L (ref 136–145)
SODIUM SERPL-SCNC: 142 MMOL/L (ref 136–145)
SPECIMEN SOURCE: NORMAL
WBC # BLD AUTO: 4.2 X10*3/UL (ref 4.4–11.3)
WBC # BLD AUTO: 6.9 X10*3/UL (ref 4.4–11.3)
WBC # BLD AUTO: 8.7 X10*3/UL (ref 4.4–11.3)

## 2024-05-25 PROCEDURE — 1100000001 HC PRIVATE ROOM DAILY

## 2024-05-25 PROCEDURE — 36415 COLL VENOUS BLD VENIPUNCTURE: CPT

## 2024-05-25 PROCEDURE — 2500000001 HC RX 250 WO HCPCS SELF ADMINISTERED DRUGS (ALT 637 FOR MEDICARE OP)

## 2024-05-25 PROCEDURE — 85025 COMPLETE CBC W/AUTO DIFF WBC: CPT

## 2024-05-25 PROCEDURE — 86922 COMPATIBILITY TEST ANTIGLOB: CPT

## 2024-05-25 PROCEDURE — 2500000002 HC RX 250 W HCPCS SELF ADMINISTERED DRUGS (ALT 637 FOR MEDICARE OP, ALT 636 FOR OP/ED)

## 2024-05-25 PROCEDURE — 86901 BLOOD TYPING SEROLOGIC RH(D): CPT

## 2024-05-25 PROCEDURE — 2500000004 HC RX 250 GENERAL PHARMACY W/ HCPCS (ALT 636 FOR OP/ED): Mod: JZ | Performed by: INTERNAL MEDICINE

## 2024-05-25 PROCEDURE — 2500000004 HC RX 250 GENERAL PHARMACY W/ HCPCS (ALT 636 FOR OP/ED): Performed by: INTERNAL MEDICINE

## 2024-05-25 PROCEDURE — C9113 INJ PANTOPRAZOLE SODIUM, VIA: HCPCS

## 2024-05-25 PROCEDURE — 2500000002 HC RX 250 W HCPCS SELF ADMINISTERED DRUGS (ALT 637 FOR MEDICARE OP, ALT 636 FOR OP/ED): Mod: MUE

## 2024-05-25 PROCEDURE — 99233 SBSQ HOSP IP/OBS HIGH 50: CPT

## 2024-05-25 PROCEDURE — 82947 ASSAY GLUCOSE BLOOD QUANT: CPT

## 2024-05-25 PROCEDURE — 82248 BILIRUBIN DIRECT: CPT

## 2024-05-25 PROCEDURE — 80069 RENAL FUNCTION PANEL: CPT

## 2024-05-25 PROCEDURE — 86850 RBC ANTIBODY SCREEN: CPT | Mod: 91

## 2024-05-25 PROCEDURE — 83735 ASSAY OF MAGNESIUM: CPT

## 2024-05-25 PROCEDURE — 2500000004 HC RX 250 GENERAL PHARMACY W/ HCPCS (ALT 636 FOR OP/ED)

## 2024-05-25 RX ADMIN — PANTOPRAZOLE SODIUM 40 MG: 40 INJECTION, POWDER, FOR SOLUTION INTRAVENOUS at 09:20

## 2024-05-25 RX ADMIN — ACETAMINOPHEN 650 MG: 325 TABLET ORAL at 15:19

## 2024-05-25 RX ADMIN — DEXAMETHASONE SODIUM PHOSPHATE 40 MG: 10 INJECTION, SOLUTION INTRAMUSCULAR; INTRAVENOUS at 21:00

## 2024-05-25 RX ADMIN — CARBIDOPA AND LEVODOPA 1.5 TABLET: 25; 100 TABLET ORAL at 06:54

## 2024-05-25 RX ADMIN — CARBIDOPA AND LEVODOPA 1.5 TABLET: 25; 100 TABLET ORAL at 18:11

## 2024-05-25 RX ADMIN — Medication 1 APPLICATION: at 06:54

## 2024-05-25 RX ADMIN — Medication 1 APPLICATION: at 21:00

## 2024-05-25 RX ADMIN — Medication 1 APPLICATION: at 13:08

## 2024-05-25 RX ADMIN — METOPROLOL TARTRATE 25 MG: 50 TABLET, FILM COATED ORAL at 09:20

## 2024-05-25 RX ADMIN — IMMUNE GLOBULIN INFUSION (HUMAN) 20 G: 100 INJECTION, SOLUTION INTRAVENOUS; SUBCUTANEOUS at 21:00

## 2024-05-25 RX ADMIN — CARBIDOPA AND LEVODOPA 1.5 TABLET: 25; 100 TABLET ORAL at 21:00

## 2024-05-25 RX ADMIN — Medication 1 APPLICATION: at 18:00

## 2024-05-25 RX ADMIN — ACETAMINOPHEN 650 MG: 325 TABLET ORAL at 06:54

## 2024-05-25 RX ADMIN — ACETAMINOPHEN 650 MG: 325 TABLET ORAL at 23:33

## 2024-05-25 RX ADMIN — DIPHENHYDRAMINE HYDROCHLORIDE 25 MG: 50 INJECTION INTRAMUSCULAR; INTRAVENOUS at 21:20

## 2024-05-25 RX ADMIN — CARBIDOPA AND LEVODOPA 1.5 TABLET: 25; 100 TABLET ORAL at 13:07

## 2024-05-25 RX ADMIN — TAMSULOSIN HYDROCHLORIDE 0.4 MG: 0.4 CAPSULE ORAL at 09:20

## 2024-05-25 RX ADMIN — FINASTERIDE 5 MG: 5 TABLET, FILM COATED ORAL at 09:20

## 2024-05-25 RX ADMIN — INSULIN LISPRO 1 UNITS: 100 INJECTION, SOLUTION INTRAVENOUS; SUBCUTANEOUS at 09:47

## 2024-05-25 ASSESSMENT — PAIN SCALES - GENERAL
PAINLEVEL_OUTOF10: 0 - NO PAIN

## 2024-05-25 ASSESSMENT — COGNITIVE AND FUNCTIONAL STATUS - GENERAL
DRESSING REGULAR LOWER BODY CLOTHING: TOTAL
EATING MEALS: TOTAL
WALKING IN HOSPITAL ROOM: TOTAL
DAILY ACTIVITIY SCORE: 6
MOBILITY SCORE: 6
MOVING TO AND FROM BED TO CHAIR: TOTAL
TURNING FROM BACK TO SIDE WHILE IN FLAT BAD: TOTAL
MOVING FROM LYING ON BACK TO SITTING ON SIDE OF FLAT BED WITH BEDRAILS: TOTAL
PERSONAL GROOMING: TOTAL
MOBILITY SCORE: 6
MOVING FROM LYING ON BACK TO SITTING ON SIDE OF FLAT BED WITH BEDRAILS: TOTAL
CLIMB 3 TO 5 STEPS WITH RAILING: TOTAL
CLIMB 3 TO 5 STEPS WITH RAILING: TOTAL
DRESSING REGULAR UPPER BODY CLOTHING: TOTAL
TOILETING: TOTAL
TURNING FROM BACK TO SIDE WHILE IN FLAT BAD: TOTAL
STANDING UP FROM CHAIR USING ARMS: TOTAL
HELP NEEDED FOR BATHING: TOTAL
STANDING UP FROM CHAIR USING ARMS: TOTAL
WALKING IN HOSPITAL ROOM: TOTAL
MOVING TO AND FROM BED TO CHAIR: TOTAL

## 2024-05-25 ASSESSMENT — PAIN - FUNCTIONAL ASSESSMENT
PAIN_FUNCTIONAL_ASSESSMENT: 0-10

## 2024-05-25 NOTE — CARE PLAN
The patient's goals for the shift include will be safe and free from falls or injury throughout the shift    The clinical goals for the shift include Pt will  continue to tolerate IVIG infusion without adverse effect.    Problem: Skin  Goal: Decreased wound size/increased tissue granulation at next dressing change  Recent Flowsheet Documentation  Taken 5/25/2024 1847 by Angela Hastings RN  Decreased wound size/increased tissue granulation at next dressing change:   Promote sleep for wound healing   Utilize specialty bed per algorithm  Goal: Participates in plan/prevention/treatment measures  Recent Flowsheet Documentation  Taken 5/25/2024 1847 by Angela Hastings RN  Participates in plan/prevention/treatment measures:   Discuss with provider PT/OT consult   Elevate heels  Goal: Prevent/manage excess moisture  Recent Flowsheet Documentation  Taken 5/25/2024 1847 by Angela Hastings RN  Prevent/manage excess moisture:   Moisturize dry skin   Cleanse incontinence/protect with barrier cream   Follow provider orders for dressing changes  Goal: Promote skin healing  Recent Flowsheet Documentation  Taken 5/25/2024 1847 by Angela Hastings RN  Promote skin healing:   Protective dressings over bony prominences   Assess skin/pad under line(s)/device(s)     Over the shift, the patient did not make progress toward the following goals. Barriers to progression include mobility. Recommendations to address these barriers include q2 vturns.

## 2024-05-25 NOTE — PROGRESS NOTES
"Markie Nobles is a 79 y.o. male on day 9 of admission presenting with NSTEMI (non-ST elevated myocardial infarction) (Multi).    Subjective   No acute events. Denies any fever, chills, chest pain, shortness of breath, abdominal pain, nausea, or vomiting. No other new issues. Pt states that he is \"feeling good\" this morning.     Objective     Vitals:    05/25/24 0206   BP: 151/78   Pulse:    Resp:    Temp: 36.3 °C (97.3 °F)   SpO2:      Physical Exam  GEN: cachectic  HEENT: AT/NC  CARDIO: RRR, normal S1 and S2  PULM: clear to auscultation bilaterally  ABD: Soft, NT/ND, bowel sounds +, PEG in place  EXTR: Warm/well perfused; left wrist has cast in place  NEURO: strength grossly intact in bilateral UE and LE  PSYCH: Appropriate mood/affect    Last Recorded Vitals  Blood pressure 151/78, pulse 63, temperature 36.3 °C (97.3 °F), resp. rate 17, height 1.778 m (5' 10\"), weight 62.6 kg (138 lb), SpO2 100%.  Intake/Output last 3 Shifts:  I/O last 3 completed shifts:  In: 854 (13.6 mL/kg) [NG/GT:800; IV Piggyback:54]  Out: 2200 (35.1 mL/kg) [Urine:2200 (1 mL/kg/hr)]  Weight: 62.6 kg     Results for orders placed or performed during the hospital encounter of 05/16/24 (from the past 24 hour(s))   POCT GLUCOSE   Result Value Ref Range    POCT Glucose 114 (H) 74 - 99 mg/dL   POCT GLUCOSE   Result Value Ref Range    POCT Glucose 110 (H) 74 - 99 mg/dL   POCT GLUCOSE   Result Value Ref Range    POCT Glucose 116 (H) 74 - 99 mg/dL   POCT GLUCOSE   Result Value Ref Range    POCT Glucose 116 (H) 74 - 99 mg/dL     Scheduled medications  acetaminophen, 650 mg, oral, q8h  amoxicillin-pot clavulanate, 500 mg, g-tube, q12h MISTY  carbidopa-levodopa, 1.5 tablet, oral, 4x daily  [Held by provider] clopidogrel, 75 mg, oral, Daily  dexAMETHasone, 40 mg, intravenous, q24h  finasteride, 5 mg, oral, Daily  immune globulin (human), 20 g, intravenous, q24h  [START ON 5/26/2024] immune globulin (human), 30 g, intravenous, q24h  insulin lispro, 0-5 " Units, subcutaneous, TID  menthol-zinc oxide, 1 Application, Topical, 4x daily  metoprolol tartrate, 25 mg, oral, Daily  pantoprazole, 40 mg, intravenous, Daily  tamsulosin, 0.4 mg, oral, Daily      Continuous medications     PRN medications  PRN medications: benzonatate, dextrose, dextrose, diphenhydrAMINE, glucagon, glucagon      Assessment and Plan:   Mr. Nobles is an 79 year old Male w/ a PMHx MGUS, CAD (s/p CABG x3 in 2022), HTN, CKD IV, Anemia, ABEBE, hyperlipidemia, obesity, BPH, gout, NIDDM, HLD, Parkinsons (w/ dementia), sickle cell trait, dysphagia, s/p peg tube (04/2024), sacral pressure sores, s/p hospitalization for aspiration PNA (DC'd 5/1/24) initially admitted for PNA and AOCA. Patient found to have lethargy and mild leukocytosis (WBC 10), likely infectious etiology. CT shows b/l lower lung findings suggestive of aspiration PNA, and given the patient's recent admission, this seems like the likely source of his infection. Additional sources include his sacral ulcer (not visualized) vs. PEG tube site (low suspicion) vs. UTI (ruled out w/ negative UA). He was found to be anemic and given 1x unit of blood transfusion with appropriate increment. BUN very elevated as well and GI was consulted for concerns of GI bleed from the PEG. 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 Patient's left wrist was swollen as well. Wife this say that patient has had gout flares in the past (last one a year ago). Xray left wrist was obtained and showed scaphoid fracture. Ortho was consulted for this. Palliative care was consulted for goals of care and patient changed code status to DNR/DNI on 5/17. Per GI, resumed tube feeds on 5/17 with nutrition consulted placed. Developed worsening anemia and thrombocytopenia on 5/20 after free water flushes increased and plavix resumed. Hematology consulted and think etiology is likely ITP vs  "medication/infection. Started on IVIG and IV dex on 5/23 with platelets and hgb recovery afterwards. Active issues include: monitor for bleeding, left scaphoid fracture, tube feeds, anemia.    Update 5/25:   - Dispo planning: PT recommending SNF   - Pending labs: drug-dependent anti-PLT Antibodies, Parvovirus B19 IgG  - Platelets and hgb improving (labs for today still pending), continue IVIG and IV dex  - Discontinue Augmentin as pt completed 7 day course of abx for aspiration PNA       # Normocytic anemia, acute on chronic   # Thrombocytopenia, possible ITP  # Sickle Cell Trait  :: Anemia d/t anemia of chronic disease and CKD with acute portion likely d/t infection +/- abx usage   :: Hgb and platelets started down trending after free water flushes increased and also restarted plavix on 5/20  :: Heme consulted: \"Thrombocytopenia is likely multifactorial in the setting of infection and antibiotic use. The acute component of his anemia is likely from the same. The chronic portion is from anemia of chronic disease and CKD. He is on EPO as an outpatient. No e/o hemolysis by smear or labs. DIC is not supported. No hepatomegaly, liver disease or splenomegaly. He has not been exposed to heparin, which excludes HIT.\"  :: Follows with heme outpatient: Rosanne Casal, APRN for MGUS and anemia of CKD - per her last evaluation the kappa light chain elevation is mild and stable and patient was continued on Procrit 10k units q2 weeks.   - Pending labs: drug-dependent anti-PLT Antibodies, Parvovirus B19 IgG  - IVIG (5/23 - ) planning for 4 day course  - Dex (5/23 - ) planning for 5 day course    # Aspiration PNA (treated)   :: WBC 10 on admission (up from 7.9 on 5/1)  :: CT Chest: Tree-in-bud b/l lower lungs and secretions in trachea. C/f acute/chronic aspiration and/or PNA  :: Low concern for systemic infection (afebrile, HDS)  :: Urine strep/legionella (-)  :: S/p Vanc/Zosyn in ED  - S/p Vancomycin:  (5/16-5/17)  - S/p Meropenem " 1g q12 (renal dosing): (5/16-5/22)  - S/p Augmentin (5/22-5/25)  - Aspiration precautions    # CAD (S/p CABG x3 in 2022)  # Troponinemia  :: Troponin I  -> 516 w/out EKG changes, likely Type II MI  - HOLD home Clopidogrel 75mg daily in setting of thrombocytopenia   - Continue home Metoprolol tartrate 25mg daily (unclear why patient is on this dose frequency, however it populated in prior cardiologist note)    # Severe Protein Calorie Malnutrition  # Dysphagia s/p PEG tube 4/2024  - Tube feeds initiated in anticipation of Nutrition recs  - Free water flushes 150 ml q4h    # Possible slow GIB, most likely d/t mucosal disruption of PEG tube, now loosened   :: Hgb 7.8 -> 6.2 over two weeks  :: No objective evidence of bleed; witnessed a bowel movement in the ED was not melenic  :: 2019: EGD/Colonoscopy - friable gastric mucosal atrophy, duodenal erythema, and diverticulosis  :: S/p 1u pRBC in ED completed 5/16 at 23:00; 1U PLT on 5/18  - Trend Hgb  - GI consulted    # CKD IV  :: eGFR 13-17 prior to admission, at baseline on admission   - Monitor RFP     # Steroid induced hyperglycemia  Started on steroids on 5/23 for possible ITP. Will place patient on sliding scale for as long as is on steroids.  - SSI (started 5/24) remove when patient stops steroids    # AMS (resolved)  # Parkinson's Disease w/ Dementia  :: Suspect AMS/lethargy due to PNA +/- anemia   :: Mentation improving after blood transfusion and abx  ::  MRI brain obtained on 5/17 demonstrated old vascular lesions but also ventricular dilation possibly consistent with NPH per radiology read  - Continue home Carbidopa-Levodopa 1.5tab QID    # BPH  - Continue home Finasteride 5mg daily  - Continue home Flomax 0.4mg daily    # Scaphoid Fracture  :: Xrays of left wrist obtained  - ortho consulted  - Splint provided for L scaphoid (moderately displaced) fracture, pt will followup with ortho on 6/11 at 3:15PM with Dr. Agudelo (appointment scheduled)     #  Sacral Ulcer  - Wound care c/s      F: PRN  E: PRN  N: Tube feeds via PEG  A: PIV    GI ppx: PPI   DVT ppx: holding in setting of thrombocytopenia     Code Status: DNR/DNI (per palliative care discussion on 5/17)  NOK: Bree Nobles (Wife) - [phone]

## 2024-05-25 NOTE — CARE PLAN
Problem: Discharge Barriers  Goal: My discharge needs are met  5/25/2024 0125 by Yaron Rodas RN  Outcome: Progressing  5/25/2024 0124 by Yaron Rodas RN  Outcome: Progressing     Problem: Pain  Goal: My pain/discomfort is manageable  5/25/2024 0125 by Yaron Rodas RN  Outcome: Progressing  5/25/2024 0124 by Yaron Rodas RN  Outcome: Progressing     Problem: Safety  Goal: Patient will be injury free during hospitalization  5/25/2024 0125 by Yaron Roads RN  Outcome: Progressing  5/25/2024 0124 by Yaron Rodas RN  Outcome: Progressing  Goal: I will remain free of falls  5/25/2024 0125 by Yaron Rodas RN  Outcome: Progressing  5/25/2024 0124 by Yaron Rodas RN  Outcome: Progressing     Problem: Daily Care  Goal: Daily care needs are met  5/25/2024 0125 by Yaron Rodas RN  Outcome: Progressing  5/25/2024 0124 by Yaron Rodas RN  Outcome: Progressing     Problem: Psychosocial Needs  Goal: Demonstrates ability to cope with hospitalization/illness  5/25/2024 0125 by Yaron Rodas RN  Outcome: Progressing  5/25/2024 0124 by Yaron Rodas RN  Outcome: Progressing  Goal: Collaborate with me, my family, and caregiver to identify my specific goals  5/25/2024 0125 by Yaron Rodas RN  Outcome: Progressing  5/25/2024 0124 by Yaron Rodas RN  Outcome: Progressing     Problem: Skin  Goal: Prevent/manage excess moisture  Recent Flowsheet Documentation  Taken 5/25/2024 0125 by Yaron Roads RN  Prevent/manage excess moisture:   Cleanse incontinence/protect with barrier cream   Moisturize dry skin   Use wicking fabric (obtain order)   Follow provider orders for dressing changes   Monitor for/manage infection if present     Problem: Skin  Goal: Prevent/manage excess moisture  Recent Flowsheet Documentation  Taken 5/25/2024 0125 by Yaron Rodas RN  Prevent/manage excess moisture:   Cleanse incontinence/protect with barrier cream   Moisturize dry skin   Use wicking fabric (obtain order)    Follow provider orders for dressing changes   Monitor for/manage infection if present     Problem: Skin  Goal: Decreased wound size/increased tissue granulation at next dressing change  Outcome: Progressing  Goal: Participates in plan/prevention/treatment measures  Outcome: Progressing  Goal: Prevent/manage excess moisture  Outcome: Progressing  Flowsheets (Taken 5/25/2024 0125)  Prevent/manage excess moisture:   Cleanse incontinence/protect with barrier cream   Moisturize dry skin   Use wicking fabric (obtain order)   Follow provider orders for dressing changes   Monitor for/manage infection if present  Goal: Prevent/minimize sheer/friction injuries  Outcome: Progressing  Goal: Promote/optimize nutrition  Outcome: Progressing  Goal: Promote skin healing  Outcome: Progressing   The patient's goals for the shift include      The clinical goals for the shift include Patient will have no reaction to IVIG    Over the shift, the patient did not make progress toward the following goals. Barriers to progression include IVIG administered. Recommendations to address these barriers include check vitals and assess for reaction.

## 2024-05-26 LAB
ALBUMIN SERPL BCP-MCNC: 2.6 G/DL (ref 3.4–5)
ANION GAP SERPL CALC-SCNC: 15 MMOL/L (ref 10–20)
APPEARANCE UR: CLEAR
APTT PPP: 26 SECONDS (ref 27–38)
B19V IGG SER IA-ACNC: 1.29 IV
B19V IGM SER IA-ACNC: 0.23 IV
BASOPHILS # BLD AUTO: 0 X10*3/UL (ref 0–0.1)
BASOPHILS NFR BLD AUTO: 0 %
BILIRUB UR STRIP.AUTO-MCNC: NEGATIVE MG/DL
BLOOD EXPIRATION DATE: NORMAL
BUN SERPL-MCNC: 139 MG/DL (ref 6–23)
CALCIUM SERPL-MCNC: 8.2 MG/DL (ref 8.6–10.6)
CHLORIDE SERPL-SCNC: 105 MMOL/L (ref 98–107)
CHLORIDE UR-SCNC: 19 MMOL/L
CHLORIDE/CREATININE (MMOL/G) IN URINE: 55 MMOL/G CREAT (ref 23–275)
CO2 SERPL-SCNC: 18 MMOL/L (ref 21–32)
COLOR UR: ABNORMAL
CREAT SERPL-MCNC: 3.61 MG/DL (ref 0.5–1.3)
CREAT UR-MCNC: 34.8 MG/DL (ref 20–370)
DISPENSE STATUS: NORMAL
EGFRCR SERPLBLD CKD-EPI 2021: 16 ML/MIN/1.73M*2
EOSINOPHIL # BLD AUTO: 0 X10*3/UL (ref 0–0.4)
EOSINOPHIL NFR BLD AUTO: 0 %
ERYTHROCYTE [DISTWIDTH] IN BLOOD BY AUTOMATED COUNT: 14 % (ref 11.5–14.5)
ERYTHROCYTE [DISTWIDTH] IN BLOOD BY AUTOMATED COUNT: 14.5 % (ref 11.5–14.5)
ERYTHROCYTE [DISTWIDTH] IN BLOOD BY AUTOMATED COUNT: 14.5 % (ref 11.5–14.5)
FIBRINOGEN PPP-MCNC: 330 MG/DL (ref 200–400)
GLUCOSE BLD MANUAL STRIP-MCNC: 115 MG/DL (ref 74–99)
GLUCOSE BLD MANUAL STRIP-MCNC: 121 MG/DL (ref 74–99)
GLUCOSE BLD MANUAL STRIP-MCNC: 160 MG/DL (ref 74–99)
GLUCOSE BLD MANUAL STRIP-MCNC: 161 MG/DL (ref 74–99)
GLUCOSE BLD MANUAL STRIP-MCNC: 87 MG/DL (ref 74–99)
GLUCOSE SERPL-MCNC: 159 MG/DL (ref 74–99)
GLUCOSE UR STRIP.AUTO-MCNC: NORMAL MG/DL
HCT VFR BLD AUTO: 18.5 % (ref 41–52)
HCT VFR BLD AUTO: 20.8 % (ref 41–52)
HCT VFR BLD AUTO: 27.7 % (ref 41–52)
HGB BLD-MCNC: 5.9 G/DL (ref 13.5–17.5)
HGB BLD-MCNC: 6.8 G/DL (ref 13.5–17.5)
HGB BLD-MCNC: 8.5 G/DL (ref 13.5–17.5)
IMM GRANULOCYTES # BLD AUTO: 0.05 X10*3/UL (ref 0–0.5)
IMM GRANULOCYTES NFR BLD AUTO: 0.7 % (ref 0–0.9)
INR PPP: 1 (ref 0.9–1.1)
INR PPP: 1 (ref 0.9–1.1)
KETONES UR STRIP.AUTO-MCNC: NEGATIVE MG/DL
LDH SERPL L TO P-CCNC: 175 U/L (ref 84–246)
LEUKOCYTE ESTERASE UR QL STRIP.AUTO: NEGATIVE
LYMPHOCYTES # BLD AUTO: 0.7 X10*3/UL (ref 0.8–3)
LYMPHOCYTES NFR BLD AUTO: 9.2 %
MAGNESIUM SERPL-MCNC: 2.07 MG/DL (ref 1.6–2.4)
MCH RBC QN AUTO: 29 PG (ref 26–34)
MCH RBC QN AUTO: 29.6 PG (ref 26–34)
MCH RBC QN AUTO: 29.9 PG (ref 26–34)
MCHC RBC AUTO-ENTMCNC: 30.7 G/DL (ref 32–36)
MCHC RBC AUTO-ENTMCNC: 31.9 G/DL (ref 32–36)
MCHC RBC AUTO-ENTMCNC: 32.7 G/DL (ref 32–36)
MCV RBC AUTO: 90 FL (ref 80–100)
MCV RBC AUTO: 94 FL (ref 80–100)
MCV RBC AUTO: 95 FL (ref 80–100)
MONOCYTES # BLD AUTO: 0.1 X10*3/UL (ref 0.05–0.8)
MONOCYTES NFR BLD AUTO: 1.3 %
NEUTROPHILS # BLD AUTO: 6.74 X10*3/UL (ref 1.6–5.5)
NEUTROPHILS NFR BLD AUTO: 88.8 %
NITRITE UR QL STRIP.AUTO: NEGATIVE
NRBC BLD-RTO: 0 /100 WBCS (ref 0–0)
PH UR STRIP.AUTO: 5.5 [PH]
PHOSPHATE SERPL-MCNC: 4.1 MG/DL (ref 2.5–4.9)
PLATELET # BLD AUTO: 1 X10*3/UL (ref 150–450)
PLATELET # BLD AUTO: 16 X10*3/UL (ref 150–450)
PLATELET # BLD AUTO: 17 X10*3/UL (ref 150–450)
POTASSIUM SERPL-SCNC: 5 MMOL/L (ref 3.5–5.3)
POTASSIUM UR-SCNC: 16 MMOL/L
POTASSIUM/CREAT UR-RTO: 46 MMOL/G CREAT
PRODUCT BLOOD TYPE: 5100
PRODUCT CODE: NORMAL
PROT SERPL-MCNC: 6.9 G/DL (ref 6.4–8.2)
PROT UR STRIP.AUTO-MCNC: ABNORMAL MG/DL
PROT UR-ACNC: 161 MG/DL (ref 5–25)
PROTHROMBIN TIME: 11.1 SECONDS (ref 9.8–12.8)
PROTHROMBIN TIME: 11.1 SECONDS (ref 9.8–12.8)
RBC # BLD AUTO: 1.97 X10*6/UL (ref 4.5–5.9)
RBC # BLD AUTO: 2.3 X10*6/UL (ref 4.5–5.9)
RBC # BLD AUTO: 2.93 X10*6/UL (ref 4.5–5.9)
RBC # UR STRIP.AUTO: ABNORMAL /UL
RBC #/AREA URNS AUTO: ABNORMAL /HPF
SODIUM SERPL-SCNC: 133 MMOL/L (ref 136–145)
SODIUM UR-SCNC: 33 MMOL/L
SODIUM/CREAT UR-RTO: 95 MMOL/G CREAT
SP GR UR STRIP.AUTO: 1.01
SQUAMOUS #/AREA URNS AUTO: ABNORMAL /HPF
UNIT ABO: NORMAL
UNIT NUMBER: NORMAL
UNIT RH: NORMAL
UNIT VOLUME: 272
UNIT VOLUME: 276
UNIT VOLUME: 281
UNIT VOLUME: 350
UROBILINOGEN UR STRIP.AUTO-MCNC: NORMAL MG/DL
WBC # BLD AUTO: 11.7 X10*3/UL (ref 4.4–11.3)
WBC # BLD AUTO: 7.6 X10*3/UL (ref 4.4–11.3)
WBC # BLD AUTO: 7.7 X10*3/UL (ref 4.4–11.3)
WBC #/AREA URNS AUTO: ABNORMAL /HPF
XM INTEP: NORMAL
XM INTEP: NORMAL

## 2024-05-26 PROCEDURE — 82436 ASSAY OF URINE CHLORIDE: CPT | Performed by: STUDENT IN AN ORGANIZED HEALTH CARE EDUCATION/TRAINING PROGRAM

## 2024-05-26 PROCEDURE — 85362 FIBRIN DEGRADATION PRODUCTS: CPT | Performed by: INTERNAL MEDICINE

## 2024-05-26 PROCEDURE — C9113 INJ PANTOPRAZOLE SODIUM, VIA: HCPCS | Performed by: STUDENT IN AN ORGANIZED HEALTH CARE EDUCATION/TRAINING PROGRAM

## 2024-05-26 PROCEDURE — 85384 FIBRINOGEN ACTIVITY: CPT | Performed by: INTERNAL MEDICINE

## 2024-05-26 PROCEDURE — 84155 ASSAY OF PROTEIN SERUM: CPT | Performed by: STUDENT IN AN ORGANIZED HEALTH CARE EDUCATION/TRAINING PROGRAM

## 2024-05-26 PROCEDURE — 80069 RENAL FUNCTION PANEL: CPT

## 2024-05-26 PROCEDURE — P9037 PLATE PHERES LEUKOREDU IRRAD: HCPCS

## 2024-05-26 PROCEDURE — 86334 IMMUNOFIX E-PHORESIS SERUM: CPT | Performed by: STUDENT IN AN ORGANIZED HEALTH CARE EDUCATION/TRAINING PROGRAM

## 2024-05-26 PROCEDURE — 2500000004 HC RX 250 GENERAL PHARMACY W/ HCPCS (ALT 636 FOR OP/ED)

## 2024-05-26 PROCEDURE — 82947 ASSAY GLUCOSE BLOOD QUANT: CPT | Mod: 91

## 2024-05-26 PROCEDURE — 99233 SBSQ HOSP IP/OBS HIGH 50: CPT | Performed by: INTERNAL MEDICINE

## 2024-05-26 PROCEDURE — 84156 ASSAY OF PROTEIN URINE: CPT | Performed by: STUDENT IN AN ORGANIZED HEALTH CARE EDUCATION/TRAINING PROGRAM

## 2024-05-26 PROCEDURE — 99232 SBSQ HOSP IP/OBS MODERATE 35: CPT | Performed by: INTERNAL MEDICINE

## 2024-05-26 PROCEDURE — P9016 RBC LEUKOCYTES REDUCED: HCPCS

## 2024-05-26 PROCEDURE — 83521 IG LIGHT CHAINS FREE EACH: CPT | Performed by: STUDENT IN AN ORGANIZED HEALTH CARE EDUCATION/TRAINING PROGRAM

## 2024-05-26 PROCEDURE — 99233 SBSQ HOSP IP/OBS HIGH 50: CPT | Performed by: STUDENT IN AN ORGANIZED HEALTH CARE EDUCATION/TRAINING PROGRAM

## 2024-05-26 PROCEDURE — 84166 PROTEIN E-PHORESIS/URINE/CSF: CPT | Performed by: STUDENT IN AN ORGANIZED HEALTH CARE EDUCATION/TRAINING PROGRAM

## 2024-05-26 PROCEDURE — 81001 URINALYSIS AUTO W/SCOPE: CPT | Performed by: STUDENT IN AN ORGANIZED HEALTH CARE EDUCATION/TRAINING PROGRAM

## 2024-05-26 PROCEDURE — 2500000004 HC RX 250 GENERAL PHARMACY W/ HCPCS (ALT 636 FOR OP/ED): Performed by: STUDENT IN AN ORGANIZED HEALTH CARE EDUCATION/TRAINING PROGRAM

## 2024-05-26 PROCEDURE — 85027 COMPLETE CBC AUTOMATED: CPT | Mod: 91 | Performed by: STUDENT IN AN ORGANIZED HEALTH CARE EDUCATION/TRAINING PROGRAM

## 2024-05-26 PROCEDURE — 2500000004 HC RX 250 GENERAL PHARMACY W/ HCPCS (ALT 636 FOR OP/ED): Mod: JZ | Performed by: INTERNAL MEDICINE

## 2024-05-26 PROCEDURE — C9113 INJ PANTOPRAZOLE SODIUM, VIA: HCPCS

## 2024-05-26 PROCEDURE — 2500000001 HC RX 250 WO HCPCS SELF ADMINISTERED DRUGS (ALT 637 FOR MEDICARE OP)

## 2024-05-26 PROCEDURE — 2500000002 HC RX 250 W HCPCS SELF ADMINISTERED DRUGS (ALT 637 FOR MEDICARE OP, ALT 636 FOR OP/ED)

## 2024-05-26 PROCEDURE — P9073 PLATELETS PHERESIS PATH REDU: HCPCS

## 2024-05-26 PROCEDURE — 2500000004 HC RX 250 GENERAL PHARMACY W/ HCPCS (ALT 636 FOR OP/ED): Performed by: INTERNAL MEDICINE

## 2024-05-26 PROCEDURE — 85610 PROTHROMBIN TIME: CPT | Performed by: INTERNAL MEDICINE

## 2024-05-26 PROCEDURE — 85025 COMPLETE CBC W/AUTO DIFF WBC: CPT

## 2024-05-26 PROCEDURE — 83615 LACTATE (LD) (LDH) ENZYME: CPT | Performed by: INTERNAL MEDICINE

## 2024-05-26 PROCEDURE — 36415 COLL VENOUS BLD VENIPUNCTURE: CPT | Performed by: INTERNAL MEDICINE

## 2024-05-26 PROCEDURE — 36430 TRANSFUSION BLD/BLD COMPNT: CPT

## 2024-05-26 PROCEDURE — 83735 ASSAY OF MAGNESIUM: CPT

## 2024-05-26 PROCEDURE — 86320 SERUM IMMUNOELECTROPHORESIS: CPT | Performed by: STUDENT IN AN ORGANIZED HEALTH CARE EDUCATION/TRAINING PROGRAM

## 2024-05-26 PROCEDURE — 84165 PROTEIN E-PHORESIS SERUM: CPT | Mod: 91 | Performed by: STUDENT IN AN ORGANIZED HEALTH CARE EDUCATION/TRAINING PROGRAM

## 2024-05-26 PROCEDURE — 84165 PROTEIN E-PHORESIS SERUM: CPT | Performed by: STUDENT IN AN ORGANIZED HEALTH CARE EDUCATION/TRAINING PROGRAM

## 2024-05-26 PROCEDURE — 2500000002 HC RX 250 W HCPCS SELF ADMINISTERED DRUGS (ALT 637 FOR MEDICARE OP, ALT 636 FOR OP/ED): Mod: MUE

## 2024-05-26 PROCEDURE — 1100000001 HC PRIVATE ROOM DAILY

## 2024-05-26 RX ORDER — PANTOPRAZOLE SODIUM 40 MG/10ML
40 INJECTION, POWDER, LYOPHILIZED, FOR SOLUTION INTRAVENOUS 2 TIMES DAILY
Status: DISCONTINUED | OUTPATIENT
Start: 2024-05-26 | End: 2024-06-06

## 2024-05-26 RX ADMIN — CARBIDOPA AND LEVODOPA 1.5 TABLET: 25; 100 TABLET ORAL at 17:10

## 2024-05-26 RX ADMIN — CARBIDOPA AND LEVODOPA 1.5 TABLET: 25; 100 TABLET ORAL at 13:20

## 2024-05-26 RX ADMIN — DEXAMETHASONE SODIUM PHOSPHATE 40 MG: 10 INJECTION, SOLUTION INTRAMUSCULAR; INTRAVENOUS at 22:36

## 2024-05-26 RX ADMIN — TAMSULOSIN HYDROCHLORIDE 0.4 MG: 0.4 CAPSULE ORAL at 09:09

## 2024-05-26 RX ADMIN — PANTOPRAZOLE SODIUM 40 MG: 40 INJECTION, POWDER, FOR SOLUTION INTRAVENOUS at 21:00

## 2024-05-26 RX ADMIN — Medication 1 APPLICATION: at 07:00

## 2024-05-26 RX ADMIN — FINASTERIDE 5 MG: 5 TABLET, FILM COATED ORAL at 09:09

## 2024-05-26 RX ADMIN — CARBIDOPA AND LEVODOPA 1.5 TABLET: 25; 100 TABLET ORAL at 21:00

## 2024-05-26 RX ADMIN — PANTOPRAZOLE SODIUM 40 MG: 40 INJECTION, POWDER, FOR SOLUTION INTRAVENOUS at 09:09

## 2024-05-26 RX ADMIN — Medication 1 APPLICATION: at 13:24

## 2024-05-26 RX ADMIN — METOPROLOL TARTRATE 25 MG: 50 TABLET, FILM COATED ORAL at 09:09

## 2024-05-26 RX ADMIN — ACETAMINOPHEN 650 MG: 325 TABLET ORAL at 23:20

## 2024-05-26 RX ADMIN — ACETAMINOPHEN 650 MG: 325 TABLET ORAL at 07:20

## 2024-05-26 RX ADMIN — CARBIDOPA AND LEVODOPA 1.5 TABLET: 25; 100 TABLET ORAL at 06:18

## 2024-05-26 RX ADMIN — Medication 1 APPLICATION: at 23:56

## 2024-05-26 RX ADMIN — DIPHENHYDRAMINE HYDROCHLORIDE 25 MG: 50 INJECTION INTRAMUSCULAR; INTRAVENOUS at 22:35

## 2024-05-26 RX ADMIN — IMMUNE GLOBULIN INFUSION (HUMAN) 30 G: 100 INJECTION, SOLUTION INTRAVENOUS; SUBCUTANEOUS at 23:08

## 2024-05-26 RX ADMIN — INSULIN LISPRO 1 UNITS: 100 INJECTION, SOLUTION INTRAVENOUS; SUBCUTANEOUS at 09:09

## 2024-05-26 RX ADMIN — Medication 1 APPLICATION: at 17:10

## 2024-05-26 ASSESSMENT — PAIN - FUNCTIONAL ASSESSMENT
PAIN_FUNCTIONAL_ASSESSMENT: 0-10
PAIN_FUNCTIONAL_ASSESSMENT: 0-10

## 2024-05-26 ASSESSMENT — PAIN SCALES - GENERAL
PAINLEVEL_OUTOF10: 0 - NO PAIN
PAINLEVEL_OUTOF10: 0 - NO PAIN

## 2024-05-26 ASSESSMENT — PAIN SCALES - WONG BAKER: WONGBAKER_NUMERICALRESPONSE: NO HURT

## 2024-05-26 NOTE — CARE PLAN
Problem: Discharge Barriers  Goal: My discharge needs are met  Outcome: Progressing     Problem: Pain  Goal: My pain/discomfort is manageable  Outcome: Progressing     Problem: Safety  Goal: Patient will be injury free during hospitalization  Outcome: Progressing  Goal: I will remain free of falls  Outcome: Progressing     Problem: Daily Care  Goal: Daily care needs are met  Outcome: Progressing     Problem: Psychosocial Needs  Goal: Demonstrates ability to cope with hospitalization/illness  Outcome: Progressing  Goal: Collaborate with me, my family, and caregiver to identify my specific goals  Outcome: Progressing     Problem: Skin  Goal: Decreased wound size/increased tissue granulation at next dressing change  Outcome: Progressing  Goal: Participates in plan/prevention/treatment measures  Outcome: Progressing  Goal: Prevent/manage excess moisture  Outcome: Progressing  Goal: Prevent/minimize sheer/friction injuries  Outcome: Progressing  Goal: Promote/optimize nutrition  Outcome: Progressing  Goal: Promote skin healing  Outcome: Progressing   The patient's goals for the shift include      The clinical goals for the shift include Pt will  continue to tolerate IVIG infusion without adverse effect.

## 2024-05-26 NOTE — PROGRESS NOTES
"Name: Markie Nobles  MRN: [id number]  Encounter Date: 5/26/2024  PCP: Leslie Watson MD    Reason for consult: Thrombocytopenia, Anemia  Attending Provider: Dr. Samuels    Hematology/ Oncology Progress Note    Subjective   Patient with ongoing melena. Receiving platelet transfusions. Patient himself thinks he is doing well. He has no complaints.    Allergies   No Known Allergies    Medications   acetaminophen, 650 mg, q8h  carbidopa-levodopa, 1.5 tablet, 4x daily  [Held by provider] clopidogrel, 75 mg, Daily  dexAMETHasone, 40 mg, q24h  finasteride, 5 mg, Daily  immune globulin (human), 20 g, q24h  immune globulin (human), 30 g, q24h  insulin lispro, 0-5 Units, TID  menthol-zinc oxide, 1 Application, 4x daily  metoprolol tartrate, 25 mg, Daily  pantoprazole, 40 mg, BID  tamsulosin, 0.4 mg, Daily         benzonatate, 100 mg, TID PRN  dextrose, 12.5 g, q15 min PRN  dextrose, 25 g, q15 min PRN  diphenhydrAMINE, 25 mg, Daily PRN  glucagon, 1 mg, q15 min PRN  glucagon, 1 mg, q15 min PRN        Review of Systems   Review of Systems   10 pt ROS reviewed and negative aside from above    Physical Exam   Blood pressure 142/72, pulse 53, temperature 36.6 °C (97.9 °F), temperature source Tympanic, resp. rate 16, height 1.778 m (5' 10\"), weight 62.6 kg (138 lb), SpO2 100%.    General: chronically ill appearing, NAD  Eyes: anicteric  Resp: breathing comfortably   Abd: Soft, NT, ND  Neuro: follows commands, moving all extremities  Skin: no rash     Labs     Lab Results   Component Value Date    GLUCOSE 159 (H) 05/26/2024    CALCIUM 8.2 (L) 05/26/2024     (L) 05/26/2024    K 5.0 05/26/2024    CO2 18 (L) 05/26/2024     05/26/2024     (HH) 05/26/2024    CREATININE 3.61 (H) 05/26/2024       Lab Results   Component Value Date    WBC 7.6 05/26/2024    HGB 6.8 (L) 05/26/2024    HCT 20.8 (L) 05/26/2024    MCV 90 05/26/2024    PLT 1 (LL) 05/26/2024       Lab Results   Component Value Date    ALT 22 05/24/2024    AST 34 " 05/24/2024    ALKPHOS 141 (H) 05/24/2024    BILITOT 0.4 05/24/2024       Imaging   5/16/24 CT CAP    FINDINGS:  Please note that the study is limited without intravenous contrast.      CHEST:      LUNG/PLEURA/LARGE AIRWAYS:  Nonspecific secretions within the trachea. Mild predominantly lower lung peribronchial wall thickening. Tree-in-bud opacities of the left and right lower lungs and posterior right middle lobe. Small scattered more consolidative bibasilar opacities. No pneumothorax or pleural effusion. Scattered pulmonary nodules which are stable from prior exam. For example a left lower lobe pulmonary nodule measures up to 0.7 cm (series 204, image 184, similar to prior exam.      VESSELS:  Aorta and pulmonary arteries are normal caliber.  Mild  atherosclerotic changes are noted of the aorta and branching vessels.  Severe coronary artery calcifications are present.      HEART:  Status post CABG. Normal size.  No pericardial effusion      MEDIASTINUM AND EREN:  No mediastinal, hilar or axillary lymph nodes are present.  The  esophagus appears normal.      CHEST WALL AND LOWER NECK:  Within normal limits.  The visualized thyroid gland appears within normal limits.      ABDOMEN:      LIVER:  The liver is normal in size without evidence of focal liver lesions.      BILE DUCTS:  The bile ducts are not dilated.      GALLBLADDER:  The gallbladder is surgically absent.      PANCREAS:  The pancreas appears unremarkable.      SPLEEN:  Redemonstration of marked nodular contour of the spleen and sequelae  of previous splenic infarcts.      ADRENAL GLANDS:  Within normal limits.      KIDNEYS AND URETERS:  Within normal limits.  No hydroureteronephrosis or  nephroureterolithiasis is present.      PELVIS:  BLADDER:  Within normal limits.      REPRODUCTIVE ORGANS:  The prostate is not enlarged.      BOWEL:  PEG tube in place. The stomach is unremarkable.  The small and large bowel are normal in caliber and demonstrate no wall  thickening.  The appendix is not definitely visualized. There is however no pericecal stranding or fluid.      VESSELS:  Severe aortoiliac atheromatous calcification. There is no aneurysmal dilatation of the abdominal aorta. The IVC is within normal limits.      PERITONEUM/RETROPERITONEUM/LYMPH NODES:  No ascites or free air, no fluid collection.  The retroperitoneum  appears unremarkable.  No abdominopelvic lymphadenopathy is present.      ABDOMINAL WALL:  The abdominal wall soft tissues appear normal. Bilateral inguinal hernial sacs containing fluid.      BONES:  Status post median sternotomy. No suspicious osseous lesions are present. Degenerative discogenic disease is noted in the lower thoracic and lumbar spine.    Assessment/Plan   Markie Nobles is a 79 y.o. male with a history of MGUS, CAD (s/p CABG x3 in 2022), HTN, CKD IV, Anemia, sickle cell trait, ABEBE, hyperlipidemia, obesity, BPH, gout, NIDDM, HLD, Parkinsons (w/ dementia), dysphagia, s/p peg tube (04/2024), sacral pressure sores, s/p recent hospitalization for aspiration PNA  (5/1/24) and now admitted for and being treated for aspiration pneumonia. Hematology is consulted for anemia and thrombocytopenia.     Peripheral Smear Reviewed 5/21  WBCs: unremarkable  RBCs: Normochronic, anisocytosis, <1 RBC frag/hpf  Plt: reduced in number, no clumping    Patient's thrombocytopenia initially thought to be multifactorial from infection and antibiotic use. The acute component of his anemia is likely from the same. The chronic portion is from anemia of chronic disease and CKD. He is on EPO as an outpatient. No e/o hemolysis by smear or labs. DIC is not supported. Folate, B12 nl. No hepatomegaly, liver disease or splenomegaly. Hepatitis B, C, HIV negative. He has not been exposed to heparin, which excludes HIT. No recent transfusions to support post-transfusion purpura.    Worsening thrombocytopenia to the single digits suggests an immune thrombocytopenia, which  may be drug-induced (meropenem vs. Vancomycin). On 5/23, discussed the risks and benefits of IVIG with patient and his wife Bree Nobles who as his POA and decision maker provided consent for IVIG.     His platelet count is not improving and his hemoglobin is down trending, which is likely from an acute GI bleed given melena. If patient's platelet count does not begin to improve in the next 24-48 hours will need to consider a TPO-receptor agonist.      Recommend:  - can give platelets to target a platelet count > 50k, but may unable to reach this given ITP; he will need immediate post-platelet transfusion counts checked within one hour of platelet tranfusions. If he increments appropriately could consider given several units of platelets and then performing endoscopic intervention   - can consider aminocaproic acid, but this can carry a thrombotic risk (especially with this degree of renal dysfunction) and given his relatively recent CAD and CABG (and need for Plavix usually) this needs to be weighed   - trend CBC at least daily  - continue dexamethasone 40mg IV daily for 4 days  - continue IVIG 400mg/kg daily for 5 days  - follow up drug-dependent anti-platelet antibodies  - until these return, please refrain from using meropenem or vancomycin     Thank you for this consult, we will continue to follow. Pt seen and discussed with Dr. Crabtree.    Jaycob Eddy MD  Hematology- Oncology Fellow, PGY-5  Hematology Consult Pager: [phone]

## 2024-05-26 NOTE — PROGRESS NOTES
"Markie Nobles is a 79 y.o. male on day 10 of admission presenting with NSTEMI (non-ST elevated myocardial infarction) (Multi).    Subjective   Pt was seen and examined at bedside. Overnight labs showed platelets of 5, 1 unit of platelets ordered. Pt with 3 dark, loose Bms overnight concerning for melena. Hg stable at 7.6, HDS. This AM, pt had had no further bowel movements. AM labs showed platelet count of 1 and hg 6.8.        Objective     Physical Exam  GEN: cachectic  HEENT: AT/NC  CARDIO: RRR, normal S1 and S2  PULM: clear to auscultation bilaterally  ABD: Soft, NT/ND, bowel sounds +, PEG in place  EXTR: Warm/well perfused; left wrist has cast in place  NEURO: strength grossly intact in bilateral UE and LE  PSYCH: Appropriate mood/affect    Last Recorded Vitals  Blood pressure 146/73, pulse 53, temperature 36.2 °C (97.2 °F), resp. rate 16, height 1.778 m (5' 10\"), weight 62.6 kg (138 lb), SpO2 100%.  Intake/Output last 3 Shifts:  I/O last 3 completed shifts:  In: 476 (7.6 mL/kg) [Blood:272; NG/GT:150; IV Piggyback:54]  Out: 1925 (30.8 mL/kg) [Urine:1925 (0.9 mL/kg/hr)]  Weight: 62.6 kg     Relevant Results  Results for orders placed or performed during the hospital encounter of 05/16/24 (from the past 24 hour(s))   POCT GLUCOSE   Result Value Ref Range    POCT Glucose 118 (H) 74 - 99 mg/dL   CBC and Auto Differential   Result Value Ref Range    WBC 6.9 4.4 - 11.3 x10*3/uL    nRBC 0.0 0.0 - 0.0 /100 WBCs    RBC 2.64 (L) 4.50 - 5.90 x10*6/uL    Hemoglobin 7.8 (L) 13.5 - 17.5 g/dL    Hematocrit 23.8 (L) 41.0 - 52.0 %    MCV 90 80 - 100 fL    MCH 29.5 26.0 - 34.0 pg    MCHC 32.8 32.0 - 36.0 g/dL    RDW 14.6 (H) 11.5 - 14.5 %    Platelets 5 (LL) 150 - 450 x10*3/uL    Neutrophils % 81.7 40.0 - 80.0 %    Immature Granulocytes %, Automated 0.3 0.0 - 0.9 %    Lymphocytes % 12.4 13.0 - 44.0 %    Monocytes % 5.5 2.0 - 10.0 %    Eosinophils % 0.0 0.0 - 6.0 %    Basophils % 0.1 0.0 - 2.0 %    Neutrophils Absolute 5.61 (H) " 1.60 - 5.50 x10*3/uL    Immature Granulocytes Absolute, Automated 0.02 0.00 - 0.50 x10*3/uL    Lymphocytes Absolute 0.85 0.80 - 3.00 x10*3/uL    Monocytes Absolute 0.38 0.05 - 0.80 x10*3/uL    Eosinophils Absolute 0.00 0.00 - 0.40 x10*3/uL    Basophils Absolute 0.01 0.00 - 0.10 x10*3/uL   Renal Function Panel   Result Value Ref Range    Glucose 96 74 - 99 mg/dL    Sodium 137 136 - 145 mmol/L    Potassium 5.1 3.5 - 5.3 mmol/L    Chloride 108 (H) 98 - 107 mmol/L    Bicarbonate 17 (L) 21 - 32 mmol/L    Anion Gap 17 10 - 20 mmol/L    Urea Nitrogen 122 (HH) 6 - 23 mg/dL    Creatinine 3.61 (H) 0.50 - 1.30 mg/dL    eGFR 16 (L) >60 mL/min/1.73m*2    Calcium 8.7 8.6 - 10.6 mg/dL    Phosphorus 3.9 2.5 - 4.9 mg/dL    Albumin 2.7 (L) 3.4 - 5.0 g/dL   POCT GLUCOSE   Result Value Ref Range    POCT Glucose 116 (H) 74 - 99 mg/dL   CBC and Auto Differential   Result Value Ref Range    WBC 8.7 4.4 - 11.3 x10*3/uL    nRBC 0.0 0.0 - 0.0 /100 WBCs    RBC 2.65 (L) 4.50 - 5.90 x10*6/uL    Hemoglobin 7.6 (L) 13.5 - 17.5 g/dL    Hematocrit 23.9 (L) 41.0 - 52.0 %    MCV 90 80 - 100 fL    MCH 28.7 26.0 - 34.0 pg    MCHC 31.8 (L) 32.0 - 36.0 g/dL    RDW 14.5 11.5 - 14.5 %    Platelets 2 (LL) 150 - 450 x10*3/uL    Neutrophils % 78.7 40.0 - 80.0 %    Immature Granulocytes %, Automated 0.7 0.0 - 0.9 %    Lymphocytes % 13.1 13.0 - 44.0 %    Monocytes % 7.3 2.0 - 10.0 %    Eosinophils % 0.0 0.0 - 6.0 %    Basophils % 0.2 0.0 - 2.0 %    Neutrophils Absolute 6.86 (H) 1.60 - 5.50 x10*3/uL    Immature Granulocytes Absolute, Automated 0.06 0.00 - 0.50 x10*3/uL    Lymphocytes Absolute 1.14 0.80 - 3.00 x10*3/uL    Monocytes Absolute 0.64 0.05 - 0.80 x10*3/uL    Eosinophils Absolute 0.00 0.00 - 0.40 x10*3/uL    Basophils Absolute 0.02 0.00 - 0.10 x10*3/uL   Type and screen   Result Value Ref Range    ABO TYPE O     Rh TYPE POS     ANTIBODY SCREEN NEG    Prepare Platelets: 1 Units   Result Value Ref Range    PRODUCT CODE V8796Y82     Unit Number  [id number]     Unit ABO O     Unit RH POS     Dispense Status TR     Blood Expiration Date May 26, 2024 23:59 EDT     PRODUCT BLOOD TYPE 5100     UNIT VOLUME 272    POCT GLUCOSE   Result Value Ref Range    POCT Glucose 111 (H) 74 - 99 mg/dL   POCT GLUCOSE   Result Value Ref Range    POCT Glucose 110 (H) 74 - 99 mg/dL   POCT GLUCOSE   Result Value Ref Range    POCT Glucose 161 (H) 74 - 99 mg/dL   CBC and Auto Differential   Result Value Ref Range    WBC 7.6 4.4 - 11.3 x10*3/uL    nRBC 0.0 0.0 - 0.0 /100 WBCs    RBC 2.30 (L) 4.50 - 5.90 x10*6/uL    Hemoglobin 6.8 (L) 13.5 - 17.5 g/dL    Hematocrit 20.8 (L) 41.0 - 52.0 %    MCV 90 80 - 100 fL    MCH 29.6 26.0 - 34.0 pg    MCHC 32.7 32.0 - 36.0 g/dL    RDW 14.5 11.5 - 14.5 %    Platelets 1 (LL) 150 - 450 x10*3/uL    Neutrophils % 88.8 40.0 - 80.0 %    Immature Granulocytes %, Automated 0.7 0.0 - 0.9 %    Lymphocytes % 9.2 13.0 - 44.0 %    Monocytes % 1.3 2.0 - 10.0 %    Eosinophils % 0.0 0.0 - 6.0 %    Basophils % 0.0 0.0 - 2.0 %    Neutrophils Absolute 6.74 (H) 1.60 - 5.50 x10*3/uL    Immature Granulocytes Absolute, Automated 0.05 0.00 - 0.50 x10*3/uL    Lymphocytes Absolute 0.70 (L) 0.80 - 3.00 x10*3/uL    Monocytes Absolute 0.10 0.05 - 0.80 x10*3/uL    Eosinophils Absolute 0.00 0.00 - 0.40 x10*3/uL    Basophils Absolute 0.00 0.00 - 0.10 x10*3/uL   Magnesium   Result Value Ref Range    Magnesium 2.07 1.60 - 2.40 mg/dL   Renal Function Panel   Result Value Ref Range    Glucose 159 (H) 74 - 99 mg/dL    Sodium 133 (L) 136 - 145 mmol/L    Potassium 5.0 3.5 - 5.3 mmol/L    Chloride 105 98 - 107 mmol/L    Bicarbonate 18 (L) 21 - 32 mmol/L    Anion Gap 15 10 - 20 mmol/L    Urea Nitrogen 139 (HH) 6 - 23 mg/dL    Creatinine 3.61 (H) 0.50 - 1.30 mg/dL    eGFR 16 (L) >60 mL/min/1.73m*2    Calcium 8.2 (L) 8.6 - 10.6 mg/dL    Phosphorus 4.1 2.5 - 4.9 mg/dL    Albumin 2.6 (L) 3.4 - 5.0 g/dL   POCT GLUCOSE   Result Value Ref Range    POCT Glucose 160 (H) 74 - 99 mg/dL         Assessment/Plan   Principal Problem:    NSTEMI (non-ST elevated myocardial infarction) (Multi)    Mr. Nobles is an 79 year old Male w/ a PMHx MGUS, CAD (s/p CABG x3 in 2022), HTN, CKD IV, Anemia, ABEBE, hyperlipidemia, obesity, BPH, gout, NIDDM, HLD, Parkinsons (w/ dementia), sickle cell trait, dysphagia, s/p peg tube (04/2024), sacral pressure sores, s/p hospitalization for aspiration PNA (DC'd 5/1/24) initially admitted for PNA and AOCA. Patient found to have lethargy and mild leukocytosis (WBC 10), likely infectious etiology. CT shows b/l lower lung findings suggestive of aspiration PNA, and given the patient's recent admission, this seems like the likely source of his infection. Additional sources include his sacral ulcer (not visualized) vs. PEG tube site (low suspicion) vs. UTI (ruled out w/ negative UA). He was found to be anemic and given 1x unit of blood transfusion with appropriate increment. BUN very elevated as well and GI was consulted for concerns of GI bleed from the PEG. 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 Patient's left wrist was swollen as well. Wife this say that patient has had gout flares in the past (last one a year ago). Xray left wrist was obtained and showed scaphoid fracture. Ortho was consulted for this. Palliative care was consulted for goals of care and patient changed code status to DNR/DNI on 5/17. Per GI, resumed tube feeds on 5/17 with nutrition consulted placed. Developed worsening anemia and thrombocytopenia on 5/20 after free water flushes increased and plavix resumed. Hematology consulted and think etiology is likely ITP vs medication/infection. Started on IVIG and IV dex on 5/23 with platelets and hgb recovery afterwards. Active issues include: monitor for bleeding, left scaphoid fracture, tube feeds, anemia.     Update 5/26:   -platelet count 1 and Hg 6.8; hematology does not  "believe this is a consumptive process with transfusions; will proceed with giving 1 unit of platelets and blood today; hematology following   -reengage GI for melena - do not feel strongly about scoping given high risk of further trauma and significantly low platelets; pt currently HDS; believe pt may be bleeding due to how low platelets are.   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 The acute component of his anemia is likely from the same. The chronic portion is from anemia of chronic disease and CKD. He is on EPO as an outpatient. No e/o hemolysis by smear or labs. DIC is not supported. No hepatomegaly, liver disease or splenomegaly. He has not been exposed to heparin, which excludes HIT.\"  :: Follows with heme outpatient: Rosanne Casal, APRN for MGUS and anemia of CKD - per her last evaluation the kappa light chain elevation is mild and stable and patient was continued on Procrit 10k units q2 weeks.   -platelet count 1 and Hg 6.8; hematology does not believe this is a consumptive process with transfusions; will proceed with giving 1 unit of platelets and blood today; hematology following   -reengage GI for melena - do not feel strongly about scoping given high risk of further trauma and " significantly low platelets; pt currently HDS; believe pt may be bleeding due to how low platelets are.   -repeat CBC in evening   - IVIG (5/23 - ) planning for 4 day course  - Dex (5/23 - ) planning for 5 day course    #WARREN on CKD IV  ::Cr 3.61 - baseline 3.1   -seems to be worsening: order renal US, UA, urine lytes, SPEP/UPEP; free light chains to rule out multiple myeloma; will consider engaging nephrology in AM if continues to worsen      # Aspiration PNA (treated)   :: WBC 10 on admission (up from 7.9 on 5/1)  :: CT Chest: Tree-in-bud b/l lower lungs and secretions in trachea. C/f acute/chronic aspiration and/or PNA  :: Low concern for systemic infection (afebrile, HDS)  :: Urine strep/legionella (-)  :: S/p Vanc/Zosyn in ED  - S/p Vancomycin:  (5/16-5/17)  - S/p Meropenem 1g q12 (renal dosing): (5/16-5/22)  - S/p Augmentin (5/22-5/25)  - Aspiration precautions     # CAD (S/p CABG x3 in 2022)  # Troponinemia  :: Troponin I  -> 516 w/out EKG changes, likely Type II MI  - HOLD home Clopidogrel 75mg daily in setting of thrombocytopenia   - Continue home Metoprolol tartrate 25mg daily (unclear why patient is on this dose frequency, however it populated in prior cardiologist note)     # Severe Protein Calorie Malnutrition  # Dysphagia s/p PEG tube 4/2024  - Tube feeds initiated in anticipation of Nutrition recs  - Free water flushes 150 ml q4h     # Possible slow GIB, most likely d/t mucosal disruption of PEG tube, now loosened   :: Hgb 7.8 -> 6.2 over two weeks  :: No objective evidence of bleed; witnessed a bowel movement in the ED was not melenic  :: 2019: EGD/Colonoscopy - friable gastric mucosal atrophy, duodenal erythema, and diverticulosis  :: S/p 1u pRBC in ED completed 5/16 at 23:00; 1U PLT on 5/18  - Trend Hgb  - GI consulte     # Steroid induced hyperglycemia  Started on steroids on 5/23 for possible ITP. Will place patient on sliding scale for as long as is on steroids.  - SSI (started 5/24) remove  when patient stops steroids     # AMS (resolved)  # Parkinson's Disease w/ Dementia  :: Suspect AMS/lethargy due to PNA +/- anemia   :: Mentation improving after blood transfusion and abx  ::  MRI brain obtained on 5/17 demonstrated old vascular lesions but also ventricular dilation possibly consistent with NPH per radiology read  - Continue home Carbidopa-Levodopa 1.5tab QID     # BPH  - Continue home Finasteride 5mg daily  - Continue home Flomax 0.4mg daily     # Scaphoid Fracture  :: Xrays of left wrist obtained  - ortho consulted  - Splint provided for L scaphoid (moderately displaced) fracture, pt will followup with ortho on 6/11 at 3:15PM with Dr. Agudelo (appointment scheduled)     # Sacral Ulcer  - Wound care c/s        F: PRN  E: PRN  N: Tube feeds via PEG  A: PIV     GI ppx: PPI   DVT ppx: holding in setting of thrombocytopenia      Code Status: DNR/DNI (per palliative care discussion on 5/17)  NOK: Bree Nobles (Wife) - [phone]           Elda Mello MD

## 2024-05-26 NOTE — PROGRESS NOTES
[facility]  [facility] [place]  CONSULT FOLLOW-UP         SUBJECTIVE     Interval Events/Subjective: GI team reengaged today because of concerns of melenic bowel movement and a drop in hemoglobin.  Patient is reported to have 2 black bowel movements yesterday and 1 black bowel movement this morning.  His hemoglobin dropped from 7.6-6.8 and then on repeat it was 5.9.  This is in the setting of acute thrombocytopenia with a platelet count of 1K, patient has been transfused a pack of platelet will repeat platelet count of 16 K.  He has been getting dexamethasone and IVIG's for possible ITP.  Hematology is following.      Medications:  acetaminophen, 650 mg, oral, q8h  carbidopa-levodopa, 1.5 tablet, oral, 4x daily  [Held by provider] clopidogrel, 75 mg, oral, Daily  dexAMETHasone, 40 mg, intravenous, q24h  finasteride, 5 mg, oral, Daily  immune globulin (human), 20 g, intravenous, q24h  immune globulin (human), 30 g, intravenous, q24h  insulin lispro, 0-5 Units, subcutaneous, TID  menthol-zinc oxide, 1 Application, Topical, 4x daily  metoprolol tartrate, 25 mg, oral, Daily  pantoprazole, 40 mg, intravenous, BID  tamsulosin, 0.4 mg, oral, Daily      PRN medications: benzonatate, dextrose, dextrose, diphenhydrAMINE, glucagon, glucagon      EXAM     Physical Exam     General: Cachectic, somnolent   eyes: EOMI, PERRLA  ENT: MMM  Heart: RRR  Lungs: No respiratory distress  Abdomen: Soft, non tender, non distended, PEG tube in place with tube feeds running.  Ileostomy bag in place  Skin: No jaundice   Neuro: Appropriately responds to questions/commands         DATA                                                                            Labs     Lab Results   Component Value Date    WBC 7.7 05/26/2024    HGB 5.9 (LL) 05/26/2024    HCT 18.5 (L) 05/26/2024    MCV 94 05/26/2024    PLT 16 (LL) 05/26/2024        Lab Results   Component Value Date    GLUCOSE 159 (H) 05/26/2024     CALCIUM 8.2 (L) 05/26/2024     (L) 05/26/2024    K 5.0 05/26/2024    CO2 18 (L) 05/26/2024     05/26/2024     (HH) 05/26/2024    CREATININE 3.61 (H) 05/26/2024                                                                               Imaging   IMPRESSION:  Chest  1. Tree-in-bud/consolidative nodularity of the bilateral lower lungs with peribronchial wall cuffing nonspecific secretions within thetrachea concerning for acute/chronic aspiration and/or pneumonia.      Abdomen-Pelvis  1.  No acute process within the abdomen/pelvis.                                                                         GI Procedures   He had EGD and colonoscopy 2019. There was a non-obstructing Schatzki ring, gastric mucosal atrophy with mildly friable mucosa (No H, pylori), and erythematous duodenopathy. There was one small tubular adenoma as well as diverticulosis       ASSESSMENT / PLAN     Assessment and Recommendations:   Markie Nobles is a 79 y.o. male with a past medical history of MGUS, CAD (s/p CABG x3 in 2022), HTN, CKD IV (baseline between 3 and 4, Anemia, ABEBE, hyperlipidemia, obesity, BPH, gout, NIDDM, HLD, Parkinsons (w/ dementia), dysphagia, s/p peg tube (04/29/2024), sacral pressure sores, s/p hospitalization at [facility] (4/24/2024 to 5/1/2024) who was brought to [facility] ER on 5/16/2024 for becoming unresponsive and for hypotension at his SNF ([place]). GI has been re-consulted for concern for upper GI bleed.     Previously patient was seen by our colleagues on 5/18 and performed aspiration of stomach contents through the PEG tube site revealed specks of hematin. 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 The suspected UGIB happened in the setting of Plavix.   Plan was to watch the patient conservatively.    Patient may have spontaneous diffuse oozing from GI tract in the setting of ITP with a low platelet count of 1K, this usually stops spontaneously after blood products are replaced.  However in terms of endoscopy evaluation, we would like to have a platelet count of 50 K to proceed safely.      Plan:  - No plans for urgent endoscopy at this point.  Continue conservative management.  - Continue PPI twice daily.  Hold tube feeds for now.  - If patient becomes hemodynamically unstable, transfer to the ICU and notify the oncall GI fellow.   - Continue to trend Hgb BID (goal > 7), platelets (goal > 50), INR daily (goal < 1.5) and transfuse as necessary.  - Ensure adequate IV access, ideally 2 large bore IVs  - If concern for hemodynamically significant GI bleeding, consider withholding antiplatelet/anticoagulation after having risk versus benefit discussion with the patient or family.  - Additional supportive care per primary team     SALVATORE per primary team     ------------------------------------------------------------------------  Doug Higgins MD  Gastroenterology Fellow  After 5PM and on Weekends, please page on-call fellow.    Discussed with on call attending Dr. Harris  Final recommendations pending attending attestation.

## 2024-05-26 NOTE — CARE PLAN
Problem: Discharge Barriers  Goal: My discharge needs are met  5/26/2024 0556 by Drea Sewell RN  Outcome: Progressing  5/26/2024 0525 by Drea Sewell RN  Outcome: Progressing     Problem: Pain  Goal: My pain/discomfort is manageable  5/26/2024 0556 by Drea Sewell RN  Outcome: Progressing  5/26/2024 0525 by Drea Sewell RN  Outcome: Progressing     Problem: Safety  Goal: Patient will be injury free during hospitalization  5/26/2024 0556 by Drea Sewell RN  Outcome: Progressing  5/26/2024 0525 by Drea Sewell RN  Outcome: Progressing  Goal: I will remain free of falls  5/26/2024 0556 by Drea Sewell RN  Outcome: Progressing  5/26/2024 0525 by Drea Sewell RN  Outcome: Progressing     Problem: Daily Care  Goal: Daily care needs are met  5/26/2024 0556 by Drea Sewell RN  Outcome: Progressing  5/26/2024 0525 by Drea Sewell RN  Outcome: Progressing     Problem: Psychosocial Needs  Goal: Demonstrates ability to cope with hospitalization/illness  5/26/2024 0556 by Drea Sewell RN  Outcome: Progressing  5/26/2024 0525 by Drea Sewell RN  Outcome: Progressing  Goal: Collaborate with me, my family, and caregiver to identify my specific goals  5/26/2024 0556 by Drea Sewell RN  Outcome: Progressing  5/26/2024 0525 by Drea Sewell RN  Outcome: Progressing     Problem: Skin  Goal: Decreased wound size/increased tissue granulation at next dressing change  5/26/2024 0556 by Drea Sewell RN  Outcome: Progressing  5/26/2024 0525 by Drea Sewell RN  Outcome: Progressing  Goal: Participates in plan/prevention/treatment measures  5/26/2024 0556 by Drea Sewell RN  Outcome: Progressing  5/26/2024 0525 by Drea Sewell RN  Outcome: Progressing  Goal: Prevent/manage excess moisture  5/26/2024 0556 by Drea Sewell RN  Outcome: Progressing  5/26/2024 0525 by Drea Sewell RN  Outcome: Progressing  Goal: Prevent/minimize sheer/friction injuries  5/26/2024 0556  by Drea Sewell, RN  Outcome: Progressing  5/26/2024 0525 by Drea Sewell RN  Outcome: Progressing  Goal: Promote/optimize nutrition  5/26/2024 0556 by Drea Sewell RN  Outcome: Progressing  5/26/2024 0525 by Drea Sewell RN  Outcome: Progressing  Goal: Promote skin healing  5/26/2024 0556 by Drea Sewell RN  Outcome: Progressing  5/26/2024 0525 by Drea Sewell RN  Outcome: Progressing   The patient's goals for the shift include      The clinical goals for the shift include Pt will  continue to tolerate IVIG infusion without adverse effect.

## 2024-05-27 LAB
ALBUMIN SERPL BCP-MCNC: 2.8 G/DL (ref 3.4–5)
ANION GAP SERPL CALC-SCNC: 17 MMOL/L (ref 10–20)
BASOPHILS # BLD AUTO: 0.01 X10*3/UL (ref 0–0.1)
BASOPHILS NFR BLD AUTO: 0.1 %
BLOOD EXPIRATION DATE: NORMAL
BUN SERPL-MCNC: 144 MG/DL (ref 6–23)
CALCIUM SERPL-MCNC: 8.7 MG/DL (ref 8.6–10.6)
CHLORIDE SERPL-SCNC: 104 MMOL/L (ref 98–107)
CO2 SERPL-SCNC: 17 MMOL/L (ref 21–32)
CREAT SERPL-MCNC: 3.42 MG/DL (ref 0.5–1.3)
DISPENSE STATUS: NORMAL
EBV EA IGG SER QL: POSITIVE
EBV NA AB SER QL: POSITIVE
EBV VCA IGG SER IA-ACNC: POSITIVE
EBV VCA IGM SER IA-ACNC: NEGATIVE
EGFRCR SERPLBLD CKD-EPI 2021: 17 ML/MIN/1.73M*2
EOSINOPHIL # BLD AUTO: 0 X10*3/UL (ref 0–0.4)
EOSINOPHIL NFR BLD AUTO: 0 %
ERYTHROCYTE [DISTWIDTH] IN BLOOD BY AUTOMATED COUNT: 14.4 % (ref 11.5–14.5)
GLUCOSE BLD MANUAL STRIP-MCNC: 106 MG/DL (ref 74–99)
GLUCOSE BLD MANUAL STRIP-MCNC: 108 MG/DL (ref 74–99)
GLUCOSE BLD MANUAL STRIP-MCNC: 121 MG/DL (ref 74–99)
GLUCOSE SERPL-MCNC: 112 MG/DL (ref 74–99)
HCT VFR BLD AUTO: 29 % (ref 41–52)
HGB BLD-MCNC: 9.2 G/DL (ref 13.5–17.5)
HOLD SPECIMEN: NORMAL
IMM GRANULOCYTES # BLD AUTO: 0.06 X10*3/UL (ref 0–0.5)
IMM GRANULOCYTES NFR BLD AUTO: 0.7 % (ref 0–0.9)
KAPPA LC SERPL-MCNC: 13.3 MG/DL (ref 0.33–1.94)
KAPPA LC/LAMBDA SER: 1.33 {RATIO} (ref 0.26–1.65)
LAMBDA LC SERPL-MCNC: 10.02 MG/DL (ref 0.57–2.63)
LYMPHOCYTES # BLD AUTO: 0.91 X10*3/UL (ref 0.8–3)
LYMPHOCYTES NFR BLD AUTO: 11 %
MAGNESIUM SERPL-MCNC: 2.04 MG/DL (ref 1.6–2.4)
MCH RBC QN AUTO: 29 PG (ref 26–34)
MCHC RBC AUTO-ENTMCNC: 31.7 G/DL (ref 32–36)
MCV RBC AUTO: 92 FL (ref 80–100)
MONOCYTES # BLD AUTO: 0.14 X10*3/UL (ref 0.05–0.8)
MONOCYTES NFR BLD AUTO: 1.7 %
NEUTROPHILS # BLD AUTO: 7.17 X10*3/UL (ref 1.6–5.5)
NEUTROPHILS NFR BLD AUTO: 86.5 %
NRBC BLD-RTO: 0 /100 WBCS (ref 0–0)
PHOSPHATE SERPL-MCNC: 5.1 MG/DL (ref 2.5–4.9)
PLATELET # BLD AUTO: 2 X10*3/UL (ref 150–450)
POTASSIUM SERPL-SCNC: 5.1 MMOL/L (ref 3.5–5.3)
PRODUCT BLOOD TYPE: 6200
PRODUCT CODE: NORMAL
RBC # BLD AUTO: 3.17 X10*6/UL (ref 4.5–5.9)
SODIUM SERPL-SCNC: 133 MMOL/L (ref 136–145)
UNIT ABO: NORMAL
UNIT NUMBER: NORMAL
UNIT RH: NORMAL
UNIT VOLUME: 262
WBC # BLD AUTO: 8.3 X10*3/UL (ref 4.4–11.3)

## 2024-05-27 PROCEDURE — C9113 INJ PANTOPRAZOLE SODIUM, VIA: HCPCS | Performed by: STUDENT IN AN ORGANIZED HEALTH CARE EDUCATION/TRAINING PROGRAM

## 2024-05-27 PROCEDURE — 2500000004 HC RX 250 GENERAL PHARMACY W/ HCPCS (ALT 636 FOR OP/ED): Performed by: STUDENT IN AN ORGANIZED HEALTH CARE EDUCATION/TRAINING PROGRAM

## 2024-05-27 PROCEDURE — 1100000001 HC PRIVATE ROOM DAILY

## 2024-05-27 PROCEDURE — P9073 PLATELETS PHERESIS PATH REDU: HCPCS

## 2024-05-27 PROCEDURE — 86664 EPSTEIN-BARR NUCLEAR ANTIGEN: CPT | Performed by: INTERNAL MEDICINE

## 2024-05-27 PROCEDURE — 36430 TRANSFUSION BLD/BLD COMPNT: CPT

## 2024-05-27 PROCEDURE — 2500000004 HC RX 250 GENERAL PHARMACY W/ HCPCS (ALT 636 FOR OP/ED): Performed by: INTERNAL MEDICINE

## 2024-05-27 PROCEDURE — 2500000004 HC RX 250 GENERAL PHARMACY W/ HCPCS (ALT 636 FOR OP/ED): Mod: JZ | Performed by: INTERNAL MEDICINE

## 2024-05-27 PROCEDURE — 86663 EPSTEIN-BARR ANTIBODY: CPT | Performed by: INTERNAL MEDICINE

## 2024-05-27 PROCEDURE — 2500000002 HC RX 250 W HCPCS SELF ADMINISTERED DRUGS (ALT 637 FOR MEDICARE OP, ALT 636 FOR OP/ED): Mod: MUE

## 2024-05-27 PROCEDURE — 86665 EPSTEIN-BARR CAPSID VCA: CPT | Performed by: INTERNAL MEDICINE

## 2024-05-27 PROCEDURE — 99233 SBSQ HOSP IP/OBS HIGH 50: CPT | Performed by: STUDENT IN AN ORGANIZED HEALTH CARE EDUCATION/TRAINING PROGRAM

## 2024-05-27 PROCEDURE — 2500000004 HC RX 250 GENERAL PHARMACY W/ HCPCS (ALT 636 FOR OP/ED)

## 2024-05-27 PROCEDURE — 83735 ASSAY OF MAGNESIUM: CPT | Performed by: STUDENT IN AN ORGANIZED HEALTH CARE EDUCATION/TRAINING PROGRAM

## 2024-05-27 PROCEDURE — 85025 COMPLETE CBC W/AUTO DIFF WBC: CPT | Performed by: STUDENT IN AN ORGANIZED HEALTH CARE EDUCATION/TRAINING PROGRAM

## 2024-05-27 PROCEDURE — 2500000001 HC RX 250 WO HCPCS SELF ADMINISTERED DRUGS (ALT 637 FOR MEDICARE OP)

## 2024-05-27 PROCEDURE — 82947 ASSAY GLUCOSE BLOOD QUANT: CPT | Mod: 91

## 2024-05-27 PROCEDURE — 80069 RENAL FUNCTION PANEL: CPT | Performed by: STUDENT IN AN ORGANIZED HEALTH CARE EDUCATION/TRAINING PROGRAM

## 2024-05-27 PROCEDURE — 36415 COLL VENOUS BLD VENIPUNCTURE: CPT | Performed by: STUDENT IN AN ORGANIZED HEALTH CARE EDUCATION/TRAINING PROGRAM

## 2024-05-27 PROCEDURE — 99233 SBSQ HOSP IP/OBS HIGH 50: CPT

## 2024-05-27 RX ORDER — DEXTROSE MONOHYDRATE AND SODIUM CHLORIDE 5; .9 G/100ML; G/100ML
100 INJECTION, SOLUTION INTRAVENOUS CONTINUOUS
Status: DISCONTINUED | OUTPATIENT
Start: 2024-05-27 | End: 2024-05-27

## 2024-05-27 RX ADMIN — TAMSULOSIN HYDROCHLORIDE 0.4 MG: 0.4 CAPSULE ORAL at 08:50

## 2024-05-27 RX ADMIN — CARBIDOPA AND LEVODOPA 1.5 TABLET: 25; 100 TABLET ORAL at 07:00

## 2024-05-27 RX ADMIN — Medication 1 APPLICATION: at 07:00

## 2024-05-27 RX ADMIN — Medication 1 APPLICATION: at 17:02

## 2024-05-27 RX ADMIN — ACETAMINOPHEN 650 MG: 325 TABLET ORAL at 17:02

## 2024-05-27 RX ADMIN — FINASTERIDE 5 MG: 5 TABLET, FILM COATED ORAL at 08:50

## 2024-05-27 RX ADMIN — CARBIDOPA AND LEVODOPA 1.5 TABLET: 25; 100 TABLET ORAL at 13:59

## 2024-05-27 RX ADMIN — CARBIDOPA AND LEVODOPA 1.5 TABLET: 25; 100 TABLET ORAL at 21:54

## 2024-05-27 RX ADMIN — Medication 1 APPLICATION: at 13:59

## 2024-05-27 RX ADMIN — PREDNISONE 62.5 MG: 10 TABLET ORAL at 13:59

## 2024-05-27 RX ADMIN — Medication 1 APPLICATION: at 21:55

## 2024-05-27 RX ADMIN — ACETAMINOPHEN 650 MG: 325 TABLET ORAL at 07:20

## 2024-05-27 RX ADMIN — IMMUNE GLOBULIN INFUSION (HUMAN) 30 G: 100 INJECTION, SOLUTION INTRAVENOUS; SUBCUTANEOUS at 23:28

## 2024-05-27 RX ADMIN — CARBIDOPA AND LEVODOPA 1.5 TABLET: 25; 100 TABLET ORAL at 17:02

## 2024-05-27 RX ADMIN — DIPHENHYDRAMINE HYDROCHLORIDE 25 MG: 50 INJECTION INTRAMUSCULAR; INTRAVENOUS at 22:54

## 2024-05-27 RX ADMIN — PANTOPRAZOLE SODIUM 40 MG: 40 INJECTION, POWDER, FOR SOLUTION INTRAVENOUS at 08:49

## 2024-05-27 RX ADMIN — PANTOPRAZOLE SODIUM 40 MG: 40 INJECTION, POWDER, FOR SOLUTION INTRAVENOUS at 21:55

## 2024-05-27 ASSESSMENT — COGNITIVE AND FUNCTIONAL STATUS - GENERAL
MOBILITY SCORE: 6
DRESSING REGULAR UPPER BODY CLOTHING: TOTAL
TOILETING: TOTAL
MOBILITY SCORE: 6
CLIMB 3 TO 5 STEPS WITH RAILING: TOTAL
CLIMB 3 TO 5 STEPS WITH RAILING: TOTAL
MOVING FROM LYING ON BACK TO SITTING ON SIDE OF FLAT BED WITH BEDRAILS: TOTAL
TOILETING: TOTAL
PERSONAL GROOMING: TOTAL
DRESSING REGULAR LOWER BODY CLOTHING: TOTAL
MOVING TO AND FROM BED TO CHAIR: TOTAL
EATING MEALS: TOTAL
PERSONAL GROOMING: TOTAL
TURNING FROM BACK TO SIDE WHILE IN FLAT BAD: TOTAL
WALKING IN HOSPITAL ROOM: TOTAL
EATING MEALS: TOTAL
DRESSING REGULAR LOWER BODY CLOTHING: TOTAL
MOVING TO AND FROM BED TO CHAIR: TOTAL
DAILY ACTIVITIY SCORE: 6
DRESSING REGULAR UPPER BODY CLOTHING: TOTAL
STANDING UP FROM CHAIR USING ARMS: TOTAL
HELP NEEDED FOR BATHING: TOTAL
TURNING FROM BACK TO SIDE WHILE IN FLAT BAD: TOTAL
HELP NEEDED FOR BATHING: TOTAL
STANDING UP FROM CHAIR USING ARMS: TOTAL
MOVING FROM LYING ON BACK TO SITTING ON SIDE OF FLAT BED WITH BEDRAILS: TOTAL
DAILY ACTIVITIY SCORE: 6
WALKING IN HOSPITAL ROOM: TOTAL

## 2024-05-27 ASSESSMENT — PAIN DESCRIPTION - DESCRIPTORS: DESCRIPTORS: ACHING

## 2024-05-27 ASSESSMENT — PAIN SCALES - GENERAL
PAINLEVEL_OUTOF10: 5 - MODERATE PAIN
PAINLEVEL_OUTOF10: 0 - NO PAIN

## 2024-05-27 ASSESSMENT — PAIN - FUNCTIONAL ASSESSMENT
PAIN_FUNCTIONAL_ASSESSMENT: 0-10

## 2024-05-27 NOTE — PROGRESS NOTES
"Name: Markie Nobles  MRN: [id number]  Encounter Date: 5/27/2024  PCP: Leslie Watson MD    Reason for consult: Thrombocytopenia, Anemia  Attending Provider: Dr. Samuels    Hematology/ Oncology Progress Note    Subjective     NAEO. Melena has improved per nursing. Patient feels well without complaints.     Allergies   No Known Allergies    Medications   acetaminophen, 650 mg, q8h  carbidopa-levodopa, 1.5 tablet, 4x daily  [Held by provider] clopidogrel, 75 mg, Daily  dexAMETHasone, 40 mg, q24h  finasteride, 5 mg, Daily  immune globulin (human), 20 g, q24h  immune globulin (human), 30 g, q24h  insulin lispro, 0-5 Units, TID  menthol-zinc oxide, 1 Application, 4x daily  metoprolol tartrate, 25 mg, Daily  pantoprazole, 40 mg, BID  tamsulosin, 0.4 mg, Daily         benzonatate, 100 mg, TID PRN  dextrose, 12.5 g, q15 min PRN  dextrose, 25 g, q15 min PRN  diphenhydrAMINE, 25 mg, Daily PRN  glucagon, 1 mg, q15 min PRN  glucagon, 1 mg, q15 min PRN        Review of Systems   Review of Systems   10 pt ROS reviewed and negative aside from above    Physical Exam   Blood pressure 149/73, pulse 50, temperature 36.3 °C (97.3 °F), temperature source Temporal, resp. rate 17, height 1.778 m (5' 10\"), weight 62.6 kg (138 lb), SpO2 100%.    General: chronically ill appearing, NAD  Eyes: anicteric  Resp: breathing comfortably   Abd: Soft, NT, ND  Neuro: follows commands, moving all extremities  Skin: no rash     Labs     Lab Results   Component Value Date    GLUCOSE 112 (H) 05/27/2024    CALCIUM 8.7 05/27/2024     (L) 05/27/2024    K 5.1 05/27/2024    CO2 17 (L) 05/27/2024     05/27/2024     (HH) 05/27/2024    CREATININE 3.42 (H) 05/27/2024       Lab Results   Component Value Date    WBC 8.3 05/27/2024    HGB 9.2 (L) 05/27/2024    HCT 29.0 (L) 05/27/2024    MCV 92 05/27/2024    PLT 2 (LL) 05/27/2024       Lab Results   Component Value Date    ALT 22 05/24/2024    AST 34 05/24/2024    ALKPHOS 141 (H) 05/24/2024    BILITOT " 0.4 05/24/2024       Imaging     Reviewed.     Assessment/Plan   Markie Nobles is a 79 y.o. male with a history of MGUS, CAD (s/p CABG x3 in 2022), HTN, CKD IV, Anemia, sickle cell trait, ABEBE, hyperlipidemia, obesity, BPH, gout, NIDDM, HLD, Parkinsons (w/ dementia), dysphagia, s/p peg tube (04/2024), sacral pressure sores, s/p recent hospitalization for aspiration PNA  (5/1/24) and now admitted for and being treated for aspiration pneumonia. Hematology is consulted for anemia and thrombocytopenia.     Peripheral Smear Reviewed 5/21  WBCs: unremarkable  RBCs: Normochronic, anisocytosis, <1 RBC frag/hpf  Plt: reduced in number, no clumping    Patient's thrombocytopenia initially thought to be multifactorial from infection and antibiotic use. The acute component of his anemia is likely from the same. The chronic portion is from anemia of chronic disease and CKD. He is on EPO as an outpatient. No e/o hemolysis by smear or labs. DIC is not supported. Folate, B12 nl. No hepatomegaly, liver disease or splenomegaly. Hepatitis B, C, HIV negative. He has not been exposed to heparin, which excludes HIT. No recent transfusions to support post-transfusion purpura.    Worsening thrombocytopenia to the single digits suggests an immune thrombocytopenia, which may be drug-induced (meropenem vs. Vancomycin). On 5/23, discussed the risks and benefits of IVIG with patient and his wife Bree Nobles who as his POA and decision maker provided consent for IVIG.     His platelet count is not improving and his hemoglobin is down trending, which is likely from an acute GI bleed given melena. Given lack of improvement in platelet count from dexamethasone, IVIG, and plt transfusions, suspicion for post-transfusion purpura is raised given pRBC transfusion 5/16 (vs refractory ITP). If this did represent dITP, we would expect counts to be improving following at least 5 days of cessation of meropenem and vancomycin.     Recommend:  - can give  platelets to target a platelet count > 50k, but may unable to reach this given ITP; he will need immediate post-platelet transfusion counts checked within one hour of platelet tranfusions. If he increments appropriately could consider given several units of platelets and then performing endoscopic intervention if needed  - trend CBC at least daily  - s/p dexamethasone 40mg IV daily for 4 days 5/23-5/26; please start prednisone 1 mg/kg/day  - continue IVIG 400mg/kg daily for 5 days (5/23-5/27)  - follow up drug-dependent anti-platelet antibodies; until these return, please refrain from using meropenem or vancomycin     Thank you for this consult, we will continue to follow. Pt seen and discussed with Dr. Kimble.    Greg Nieves MD  Hematology- Oncology Fellow, PGY-5  Hematology Consult Pager: [phone]

## 2024-05-27 NOTE — PROGRESS NOTES
"Markie Nobles is a 79 y.o. male on day 11 of admission presenting with NSTEMI (non-ST elevated myocardial infarction) (Multi).    Subjective   No acute events. Pt states that his last bm was today and was brown, but had a black bm yesterday. Denies any fever, chills, chest pain, shortness of breath, abdominal pain, or other new issues. No other sites of obvious bleeding.        Objective     Physical Exam  GEN: cachectic  HEENT: AT/NC  CARDIO: RRR, normal S1 and S2  PULM: clear to auscultation bilaterally  ABD: Soft, NT/ND, bowel sounds +, PEG in place  EXTR: Warm/well perfused; left wrist has cast in place  NEURO: strength grossly intact in bilateral UE and LE  PSYCH: Appropriate mood/affect    Last Recorded Vitals  Blood pressure 149/73, pulse 50, temperature 36.3 °C (97.3 °F), resp. rate 17, height 1.778 m (5' 10\"), weight 62.6 kg (138 lb), SpO2 100%.  Intake/Output last 3 Shifts:  I/O last 3 completed shifts:  In: 2186.6 (34.9 mL/kg) [Blood:1736.6; NG/GT:450]  Out: 900 (14.4 mL/kg) [Urine:900 (0.4 mL/kg/hr)]  Weight: 62.6 kg     Relevant Results  Results for orders placed or performed during the hospital encounter of 05/16/24 (from the past 24 hour(s))   POCT GLUCOSE   Result Value Ref Range    POCT Glucose 160 (H) 74 - 99 mg/dL   Prepare RBC: 1 Units   Result Value Ref Range    PRODUCT CODE P9942T73     Unit Number [id number]     Unit ABO O     Unit RH POS     XM INTEP COMP     Dispense Status TR     Blood Expiration Date June 14, 2024 23:59 EDT     PRODUCT BLOOD TYPE 5100     UNIT VOLUME 350    Prepare Platelets: 1 Units   Result Value Ref Range    PRODUCT CODE I8115Q41     Unit Number [id number]     Unit ABO O     Unit RH POS     Dispense Status TR     Blood Expiration Date May 26, 2024 23:59 EDT     PRODUCT BLOOD TYPE 5100     UNIT VOLUME 276    POCT GLUCOSE   Result Value Ref Range    POCT Glucose 121 (H) 74 - 99 mg/dL   Urine electrolytes   Result Value Ref Range    Sodium, Urine Random 33 " mmol/L    Sodium/Creatinine Ratio 95 Not established. mmol/g Creat    Potassium, Urine Random 16 mmol/L    Potassium/Creatinine Ratio 46 Not established mmol/g Creat    Chloride, Urine Random 19 mmol/L    Chloride/Creatinine Ratio 55 23 - 275 mmol/g creat    Creatinine, Urine Random 34.8 20.0 - 370.0 mg/dL   Urinalysis with Reflex Culture and Microscopic   Result Value Ref Range    Color, Urine Light-Yellow Light-Yellow, Yellow, Dark-Yellow    Appearance, Urine Clear Clear    Specific Gravity, Urine 1.013 1.005 - 1.035    pH, Urine 5.5 5.0, 5.5, 6.0, 6.5, 7.0, 7.5, 8.0    Protein, Urine 100 (2+) (A) NEGATIVE, 10 (TRACE), 20 (TRACE) mg/dL    Glucose, Urine Normal Normal mg/dL    Blood, Urine 0.1 (1+) (A) NEGATIVE    Ketones, Urine NEGATIVE NEGATIVE mg/dL    Bilirubin, Urine NEGATIVE NEGATIVE    Urobilinogen, Urine Normal Normal mg/dL    Nitrite, Urine NEGATIVE NEGATIVE    Leukocyte Esterase, Urine NEGATIVE NEGATIVE   Extra Urine Gray Tube   Result Value Ref Range    Extra Tube Hold for add-ons.    Protein, Urine Random   Result Value Ref Range    Total Protein, Urine Random 161 (H) 5 - 25 mg/dL   Urinalysis Microscopic   Result Value Ref Range    WBC, Urine 1-5 1-5, NONE /HPF    RBC, Urine 6-10 (A) NONE, 1-2, 3-5 /HPF    Squamous Epithelial Cells, Urine 1-9 (SPARSE) Reference range not established. /HPF   CBC   Result Value Ref Range    WBC 7.7 4.4 - 11.3 x10*3/uL    nRBC 0.0 0.0 - 0.0 /100 WBCs    RBC 1.97 (L) 4.50 - 5.90 x10*6/uL    Hemoglobin 5.9 (LL) 13.5 - 17.5 g/dL    Hematocrit 18.5 (L) 41.0 - 52.0 %    MCV 94 80 - 100 fL    MCH 29.9 26.0 - 34.0 pg    MCHC 31.9 (L) 32.0 - 36.0 g/dL    RDW 14.5 11.5 - 14.5 %    Platelets 16 (LL) 150 - 450 x10*3/uL   Prepare RBC: 1 Units   Result Value Ref Range    PRODUCT CODE [id number]     Unit Number [id number]     Unit ABO O     Unit RH POS     XM INTEP COMP     Dispense Status TR     Blood Expiration Date June 05, 2024 23:59 EDT     PRODUCT BLOOD TYPE 5100     UNIT  VOLUME 281    POCT GLUCOSE   Result Value Ref Range    POCT Glucose 115 (H) 74 - 99 mg/dL   Prepare Platelets: 1 Units   Result Value Ref Range    PRODUCT CODE K8177E19     Unit Number [id number]     Unit ABO A     Unit RH POS     Dispense Status IS     Blood Expiration Date May 26, 2024 23:59 EDT     PRODUCT BLOOD TYPE 6200     UNIT VOLUME 178    Protein, Total   Result Value Ref Range    Total Protein 6.9 6.4 - 8.2 g/dL   CBC   Result Value Ref Range    WBC 11.7 (H) 4.4 - 11.3 x10*3/uL    nRBC 0.0 0.0 - 0.0 /100 WBCs    RBC 2.93 (L) 4.50 - 5.90 x10*6/uL    Hemoglobin 8.5 (L) 13.5 - 17.5 g/dL    Hematocrit 27.7 (L) 41.0 - 52.0 %    MCV 95 80 - 100 fL    MCH 29.0 26.0 - 34.0 pg    MCHC 30.7 (L) 32.0 - 36.0 g/dL    RDW 14.0 11.5 - 14.5 %    Platelets 17 (LL) 150 - 450 x10*3/uL   POCT GLUCOSE   Result Value Ref Range    POCT Glucose 87 74 - 99 mg/dL   POCT GLUCOSE   Result Value Ref Range    POCT Glucose 108 (H) 74 - 99 mg/dL     Scheduled medications  acetaminophen, 650 mg, oral, q8h  carbidopa-levodopa, 1.5 tablet, oral, 4x daily  [Held by provider] clopidogrel, 75 mg, oral, Daily  dexAMETHasone, 40 mg, intravenous, q24h  finasteride, 5 mg, oral, Daily  immune globulin (human), 20 g, intravenous, q24h  immune globulin (human), 30 g, intravenous, q24h  insulin lispro, 0-5 Units, subcutaneous, TID  menthol-zinc oxide, 1 Application, Topical, 4x daily  metoprolol tartrate, 25 mg, oral, Daily  pantoprazole, 40 mg, intravenous, BID  tamsulosin, 0.4 mg, oral, Daily      Continuous medications     PRN medications  PRN medications: benzonatate, dextrose, dextrose, diphenhydrAMINE, glucagon, glucagon      Assessment and Plan:   Mr. Nobles is an 79 year old Male w/ a PMHx MGUS, CAD (s/p CABG x3 in 2022), HTN, CKD IV, Anemia, ABEBE, hyperlipidemia, obesity, BPH, gout, NIDDM, HLD, Parkinsons (w/ dementia), sickle cell trait, dysphagia, s/p peg tube (04/2024), sacral pressure sores, s/p hospitalization for aspiration PNA  (DC'd 5/1/24) initially admitted for PNA and AOCA. Patient found to have lethargy and mild leukocytosis (WBC 10), likely infectious etiology. CT shows b/l lower lung findings suggestive of aspiration PNA, and given the patient's recent admission, this seems like the likely source of his infection. Additional sources include his sacral ulcer (not visualized) vs. PEG tube site (low suspicion) vs. UTI (ruled out w/ negative UA). He was found to be anemic and given 1x unit of blood transfusion with appropriate increment. BUN very elevated as well and GI was consulted for concerns of GI bleed from the PEG. 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 Patient's left wrist was swollen as well. Wife this say that patient has had gout flares in the past (last one a year ago). Xray left wrist was obtained and showed scaphoid fracture. Ortho was consulted for this. Palliative care was consulted for goals of care and patient changed code status to DNR/DNI on 5/17. Per GI, resumed tube feeds on 5/17 with nutrition consulted placed. Developed worsening anemia and thrombocytopenia on 5/20 after free water flushes increased and plavix resumed. Hematology consulted and think etiology is likely ITP vs medication/infection. Started on IVIG and IV dex on 5/23 with platelets and hgb recovery afterwards. Active issues include: monitor for bleeding, left scaphoid fracture, tube feeds, anemia.     Update 5/27:   - GI and Heme following  - GI with no plan to scope as PLT <50k, believe that GI bleed may be from spontaneous oozing in setting of very low PLT. Believe this will stop spontaneously after improvement in PLT.   - Received 1 unit RBC on 5/26; 1 unit of PLT on 5/25 and 1 unit on 5/26  - Pending AM labs for today. Obtain BID CBC  - FeNa 2.6% (intrinsic)- suspect d/t recent abx usage  - Obtain PVR   - Pending abdominal/spleen (for thrombocytopenia)  and renal US (for WARREN)   - Resume TF Nepro 35 ml/hr, start at 10 ml/hr and increase until at goal   - Holding off on giving further PLT unless significant bleeding  - Dispo planning: PT recommending SNF   - Pending labs: drug-dependent anti-PLT Ab, EBV screen, ADAMTS-13, SPEP/UPEP, fibrin split products, immunoglobulin free LT chains       # Thrombocytopenia, possible ITP  :: Likely multifactorial in the setting of recent infection and antibiotic use. Heme does not believe this is a consumptive process. No hepatomegaly, liver disease or splenomegaly. He has not been exposed to heparin, which excludes HIT.   :: S/p 1 unit PLT on 5/18, 5/25 and 5/26  - Heme following   - Pending abdominal (spleen) US  - IVIG (5/23 - ) planning for 4 day course  - Dex (5/23 - ) planning for 5 day course    # Normocytic anemia, acute on chronic   # Sickle Cell Trait  :: Anemia d/t anemia of chronic disease and CKD with acute portion likely d/t recent infection +/- abx usage. Also likely d/t GI bleed given melena on 5/25-5/26 (likely slow oozing in s/o very low PLT)  :: No e/o hemolysis by smear or labs. DIC is not supported per heme.   :: Follows with heme outpatient: Rosanne Casal, APRN for MGUS and anemia of CKD - per her last evaluation the kappa light chain elevation is mild and stable and patient was continued on Procrit 10k units q2 weeks. On EPO as an outpatient.   :: Hgb and platelets started down trending after free water flushes increased and also restarted plavix on 5/20  :: S/p 1 unit RBC in ED on 5/16 and 5/26   - Maintain two large bore Ivs  - Maintain active T+S  - CBC BID     #WARREN on CKD IV  :: Cr 3.61 - baseline 3.1   :: FeNa 2.6% (intrinsic)- suspect d/t recent abx usage  - Pending renal US, PVR    - Pending SPEP/UPEP, free light chains to rule out multiple myeloma  - Will consider engaging nephrology if continues to worsen      # Aspiration PNA (treated)   :: WBC 10 on admission (up from 7.9 on 5/1)  :: CT Chest:  Tree-in-bud b/l lower lungs and secretions in trachea. C/f acute/chronic aspiration and/or PNA  :: Low concern for systemic infection (afebrile, HDS)  :: Urine strep/legionella (-)  :: S/p Vanc/Zosyn in ED  - S/p Vancomycin:  (5/16-5/17)  - S/p Meropenem 1g q12 (renal dosing): (5/16-5/22)  - S/p Augmentin (5/22-5/25)  - Aspiration precautions     # CAD (S/p CABG x3 in 2022)  # Troponinemia  :: Troponin I  -> 516 w/out EKG changes, likely Type II MI  - HOLD home Clopidogrel 75mg daily in setting of thrombocytopenia   - Continue home Metoprolol tartrate 25mg daily (unclear why patient is on this dose frequency, however it populated in prior cardiologist note)     # Severe Protein Calorie Malnutrition  # Dysphagia s/p PEG tube 4/2024  - Tube feeds per nutrition recs- HOLDING per GI in setting of melena   - Free water flushes 150 ml q4h     # Upper GI bleed  :: Most likely d/t mucosal disruption of PEG tube initially; later developed melena with Hgb drop on 5/25 in setting of very low PLT<5 (likely slow oozing from low PLT per GI)   :: 2019 EGD/Colonoscopy - friable gastric mucosal atrophy, duodenal erythema, and diverticulosis  - Re-engaged GI on 5/26 for melena - do not feel strongly about scoping given high risk of further trauma and significantly low platelets; pt currently HDS; believe pt may be bleeding due to how low platelets are.   - PPI IV BID in setting of melena      # Steroid induced hyperglycemia  Started on steroids on 5/23 for possible ITP. Will place patient on sliding scale for as long as is on steroids.  - SSI (started 5/24) remove when patient stops steroids     # AMS (resolved)  # Parkinson's Disease w/ Dementia  :: Suspect AMS/lethargy due to PNA +/- anemia   :: Mentation improving after blood transfusion and abx  ::  MRI brain obtained on 5/17 demonstrated old vascular lesions but also ventricular dilation possibly consistent with NPH per radiology read  - Continue home Carbidopa-Levodopa  1.5tab QID     # BPH  - Continue home Finasteride 5mg daily  - Continue home Flomax 0.4mg daily     # Scaphoid Fracture  :: Xrays of left wrist obtained  - Ortho consulted  - Splint provided for L scaphoid (moderately displaced) fracture, pt will followup with ortho on 6/11 at 3:15PM with Dr. Agudelo (appointment scheduled)     # Sacral Ulcer  - Wound care c/s        F: PRN  E: PRN  N: Tube feeds via PEG  A: PIV     GI ppx: PPI IV BID  DVT ppx: holding in setting of thrombocytopenia      Code Status: DNR/DNI (per palliative care discussion on 5/17)  NOK: Bree Nobles (Wife) - [phone]

## 2024-05-27 NOTE — PROGRESS NOTES
[facility]  [facility] [place]  CONSULT FOLLOW-UP         SUBJECTIVE     Interval Events/Subjective: pt seen this AM. HDS. Had another episode of melena this AM. PEG tube was lavaged at bedside this AM with clear gastric contents, no blood or clots appreciated. Hb trend: 5.9 --> 2 U --> 8.5 --> 9.2 this AM. Also with several plt transfusions: 2 --> 1 unit plts --> 1 --> 1 U plts --> 16 --> 17 --> 1 U plts --> 2 this AM.       Medications:  acetaminophen, 650 mg, oral, q8h  carbidopa-levodopa, 1.5 tablet, oral, 4x daily  [Held by provider] clopidogrel, 75 mg, oral, Daily  dexAMETHasone, 40 mg, intravenous, q24h  finasteride, 5 mg, oral, Daily  immune globulin (human), 20 g, intravenous, q24h  immune globulin (human), 30 g, intravenous, q24h  insulin lispro, 0-5 Units, subcutaneous, TID  menthol-zinc oxide, 1 Application, Topical, 4x daily  metoprolol tartrate, 25 mg, oral, Daily  pantoprazole, 40 mg, intravenous, BID  tamsulosin, 0.4 mg, oral, Daily      PRN medications: benzonatate, dextrose, dextrose, diphenhydrAMINE, glucagon, glucagon      EXAM     Physical Exam     General: Cachectic, somnolent   eyes: EOMI, PERRLA  ENT: MMM  Heart: RRR  Lungs: No respiratory distress  Abdomen: Soft, non tender, non distended  PEG lavaged at bedside this AM, clear gastric contents.   Skin: No jaundice   Neuro: Appropriately responds to questions/commands         DATA                                                                            Labs     Lab Results   Component Value Date    WBC 8.3 05/27/2024    HGB 9.2 (L) 05/27/2024    HCT 29.0 (L) 05/27/2024    MCV 92 05/27/2024    PLT 2 (LL) 05/27/2024        Lab Results   Component Value Date    GLUCOSE 112 (H) 05/27/2024    CALCIUM 8.7 05/27/2024     (L) 05/27/2024    K 5.1 05/27/2024    CO2 17 (L) 05/27/2024     05/27/2024     (HH) 05/27/2024    CREATININE 3.42 (H) 05/27/2024                                                                                Imaging   IMPRESSION:  Chest  1. Tree-in-bud/consolidative nodularity of the bilateral lower lungs with peribronchial wall cuffing nonspecific secretions within thetrachea concerning for acute/chronic aspiration and/or pneumonia.      Abdomen-Pelvis  1.  No acute process within the abdomen/pelvis.                                                                         GI Procedures   He had EGD and colonoscopy 2019. There was a non-obstructing Schatzki ring, gastric mucosal atrophy with mildly friable mucosa (No H, pylori), and erythematous duodenopathy. There was one small tubular adenoma as well as diverticulosis       ASSESSMENT / PLAN     Assessment and Recommendations:   Markie Nobles is a 79 y.o. male with a past medical history of MGUS, CAD (s/p CABG x3 in 2022), HTN, CKD IV (baseline between 3 and 4, Anemia, ABEBE, hyperlipidemia, obesity, BPH, gout, NIDDM, HLD, Parkinsons (w/ dementia), dysphagia, s/p peg tube (04/29/2024), sacral pressure sores, s/p hospitalization at [facility] (4/24/2024 to 5/1/2024) who was brought to [facility] ER on 5/16/2024 for becoming unresponsive and for hypotension at his SNF ([facility]). GI has been re-consulted for concern for upper GI bleed.     Previously patient was seen by our colleagues on 5/18 and performed aspiration of stomach contents through the PEG tube site revealed specks of hematin. 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 The suspected UGIB happened in the setting of Plavix.  Plan was to watch the patient conservatively.    We have now been re-consulted on 5/26 for melena and down trending Hb. At this time pt has evidence of profound thrombocytopenia (plts 2 today, were 1 yesterday). This is  likely oozing in the setting of thrombocytopenia. We would not plan for any endoscopic evaluation at this time given ongoing thrombocytopenia.     Plan:  - No plans for urgent endoscopy at this point.  Continue conservative management.  - OK to resume diet today  - Continue PPI twice daily.  - If patient becomes hemodynamically unstable, transfer to the ICU and notify the melanie GI fellow.   - Continue to trend Hgb BID (goal > 7), platelets (goal > 50), INR daily (goal < 1.5) and transfuse as necessary.  - Ensure adequate IV access, ideally 2 large bore Ivs    SALVATORE per primary team     ------------------------------------------------------------------------  Angie Garg MD   Gastroenterology Fellow  After 5PM and on Weekends, please page on-call fellow.    Patient seen and discussed with attending, Dr. Rodrigez.     Thank you for the consultation. Gastroenterology will continue to the follow the patient.   Please do not hesitate to contact me on DocHalo or page [phone] if there are any further questions between the weekday hours of 7 AM - 5 PM.   If there is an urgent concern during the weekend, after-hours, or holidays; then please page the on-call GI fellow at [phone]. Thank you.

## 2024-05-28 ENCOUNTER — APPOINTMENT (OUTPATIENT)
Dept: RADIOLOGY | Facility: HOSPITAL | Age: 79
DRG: 177 | End: 2024-05-28
Payer: MEDICARE

## 2024-05-28 LAB
ABO GROUP (TYPE) IN BLOOD: NORMAL
ALBUMIN MFR UR ELPH: 57.4 %
ALBUMIN SERPL BCP-MCNC: 2.6 G/DL (ref 3.4–5)
ALPHA1 GLOB MFR UR ELPH: 3.6 %
ALPHA2 GLOB MFR UR ELPH: 6.8 %
ANION GAP SERPL CALC-SCNC: 13 MMOL/L (ref 10–20)
ANTIBODY SCREEN: NORMAL
APTT PPP: 27 SECONDS (ref 27–38)
B-GLOBULIN MFR UR ELPH: 9.7 %
BASOPHILS # BLD AUTO: 0.01 X10*3/UL (ref 0–0.1)
BASOPHILS NFR BLD AUTO: 0.1 %
BLOOD EXPIRATION DATE: NORMAL
BUN SERPL-MCNC: 156 MG/DL (ref 6–23)
CALCIUM SERPL-MCNC: 8.3 MG/DL (ref 8.6–10.6)
CHLORIDE SERPL-SCNC: 104 MMOL/L (ref 98–107)
CMV DNA SERPL NAA+PROBE-LOG IU: NORMAL {LOG_IU}/ML
CO2 SERPL-SCNC: 20 MMOL/L (ref 21–32)
CREAT SERPL-MCNC: 3.54 MG/DL (ref 0.5–1.3)
DISPENSE STATUS: NORMAL
EGFRCR SERPLBLD CKD-EPI 2021: 17 ML/MIN/1.73M*2
EOSINOPHIL # BLD AUTO: 0 X10*3/UL (ref 0–0.4)
EOSINOPHIL NFR BLD AUTO: 0 %
ERYTHROCYTE [DISTWIDTH] IN BLOOD BY AUTOMATED COUNT: 14 % (ref 11.5–14.5)
ERYTHROCYTE [DISTWIDTH] IN BLOOD BY AUTOMATED COUNT: 14 % (ref 11.5–14.5)
GAMMA GLOB MFR UR ELPH: 22.5 %
GLUCOSE BLD MANUAL STRIP-MCNC: 104 MG/DL (ref 74–99)
GLUCOSE BLD MANUAL STRIP-MCNC: 131 MG/DL (ref 74–99)
GLUCOSE BLD MANUAL STRIP-MCNC: 136 MG/DL (ref 74–99)
GLUCOSE BLD MANUAL STRIP-MCNC: 151 MG/DL (ref 74–99)
GLUCOSE SERPL-MCNC: 137 MG/DL (ref 74–99)
HAPTOGLOB SERPL-MCNC: 280 MG/DL (ref 37–246)
HCT VFR BLD AUTO: 20.6 % (ref 41–52)
HCT VFR BLD AUTO: 26.5 % (ref 41–52)
HGB BLD-MCNC: 7.1 G/DL (ref 13.5–17.5)
HGB BLD-MCNC: 9.1 G/DL (ref 13.5–17.5)
IMM GRANULOCYTES # BLD AUTO: 0.04 X10*3/UL (ref 0–0.5)
IMM GRANULOCYTES NFR BLD AUTO: 0.4 % (ref 0–0.9)
INR PPP: 1 (ref 0.9–1.1)
LABORATORY COMMENT REPORT: NOT DETECTED
LYMPHOCYTES # BLD AUTO: 1.01 X10*3/UL (ref 0.8–3)
LYMPHOCYTES NFR BLD AUTO: 10.2 %
MAGNESIUM SERPL-MCNC: 2.13 MG/DL (ref 1.6–2.4)
MCH RBC QN AUTO: 30 PG (ref 26–34)
MCH RBC QN AUTO: 30.3 PG (ref 26–34)
MCHC RBC AUTO-ENTMCNC: 34.3 G/DL (ref 32–36)
MCHC RBC AUTO-ENTMCNC: 34.5 G/DL (ref 32–36)
MCV RBC AUTO: 87 FL (ref 80–100)
MCV RBC AUTO: 88 FL (ref 80–100)
MONOCYTES # BLD AUTO: 0.86 X10*3/UL (ref 0.05–0.8)
MONOCYTES NFR BLD AUTO: 8.7 %
NEUTROPHILS # BLD AUTO: 7.99 X10*3/UL (ref 1.6–5.5)
NEUTROPHILS NFR BLD AUTO: 80.6 %
NRBC BLD-RTO: 0 /100 WBCS (ref 0–0)
NRBC BLD-RTO: 0 /100 WBCS (ref 0–0)
OSMOLALITY SERPL: 336 MOSM/KG (ref 280–300)
PATH REVIEW-URINE PROTEIN ELECTROPHORESIS: NORMAL
PHOSPHATE SERPL-MCNC: 5.3 MG/DL (ref 2.5–4.9)
PLATELET # BLD AUTO: 2 X10*3/UL (ref 150–450)
PLATELET # BLD AUTO: 3 X10*3/UL (ref 150–450)
POTASSIUM SERPL-SCNC: 4.9 MMOL/L (ref 3.5–5.3)
PRODUCT BLOOD TYPE: 6200
PRODUCT CODE: NORMAL
PROTHROMBIN TIME: 11.6 SECONDS (ref 9.8–12.8)
RBC # BLD AUTO: 2.37 X10*6/UL (ref 4.5–5.9)
RBC # BLD AUTO: 3 X10*6/UL (ref 4.5–5.9)
RH FACTOR (ANTIGEN D): NORMAL
SODIUM SERPL-SCNC: 132 MMOL/L (ref 136–145)
UNIT ABO: NORMAL
UNIT NUMBER: NORMAL
UNIT RH: NORMAL
UNIT VOLUME: 178
URINE ELECTROPHORESIS COMMENT: NORMAL
WBC # BLD AUTO: 10.6 X10*3/UL (ref 4.4–11.3)
WBC # BLD AUTO: 9.9 X10*3/UL (ref 4.4–11.3)

## 2024-05-28 PROCEDURE — 85025 COMPLETE CBC W/AUTO DIFF WBC: CPT

## 2024-05-28 PROCEDURE — 2500000001 HC RX 250 WO HCPCS SELF ADMINISTERED DRUGS (ALT 637 FOR MEDICARE OP)

## 2024-05-28 PROCEDURE — 2500000002 HC RX 250 W HCPCS SELF ADMINISTERED DRUGS (ALT 637 FOR MEDICARE OP, ALT 636 FOR OP/ED): Mod: MUE

## 2024-05-28 PROCEDURE — 83735 ASSAY OF MAGNESIUM: CPT

## 2024-05-28 PROCEDURE — 2500000004 HC RX 250 GENERAL PHARMACY W/ HCPCS (ALT 636 FOR OP/ED): Performed by: STUDENT IN AN ORGANIZED HEALTH CARE EDUCATION/TRAINING PROGRAM

## 2024-05-28 PROCEDURE — 2500000004 HC RX 250 GENERAL PHARMACY W/ HCPCS (ALT 636 FOR OP/ED): Mod: JZ | Performed by: INTERNAL MEDICINE

## 2024-05-28 PROCEDURE — 1100000001 HC PRIVATE ROOM DAILY

## 2024-05-28 PROCEDURE — 76705 ECHO EXAM OF ABDOMEN: CPT

## 2024-05-28 PROCEDURE — 86850 RBC ANTIBODY SCREEN: CPT | Mod: 91

## 2024-05-28 PROCEDURE — 97530 THERAPEUTIC ACTIVITIES: CPT | Mod: GP | Performed by: PHYSICAL THERAPIST

## 2024-05-28 PROCEDURE — 76705 ECHO EXAM OF ABDOMEN: CPT | Performed by: STUDENT IN AN ORGANIZED HEALTH CARE EDUCATION/TRAINING PROGRAM

## 2024-05-28 PROCEDURE — 76770 US EXAM ABDO BACK WALL COMP: CPT

## 2024-05-28 PROCEDURE — 99233 SBSQ HOSP IP/OBS HIGH 50: CPT

## 2024-05-28 PROCEDURE — 36415 COLL VENOUS BLD VENIPUNCTURE: CPT

## 2024-05-28 PROCEDURE — 82947 ASSAY GLUCOSE BLOOD QUANT: CPT

## 2024-05-28 PROCEDURE — 85397 CLOTTING FUNCT ACTIVITY: CPT

## 2024-05-28 PROCEDURE — 80069 RENAL FUNCTION PANEL: CPT

## 2024-05-28 PROCEDURE — 2500000004 HC RX 250 GENERAL PHARMACY W/ HCPCS (ALT 636 FOR OP/ED)

## 2024-05-28 PROCEDURE — 85610 PROTHROMBIN TIME: CPT

## 2024-05-28 PROCEDURE — 97530 THERAPEUTIC ACTIVITIES: CPT | Mod: GO

## 2024-05-28 PROCEDURE — 86901 BLOOD TYPING SEROLOGIC RH(D): CPT

## 2024-05-28 PROCEDURE — 76770 US EXAM ABDO BACK WALL COMP: CPT | Performed by: STUDENT IN AN ORGANIZED HEALTH CARE EDUCATION/TRAINING PROGRAM

## 2024-05-28 PROCEDURE — 85027 COMPLETE CBC AUTOMATED: CPT

## 2024-05-28 PROCEDURE — 83930 ASSAY OF BLOOD OSMOLALITY: CPT

## 2024-05-28 PROCEDURE — C9113 INJ PANTOPRAZOLE SODIUM, VIA: HCPCS | Performed by: STUDENT IN AN ORGANIZED HEALTH CARE EDUCATION/TRAINING PROGRAM

## 2024-05-28 RX ADMIN — PREDNISONE 62.5 MG: 10 TABLET ORAL at 08:48

## 2024-05-28 RX ADMIN — ACETAMINOPHEN 650 MG: 325 TABLET ORAL at 15:58

## 2024-05-28 RX ADMIN — Medication 1 APPLICATION: at 06:34

## 2024-05-28 RX ADMIN — CARBIDOPA AND LEVODOPA 1.5 TABLET: 25; 100 TABLET ORAL at 13:14

## 2024-05-28 RX ADMIN — Medication 1 APPLICATION: at 13:14

## 2024-05-28 RX ADMIN — CARBIDOPA AND LEVODOPA 1.5 TABLET: 25; 100 TABLET ORAL at 20:47

## 2024-05-28 RX ADMIN — FINASTERIDE 5 MG: 5 TABLET, FILM COATED ORAL at 08:49

## 2024-05-28 RX ADMIN — CARBIDOPA AND LEVODOPA 1.5 TABLET: 25; 100 TABLET ORAL at 17:39

## 2024-05-28 RX ADMIN — CARBIDOPA AND LEVODOPA 1.5 TABLET: 25; 100 TABLET ORAL at 06:29

## 2024-05-28 RX ADMIN — ROMIPLOSTIM 190 MCG: 250 INJECTION, POWDER, LYOPHILIZED, FOR SOLUTION SUBCUTANEOUS at 19:06

## 2024-05-28 RX ADMIN — TAMSULOSIN HYDROCHLORIDE 0.4 MG: 0.4 CAPSULE ORAL at 08:49

## 2024-05-28 RX ADMIN — Medication 1 APPLICATION: at 20:47

## 2024-05-28 RX ADMIN — Medication 1 APPLICATION: at 17:45

## 2024-05-28 RX ADMIN — PANTOPRAZOLE SODIUM 40 MG: 40 INJECTION, POWDER, FOR SOLUTION INTRAVENOUS at 08:50

## 2024-05-28 RX ADMIN — PANTOPRAZOLE SODIUM 40 MG: 40 INJECTION, POWDER, FOR SOLUTION INTRAVENOUS at 20:47

## 2024-05-28 RX ADMIN — ACETAMINOPHEN 650 MG: 325 TABLET ORAL at 06:29

## 2024-05-28 ASSESSMENT — PAIN - FUNCTIONAL ASSESSMENT
PAIN_FUNCTIONAL_ASSESSMENT: 0-10

## 2024-05-28 ASSESSMENT — COGNITIVE AND FUNCTIONAL STATUS - GENERAL
CLIMB 3 TO 5 STEPS WITH RAILING: TOTAL
MOBILITY SCORE: 6
WALKING IN HOSPITAL ROOM: TOTAL
MOVING FROM LYING ON BACK TO SITTING ON SIDE OF FLAT BED WITH BEDRAILS: TOTAL
DRESSING REGULAR UPPER BODY CLOTHING: TOTAL
DRESSING REGULAR LOWER BODY CLOTHING: TOTAL
MOVING FROM LYING ON BACK TO SITTING ON SIDE OF FLAT BED WITH BEDRAILS: TOTAL
DAILY ACTIVITIY SCORE: 7
STANDING UP FROM CHAIR USING ARMS: TOTAL
TURNING FROM BACK TO SIDE WHILE IN FLAT BAD: TOTAL
TOILETING: TOTAL
TURNING FROM BACK TO SIDE WHILE IN FLAT BAD: TOTAL
HELP NEEDED FOR BATHING: TOTAL
MOBILITY SCORE: 6
PERSONAL GROOMING: A LOT
EATING MEALS: TOTAL
WALKING IN HOSPITAL ROOM: TOTAL
MOVING TO AND FROM BED TO CHAIR: TOTAL
STANDING UP FROM CHAIR USING ARMS: TOTAL
CLIMB 3 TO 5 STEPS WITH RAILING: TOTAL
MOVING TO AND FROM BED TO CHAIR: TOTAL

## 2024-05-28 ASSESSMENT — PAIN SCALES - GENERAL
PAINLEVEL_OUTOF10: 0 - NO PAIN

## 2024-05-28 NOTE — CARE PLAN
The patient's goals for the shift include will be safe and free from falls/injury through out the shift    The clinical goals for the shift include Pt will no tarry stool  and platelet count will be >10 by end of shift.    Over the shift, the patient did not make progress toward the following goals. Barriers to progression include  inactive bleeding . Recommendations to address these barriers include monitor platelet count

## 2024-05-28 NOTE — CARE PLAN
Problem: Discharge Barriers  Goal: My discharge needs are met  Outcome: Progressing     Problem: Skin  Goal: Prevent/manage excess moisture  Recent Flowsheet Documentation  Taken 5/28/2024 0107 by Yaron Rodas RN  Prevent/manage excess moisture:   Cleanse incontinence/protect with barrier cream   Moisturize dry skin   Use wicking fabric (obtain order)   Follow provider orders for dressing changes   Monitor for/manage infection if present     Problem: Skin  Goal: Prevent/manage excess moisture  Recent Flowsheet Documentation  Taken 5/28/2024 0107 by Yaron Rodas RN  Prevent/manage excess moisture:   Cleanse incontinence/protect with barrier cream   Moisturize dry skin   Use wicking fabric (obtain order)   Follow provider orders for dressing changes   Monitor for/manage infection if present     Problem: Skin  Goal: Prevent/manage excess moisture  Recent Flowsheet Documentation  Taken 5/28/2024 0107 by Yaron Rodas RN  Prevent/manage excess moisture:   Cleanse incontinence/protect with barrier cream   Moisturize dry skin   Use wicking fabric (obtain order)   Follow provider orders for dressing changes   Monitor for/manage infection if present     Problem: Skin  Goal: Prevent/manage excess moisture  Flowsheets (Taken 5/28/2024 0107)  Prevent/manage excess moisture:   Cleanse incontinence/protect with barrier cream   Moisturize dry skin   Use wicking fabric (obtain order)   Follow provider orders for dressing changes   Monitor for/manage infection if present   The patient's goals for the shift include      The clinical goals for the shift include Patient will not have any active bleeding this shift    Over the shift, the patient did not make progress toward the following goals. Barriers to progression include low platelets. Recommendations to address these barriers include transfuse platelets per order.     "Patient called the clinic asking if Dr. Machado has received her medical records from the [facility]. They were faxed yesterday. Pt expressed her frustration as to why they were not faxed since May since she was referred there. She is requesting Dr. Machado to review her neuro bone scan/molecular breast imaging and diagnosis and address these questions: What are they looking for? Cancer or fibromyalgia? Pt states she is not being given the correct diagnosis or treatment at [facility]. Saying: \"They won't explain anything to me and giving me false documentation.\" She believes she needs an ultrasound asap of the left lymph node which is enlarged and hurts.    Huddled with Dr. Machado who said he will be contacting OBGYN specialist to see the patient.     Pt would like this expedited and she be notified when this is done and the records are received.              "

## 2024-05-28 NOTE — PROGRESS NOTES
Markie Nobles is a 79 y.o. male on day 12 of admission presenting with NSTEMI (non-ST elevated myocardial infarction) (Multi).    Transitional Care Coordination Progress Note:  Patient discussed during interdisciplinary rounds.   Team members present: MD and TCC  Plan per Medical/Surgical team: Thrombocytopenia and bleeding getting worse. IVIG and steroids not working. Platelets in single digits. Waiting on Hematology for recommendations.  Payor: [employer]  Discharge disposition: SNF-[facility]  Potential Barriers: None  ADOD: 05/31/24    BRIAN BRODERICK RN

## 2024-05-28 NOTE — PROGRESS NOTES
Physical Therapy    Physical Therapy Treatment    Patient Name: Markie Nobles  MRN: [id number]  Today's Date: 5/28/2024  Time Calculation  Start Time: 1350  Stop Time: 1417  Time Calculation (min): 27 min    Assessment/Plan   PT Assessment  PT Assessment Results: Decreased strength, Decreased endurance, Impaired balance, Decreased mobility, Decreased coordination, Decreased cognition, Impaired hearing, Impaired vision, Impaired tone, Orthopedic restrictions, Decreased range of motion  Rehab Prognosis: Fair  Evaluation/Treatment Tolerance: Patient tolerated treatment well  Medical Staff Made Aware: Yes  Strengths: Support of Caregivers  Barriers to Participation: Other (Comment) (fatigue)  End of Session Communication: Bedside nurse, Care Coordinator  Assessment Comment: 78 yo male with PNA, Left scaphoid fx (NWB in cast), sacral ulcers, anemia, NSTEMI and thrombocytopenia as wellas hx of MGUS and Parkinsons with dementia presents with significant bradykinesia, weakness, decreased activity tolerance and decreased balance. Recommend moderate intensity therapy as pt needs all 3 therapies, is not at baseline, appears to have good support and was willing to participate.  End of Session Patient Position: Bed, 3 rail up, Alarm on  PT Plan  Inpatient/Swing Bed or Outpatient: Inpatient  PT Plan  Treatment/Interventions: Bed mobility, Transfer training, Gait training, Balance training, Neuromuscular re-education, Strengthening, Endurance training, Range of motion, Therapeutic exercise, Therapeutic activity, Home exercise program, Positioning, Postural re-education  PT Plan: Skilled PT  PT Frequency: 3 times per week  PT Discharge Recommendations: Moderate intensity level of continued care  Equipment Recommended upon Discharge: Other (comment) (TBD at SNF)  PT Recommended Transfer Status: Other (comment) (not yet safe to do)  PT - OK to Discharge: Yes (PT eval completed & DC recs made)      General Visit Information:   PT   Visit  PT Received On: 05/28/24  General  Reason for Referral: 79 y.o M brought to ER on 5/16/2024 for becoming unresponsive and for hypotension at his SNF ([facility]). Admitted for PNA and AOCA. Pt found to have L scaphoid fx.  Pt recently hospitalized (4/25 - 5/1) and treated for aspiration PNA and suspected UTI, sacral ulcer, anemic, NSTEMI, aspiration PNA, possible GIB, thrombocytopenia, anemia  Past Medical History Relevant to Rehab: MGUS, CAD (s/p CABG x3 in 2022), HTN, CKD IV, Anemia, ABEBE, hyperlipidemia, obesity, BPH, gout, NIDDM, HLD, Parkinsons (w/ dementia), dysphagia, s/p peg tube (04/2024), sacral pressure sores  Missed Visit: No  Missed Visit Reason:  (N/A)  Family/Caregiver Present: Yes  Caregiver Feedback: Wife present and very engaged;  and friend also came to visit.  Co-Treatment: OT  Co-Treatment Reason: Pt requires skilled A +2 therapists due to medical and physical complexity  Prior to Session Communication: Bedside nurse, Care Coordinator  Patient Position Received: Bed, 3 rail up, Alarm on  Preferred Learning Style: auditory, verbal  General Comment: Pt supine alert, bradykinetic with movements and replies, flat affect (except when  talking with him). Pt able to come to sit, do static sitting as noted and come partially to stand with A +2 as noted.    Subjective   Precautions:  Precautions  Hearing/Visual Limitations: glasses  UE Weight Bearing Status: Left Non-Weight Bearing (thumb spica splint)  Medical Precautions: Fall precautions (Left scaphoid fx (NWB in cast), PD, dementia, falls, NPO/dsyphagia/PEG, sacral pressure ulcers)  Post-Surgical Precautions: Abdominal surgery precautions  Splinting: Left thumb spica splint  Precautions Comment: per Dr. Swift 5/28:  fine for platform weight bearing through Left elbow, also okay for sling as long as he still ranges the elbow frequently; NWB through wrist for a total of 6 weeks, he will just need to f/u w/ Dr. Agudelo when  discharged  Vital Signs:       Objective   Pain:  Pain Assessment  Pain Assessment: 0-10  Pain Score: 0 - No pain  Pain Interventions: Repositioned, Ambulation/increased activity, Rest, Therapeutic presence, Therapeutic touch  Response to Interventions: comfortable Right sidelying end of session  Cognition:  Cognition  Overall Cognitive Status: Impaired at baseline  Arousal/Alertness: Delayed responses to stimuli  Orientation Level:  (pt not answering orientation questions, but identified and spoke with wife and  clearly)  Following Commands:  (following 75% of simple 1 step commands with repetition, tactile cues)  Problem Solving: Assistance required to identify errors made  Cognition Comments: alert, engaged with encouragement, bradykinetic  Memory: Exceptions to WFL  Problem Solving: Exceptions to WFL  Safety/Judgement: Exceptions to WFL  Task Initiation: Delayed initiation  Processing Speed: Delayed  Coordination:     Postural Control:  Postural Control  Postural Control: Impaired  Head Control: decreased- flexed in sitting and standing (some is fixed, able to hold up partially with max cueing)  Trunk Control: mod A static sit x 12 minutes with Right UE support, Left elbow on pillow  Posture Comment: forward head, rounded shoulders, posterior pelvic tilt, leaning to Right  Static Sitting Balance  Static Sitting-Balance Support: Right upper extremity supported (Left elbow on pillow (NWB wrist))  Static Sitting-Level of Assistance: Moderate assistance (CG to mod A (variable, often mod A))  Static Sitting-Comment/Number of Minutes: 12  Dynamic Sitting Balance  Dynamic Sitting-Balance Support: Feet unsupported, No upper extremity supported (Left elbow on pillow)  Dynamic Sitting-Balance:  (washing his face)  Dynamic Sitting-Comments: mod A for trunk balance  Static Standing Balance  Static Standing-Balance Support: Bilateral upper extremity supported (arm9in-arm (no device, left elbow WB only))  Static  Standing-Level of Assistance: Maximum assistance (+2)  Static Standing-Comment/Number of Minutes: briefly as noted  Dynamic Standing Balance  Dynamic Standing-Balance Support:  (unable)    Activity Tolerance:  Activity Tolerance  Endurance: Tolerates 10 - 20 min exercise with multiple rests  Treatments:  Therapeutic Activity  Therapeutic Activity Performed: Yes  Therapeutic Activity 1: facilitation of bed mobility  Therapeutic Activity 2: sitting balance  Therapeutic Activity 3: transfer training/standing balance    Bed Mobility  Bed Mobility: Yes  Bed Mobility 1  Bed Mobility 1: Rolling right, Rolling left  Level of Assistance 1: Maximum assistance, +2, Moderate tactile cues, Moderate verbal cues  Bed Mobility Comments 1: bradykinetic  Bed Mobility 2  Bed Mobility  2: Supine to sitting, Sitting to supine  Level of Assistance 2: Maximum verbal cues, Maximum tactile cues, Maximum assistance, +2  Bed Mobility Comments 2: HOB elevated 65 degrees, use of drawsheet  Bed Mobility 3  Bed Mobility 3: Scooting (in sitting)  Level of Assistance 3: Maximum verbal cues  Bed Mobility Comments 3: using drawsheet  Bed Mobility 4  Bed Mobility 4: Scooting (supine)  Level of Assistance 4: Dependent, +2 (bed in trendelenberg, use of drawsheet)    Ambulation/Gait Training  Ambulation/Gait Training Performed: No (unable)  Transfers  Transfer: Yes  Transfer 1  Transfer From 1: Sit to, Stand to  Transfer to 1: Sit, Stand  Technique 1: Sit to stand, Stand to sit (4 trials)  Transfer Device 1:  (no device)  Transfer Level of Assistance 1: Maximum assistance, +2  Trials/Comments 1: arm-in-arm, Left UE only WB at shoulder/elbow (not wrist)         Outcome Measures:  [facility] Basic Mobility  Turning from your back to your side while in a flat bed without using bedrails: Total  Moving from lying on your back to sitting on the side of a flat bed without using bedrails: Total  Moving to and from bed to chair (including a wheelchair):  "Total  Standing up from a chair using your arms (e.g. wheelchair or bedside chair): Total  To walk in hospital room: Total  Climbing 3-5 steps with railing: Total  Basic Mobility - Total Score: 6    Education Documentation  No documentation found.  Education Comments  No comments found.          Encounter Problems       Encounter Problems (Active)       General Goals       sit to/from stand, bed to/from chair with WW with Left platform attachment with min A +2 (Progressing)       Start:  05/28/24    Expected End:  06/11/24            ambulate 15' with wheeled walker with Left platform attachment with min A +2 (Not Progressing)       Start:  05/28/24    Expected End:  06/11/24               PT Problem       supine to sit and sit to supine, HOB elevated 45 degrees, use of Right rail (NWB Left UE) with mod Assist x2 (Progressing)       Start:  05/19/24    Expected End:  06/11/24            Pt will demo static sit EOB x 10 min with UE support and SBA, no LOB; also reach with Right UE >6\" out of ROSALIO with CGA, no LOB (Progressing)       Start:  05/19/24    Expected End:  06/11/24            roll either direction (NWB Left UE) with SBA/cues (Progressing)       Start:  05/19/24    Expected End:  06/11/24            supine and sitting LE HEP with min A and cueing (Not Progressing)       Start:  05/19/24    Expected End:  06/11/24                   "

## 2024-05-28 NOTE — PROGRESS NOTES
"Name: Markie Nobles  MRN: [id number]  Encounter Date: 5/28/2024  PCP: Leslie Watson MD    Reason for consult: Thrombocytopenia, Anemia  Attending Provider: Dr. Samuels    Hematology/ Oncology Progress Note    Subjective   Patient resting today. He was given one unit platelets overnight.    Allergies   No Known Allergies    Medications   acetaminophen, 650 mg, q8h  carbidopa-levodopa, 1.5 tablet, 4x daily  [Held by provider] clopidogrel, 75 mg, Daily  finasteride, 5 mg, Daily  insulin lispro, 0-5 Units, TID  menthol-zinc oxide, 1 Application, 4x daily  metoprolol tartrate, 25 mg, Daily  pantoprazole, 40 mg, BID  predniSONE, 1 mg/kg, Daily  romiPLOStim, 3 mcg/kg, Once  tamsulosin, 0.4 mg, Daily         benzonatate, 100 mg, TID PRN  dextrose, 12.5 g, q15 min PRN  dextrose, 25 g, q15 min PRN  glucagon, 1 mg, q15 min PRN        Review of Systems   Review of Systems   10 pt ROS reviewed and negative aside from above    Physical Exam   Blood pressure 144/67, pulse 50, temperature 36.4 °C (97.5 °F), resp. rate 12, height 1.778 m (5' 10\"), weight 62.6 kg (138 lb), SpO2 97%.    General: chronically ill appearing, NAD  Eyes: anicteric  Resp: breathing comfortably   Abd: Soft, NT, ND  Neuro: follows commands, moving all extremities  Skin: no rash     Labs     Lab Results   Component Value Date    GLUCOSE 137 (H) 05/28/2024    CALCIUM 8.3 (L) 05/28/2024     (L) 05/28/2024    K 4.9 05/28/2024    CO2 20 (L) 05/28/2024     05/28/2024     (HH) 05/28/2024    CREATININE 3.54 (H) 05/28/2024       Lab Results   Component Value Date    WBC 9.9 05/28/2024    HGB 9.1 (L) 05/28/2024    HCT 26.5 (L) 05/28/2024    MCV 88 05/28/2024    PLT 2 (LL) 05/28/2024       Lab Results   Component Value Date    ALT 22 05/24/2024    AST 34 05/24/2024    ALKPHOS 141 (H) 05/24/2024    BILITOT 0.4 05/24/2024       Imaging   Reviewed.     Assessment/Plan   Markie Nobles is a 79 y.o. male with a history of MGUS, CAD (s/p CABG x3 in 2022), " HTN, CKD IV, Anemia, sickle cell trait, ABEBE, hyperlipidemia, obesity, BPH, gout, NIDDM, HLD, Parkinsons (w/ dementia), dysphagia, s/p peg tube (04/2024), sacral pressure sores, s/p recent hospitalization for aspiration PNA  (5/1/24) and now admitted for and being treated for aspiration pneumonia. Hematology is consulted for anemia and thrombocytopenia.     Peripheral Smear Reviewed 5/21  WBCs: unremarkable  RBCs: Normochronic, anisocytosis, <1 RBC frag/hpf  Plt: reduced in number, no clumping    Patient's thrombocytopenia initially thought to be multifactorial from infection and antibiotic use. The acute component of his anemia is likely from the same. The chronic portion is from anemia of chronic disease and CKD. He is on EPO as an outpatient. No e/o hemolysis by smear or labs. DIC is not supported. Folate, B12 nl. No hepatomegaly, liver disease or splenomegaly. Hepatitis B, C, HIV negative. He has not been exposed to heparin, which excludes HIT.     Worsening thrombocytopenia to the single digits suggests an immune thrombocytopenia, which may be drug-induced (meropenem vs. Vancomycin). On 5/23, discussed the risks and benefits of IVIG with patient and his wife Bree Nobles who as his POA and decision maker provided consent for IVIG.     His platelet count is not improving and his hemoglobin is down trending, which is likely from an acute GI bleed given melena. Given lack of improvement in platelet count from dexamethasone, IVIG, and plt transfusions, suspicion for post-transfusion purpura is raised given pRBC transfusion 5/16 (vs refractory ITP). If this did represent dITP, we would expect counts to be improving following at least 5 days of cessation of meropenem and vancomycin.     Will plan to start romiplostim today. Patient will benefit from a bone marrow biopsy at some point for further evaluation.     Recommend:  - can give platelets to target a platelet count > 50k, but may unable to reach this given ITP;  he will need immediate post-platelet transfusion counts checked within one hour of platelet tranfusions. If he increments appropriately could consider given several units of platelets and then performing endoscopic intervention if needed  - trend CBC at least daily  - s/p dexamethasone 40mg IV daily for 4 days 5/23-5/26; please continue prednisone 1 mg/kg/day  - s/p  IVIG 400mg/kg daily for 5 days (5/23-5/27)  - start romiplostim 3mcg/kg weekly (ordered 5/28)   - will plan for bone marrow biopsy in the next several days pending availability and patient cooperation   - follow up drug-dependent anti-platelet antibodies; until these return, please refrain from using meropenem or vancomycin     Thank you for this consult, we will continue to follow. Pt seen and discussed with Dr. Kimble.    Jaycob Eddy MD  Hematology- Oncology Fellow, PGY-5  Hematology Consult Pager: [phone]

## 2024-05-28 NOTE — PROGRESS NOTES
Occupational Therapy    Occupational Therapy Treatment    Name: Markie Nobles  MRN: [id number]  : 1945  Date: 24  Room: [address] [place] [address]    Time Calculation  Start Time: 1352  Stop Time: 1418  Time Calculation (min): 26 min    Assessment:  OT Assessment: Pt able to perform functional mobility with MaxAx2 to sit EOB for ~12 minutes requiring ModAx1 to maintain sitting. Pt required VCs for guidance, education, and to initiate tasks. Pt often required repeated verbal and tactile cues to respond to stimuli. Overall, patient continues to benefit from skilled OT services at a MOD intensity.  Prognosis: Fair  Barriers to Discharge: None  Evaluation/Treatment Tolerance: Other (Comment) (weakness)  Medical Staff Made Aware: Yes  End of Session Communication: Bedside nurse  End of Session Patient Position: Bed, 3 rail up, Alarm on  Plan:  Treatment Interventions: ADL retraining, Functional transfer training, Endurance training, UE strengthening/ROM, Cognitive reorientation, Equipment evaluation/education, Compensatory technique education  OT Frequency: 3 times per week  OT Discharge Recommendations: Moderate intensity level of continued care  Equipment Recommended upon Discharge: Other (comment) (TBD at SNF)  OT Recommended Transfer Status: Assist of 2  OT - OK to Discharge: Yes    Subjective   General:  OT Last Visit  OT Received On: 24  Reason for Referral: 79 y.o M brought to ER on 2024 for becoming unresponsive and for hypotension at his SNF ([facility]). Admitted for PNA and AOCA. Pt found to have L scaphoid fx.  Pt recently hospitalized ( - ) and treated for aspiration PNA and suspected UTI, sacral ulcer, anemic, NSTEMI, aspiration PNA, possible GIB, thrombocytopenia, anemia  Past Medical History Relevant to Rehab: MGUS, CAD (s/p CABG x3 in ), HTN, CKD IV, Anemia, ABEBE, hyperlipidemia, obesity, BPH, gout, NIDDM, HLD, Parkinsons (w/ dementia), dysphagia, s/p peg tube (2024), sacral  pressure sores  Co-Treatment: PT  Co-Treatment Reason: Expedite discharge planning and pt requires skilled A +2 therapists due to medical and physical complexity  Prior to Session Communication: Bedside nurse  Patient Position Received: Bed, 3 rail up, Alarm on  Family/Caregiver Present: Yes  Caregiver Feedback: Wife present and very engaged;  and friend also came to visit.  General Comment: Pt supine in bed with wife present in room upon arrival. Pt agreeable to OT although hesitant. Pt overall with flat affect and required repeated commands and cues to respond to stimuli.   Precautions:  Hearing/Visual Limitations: glasses  UE Weight Bearing Status: Left Non-Weight Bearing  Medical Precautions: Fall precautions (Left scaphoid fx (NWB in cast), PD, dementia, falls, NPO/dsyphagia/PEG, sacral pressure ulcers)  Post-Surgical Precautions: Abdominal surgery precautions  Splinting: L forearm/hand cast  Vitals:     Lines/Tubes/Drains:  Gastrostomy/Enterostomy PEG-jejunostomy LUQ (Active)   Number of days: 29       External Urinary Catheter Male (Active)   Number of days: 33     Cognition:  Overall Cognitive Status: Impaired at baseline  Arousal/Alertness: Delayed responses to stimuli  Orientation Level: Other (Comment) (Pt not responding to orientation questions but responsive to wife, , friend in room)  Following Commands: Follows one step commands with repetition (~75% of simple one step commands with repetition)  Problem Solving: Assistance required to identify errors made  Cognition Comments: Engaged throughout most of Tx but required a fair amount of repeated cues to maintain focus or respond  Attention: Exceptions to WFL  Alternating Attention: Impaired  Sustained Attention: Impaired  Memory: Exceptions to WFL  Short-Term Memory: Impaired  Problem Solving: Exceptions to WFL  Safety/Judgement: Exceptions to WFL  Insight: Moderate  Impulsive: Within functional limits  Task Initiation: Delayed  initiation  Planning: Reduced planning skills  Processing Speed: Delayed    Pain Assessment:  Pain Assessment  Pain Assessment: 0-10  Pain Score: 0 - No pain     Objective   Activities of Daily Living:      Grooming  Grooming Comments: Pt able to wash face while seated EOB with SBA/setupA  and Mod VCs for guidance and encouragement    Functional Standing Tolerance:  Functional Mobility  Functional Mobility Performed: No  Bed Mobility/Transfers:     Bed Mobility  Bed Mobility: Yes  Bed Mobility 1  Bed Mobility 1: Rolling right, Rolling left  Level of Assistance 1: Maximum assistance, +2, Moderate tactile cues, Moderate verbal cues  Bed Mobility 2  Bed Mobility  2: Supine to sitting, Sitting to supine  Level of Assistance 2: Maximum assistance, +2, Maximum verbal cues, Maximum tactile cues  Bed Mobility Comments 2: HOB elevated, VCs for guidance, drawsheet  Bed Mobility 3  Bed Mobility 3: Scooting  Level of Assistance 3: Maximum assistance  Bed Mobility Comments 3: Use of drawsheet  Bed Mobility 4  Bed Mobility 4: Scooting (Boost towards HOB)  Level of Assistance 4: Dependent, +2, Minimal verbal cues    Transfers  Transfer: Yes  Transfer 1  Transfer From 1: Bed to, Stand to  Transfer to 1: Stand, Bed  Technique 1: Sit to stand, Stand to sit  Transfer Device 1:  (Arm in arm x2)  Transfer Level of Assistance 1: Maximum assistance, +2  Trials/Comments 1: Arm in arm MaxAx2 to partially stand x4 trials from EOB (LUE supported at shoulder)    Balance:  Static Sitting Balance  Static Sitting-Comment/Number of Minutes: ModAx1 to maintain upright sitting, pt retropulsive without assistance. Sat EOB for ~ 12 minutes during balance training and functional mobility training. Cues for R hand placement and verbal reminders to support self in sitting    Therapy/Activity:      Therapeutic Activity  Therapeutic Activity Performed: Yes  Therapeutic Activity 1: Functional mobility training as noted- functional transfer training, sitting  balance training, bed mobility training all requiring ModAx1-MaxAx2 with VCs for guidance and education     Outcome Measures:  [facility] Daily Activity  Putting on and taking off regular lower body clothing: Total  Bathing (including washing, rinsing, drying): Total  Putting on and taking off regular upper body clothing: Total  Toileting, which includes using toilet, bedpan or urinal: Total  Taking care of personal grooming such as brushing teeth: A lot  Eating Meals: Total  Daily Activity - Total Score: 7     Education Documentation  Body Mechanics, taught by Irvin Martins, OT at 5/28/2024  3:27 PM.  Learner: Patient  Readiness: Acceptance  Method: Explanation, Demonstration  Response: Needs Reinforcement    Precautions, taught by Irvin Martins OT at 5/28/2024  3:27 PM.  Learner: Patient  Readiness: Acceptance  Method: Explanation, Demonstration  Response: Needs Reinforcement    ADL Training, taught by Irvin Martins OT at 5/28/2024  3:27 PM.  Learner: Patient  Readiness: Acceptance  Method: Explanation, Demonstration  Response: Needs Reinforcement    Education Comments  No comments found.      Goals:  Encounter Problems       Encounter Problems (Active)       ADLs       Patient will complete daily grooming tasks brushing teeth and washing face/hair with moderate assist level of assistance and PRN adaptive equipment while supported sitting. (Progressing)       Start:  05/19/24    Expected End:  06/02/24               COGNITION/SAFETY       Patient will follow 75% Simple commands to allow improved ADL performance. (Progressing)       Start:  05/19/24    Expected End:  06/02/24               TRANSFERS       Patient will perform bed mobility moderate assist level of assistance and bed rails and draw sheet in order to improve safety and independence with mobility (Progressing)       Start:  05/19/24    Expected End:  06/02/24            Patient will complete sit to stand transfer with moderate assist  level of assistance in order to improve safety and prepare for out of bed mobility. (Progressing)       Start:  05/19/24    Expected End:  06/02/24 05/28/24 at 3:27 PM   Irvin Martins, OT   [phone]

## 2024-05-28 NOTE — PROGRESS NOTES
"Markie Nobles is a 79 y.o. male on day 12 of admission presenting with NSTEMI (non-ST elevated myocardial infarction) (Multi).    Subjective   Pt had bleeding from left arm phlebotomy site overnight which stopped with pressure. Pt was then given 1 unit PLT. CBC 1 hr after transfusion with Hgb 7.1 (from 9.2 in AM) and PLT 3 (from 2 in AM). Denies any fever, chills, chest pain, shortness of breath, abdominal pain, or other new issues. No other sites of obvious bleeding per patient.        Objective     Physical Exam  GEN: cachectic  HEENT: AT/NC  CARDIO: RRR, normal S1 and S2  PULM: clear to auscultation bilaterally  ABD: Soft, NT/ND, bowel sounds +, PEG in place  EXTR: Warm/well perfused; left wrist has cast in place  NEURO: strength grossly intact in bilateral UE and LE, Aox2 (knows Name, City, Year; said month is \"March\")   PSYCH: Appropriate mood/affect    Last Recorded Vitals  Blood pressure 155/79, pulse (!) 48, temperature 36.4 °C (97.5 °F), resp. rate 18, height 1.778 m (5' 10\"), weight 62.6 kg (138 lb), SpO2 100%.  Intake/Output last 3 Shifts:  I/O last 3 completed shifts:  In: 1978.6 (31.6 mL/kg) [Blood:1188.6; NG/GT:790]  Out: 950 (15.2 mL/kg) [Urine:950 (0.4 mL/kg/hr)]  Weight: 62.6 kg     Relevant Results  Results for orders placed or performed during the hospital encounter of 05/16/24 (from the past 24 hour(s))   Magnesium   Result Value Ref Range    Magnesium 2.04 1.60 - 2.40 mg/dL   Renal Function Panel   Result Value Ref Range    Glucose 112 (H) 74 - 99 mg/dL    Sodium 133 (L) 136 - 145 mmol/L    Potassium 5.1 3.5 - 5.3 mmol/L    Chloride 104 98 - 107 mmol/L    Bicarbonate 17 (L) 21 - 32 mmol/L    Anion Gap 17 10 - 20 mmol/L    Urea Nitrogen 144 (HH) 6 - 23 mg/dL    Creatinine 3.42 (H) 0.50 - 1.30 mg/dL    eGFR 17 (L) >60 mL/min/1.73m*2    Calcium 8.7 8.6 - 10.6 mg/dL    Phosphorus 5.1 (H) 2.5 - 4.9 mg/dL    Albumin 2.8 (L) 3.4 - 5.0 g/dL   CBC and Auto Differential   Result Value Ref Range    WBC " 8.3 4.4 - 11.3 x10*3/uL    nRBC 0.0 0.0 - 0.0 /100 WBCs    RBC 3.17 (L) 4.50 - 5.90 x10*6/uL    Hemoglobin 9.2 (L) 13.5 - 17.5 g/dL    Hematocrit 29.0 (L) 41.0 - 52.0 %    MCV 92 80 - 100 fL    MCH 29.0 26.0 - 34.0 pg    MCHC 31.7 (L) 32.0 - 36.0 g/dL    RDW 14.4 11.5 - 14.5 %    Platelets 2 (LL) 150 - 450 x10*3/uL    Neutrophils % 86.5 40.0 - 80.0 %    Immature Granulocytes %, Automated 0.7 0.0 - 0.9 %    Lymphocytes % 11.0 13.0 - 44.0 %    Monocytes % 1.7 2.0 - 10.0 %    Eosinophils % 0.0 0.0 - 6.0 %    Basophils % 0.1 0.0 - 2.0 %    Neutrophils Absolute 7.17 (H) 1.60 - 5.50 x10*3/uL    Immature Granulocytes Absolute, Automated 0.06 0.00 - 0.50 x10*3/uL    Lymphocytes Absolute 0.91 0.80 - 3.00 x10*3/uL    Monocytes Absolute 0.14 0.05 - 0.80 x10*3/uL    Eosinophils Absolute 0.00 0.00 - 0.40 x10*3/uL    Basophils Absolute 0.01 0.00 - 0.10 x10*3/uL   EBV Screen (VCA IgG/IgM)   Result Value Ref Range    EBV VCA, IgG  Positive (A) Negative    EBV VCA, IgM  Negative Negative   EBV Early Antigen Antibody, IgG   Result Value Ref Range    EBV Early Antigen Antibody, IgG Positive (A) Negative   Tae-Barr Virus Nuclear Antigen Antibody, IgG   Result Value Ref Range    EBV Nuclear Antigen Antibody, IgG Positive (A) Negative   POCT GLUCOSE   Result Value Ref Range    POCT Glucose 106 (H) 74 - 99 mg/dL   POCT GLUCOSE   Result Value Ref Range    POCT Glucose 121 (H) 74 - 99 mg/dL   Prepare Platelets: 1 Units   Result Value Ref Range    PRODUCT CODE [id number]     Unit Number [id number]     Unit ABO A     Unit RH POS     Dispense Status TR     Blood Expiration Date May 27, 2024 23:59 EDT     PRODUCT BLOOD TYPE 6200     UNIT VOLUME 262    CBC   Result Value Ref Range    WBC 10.6 4.4 - 11.3 x10*3/uL    nRBC 0.0 0.0 - 0.0 /100 WBCs    RBC 2.37 (L) 4.50 - 5.90 x10*6/uL    Hemoglobin 7.1 (L) 13.5 - 17.5 g/dL    Hematocrit 20.6 (L) 41.0 - 52.0 %    MCV 87 80 - 100 fL    MCH 30.0 26.0 - 34.0 pg    MCHC 34.5 32.0 - 36.0 g/dL    RDW  14.0 11.5 - 14.5 %    Platelets 3 (LL) 150 - 450 x10*3/uL   POCT GLUCOSE   Result Value Ref Range    POCT Glucose 151 (H) 74 - 99 mg/dL     Scheduled medications  acetaminophen, 650 mg, oral, q8h  carbidopa-levodopa, 1.5 tablet, oral, 4x daily  [Held by provider] clopidogrel, 75 mg, oral, Daily  finasteride, 5 mg, oral, Daily  insulin lispro, 0-5 Units, subcutaneous, TID  menthol-zinc oxide, 1 Application, Topical, 4x daily  metoprolol tartrate, 25 mg, oral, Daily  pantoprazole, 40 mg, intravenous, BID  predniSONE, 1 mg/kg, oral, Daily  tamsulosin, 0.4 mg, oral, Daily      Continuous medications     PRN medications  PRN medications: benzonatate, dextrose, dextrose, diphenhydrAMINE, glucagon, glucagon      Assessment and Plan:   79 year old Male w/ a PMHx MGUS, CAD (s/p CABG x3 in 2022), HTN, CKD IV, Anemia, ABEBE, hyperlipidemia, obesity, BPH, gout, NIDDM, HLD, Parkinsons (w/ dementia), sickle cell trait, dysphagia, s/p peg tube (04/2024), sacral pressure sores, s/p hospitalization for aspiration PNA (DC'd 5/1/24) initially admitted for PNA. Patient found to have lethargy and mild leukocytosis (WBC 10), likely infectious etiology. CT shows b/l lower lung findings suggestive of aspiration PNA, and given the patient's recent admission, this seems like the likely source of his infection. Additional sources include his sacral ulcer (not visualized) vs. PEG tube site (low suspicion) vs. UTI (ruled out w/ negative UA). He was found to be anemic and given 1x unit of blood transfusion with appropriate increment. BUN very elevated as well and GI was consulted for concerns of GI bleed from the PEG. 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 Patient's left wrist was swollen as well. Xray left wrist was obtained and showed scaphoid fracture. Ortho was consulted for this. Developed worsening anemia and thrombocytopenia on 5/20 after free water  flushes increased and plavix resumed. Has had persistent thrombocytopenia despite course of IVIG and steroids. Suspecting post-transfusion purpura vs drug-induced ITP 2/2 abx.      Update 5/28:   - GI with no plan to scope as PLT <50k, believe that GI bleed may be from spontaneous oozing in setting of very low PLT. Believe this will stop spontaneously after improvement in PLT.   - Heme suspecting post-transfusion purpura (less likely drug-induced ITP). Would need antigen-negative RBC/PLT if required.   - Will not give PLT unless life-threatening bleeding. Keep Hgb > 6.   - Received 1 unit PLT overnight in setting of bleeding from left arm phlebotomy site.  - Midnight CBC 1 hr post-PLT transfusion with Hgb 7.1 (from 9.2 in AM) and PLT 3 (from 2 in AM).  - PVR yesterday 17 ml   - Finished 5 day course of IVIG yesterday   - Switched from Dex to Pred 62.5 mg PO daily (1 mg/kg/day) yesterday per Heme   - Pending abdominal/spleen (for thrombocytopenia) and renal US (for WARREN)   - Dispo planning: PT recommending SNF   - Today's notable labs: Cr 3.54 (from 3.42) however eGFR stable at 17;  (from 144); Phos 5.3 (from 5.1); Na 132 (from 133); PLT 2 today from 3 yesterday evening.    - Pending lab workup: drug-dependent anti-PLT Ab, CMV PCR, ADAMTS-13, SPEP/UPEP, fibrin split products  - Newly resulted labs: EBV nuclear antigen IgG (+), EBV early antigen IgG (+), EBV VCA IgG (+), EBV VCA IgM (-), Immunoglobulin free LT chain kappa:lambda ratio 1.33 (same as prior 4/17/24).       # Thrombocytopenia  :: Ddx: Post-transfusion purpura vs drug-induced ITP 2/2 abx   :: Heme does not believe this is a consumptive process. No hepatomegaly, liver disease or splenomegaly. He has not been exposed to heparin, which excludes HIT.   :: S/p 1 unit PLT on 5/18, 5/25, 5/26, 5/27  :: Lab workup (completed): Hep B/C and HIV negative for acute infection. Parvovirus IgG (+) but PCR (-). EBV nuclear antigen IgG (+), EBV early antigen IgG (+),  EBV VCA IgG (+), but EBV VCA IgM (-). Immunoglobulin free LT chain kappa:lambda ratio 1.33 (same as prior 4/17/24).   - Heme following   - Should give antigen-negative RBC/PLT if blood products required.  - AVOID giving PLT unless life-threatening bleeding   - S/p IVIG (5/23 - 5/27) - 5 days   - S/p Dex (5/23 - 5/26) - 4 days   - Cont. Pred 62.5 mg PO/day (1 mg/kg/day) (5/27 - )  - Pending abdominal (spleen) US    # Normocytic anemia, acute on chronic   # Sickle Cell Trait  :: Anemia d/t anemia of chronic disease and CKD with acute portion likely d/t recent infection +/- abx usage. Also likely d/t GI bleed given melena on 5/25-5/26 (likely slow oozing in s/o very low PLT)  :: No e/o hemolysis by smear or labs. DIC is not supported per heme.   :: Follows with heme outpatient: Rosanne Casal, APRN for MGUS and anemia of CKD - per her last evaluation the kappa light chain elevation is mild and stable and patient was continued on Procrit 10k units q2 weeks. On EPO as an outpatient.   :: Hgb and platelets started down trending after free water flushes increased and also restarted plavix on 5/20  :: S/p 1 unit RBC in ED on 5/16 and 5/26   - Maintain two large bore IV, active T+S  - Daily CBC (Keep Hgb > 6)     #WARREN on CKD IV  :: Cr 3.61 - baseline 3.1   :: FeNa 2.6% (intrinsic)- suspect d/t recent abx usage. PVR 17 ml.   - Pending renal US      # CAD (S/p CABG x3 in 2022)  # Troponinemia  :: Troponin I  -> 516 w/out EKG changes, likely Type II MI  - HOLD home Clopidogrel 75mg daily in setting of thrombocytopenia   - Continue home Metoprolol tartrate 25mg daily (unclear why patient is on this dose frequency, however it populated in prior cardiologist note)     # Severe Protein Calorie Malnutrition  # Dysphagia s/p PEG tube 4/2024  - Tube feeds with Nepro goal 35cc/hr, free water flushes 150 ml q4h     # Upper GI bleed  :: Most likely d/t mucosal disruption of PEG tube initially; later developed melena with Hgb drop on  5/25 in setting of very low PLT<5 (likely slow oozing from low PLT per GI)   :: 2019 EGD/Colonoscopy - friable gastric mucosal atrophy, duodenal erythema, and diverticulosis  - Re-engaged GI on 5/26 for melena - do not feel strongly about scoping given high risk of further trauma and significantly low platelets; pt currently HDS; believe pt may be bleeding due to how low platelets are.   - PPI IV BID in setting of melena     # Aspiration PNA (treated)   :: WBC 10 on admission (up from 7.9 on 5/1)  :: CT Chest: Tree-in-bud b/l lower lungs and secretions in trachea. C/f acute/chronic aspiration and/or PNA  :: Low concern for systemic infection (afebrile, HDS), urine strep/legionella (-)  :: S/p Vanc/Zosyn in ED  - S/p Vancomycin:  (5/16-5/17)  - S/p Meropenem 1g q12 (renal dosing): (5/16-5/22)  - S/p Augmentin (5/22-5/25)  - Aspiration precautions    # Steroid induced hyperglycemia  Started on steroids on 5/23 for possible ITP. Will place patient on sliding scale for as long as is on steroids.  - SSI (started 5/24) remove when patient stops steroids     # AMS (resolved)  # Parkinson's Disease w/ Dementia  :: Suspect AMS/lethargy due to PNA +/- anemia   :: Mentation improving after blood transfusion and abx  ::  MRI brain obtained on 5/17 demonstrated old vascular lesions but also ventricular dilation possibly consistent with NPH per radiology read  - Continue home Carbidopa-Levodopa 1.5tab QID     # BPH  - Continue home Finasteride 5mg daily  - Continue home Flomax 0.4mg daily     # Scaphoid Fracture  :: Xrays of left wrist obtained  - Ortho consulted  - Splint provided for L scaphoid (moderately displaced) fracture, pt will followup with ortho on 6/11 at 3:15PM with Dr. Agudelo (appointment scheduled)     # Sacral Ulcer  - Wound care c/s        F: PRN  E: PRN  N: Tube feeds via PEG  A: PIV     GI ppx: PPI IV BID  DVT ppx: holding in setting of thrombocytopenia      Code Status: DNR/DNI (per palliative care  discussion on 5/17)  NOK: Bree Giuliano (Wife) - [phone]

## 2024-05-28 NOTE — PROGRESS NOTES
Occupational Therapy                 Therapy Communication Note    Patient Name: Markie Nobles  MRN: [id number]  Today's Date: 5/28/2024     Discipline: Occupational Therapy    Missed Visit Reason: Missed Visit Reason: Other (Comment) (Pt off floor at this time. Will reattempt as schedule permits.)    Missed Time: Attempt

## 2024-05-28 NOTE — CONSULTS
"Nutrition Follow Up Assessment:   Nutrition Assessment    Reason for Assessment:  (Follow up)    Patient is a 79 y.o. male presenting with NSTEMI     PMHx MGUS, CAD (s/p CABG x3 in 2022), HTN, CKD IV, Anemia, ABEBE, hyperlipidemia, obesity, BPH, gout, NIDDM, HLD, Parkinsons (w/ dementia), sickle cell trait, dysphagia, s/p peg tube (04/2024), sacral pressure sores, s/p hospitalization for aspiration PNA (DC'd 5/1/24) initially admitted for PNA.     Nutrition History:  Food and Nutrient History: Patient is NPO with Nepro infusing @ 35ml/hr upon visit this morning = 1512kcal, 68g protein, 610ml H20. Water flushes @ 150ml every 4 hrs provide an additional 900ml H20/d.  No s/s of intolerance per patient or wife at bedside.  Food Allergies/Intolerances:  None  Oral Problems: Swallowing difficulty       Anthropometrics:  Height: 177.8 cm (5' 10\")   Weight: 62.6 kg (138 lb)   BMI (Calculated): 19.8  IBW/kg (Dietitian Calculated): 75.5 kg  Percent of IBW: 83 %       Weight History:   Wt Readings from Last 21 Encounters:   05/16/24 62.6 kg (138 lb)   05/01/24 62.6 kg (138 lb 0.1 oz)   03/07/24 59.9 kg (132 lb)   02/26/24 59.4 kg (131 lb)   02/26/24 60.2 kg (132 lb 11.5 oz)   02/12/24 59.9 kg (132 lb)   01/29/24 63.1 kg (139 lb 3.2 oz)   01/24/24 59.9 kg (132 lb)   01/23/24 59.9 kg (132 lb)   01/12/24 60 kg (132 lb 3.2 oz)   12/14/23 59.6 kg (131 lb 4.8 oz)   11/20/23 62 kg (136 lb 9.6 oz)   11/15/23 59.9 kg (132 lb)   10/31/23 60.2 kg (132 lb 11.5 oz)   10/18/23 66 kg (145 lb 6.4 oz)   10/17/23 64.8 kg (142 lb 13.7 oz)   10/03/23 66.9 kg (147 lb 7.8 oz)   07/17/23 66.7 kg (147 lb)   07/10/23 68.5 kg (151 lb)   06/08/23 69.1 kg (152 lb 6.4 oz)   04/25/23 66.7 kg (147 lb)       Weight Change %:  Weight History / % Weight Change: 5/16/24) 62.6kg, 5/28/24) no new weight available    Nutrition Focused Physical Exam Findings:  Subcutaneous Fat Loss:    severe  Muscle Wasting:   severe  Edema:  Edema Location: documented as " generalized  Physical Findings:  Skin: Positive (brusing and sacral wound)    Nutrition Significant Labs:  CBC Trend:   Results from last 7 days   Lab Units 05/28/24  0649 05/28/24  0009 05/27/24  0809 05/26/24 2032   WBC AUTO x10*3/uL 9.9 10.6 8.3 11.7*   RBC AUTO x10*6/uL 3.00* 2.37* 3.17* 2.93*   HEMOGLOBIN g/dL 9.1* 7.1* 9.2* 8.5*   HEMATOCRIT % 26.5* 20.6* 29.0* 27.7*   MCV fL 88 87 92 95   PLATELETS AUTO x10*3/uL 2* 3* 2* 17*    , Renal Lab Trend:   Results from last 7 days   Lab Units 05/28/24  0649 05/27/24  0809 05/26/24  0754 05/25/24  1544   POTASSIUM mmol/L 4.9 5.1 5.0 5.1   PHOSPHORUS mg/dL 5.3* 5.1* 4.1 3.9   SODIUM mmol/L 132* 133* 133* 137   MAGNESIUM mg/dL 2.13 2.04 2.07  --    EGFR mL/min/1.73m*2 17* 17* 16* 16*   BUN mg/dL 156* 144* 139* 122*   CREATININE mg/dL 3.54* 3.42* 3.61* 3.61*        Nutrition Specific Medications:  Scheduled medications  acetaminophen, 650 mg, oral, q8h  carbidopa-levodopa, 1.5 tablet, oral, 4x daily  [Held by provider] clopidogrel, 75 mg, oral, Daily  finasteride, 5 mg, oral, Daily  insulin lispro, 0-5 Units, subcutaneous, TID  menthol-zinc oxide, 1 Application, Topical, 4x daily  metoprolol tartrate, 25 mg, oral, Daily  pantoprazole, 40 mg, intravenous, BID  predniSONE, 1 mg/kg, oral, Daily  tamsulosin, 0.4 mg, oral, Daily      I/O:   Last BM Date: 05/27/24; Stool Appearance: Loose (05/27/24 5884)    Dietary Orders (From admission, onward)       Start     Ordered    05/27/24 1024  Enteral feeding with NPO Nepro; PEG (percutaneous endoscopic gastric); 35; 150; Water; Tap water; Every 4 hours  Diet effective now        Comments: Please start at 10 ml/hr and then increase by 10 ml per hour every 4 hrs until at goal.   Question Answer Comment   Tube feeding formula: Nepro    Feeding route: PEG (percutaneous endoscopic gastric)    Tube feeding continuous rate (mL/hr): 35    Tube feeding flush (mL): 150    Flush type: Water    Water type: Tap water    Flush frequency: Every  4 hours        05/27/24 1027                     Estimated Needs:   Total Energy Estimated Needs (kCal): 1800 kCal  Method for Estimating Needs: 30kcal/kg  Total Protein Estimated Needs (g):  (~65-75)  Method for Estimating Needs: ~1.0-1.2 gm/kg using acutal BW 62.6 kg        Nutrition Diagnosis   Malnutrition Diagnosis  Patient has Malnutrition Diagnosis: Yes  Diagnosis Status: Ongoing  Malnutrition Diagnosis: Severe malnutrition related to chronic disease or condition  As Evidenced by: areas of severe muscle wasting and adipose loss visually evident on exam, BMI 19.8 and 83% ideal/desired BW            Nutrition Interventions/Recommendations         Nutrition Prescription:  Individualized Nutrition Prescription Provided for :     Recommend   1) Increase Nepro to new goal of 40ml/hr to provide 1728kcal, 78g protein, ~700ml H20/d.    2) Additional fluids per provider discretion.   3) Obtain weight.           Nutrition Monitoring and Evaluation   Food/Nutrient Related History Monitoring  Enteral and Parenteral Nutrition Intake: Enteral nutrition formula/solution  Criteria: TF tolerance    Body Composition/Growth/Weight History  Monitoring and Evaluation Plan: Weight  Criteria: Promote gradual gain    Biochemical Data, Medical Tests and Procedures  Monitoring and Evaluation Plan: Electrolyte/renal panel  Criteria: wnl    Nutrition Focused Physical Findings  Monitoring and Evaluation Plan: Skin  Criteria: Promote healing       Time Spent/Follow-up Reminder:   Time Spent (min): 60 minutes  Last Date of Nutrition Visit: 05/28/24  Nutrition Follow-Up Needed?: Dietitian to reassess per policy  Follow up Comment: Rec Nepro @ 40ml/hr

## 2024-05-29 PROBLEM — D69.6 THROMBOCYTOPENIA (CMS-HCC): Status: ACTIVE | Noted: 2024-05-29

## 2024-05-29 PROBLEM — J18.9 PNEUMONIA: Status: ACTIVE | Noted: 2024-05-29

## 2024-05-29 LAB
ALBUMIN SERPL BCP-MCNC: 2.4 G/DL (ref 3.4–5)
ANION GAP SERPL CALC-SCNC: 13 MMOL/L (ref 10–20)
APTT PPP: 27 SECONDS (ref 27–38)
BASOPHILS # BLD AUTO: 0.01 X10*3/UL (ref 0–0.1)
BASOPHILS NFR BLD AUTO: 0.1 %
BUN SERPL-MCNC: 168 MG/DL (ref 6–23)
CALCIUM SERPL-MCNC: 8.1 MG/DL (ref 8.6–10.6)
CHLORIDE SERPL-SCNC: 107 MMOL/L (ref 98–107)
CO2 SERPL-SCNC: 20 MMOL/L (ref 21–32)
CREAT SERPL-MCNC: 3.65 MG/DL (ref 0.5–1.3)
EGFRCR SERPLBLD CKD-EPI 2021: 16 ML/MIN/1.73M*2
EOSINOPHIL # BLD AUTO: 0.02 X10*3/UL (ref 0–0.4)
EOSINOPHIL NFR BLD AUTO: 0.3 %
ERYTHROCYTE [DISTWIDTH] IN BLOOD BY AUTOMATED COUNT: 14.1 % (ref 11.5–14.5)
FSP PPP-MCNC: < 5 UG/ML
GLUCOSE BLD MANUAL STRIP-MCNC: 133 MG/DL (ref 74–99)
GLUCOSE BLD MANUAL STRIP-MCNC: 135 MG/DL (ref 74–99)
GLUCOSE BLD MANUAL STRIP-MCNC: 146 MG/DL (ref 74–99)
GLUCOSE BLD MANUAL STRIP-MCNC: 69 MG/DL (ref 74–99)
GLUCOSE SERPL-MCNC: 108 MG/DL (ref 74–99)
HCT VFR BLD AUTO: 24.9 % (ref 41–52)
HGB BLD-MCNC: 8.6 G/DL (ref 13.5–17.5)
IMM GRANULOCYTES # BLD AUTO: 0.06 X10*3/UL (ref 0–0.5)
IMM GRANULOCYTES NFR BLD AUTO: 0.8 % (ref 0–0.9)
INR PPP: 1.1 (ref 0.9–1.1)
LYMPHOCYTES # BLD AUTO: 1.27 X10*3/UL (ref 0.8–3)
LYMPHOCYTES NFR BLD AUTO: 16.9 %
MAGNESIUM SERPL-MCNC: 2.06 MG/DL (ref 1.6–2.4)
MCH RBC QN AUTO: 30.5 PG (ref 26–34)
MCHC RBC AUTO-ENTMCNC: 34.5 G/DL (ref 32–36)
MCV RBC AUTO: 88 FL (ref 80–100)
MONOCYTES # BLD AUTO: 1.12 X10*3/UL (ref 0.05–0.8)
MONOCYTES NFR BLD AUTO: 14.9 %
NEUTROPHILS # BLD AUTO: 5.04 X10*3/UL (ref 1.6–5.5)
NEUTROPHILS NFR BLD AUTO: 67 %
NRBC BLD-RTO: 0 /100 WBCS (ref 0–0)
PHOSPHATE SERPL-MCNC: 4.7 MG/DL (ref 2.5–4.9)
PLATELET # BLD AUTO: 5 X10*3/UL (ref 150–450)
POTASSIUM SERPL-SCNC: 4.5 MMOL/L (ref 3.5–5.3)
PROTHROMBIN TIME: 12.1 SECONDS (ref 9.8–12.8)
RBC # BLD AUTO: 2.82 X10*6/UL (ref 4.5–5.9)
SODIUM SERPL-SCNC: 135 MMOL/L (ref 136–145)
WBC # BLD AUTO: 7.5 X10*3/UL (ref 4.4–11.3)

## 2024-05-29 PROCEDURE — 2500000002 HC RX 250 W HCPCS SELF ADMINISTERED DRUGS (ALT 637 FOR MEDICARE OP, ALT 636 FOR OP/ED): Mod: MUE

## 2024-05-29 PROCEDURE — 85025 COMPLETE CBC W/AUTO DIFF WBC: CPT

## 2024-05-29 PROCEDURE — 80069 RENAL FUNCTION PANEL: CPT

## 2024-05-29 PROCEDURE — 2500000001 HC RX 250 WO HCPCS SELF ADMINISTERED DRUGS (ALT 637 FOR MEDICARE OP)

## 2024-05-29 PROCEDURE — 2500000004 HC RX 250 GENERAL PHARMACY W/ HCPCS (ALT 636 FOR OP/ED): Performed by: STUDENT IN AN ORGANIZED HEALTH CARE EDUCATION/TRAINING PROGRAM

## 2024-05-29 PROCEDURE — 83735 ASSAY OF MAGNESIUM: CPT

## 2024-05-29 PROCEDURE — C9113 INJ PANTOPRAZOLE SODIUM, VIA: HCPCS | Performed by: STUDENT IN AN ORGANIZED HEALTH CARE EDUCATION/TRAINING PROGRAM

## 2024-05-29 PROCEDURE — 85730 THROMBOPLASTIN TIME PARTIAL: CPT | Mod: 91

## 2024-05-29 PROCEDURE — 2500000004 HC RX 250 GENERAL PHARMACY W/ HCPCS (ALT 636 FOR OP/ED)

## 2024-05-29 PROCEDURE — 85610 PROTHROMBIN TIME: CPT

## 2024-05-29 PROCEDURE — 2500000005 HC RX 250 GENERAL PHARMACY W/O HCPCS

## 2024-05-29 PROCEDURE — 36415 COLL VENOUS BLD VENIPUNCTURE: CPT

## 2024-05-29 PROCEDURE — 99233 SBSQ HOSP IP/OBS HIGH 50: CPT

## 2024-05-29 PROCEDURE — 82947 ASSAY GLUCOSE BLOOD QUANT: CPT | Mod: 91

## 2024-05-29 PROCEDURE — 1100000001 HC PRIVATE ROOM DAILY

## 2024-05-29 RX ADMIN — FINASTERIDE 5 MG: 5 TABLET, FILM COATED ORAL at 09:50

## 2024-05-29 RX ADMIN — ACETAMINOPHEN 650 MG: 325 TABLET ORAL at 22:26

## 2024-05-29 RX ADMIN — PREDNISONE 62.5 MG: 10 TABLET ORAL at 12:17

## 2024-05-29 RX ADMIN — CARBIDOPA AND LEVODOPA 1.5 TABLET: 25; 100 TABLET ORAL at 07:55

## 2024-05-29 RX ADMIN — Medication 1 APPLICATION: at 21:21

## 2024-05-29 RX ADMIN — PANTOPRAZOLE SODIUM 40 MG: 40 INJECTION, POWDER, FOR SOLUTION INTRAVENOUS at 09:50

## 2024-05-29 RX ADMIN — PANTOPRAZOLE SODIUM 40 MG: 40 INJECTION, POWDER, FOR SOLUTION INTRAVENOUS at 21:20

## 2024-05-29 RX ADMIN — DEXTROSE MONOHYDRATE 12.5 G: 25 INJECTION, SOLUTION INTRAVENOUS at 12:13

## 2024-05-29 RX ADMIN — CARBIDOPA AND LEVODOPA 1.5 TABLET: 25; 100 TABLET ORAL at 12:13

## 2024-05-29 RX ADMIN — Medication 1 APPLICATION: at 17:56

## 2024-05-29 RX ADMIN — CARBIDOPA AND LEVODOPA 1.5 TABLET: 25; 100 TABLET ORAL at 21:20

## 2024-05-29 RX ADMIN — CARBIDOPA AND LEVODOPA 1.5 TABLET: 25; 100 TABLET ORAL at 17:55

## 2024-05-29 RX ADMIN — TAMSULOSIN HYDROCHLORIDE 0.4 MG: 0.4 CAPSULE ORAL at 09:50

## 2024-05-29 RX ADMIN — ACETAMINOPHEN 650 MG: 325 TABLET ORAL at 07:55

## 2024-05-29 RX ADMIN — ACETAMINOPHEN 650 MG: 325 TABLET ORAL at 15:00

## 2024-05-29 RX ADMIN — Medication 1 APPLICATION: at 07:56

## 2024-05-29 RX ADMIN — METOPROLOL TARTRATE 25 MG: 50 TABLET, FILM COATED ORAL at 09:50

## 2024-05-29 ASSESSMENT — PAIN SCALES - PAIN ASSESSMENT IN ADVANCED DEMENTIA (PAINAD)
TOTALSCORE: 0
TOTALSCORE: 0
FACIALEXPRESSION: SMILING OR INEXPRESSIVE
BREATHING: NORMAL
CONSOLABILITY: NO NEED TO CONSOLE
BODYLANGUAGE: RELAXED
CONSOLABILITY: NO NEED TO CONSOLE
BODYLANGUAGE: RELAXED
BREATHING: NORMAL
FACIALEXPRESSION: SMILING OR INEXPRESSIVE

## 2024-05-29 ASSESSMENT — COGNITIVE AND FUNCTIONAL STATUS - GENERAL
MOBILITY SCORE: 6
DAILY ACTIVITIY SCORE: 7
HELP NEEDED FOR BATHING: TOTAL
PERSONAL GROOMING: A LOT
WALKING IN HOSPITAL ROOM: TOTAL
EATING MEALS: TOTAL
CLIMB 3 TO 5 STEPS WITH RAILING: TOTAL
DRESSING REGULAR UPPER BODY CLOTHING: TOTAL
MOVING FROM LYING ON BACK TO SITTING ON SIDE OF FLAT BED WITH BEDRAILS: TOTAL
DRESSING REGULAR LOWER BODY CLOTHING: TOTAL
TOILETING: TOTAL
STANDING UP FROM CHAIR USING ARMS: TOTAL
MOVING TO AND FROM BED TO CHAIR: TOTAL
TURNING FROM BACK TO SIDE WHILE IN FLAT BAD: TOTAL

## 2024-05-29 NOTE — DISCHARGE INSTRUCTIONS
"Dear Mr. Nobles,     It was a pleasure caring for you while you were in the hospital. You were admitted for pneumonia and treated with antibiotics. You were also found to have low red blood cells and low platelets which required blood transfusions. Your platelets continued to be low while you were in the hospital so you were seen by the hematology specialists. You were treated with a medication called \"IVIG\" in addition to steroids. Despite this however, your platelets remained low so you were treated with a medication called \"romiplostim\" which works to stimulate platelet production. While in the hospital, you were seen by the gastroenterologist since you were having some black stools which were suspicious for bleeding from the gastrointestinal (GI) tract. This bleeding was likely due to your very low platelets. You were also seen by the orthopedic specialists in the hospital for a fracture of your left wrist. This fracture was splinted. While in the hospital, your platelets starting to slowly improve with the medication called \"romiplostim.\"     Please follow up with:   [ ] Primary care   [ ] Orthopedics- regarding left wrist fracture    [ ] Hematology- regarding low platelets and low red blood cells     Sincerely,   Your  Medicine Team   "

## 2024-05-29 NOTE — RESEARCH NOTES
Artificial Intelligence Monitoring in Nursing (AIMS Nursing) Study    Principle Investigator - Dr. Se Lomas  Research Coordinator - Zhanna Reyes, GEETA     Patient Name - Markie Nobles  Date - 5/29/2024 2:38 PM  Location - [place] [address]    Markie Nobles was approached by Zhanna Reyes RRT to talk about participating in the AIMS Nursing Study. The patient was not able to be approached, a research coordinator will come back at a later time. Study protocol was followed and patient was given study contact information.     Zhanna Reyes, RRT

## 2024-05-29 NOTE — PROGRESS NOTES
"Markie Nobles is a 79 y.o. male on day 13 of admission presenting with NSTEMI (non-ST elevated myocardial infarction) (Multi).    Subjective   No acute events. Denies any fever, chills, chest pain, shortness of breath, abdominal pain, or other new issues.        Objective     Physical Exam  GEN: cachectic  HEENT: AT/NC  CARDIO: RRR, normal S1 and S2  PULM: clear to auscultation bilaterally  ABD: Soft, NT/ND, bowel sounds +, PEG in place  EXTR: Warm/well perfused; left wrist has cast in place  NEURO: strength grossly intact in bilateral UE and LE, Aox2 (knows Name, City, Year; said month is \"March\")   PSYCH: Appropriate mood/affect    Last Recorded Vitals  Blood pressure 156/76, pulse 61, temperature 36.4 °C (97.5 °F), resp. rate 18, height 1.778 m (5' 10\"), weight 62.6 kg (138 lb), SpO2 100%.  Intake/Output last 3 Shifts:  I/O last 3 completed shifts:  In: 1173.1 (18.7 mL/kg) [Blood:323.1; NG/GT:850]  Out: 2150 (34.3 mL/kg) [Urine:2150 (1 mL/kg/hr)]  Weight: 62.6 kg     Relevant Results  Results for orders placed or performed during the hospital encounter of 05/16/24 (from the past 24 hour(s))   POCT GLUCOSE   Result Value Ref Range    POCT Glucose 131 (H) 74 - 99 mg/dL   POCT GLUCOSE   Result Value Ref Range    POCT Glucose 104 (H) 74 - 99 mg/dL   POCT GLUCOSE   Result Value Ref Range    POCT Glucose 136 (H) 74 - 99 mg/dL   POCT GLUCOSE   Result Value Ref Range    POCT Glucose 135 (H) 74 - 99 mg/dL     Scheduled medications  acetaminophen, 650 mg, oral, q8h  carbidopa-levodopa, 1.5 tablet, oral, 4x daily  [Held by provider] clopidogrel, 75 mg, oral, Daily  finasteride, 5 mg, oral, Daily  insulin lispro, 0-5 Units, subcutaneous, TID  menthol-zinc oxide, 1 Application, Topical, 4x daily  metoprolol tartrate, 25 mg, oral, Daily  pantoprazole, 40 mg, intravenous, BID  predniSONE, 1 mg/kg, oral, Daily  tamsulosin, 0.4 mg, oral, Daily      Continuous medications     PRN medications  PRN medications: benzonatate, " dextrose, dextrose, glucagon      Assessment and Plan:   79 year old Male w/ a PMHx MGUS, CAD (s/p CABG x3 in 2022), HTN, CKD IV, Anemia, ABEBE, hyperlipidemia, obesity, BPH, gout, NIDDM, HLD, Parkinsons (w/ dementia), sickle cell trait, dysphagia, s/p peg tube (04/2024), sacral pressure sores, s/p hospitalization for aspiration PNA (DC'd 5/1/24) initially admitted for PNA. Patient found to have lethargy and mild leukocytosis (WBC 10), likely infectious etiology. CT shows b/l lower lung findings suggestive of aspiration PNA, and given the patient's recent admission, this seems like the likely source of his infection. Additional sources include his sacral ulcer (not visualized) vs. PEG tube site (low suspicion) vs. UTI (ruled out w/ negative UA). He was found to be anemic and given 1x unit of blood transfusion with appropriate increment. BUN very elevated as well and GI was consulted for concerns of GI bleed from the PEG. 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 Patient's left wrist was swollen as well. Xray left wrist was obtained and showed scaphoid fracture. Ortho was consulted for this. Developed worsening anemia and thrombocytopenia on 5/20 after free water flushes increased and plavix resumed. Has had persistent thrombocytopenia despite course of IVIG and steroids. Suspecting post-transfusion purpura vs drug-induced ITP 2/2 abx.      Update 5/29:   - GI and Heme following   - Will not give PLT unless life-threatening bleeding. Keep Hgb > 6.   - Received first dose of romiplostim 190 mcg yesterday (weekly)   - Renal US with relative atrophic Rt kidney, signs of medical renal disease, no hydronephrosis, bilateral renal cysts, and prostamegaly.   - Spleen US with borderline splenomegaly, diffuse heterogenicity of splenic parenchyma with areas of peripheral wedge-shaped hyperechogenicity which may represent peripheral  calcifications and chronic scarring although infarcts can't be excluded per radiology  - Increase nepro TF goal to 40 ml/hr per nutrition (obtain weight today as well)   - Planning for possible bone marrow bx for further workup up thrombocytopenia   - Today's notable labs: labs for this AM still pending   - Pending lab workup: drug-dependent anti-PLT Ab, ADAMTS-13, SPEP, fibrin split products  - Newly resulted labs: UPEP with marked proteinuria but no monoclonal protein detected, CMV DNA PCR (-)  - T+S last obtained 5/28  - Dispo planning: PT recommending SNF       # Thrombocytopenia  :: Ddx: Post-transfusion purpura vs drug-induced ITP 2/2 abx   :: Heme does not believe this is a consumptive process. No hepatomegaly, liver disease or splenomegaly. He has not been exposed to heparin, which excludes HIT.   :: S/p 1 unit PLT on 5/18, 5/25, 5/26, 5/27  :: Lab workup (completed): Hep B/C and HIV negative for acute infection. Parvovirus IgG (+) but PCR (-). EBV nuclear antigen IgG (+), EBV early antigen IgG (+), EBV VCA IgG (+), but EBV VCA IgM (-). CMV DNA PCR (-). Immunoglobulin free LT chain kappa:lambda ratio 1.33 (same as prior 4/17/24).   :: Spleen US with borderline splenomegaly, diffuse heterogenicity of splenic parenchyma with areas of peripheral wedge-shaped hyperechogenicity which may represent peripheral calcifications and chronic scarring although infarcts can't be excluded per radiology  - Heme following   - Should give antigen-negative (washed, leukoreduced) blood products if required.  - S/p IVIG (5/23 - 5/27) - 5 days   - S/p Dex (5/23 - 5/26) - 4 days   - AVOID giving PLT unless life-threatening bleeding   - Received first dose of romiplostim 190 mcg on 5/29 (plan for weekly)   - Cont. Pred 62.5 mg PO/day (1 mg/kg/day) (5/27 - )    # Normocytic anemia, acute on chronic   # Sickle Cell Trait  :: Anemia d/t anemia of chronic disease and CKD with acute portion likely d/t recent infection +/- abx usage.  Also likely d/t GI bleed given melena on 5/25-5/26 (likely slow oozing in s/o very low PLT)  :: No e/o hemolysis by smear or labs. DIC is not supported per heme.   :: Heme outpatient: Rosanne Casal, APRN for MGUS and anemia of CKD - per her last evaluation the kappa light chain elevation is mild and stable and patient was continued on Procrit 10k units q2 weeks. On EPO as an outpatient.   :: Hgb and platelets started down trending after free water flushes increased and also restarted plavix on 5/20  :: S/p 1 unit RBC in ED on 5/16 and 5/26   - Maintain two large bore IV, active T+S, daily CBC (Keep Hgb > 6)     #WARREN on CKD IV  :: Cr 3.61 - baseline 3.1   :: FeNa 2.6% (intrinsic)- suspect d/t CKD and recent abx usage. PVR 17 ml.   :: UPEP with marked proteinuria but no monoclonal protein detected  :: Renal US with relative atrophic Rt kidney, signs of medical renal disease, no hydronephrosis, bilateral renal cysts, and prostamegaly.   - Monitor       # CAD (S/p CABG x3 in 2022)  # Troponinemia  :: Troponin I  -> 516 w/out EKG changes, likely Type II MI  - HOLD home Clopidogrel 75mg daily in setting of thrombocytopenia   - Continue home Metoprolol tartrate 25mg daily (unclear why patient is on this dose frequency, however it populated in prior cardiologist note)     # Severe Protein Calorie Malnutrition  # Dysphagia s/p PEG tube 4/2024  - Tube feeds with Nepro goal 40cc/hr, free water flushes 150 ml q4h     # Upper GI bleed  :: Most likely d/t mucosal disruption of PEG tube initially; later developed melena with Hgb drop on 5/25 in setting of very low PLT<5 (likely slow oozing from low PLT per GI)   :: 2019 EGD/Colonoscopy - friable gastric mucosal atrophy, duodenal erythema, and diverticulosis  - Re-engaged GI on 5/26 for melena - do not feel strongly about scoping given high risk of further trauma and significantly low platelets; pt currently HDS; believe pt may be bleeding due to how low platelets are.   -  PPI IV BID in setting of melena     # Aspiration PNA (treated)   :: WBC 10 on admission (up from 7.9 on 5/1)  :: CT Chest: Tree-in-bud b/l lower lungs and secretions in trachea. C/f acute/chronic aspiration and/or PNA  :: Low concern for systemic infection (afebrile, HDS), urine strep/legionella (-)  :: S/p Vanc/Zosyn in ED  - S/p Vancomycin:  (5/16-5/17)  - S/p Meropenem 1g q12 (renal dosing): (5/16-5/22)  - S/p Augmentin (5/22-5/25)  - Aspiration precautions    # Steroid induced hyperglycemia  Started on steroids on 5/23 for possible ITP. Will place patient on sliding scale for as long as is on steroids.  - SSI (started 5/24) remove when patient stops steroids     # AMS (resolved)  # Parkinson's Disease w/ Dementia  :: Suspect AMS/lethargy due to PNA +/- anemia   :: Mentation improving after blood transfusion and abx  ::  MRI brain obtained on 5/17 demonstrated old vascular lesions but also ventricular dilation possibly consistent with NPH per radiology read  - Continue home Carbidopa-Levodopa 1.5tab QID     # BPH  - Continue home Finasteride 5mg daily  - Continue home Flomax 0.4mg daily     # Scaphoid Fracture  :: Xrays of left wrist obtained  - Ortho consulted  - Splint provided for L scaphoid (moderately displaced) fracture, pt will followup with ortho on 6/11 at 3:15PM with Dr. Agudelo (appointment scheduled)  - Per ortho on 5/28: ok for platform wt bearing through elbow, will likely need to be NWB through wrist for total of 6 wks      # Sacral Ulcer  - Wound care c/s        F: PRN  E: PRN  N: Tube feeds via PEG  A: PIV     GI ppx: PPI IV BID  DVT ppx: holding in setting of thrombocytopenia      Code Status: DNR/DNI (per palliative care discussion on 5/17)  NOK: Bree Nobles (Wife) - [phone]

## 2024-05-29 NOTE — PROGRESS NOTES
Markie Nobles is a 79 y.o. male on day 13 of admission presenting with NSTEMI (non-ST elevated myocardial infarction) (Multi).    Transitional Care Coordination Progress Note:  Patient discussed during interdisciplinary rounds.   Team members present: MD and TCC  Plan per Medical/Surgical team: Watching Platelets.  Payor: [employer]  Discharge disposition: [facility]. Auth good through 05/30/24.  Potential Barriers: None  ADOD: 06/03/24      BRIAN BRODERICK RN

## 2024-05-29 NOTE — PROGRESS NOTES
[facility]  [facility] [place]  CONSULT FOLLOW-UP         SUBJECTIVE     Interval Events/Subjective:   Continues to have loose, tarry stools. Had one last night and 3 so far today.     He is HDS, though bradycardic. RN aspirated PEG today and it just had gastric contents, no blood or clots.     Hb trend: 5.9 --> 2 U --> 8.5 --> 9.2 --> 9.1. Also with several plt transfusions: 2 --> 1 unit plts --> 1 --> 1 U plts --> 16 --> 17 --> 1 U plts --> 2 ---> 3 --> 1 U plts -- > 2     Hematology is following and he has received the following products so far:   - s/p dexamethasone 40mg IV daily for 4 days 5/23-5/26; being continued on prednisone 1 mg/kg  - s/p  IVIG 400mg/kg daily for 5 days (5/23-5/27)  - started romiplostim 3mcg/kg weekly (got first dose on 5/28)     Medications:  acetaminophen, 650 mg, oral, q8h  carbidopa-levodopa, 1.5 tablet, oral, 4x daily  [Held by provider] clopidogrel, 75 mg, oral, Daily  finasteride, 5 mg, oral, Daily  insulin lispro, 0-5 Units, subcutaneous, TID  menthol-zinc oxide, 1 Application, Topical, 4x daily  metoprolol tartrate, 25 mg, oral, Daily  pantoprazole, 40 mg, intravenous, BID  predniSONE, 1 mg/kg, oral, Daily  tamsulosin, 0.4 mg, oral, Daily      PRN medications: benzonatate, dextrose, dextrose, glucagon      EXAM     Physical Exam     General: Cachectic, awake and alert  HEENT: No pallor, no icterus   Heart: RRR  Lungs: No respiratory distress  Abdomen: Soft, non tender, non distended  PEG lavaged at bedside by nurse, clear gastric contents without blood  Skin: No jaundice   Neuro: Appropriately responds to questions/commands. Full neuro exam deferred     DATA                                                                            Labs     Lab Results   Component Value Date    WBC 9.9 05/28/2024    HGB 9.1 (L) 05/28/2024    HCT 26.5 (L) 05/28/2024    MCV 88 05/28/2024    PLT 2 (LL) 05/28/2024        Lab Results   Component Value Date     GLUCOSE 137 (H) 05/28/2024    CALCIUM 8.3 (L) 05/28/2024     (L) 05/28/2024    K 4.9 05/28/2024    CO2 20 (L) 05/28/2024     05/28/2024     (HH) 05/28/2024    CREATININE 3.54 (H) 05/28/2024                                                                               Imaging   IMPRESSION:  Chest  1. Tree-in-bud/consolidative nodularity of the bilateral lower lungs with peribronchial wall cuffing nonspecific secretions within thetrachea concerning for acute/chronic aspiration and/or pneumonia.      Abdomen-Pelvis  1.  No acute process within the abdomen/pelvis.                                                                         GI Procedures   He had EGD and colonoscopy 2019. There was a non-obstructing Schatzki ring, gastric mucosal atrophy with mildly friable mucosa (No H, pylori), and erythematous duodenopathy. There was one small tubular adenoma as well as diverticulosis       ASSESSMENT / PLAN     Assessment and Recommendations:   Markie Nobles is a 79 y.o. male with a past medical history of MGUS, CAD (s/p CABG x3 in 2022), HTN, CKD IV (baseline between 3 and 4), Anemia, NAFLD, hyperlipidemia, BPH, gout, NIDDM, HLD, Parkinsons (w/ dementia), dysphagia s/p peg tube (04/29/2024), sacral pressure sores, s/p hospitalization at [facility] (4/24/2024 to 5/1/2024) who was brought to [facility] ER on 5/16/2024 for becoming unresponsive and for hypotension at his SNF ([facility]). GI has been re-consulted for concern for upper GI bleed.     Previously patient was seen by our colleagues on 5/18/2024 and performed aspiration of stomach contents through the PEG tube site revealed specks of hematin. 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 The suspected UGIB happened  in the setting of Plavix.  Plan was to watch the patient conservatively.    We have now been re-consulted on 5/26/2024 for melena and down trending Hb. At this time pt has evidence of profound thrombocytopenia (plts in single digits). This is likely oozing in the setting of thrombocytopenia. We would not plan for any endoscopic evaluation at this time given ongoing profound thrombocytopenia.     Plan:  - No plans for urgent endoscopy at this point.  Continue conservative management. Correction of thrombocytopenia in refractory ITP could take weeks or months. Correction of his thrombocytopenia will likely help with suspected oozing from the stomach that might be causing his UGIB   - Hematology following, appreciate recs   - Continue PPI twice daily  - If patient becomes hemodynamically unstable, transfer to the ICU and notify the melanie GI fellow  - Continue to trend Hgb BID (goal > 7), platelets (goal > 50), INR daily (goal < 1.5) and transfuse as necessary  - Ensure adequate IV access, ideally 2 large bore peripheral IV    Patient seen and discussed with GI attending, Dr. Debbie Rodrigez.     Thank you for the consultation. Gastroenterology will continue to follow the patient.     Please do not hesitate to contact me on Doc[facility] or page [phone] if there are any further questions between the weekday hours of 7 AM - 5 PM.   If there is an urgent concern during the weekend, after-hours, or holidays; then please page the [place] GI fellow at [phone]. Thank you.    Marilee Beltre MD MPH   GI Fellow   [facility] [place]

## 2024-05-30 ENCOUNTER — DOCUMENTATION (OUTPATIENT)
Dept: RESEARCH | Age: 79
End: 2024-05-30

## 2024-05-30 ENCOUNTER — APPOINTMENT (OUTPATIENT)
Dept: RADIOLOGY | Facility: HOSPITAL | Age: 79
DRG: 177 | End: 2024-05-30
Payer: MEDICARE

## 2024-05-30 LAB
ALBUMIN SERPL BCP-MCNC: 2.6 G/DL (ref 3.4–5)
ALBUMIN: 2.8 G/DL (ref 3.4–5)
ALPHA 1 GLOBULIN: 0.3 G/DL (ref 0.2–0.6)
ALPHA 2 GLOBULIN: 0.8 G/DL (ref 0.4–1.1)
ANION GAP SERPL CALC-SCNC: 15 MMOL/L (ref 10–20)
APTT PPP: 28 SECONDS (ref 27–38)
BASOPHILS # BLD AUTO: 0.01 X10*3/UL (ref 0–0.1)
BASOPHILS NFR BLD AUTO: 0.1 %
BETA GLOBULIN: 0.8 G/DL (ref 0.5–1.2)
BUN SERPL-MCNC: 175 MG/DL (ref 6–23)
CALCIUM SERPL-MCNC: 8.3 MG/DL (ref 8.6–10.6)
CHLORIDE SERPL-SCNC: 106 MMOL/L (ref 98–107)
CO2 SERPL-SCNC: 19 MMOL/L (ref 21–32)
CREAT SERPL-MCNC: 3.8 MG/DL (ref 0.5–1.3)
EGFRCR SERPLBLD CKD-EPI 2021: 15 ML/MIN/1.73M*2
EOSINOPHIL # BLD AUTO: 0.01 X10*3/UL (ref 0–0.4)
EOSINOPHIL NFR BLD AUTO: 0.1 %
ERYTHROCYTE [DISTWIDTH] IN BLOOD BY AUTOMATED COUNT: 13.9 % (ref 11.5–14.5)
GAMMA GLOBULIN: 2.1 G/DL (ref 0.5–1.4)
GLUCOSE BLD MANUAL STRIP-MCNC: 100 MG/DL (ref 74–99)
GLUCOSE BLD MANUAL STRIP-MCNC: 113 MG/DL (ref 74–99)
GLUCOSE BLD MANUAL STRIP-MCNC: 136 MG/DL (ref 74–99)
GLUCOSE BLD MANUAL STRIP-MCNC: 142 MG/DL (ref 74–99)
GLUCOSE SERPL-MCNC: 105 MG/DL (ref 74–99)
HCT VFR BLD AUTO: 25 % (ref 41–52)
HGB BLD-MCNC: 8.3 G/DL (ref 13.5–17.5)
IMM GRANULOCYTES # BLD AUTO: 0.05 X10*3/UL (ref 0–0.5)
IMM GRANULOCYTES NFR BLD AUTO: 0.6 % (ref 0–0.9)
IMMUNOFIXATION COMMENT: ABNORMAL
INR PPP: 1 (ref 0.9–1.1)
LYMPHOCYTES # BLD AUTO: 0.97 X10*3/UL (ref 0.8–3)
LYMPHOCYTES NFR BLD AUTO: 11.3 %
MAGNESIUM SERPL-MCNC: 2.19 MG/DL (ref 1.6–2.4)
MCH RBC QN AUTO: 29.9 PG (ref 26–34)
MCHC RBC AUTO-ENTMCNC: 33.2 G/DL (ref 32–36)
MCV RBC AUTO: 90 FL (ref 80–100)
MONOCYTES # BLD AUTO: 0.65 X10*3/UL (ref 0.05–0.8)
MONOCYTES NFR BLD AUTO: 7.6 %
NEUTROPHILS # BLD AUTO: 6.89 X10*3/UL (ref 1.6–5.5)
NEUTROPHILS NFR BLD AUTO: 80.3 %
NRBC BLD-RTO: 0 /100 WBCS (ref 0–0)
PATH REVIEW - SERUM IMMUNOFIXATION: ABNORMAL
PATH REVIEW-SERUM PROTEIN ELECTROPHORESIS: ABNORMAL
PHOSPHATE SERPL-MCNC: 4.7 MG/DL (ref 2.5–4.9)
PLATELET # BLD AUTO: 3 X10*3/UL (ref 150–450)
POTASSIUM SERPL-SCNC: 5.2 MMOL/L (ref 3.5–5.3)
PROTEIN ELECTROPHORESIS COMMENT: ABNORMAL
PROTHROMBIN TIME: 11.4 SECONDS (ref 9.8–12.8)
RBC # BLD AUTO: 2.78 X10*6/UL (ref 4.5–5.9)
SODIUM SERPL-SCNC: 135 MMOL/L (ref 136–145)
WBC # BLD AUTO: 8.6 X10*3/UL (ref 4.4–11.3)

## 2024-05-30 PROCEDURE — 88342 IMHCHEM/IMCYTCHM 1ST ANTB: CPT | Performed by: PATHOLOGY

## 2024-05-30 PROCEDURE — 36415 COLL VENOUS BLD VENIPUNCTURE: CPT

## 2024-05-30 PROCEDURE — 07DR3ZX EXTRACTION OF ILIAC BONE MARROW, PERCUTANEOUS APPROACH, DIAGNOSTIC: ICD-10-PCS | Performed by: STUDENT IN AN ORGANIZED HEALTH CARE EDUCATION/TRAINING PROGRAM

## 2024-05-30 PROCEDURE — 92526 ORAL FUNCTION THERAPY: CPT | Mod: GN

## 2024-05-30 PROCEDURE — 81450 HL NEO GSAP 5-50DNA/DNA&RNA: CPT

## 2024-05-30 PROCEDURE — 36430 TRANSFUSION BLD/BLD COMPNT: CPT

## 2024-05-30 PROCEDURE — 88311 DECALCIFY TISSUE: CPT | Performed by: PATHOLOGY

## 2024-05-30 PROCEDURE — 99233 SBSQ HOSP IP/OBS HIGH 50: CPT

## 2024-05-30 PROCEDURE — 85097 BONE MARROW INTERPRETATION: CPT | Mod: TC

## 2024-05-30 PROCEDURE — 1100000001 HC PRIVATE ROOM DAILY

## 2024-05-30 PROCEDURE — 88341 IMHCHEM/IMCYTCHM EA ADD ANTB: CPT | Performed by: PATHOLOGY

## 2024-05-30 PROCEDURE — 2720000007 HC OR 272 NO HCPCS

## 2024-05-30 PROCEDURE — 88313 SPECIAL STAINS GROUP 2: CPT | Performed by: PATHOLOGY

## 2024-05-30 PROCEDURE — 7100000010 HC PHASE TWO TIME - EACH INCREMENTAL 1 MINUTE

## 2024-05-30 PROCEDURE — 85610 PROTHROMBIN TIME: CPT

## 2024-05-30 PROCEDURE — 83735 ASSAY OF MAGNESIUM: CPT

## 2024-05-30 PROCEDURE — 079T3ZX DRAINAGE OF BONE MARROW, PERCUTANEOUS APPROACH, DIAGNOSTIC: ICD-10-PCS | Performed by: STUDENT IN AN ORGANIZED HEALTH CARE EDUCATION/TRAINING PROGRAM

## 2024-05-30 PROCEDURE — 77012 CT SCAN FOR NEEDLE BIOPSY: CPT | Performed by: STUDENT IN AN ORGANIZED HEALTH CARE EDUCATION/TRAINING PROGRAM

## 2024-05-30 PROCEDURE — 2500000004 HC RX 250 GENERAL PHARMACY W/ HCPCS (ALT 636 FOR OP/ED): Performed by: STUDENT IN AN ORGANIZED HEALTH CARE EDUCATION/TRAINING PROGRAM

## 2024-05-30 PROCEDURE — 88365 INSITU HYBRIDIZATION (FISH): CPT | Performed by: PATHOLOGY

## 2024-05-30 PROCEDURE — 88341 IMHCHEM/IMCYTCHM EA ADD ANTB: CPT | Mod: TC,91,SUR

## 2024-05-30 PROCEDURE — 88342 IMHCHEM/IMCYTCHM 1ST ANTB: CPT | Mod: TC,SUR

## 2024-05-30 PROCEDURE — 77012 CT SCAN FOR NEEDLE BIOPSY: CPT

## 2024-05-30 PROCEDURE — C1830 POWER BONE MARROW BX NEEDLE: HCPCS

## 2024-05-30 PROCEDURE — 2500000004 HC RX 250 GENERAL PHARMACY W/ HCPCS (ALT 636 FOR OP/ED)

## 2024-05-30 PROCEDURE — 7100000009 HC PHASE TWO TIME - INITIAL BASE CHARGE

## 2024-05-30 PROCEDURE — 88305 TISSUE EXAM BY PATHOLOGIST: CPT | Performed by: PATHOLOGY

## 2024-05-30 PROCEDURE — 88189 FLOWCYTOMETRY/READ 16 & >: CPT | Performed by: PATHOLOGY

## 2024-05-30 PROCEDURE — 85025 COMPLETE CBC W/AUTO DIFF WBC: CPT

## 2024-05-30 PROCEDURE — 80069 RENAL FUNCTION PANEL: CPT

## 2024-05-30 PROCEDURE — C9113 INJ PANTOPRAZOLE SODIUM, VIA: HCPCS | Performed by: STUDENT IN AN ORGANIZED HEALTH CARE EDUCATION/TRAINING PROGRAM

## 2024-05-30 PROCEDURE — 38222 DX BONE MARROW BX & ASPIR: CPT | Performed by: STUDENT IN AN ORGANIZED HEALTH CARE EDUCATION/TRAINING PROGRAM

## 2024-05-30 PROCEDURE — 88185 FLOWCYTOMETRY/TC ADD-ON: CPT | Mod: TC

## 2024-05-30 PROCEDURE — 82947 ASSAY GLUCOSE BLOOD QUANT: CPT | Mod: 91

## 2024-05-30 PROCEDURE — G0452 MOLECULAR PATHOLOGY INTERPR: HCPCS | Performed by: STUDENT IN AN ORGANIZED HEALTH CARE EDUCATION/TRAINING PROGRAM

## 2024-05-30 PROCEDURE — 88264 CHROMOSOME ANALYSIS 20-25: CPT

## 2024-05-30 PROCEDURE — P9037 PLATE PHERES LEUKOREDU IRRAD: HCPCS

## 2024-05-30 PROCEDURE — 2500000002 HC RX 250 W HCPCS SELF ADMINISTERED DRUGS (ALT 637 FOR MEDICARE OP, ALT 636 FOR OP/ED): Mod: MUE

## 2024-05-30 PROCEDURE — 2500000001 HC RX 250 WO HCPCS SELF ADMINISTERED DRUGS (ALT 637 FOR MEDICARE OP)

## 2024-05-30 RX ORDER — FENTANYL CITRATE 50 UG/ML
INJECTION, SOLUTION INTRAMUSCULAR; INTRAVENOUS
Status: COMPLETED | OUTPATIENT
Start: 2024-05-30 | End: 2024-05-30

## 2024-05-30 RX ADMIN — CARBIDOPA AND LEVODOPA 1.5 TABLET: 25; 100 TABLET ORAL at 08:41

## 2024-05-30 RX ADMIN — Medication 1 APPLICATION: at 18:45

## 2024-05-30 RX ADMIN — Medication 1 APPLICATION: at 08:50

## 2024-05-30 RX ADMIN — FINASTERIDE 5 MG: 5 TABLET, FILM COATED ORAL at 08:42

## 2024-05-30 RX ADMIN — Medication 1 APPLICATION: at 21:36

## 2024-05-30 RX ADMIN — CARBIDOPA AND LEVODOPA 1.5 TABLET: 25; 100 TABLET ORAL at 21:36

## 2024-05-30 RX ADMIN — PANTOPRAZOLE SODIUM 40 MG: 40 INJECTION, POWDER, FOR SOLUTION INTRAVENOUS at 08:43

## 2024-05-30 RX ADMIN — ACETAMINOPHEN 650 MG: 325 TABLET ORAL at 08:42

## 2024-05-30 RX ADMIN — TAMSULOSIN HYDROCHLORIDE 0.4 MG: 0.4 CAPSULE ORAL at 08:42

## 2024-05-30 RX ADMIN — PREDNISONE 62.5 MG: 10 TABLET ORAL at 08:42

## 2024-05-30 RX ADMIN — FENTANYL CITRATE 50 MCG: 50 INJECTION, SOLUTION INTRAMUSCULAR; INTRAVENOUS at 13:30

## 2024-05-30 RX ADMIN — ACETAMINOPHEN 650 MG: 325 TABLET ORAL at 22:51

## 2024-05-30 RX ADMIN — METOPROLOL TARTRATE 25 MG: 50 TABLET, FILM COATED ORAL at 08:42

## 2024-05-30 RX ADMIN — CARBIDOPA AND LEVODOPA 1.5 TABLET: 25; 100 TABLET ORAL at 18:45

## 2024-05-30 RX ADMIN — PANTOPRAZOLE SODIUM 40 MG: 40 INJECTION, POWDER, FOR SOLUTION INTRAVENOUS at 21:36

## 2024-05-30 RX ADMIN — ACETAMINOPHEN 650 MG: 325 TABLET ORAL at 14:50

## 2024-05-30 ASSESSMENT — COGNITIVE AND FUNCTIONAL STATUS - GENERAL
HELP NEEDED FOR BATHING: TOTAL
MOVING TO AND FROM BED TO CHAIR: TOTAL
CLIMB 3 TO 5 STEPS WITH RAILING: TOTAL
PERSONAL GROOMING: A LOT
MOBILITY SCORE: 6
MOBILITY SCORE: 6
WALKING IN HOSPITAL ROOM: TOTAL
HELP NEEDED FOR BATHING: TOTAL
TURNING FROM BACK TO SIDE WHILE IN FLAT BAD: TOTAL
EATING MEALS: TOTAL
STANDING UP FROM CHAIR USING ARMS: TOTAL
STANDING UP FROM CHAIR USING ARMS: TOTAL
CLIMB 3 TO 5 STEPS WITH RAILING: TOTAL
PERSONAL GROOMING: A LOT
MOVING FROM LYING ON BACK TO SITTING ON SIDE OF FLAT BED WITH BEDRAILS: TOTAL
TURNING FROM BACK TO SIDE WHILE IN FLAT BAD: TOTAL
DAILY ACTIVITIY SCORE: 7
MOVING TO AND FROM BED TO CHAIR: TOTAL
EATING MEALS: TOTAL
DRESSING REGULAR UPPER BODY CLOTHING: TOTAL
DRESSING REGULAR UPPER BODY CLOTHING: TOTAL
DAILY ACTIVITIY SCORE: 7
DRESSING REGULAR LOWER BODY CLOTHING: TOTAL
TOILETING: TOTAL
WALKING IN HOSPITAL ROOM: TOTAL
TOILETING: TOTAL
DRESSING REGULAR LOWER BODY CLOTHING: TOTAL
MOVING FROM LYING ON BACK TO SITTING ON SIDE OF FLAT BED WITH BEDRAILS: TOTAL

## 2024-05-30 ASSESSMENT — PAIN SCALES - WONG BAKER
WONGBAKER_NUMERICALRESPONSE: HURTS LITTLE BIT
WONGBAKER_NUMERICALRESPONSE: HURTS LITTLE BIT

## 2024-05-30 ASSESSMENT — PAIN SCALES - GENERAL
PAINLEVEL_OUTOF10: 0 - NO PAIN

## 2024-05-30 ASSESSMENT — PAIN - FUNCTIONAL ASSESSMENT
PAIN_FUNCTIONAL_ASSESSMENT: WONG-BAKER FACES
PAIN_FUNCTIONAL_ASSESSMENT: WONG-BAKER FACES
PAIN_FUNCTIONAL_ASSESSMENT: 0-10

## 2024-05-30 NOTE — PROGRESS NOTES
"Markie Nobles is a 79 y.o. male on day 14 of admission presenting with Pneumonia.    Subjective   No acute events. Denies any fever, chills, chest pain, shortness of breath, abdominal pain, or other new issues.     Objective     Physical Exam  GEN: cachectic  HEENT: AT/NC  CARDIO: RRR, normal S1 and S2  PULM: clear to auscultation bilaterally  ABD: Soft, NT/ND, bowel sounds +, PEG in place  EXTR: Warm/well perfused; left wrist has cast in place  NEURO: strength grossly intact in bilateral UE and LE, Aox2 (knows Name, City, Year; said month is \"March\")   PSYCH: Appropriate mood/affect    Last Recorded Vitals  Blood pressure 150/89, pulse 68, temperature 36.4 °C (97.5 °F), resp. rate 18, height 1.778 m (5' 10\"), weight 62.5 kg (137 lb 12.6 oz), SpO2 100%.  Intake/Output last 3 Shifts:  I/O last 3 completed shifts:  In: 2120 (33.9 mL/kg) [NG/GT:2120]  Out: 2150 (34.4 mL/kg) [Urine:2150 (1 mL/kg/hr)]  Weight: 62.5 kg     Relevant Results  Results for orders placed or performed during the hospital encounter of 05/16/24 (from the past 24 hour(s))   Magnesium   Result Value Ref Range    Magnesium 2.06 1.60 - 2.40 mg/dL   Renal Function Panel   Result Value Ref Range    Glucose 108 (H) 74 - 99 mg/dL    Sodium 135 (L) 136 - 145 mmol/L    Potassium 4.5 3.5 - 5.3 mmol/L    Chloride 107 98 - 107 mmol/L    Bicarbonate 20 (L) 21 - 32 mmol/L    Anion Gap 13 10 - 20 mmol/L    Urea Nitrogen 168 (HH) 6 - 23 mg/dL    Creatinine 3.65 (H) 0.50 - 1.30 mg/dL    eGFR 16 (L) >60 mL/min/1.73m*2    Calcium 8.1 (L) 8.6 - 10.6 mg/dL    Phosphorus 4.7 2.5 - 4.9 mg/dL    Albumin 2.4 (L) 3.4 - 5.0 g/dL   CBC and Auto Differential   Result Value Ref Range    WBC 7.5 4.4 - 11.3 x10*3/uL    nRBC 0.0 0.0 - 0.0 /100 WBCs    RBC 2.82 (L) 4.50 - 5.90 x10*6/uL    Hemoglobin 8.6 (L) 13.5 - 17.5 g/dL    Hematocrit 24.9 (L) 41.0 - 52.0 %    MCV 88 80 - 100 fL    MCH 30.5 26.0 - 34.0 pg    MCHC 34.5 32.0 - 36.0 g/dL    RDW 14.1 11.5 - 14.5 %    Platelets 5 " (LL) 150 - 450 x10*3/uL    Neutrophils % 67.0 40.0 - 80.0 %    Immature Granulocytes %, Automated 0.8 0.0 - 0.9 %    Lymphocytes % 16.9 13.0 - 44.0 %    Monocytes % 14.9 2.0 - 10.0 %    Eosinophils % 0.3 0.0 - 6.0 %    Basophils % 0.1 0.0 - 2.0 %    Neutrophils Absolute 5.04 1.60 - 5.50 x10*3/uL    Immature Granulocytes Absolute, Automated 0.06 0.00 - 0.50 x10*3/uL    Lymphocytes Absolute 1.27 0.80 - 3.00 x10*3/uL    Monocytes Absolute 1.12 (H) 0.05 - 0.80 x10*3/uL    Eosinophils Absolute 0.02 0.00 - 0.40 x10*3/uL    Basophils Absolute 0.01 0.00 - 0.10 x10*3/uL   Coagulation Screen   Result Value Ref Range    Protime 12.1 9.8 - 12.8 seconds    INR 1.1 0.9 - 1.1    aPTT 27 27 - 38 seconds   POCT GLUCOSE   Result Value Ref Range    POCT Glucose 69 (L) 74 - 99 mg/dL   POCT GLUCOSE   Result Value Ref Range    POCT Glucose 133 (H) 74 - 99 mg/dL   POCT GLUCOSE   Result Value Ref Range    POCT Glucose 146 (H) 74 - 99 mg/dL   POCT GLUCOSE   Result Value Ref Range    POCT Glucose 136 (H) 74 - 99 mg/dL     Scheduled medications  acetaminophen, 650 mg, oral, q8h  carbidopa-levodopa, 1.5 tablet, oral, 4x daily  [Held by provider] clopidogrel, 75 mg, oral, Daily  finasteride, 5 mg, oral, Daily  insulin lispro, 0-5 Units, subcutaneous, TID  menthol-zinc oxide, 1 Application, Topical, 4x daily  metoprolol tartrate, 25 mg, oral, Daily  pantoprazole, 40 mg, intravenous, BID  predniSONE, 1 mg/kg, oral, Daily  tamsulosin, 0.4 mg, oral, Daily      Continuous medications     PRN medications  PRN medications: benzonatate, dextrose, dextrose, glucagon      Assessment and Plan:   79 year old Male w/ a PMHx MGUS, CAD (s/p CABG x3 in 2022), HTN, CKD IV, Anemia, ABEBE, hyperlipidemia, obesity, BPH, gout, NIDDM, HLD, Parkinsons (w/ dementia), sickle cell trait, dysphagia, s/p peg tube (04/2024), sacral pressure sores, s/p hospitalization for aspiration PNA (DC'd 5/1/24) initially admitted for PNA. Patient found to have lethargy and mild  leukocytosis (WBC 10), likely infectious etiology. CT shows b/l lower lung findings suggestive of aspiration PNA, and given the patient's recent admission, this seems like the likely source of his infection. Additional sources include his sacral ulcer (not visualized) vs. PEG tube site (low suspicion) vs. UTI (ruled out w/ negative UA). He was found to be anemic and given 1x unit of blood transfusion with appropriate increment. BUN very elevated as well and GI was consulted for concerns of GI bleed from the PEG. 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 Patient's left wrist was swollen as well. Xray left wrist was obtained and showed scaphoid fracture. Ortho was consulted for this. Developed worsening anemia and thrombocytopenia on 5/20 after free water flushes increased and plavix resumed. Has had persistent thrombocytopenia despite course of IVIG and steroids. Suspecting post-transfusion purpura vs drug-induced ITP 2/2 abx.      Update 5/30:   - GI signed off, re-engage when PLT closer to 50 in order to consider endoscopic intervention for melena   - Heme following   - Will not give PLT unless life-threatening bleeding. Keep Hgb > 6.   - Planning for bone marrow bx today- will order 1 unit PLT to be given in IR suite per IR request   - Today's notable labs: PLT 3 (from 5 yesterday), Cr 3.8/eGFR 15 (from Cr 3.65/eGFR 16)  - Newly resulted labs: Fibrin split products <5 (wnl)   - Pending lab workup: drug-dependent anti-PLT Ab, ADAMTS-13, SPEP  - T+S last obtained 5/28 (ordered for 5/31 AM draw)   - Dispo planning: PT recommending SNF       # Thrombocytopenia  :: Ddx: Post-transfusion purpura vs drug-induced ITP 2/2 abx   :: Heme does not believe this is a consumptive process. No hepatomegaly, liver disease or splenomegaly. He has not been exposed to heparin, which excludes HIT.   :: S/p 1 unit PLT on 5/18, 5/25, 5/26, 5/27  ::  Lab workup (completed): Hep B/C and HIV negative for acute infection. Parvovirus IgG (+) but PCR (-). EBV nuclear antigen IgG (+), EBV early antigen IgG (+), EBV VCA IgG (+), but EBV VCA IgM (-). CMV DNA PCR (-). Immunoglobulin free LT chain kappa:lambda ratio 1.33 (same as prior 4/17/24).   :: Spleen US with borderline splenomegaly, diffuse heterogenicity of splenic parenchyma with areas of peripheral wedge-shaped hyperechogenicity which may represent peripheral calcifications and chronic scarring although infarcts can't be excluded per radiology  - Heme following   - Should give antigen-negative (washed, leukoreduced) blood products if required.  - S/p IVIG (5/23 - 5/27) - 5 days   - S/p Dex (5/23 - 5/26) - 4 days   - AVOID giving PLT unless life-threatening bleeding   - Received first dose of romiplostim 190 mcg on 5/29 (plan for weekly)   - Cont. Pred 62.5 mg PO/day (1 mg/kg/day) (5/27 - )    # Normocytic anemia, acute on chronic   # Sickle Cell Trait  :: Anemia d/t anemia of chronic disease and CKD with acute portion likely d/t recent infection +/- abx usage. Also likely d/t GI bleed given melena on 5/25-5/26 (likely slow oozing in s/o very low PLT)  :: No e/o hemolysis by smear or labs. DIC is not supported per heme.   :: Heme outpatient: Rosanne Casal, APRN for MGUS and anemia of CKD - per her last evaluation the kappa light chain elevation is mild and stable and patient was continued on Procrit 10k units q2 weeks. On EPO as an outpatient.   :: Hgb and platelets started down trending after free water flushes increased and also restarted plavix on 5/20  :: S/p 1 unit RBC in ED on 5/16 and 5/26   - Maintain two large bore IV, active T+S, daily CBC (Keep Hgb > 6)     #WARREN on CKD IV  :: Cr 3.64 (eGFR 16) on admission (5/16), Cr between 3.4-3.8 during hospital stay.   :: Baseline Cr: 3.18 on 5/1/24 however fluctuated before this from 3.4 to mid 4s. Cr was upper 2s prior to 7/2023.  :: FeNa 2.6% (intrinsic)-  suspect d/t CKD and recent abx usage. PVR 17 ml.   :: UPEP with marked proteinuria but no monoclonal protein detected  :: Renal US with relative atrophic Rt kidney, signs of medical renal disease, no hydronephrosis, bilateral renal cysts, and prostamegaly.   - Monitor       # CAD (S/p CABG x3 in 2022)  # Troponinemia  :: Troponin I  -> 516 w/out EKG changes, likely Type II MI  - HOLD home Clopidogrel 75mg daily in setting of thrombocytopenia   - Continue home Metoprolol tartrate 25mg daily (unclear why patient is on this dose frequency, however it populated in prior cardiologist note)     # Severe Protein Calorie Malnutrition  # Dysphagia s/p PEG tube 4/2024  - Tube feeds with Nepro goal 40cc/hr, free water flushes 150 ml q4h     # Upper GI bleed  :: Most likely d/t mucosal disruption of PEG tube initially; later developed melena with Hgb drop on 5/25 in setting of very low PLT<5 (likely slow oozing from low PLT per GI)   :: 2019 EGD/Colonoscopy - friable gastric mucosal atrophy, duodenal erythema, and diverticulosis  - Re-engaged GI on 5/26 for melena - do not feel strongly about scoping given high risk of further trauma and significantly low platelets; pt currently HDS; believe pt may be bleeding due to how low platelets are.   - PPI IV BID in setting of melena     # Aspiration PNA (treated)   :: WBC 10 on admission (up from 7.9 on 5/1)  :: CT Chest: Tree-in-bud b/l lower lungs and secretions in trachea. C/f acute/chronic aspiration and/or PNA  :: Low concern for systemic infection (afebrile, HDS), urine strep/legionella (-)  :: S/p Vanc/Zosyn in ED  - S/p Vancomycin:  (5/16-5/17)  - S/p Meropenem 1g q12 (renal dosing): (5/16-5/22)  - S/p Augmentin (5/22-5/25)  - Aspiration precautions    # Steroid induced hyperglycemia  Started on steroids on 5/23 for possible ITP. Will place patient on sliding scale for as long as is on steroids.  - SSI (started 5/24) remove when patient stops steroids     # AMS  (resolved)  # Parkinson's Disease w/ Dementia  :: Suspect AMS/lethargy due to PNA +/- anemia   :: Mentation improving after blood transfusion and abx  ::  MRI brain obtained on 5/17 demonstrated old vascular lesions but also ventricular dilation possibly consistent with NPH per radiology read  - Continue home Carbidopa-Levodopa 1.5tab QID     # BPH  - Continue home Finasteride 5mg daily  - Continue home Flomax 0.4mg daily     # Scaphoid Fracture  :: Xrays of left wrist obtained  - Ortho consulted  - Splint provided for L scaphoid (moderately displaced) fracture, pt will followup with ortho on 6/11 at 3:15PM with Dr. Agudelo (appointment scheduled)  - Per ortho on 5/28: ok for platform wt bearing through elbow, will likely need to be NWB through wrist for total of 6 wks      # Sacral Ulcer  - Wound care c/s        F: PRN  E: PRN  N: Tube feeds via PEG  A: PIV     GI ppx: PPI IV BID  DVT ppx: holding in setting of thrombocytopenia      Code Status: DNR/DNI (per palliative care discussion on 5/17)  NOK: Bree Nobles (Wife) - [phone]

## 2024-05-30 NOTE — SIGNIFICANT EVENT
Patient's platelets are 5 today. Continues to have black tarry bowel movements, likely from ongoing oozing from GI tract. Hemoglobin is 8.6 today (down from 9.1 yesterday). Got Nplate injection 190 mcg yesterday.     Plan:  - No plans for urgent endoscopy at this point due to severe thrombocytopenia. Please re-engage GI when platelets are at closer to or above 50, so we that we can consider endoscopic intervention   - Hematology following, appreciate recs   - Continue PPI twice daily  - If patient becomes hemodynamically unstable, transfer to the ICU and notify the oncall GI fellow  - Continue to trend Hgb BID (goal > 7), platelets (goal > 50), INR daily (goal < 1.5) and transfuse as necessary  - Ensure adequate IV access, ideally 2 large bore peripheral IV     Patient discussed with GI attending, Dr. Debbie Rodrigez.      Thank you for the consultation. Gastroenterology will SIGN OFF at this time.     Please do not hesitate to contact me on [facility] or page [phone] if there are any further questions between the weekday hours of 7 AM - 5 PM.   If there is an urgent concern during the weekend, after-hours, or holidays; then please [place] the [place] [phone]. Thank you.     Marilee Beltre MD MPH   GI Fellow   [facility]

## 2024-05-30 NOTE — RESEARCH NOTES
Artificial Intelligence Monitoring in Nursing (AIMS Nursing) Study    Principle Investigator - Dr. Se Lomas  Research Coordinator - Joseph Watson RN     Patient Name - Markie Nobles  Date - 5/30/2024 3:27 PM  Location - [place] [address]    Markie Nobles was approached by Joseph Watson RN to talk about participating in the AIMS Nursing Study. LAR, Bree Nobles-spouse, signed the consent form and was given a copy for their records. Study protocol was followed and patient was given study contact information.     Joseph Watsno RN

## 2024-05-30 NOTE — PROGRESS NOTES
Speech-Language Pathology    SLP Adult Inpatient  Speech-Language Pathology Treatment     Patient Name: Markie Nobles  MRN: [id number]  Today's Date: 5/30/2024  Time Calculation  Start Time: 0853  Stop Time: 0908  Time Calculation (min): 15 min         Assessment:  Severe oropharyngeal dysphagia per MBSS completed 4/26/24  High risk for aspiration w/ PO intake is apparent  Swallowing function impacted by Parkinson's Disease      Recommendations:  NPO  Continued alternative means of nutrition/hydration/medication support; PEG placed 4/29/24.     Frequent, aggressive oral care as tolerated to improve infection control.     OK for small amounts of ice chips (one at a time, 4-5x/hour) for oral comfort and to prevent swallow disuse atrophy, but only after aggressive oral care to avoid colonization of bacteria within the oral cavity.        Plan:  SLP Plan: Skilled SLP  SLP Frequency: 2x per week  Duration:  4 weeks  Discussed POC: Pt, family  *Trial period of therapy.  Reviewed progressive nature of dysphagia r/t PD and possibility that therapy may not functionally/meaningfully improve swallowing function.  Understanding indicated by pt/family.       Goals:  Goals created by previous SLP following MBSS.  Pt had been participating in these goals w/ HC ST, ST during previous hospitalization, and SNF ST.       Pt will complete pharyngeal strengthening exercises x30 per session with good effort and efficiency (effortful swallows, tongue retractions, tongue raises).                Date initiate: 5/18/24              Status: Progressing- see note below     Pt/family will indicate understanding of dysphagia, risk for aspiration, strategies to mitigate aspiration PNA, and overall SLP POC via teach back method with > 90% accuracy.              Date initiated: 5/18/24              Status: Progressing- see note below               Subjective:  Pt properly identified and treated bedside.  Awake and alert, with no c/o pain.  Room  air.  Wife present.  Flat affect.  Pt required MAX cues for minimal participation.       Pt admitted with PNA and acute on chronic anemia.  PMHx pertinent to SLP: Parkinson's Disease, dementia, recurrent PNA.       Pt completed an MBSS on 4/26/24 that showed silent aspiration with all liquids; risk apparent for aspiration with solids.  Recommended NPO w/ pharyngeal strengthening tx.  PEG placed 4/29/24.  Pt was doing ST at SNF prior to admission.         Objective:   Pt required significant verbal cues and prompting for participation in Speech Therapy this AM.  Completed effortful swallows x5 with max cues.  Pt also completed lingual retraction exercise x2 with max cues.  Politely declined further participation in therapy, but did report he was open to continued Speech Therapy this admission.  Explained need for improved participation; understanding indicated.  SLP reviewed POC.  No needs voiced at end of session.  Call bell within reach.          05/30/24 at 4:17 PM - Lilia Abraham, SLP

## 2024-05-30 NOTE — POST-PROCEDURE NOTE
Interventional Radiology Brief Postprocedure Note    Attending: Marquis Ramires MD    Assistant: Lila Richard MD    Diagnosis: thrombocytopenia    Description of procedure: A techincally successful CT guided left iliac bone marrow biopsy was performed . Please see PACS report for details.    Anesthesia:  Local and IV analgeisa, no sedation    Complications: None    Estimated Blood Loss: minimal    Medications  As of 05/30/24 1351      sodium chloride 0.9 % bolus 1,000 mL (mL/hr) Total volume:  Not documented* Dosing weight:  62.6   *Total volume has not been documented. View each administration to see the amount administered.     Date/Time Rate/Dose/Volume Action       05/16/24  1636 1,000 mL - 500 mL/hr (over 120 min) New Bag      1837 500 mL/hr (over 120 min) Rate Verify      2001  (over 120 min) Stopped               sodium chloride 0.9 % bolus 500 mL (mL/hr) Total volume:  Not documented* Dosing weight:  62.6   *Total volume has not been documented. View each administration to see the amount administered.     Date/Time Rate/Dose/Volume Action       05/16/24  2141 500 mL - 250 mL/hr (over 120 min) New Bag      2341  (over 120 min) Stopped               piperacillin-tazobactam-dextrose (Zosyn) IV 4.5 g (mL/hr) Total volume:  Not documented* Dosing weight:  62.6   *Total volume has not been documented. View each administration to see the amount administered.     Date/Time Rate/Dose/Volume Action       05/16/24  1616 4.5 g - 200 mL/hr (over 30 min) New Bag      1646  (over 30 min) Stopped               vancomycin in dextrose 5 % (Vancocin) IVPB 1.5 g (mL/hr) Total volume:  Not documented* Dosing weight:  62.6   *Total volume has not been documented. View each administration to see the amount administered.     Date/Time Rate/Dose/Volume Action       05/16/24  1634 1.5 g - 200 mL/hr (over 90 min) New Bag      1804  (over 90 min) Stopped               aspirin tablet 325 mg (mg) Total dose:  325 mg Dosing weight:  62.6       Date/Time Rate/Dose/Volume Action       05/16/24  1711 325 mg Given               meropenem (Merrem) 1,000 mg in sodium chloride 0.9% 100 mL IV (mL/hr) Total volume:  Cannot be calculated* Dosing weight:  62.6   *Cannot convert documented volume to the ordered unit     Date/Time Rate/Dose/Volume Action       05/16/24  2344 *1,000 mg (over 30 min) New Bag     05/17/24  0014 100 mL [vol] Stopped      0846 *1,000 mg (over 30 min) New Bag      0916  (over 30 min) Stopped     05/18/24  0038 *1,000 mg (over 30 min) New Bag      0108  (over 30 min) Stopped      0931 *1,000 mg (over 30 min) New Bag      1001 100 mL [vol] Stopped      2129 *1,000 mg (over 30 min) New Bag      2159  (over 30 min) Stopped     05/19/24  0840 *1,000 mg (over 30 min) New Bag      0910 100 mL [vol] Stopped     05/20/24  0857 *1,000 mg (over 30 min) New Bag      0927  (over 30 min) Stopped      2139 1,000 mg - 200 mL/hr (over 30 min) - 100 mL [vol] New Bag      2209  (over 30 min) Stopped     05/21/24  0947 *1,000 mg (over 30 min) New Bag      1017  (over 30 min) Stopped      2103 100 mL [vol] New Bag      2133  (over 30 min) Stopped     05/22/24  0934 *1,000 mg (over 30 min) New Bag      1004 100 mL [vol] Stopped               acetaminophen (Tylenol) tablet 650 mg (mg) Total dose:  14,300 mg Dosing weight:  62.6      Date/Time Rate/Dose/Volume Action       05/17/24  1711 650 mg Given     05/19/24  1935 650 mg Given     05/20/24  1506 650 mg Given      2308 650 mg Given     05/21/24  1229 650 mg Given     05/22/24  0933 650 mg Given     05/23/24  1040 650 mg Given      1815 *650 mg Missed     05/24/24  0215 *650 mg Missed      1011 650 mg Given      1745 650 mg Given     05/25/24  0654 650 mg Given      1519 650 mg Given      1815 *650 mg Missed      2333 650 mg Given     05/26/24  0720 650 mg Given      1520 *650 mg Missed      2320 650 mg Given     05/27/24  0720 650 mg Given      1702 650 mg Given      2320 *650 mg Missed     05/28/24  0629  650 mg Given      1558 650 mg Given      2320 *650 mg Missed     05/29/24  0755 650 mg Given      1500 650 mg Given      2226 650 mg Given     05/30/24  0842 650 mg Given               carbidopa-levodopa (Sinemet)  mg per tablet 1.5 tablet (tablet) Total dose:  79.5 tablet* Dosing weight:  62.6   *Administration not included in total     Date/Time Rate/Dose/Volume Action       05/16/24  2322 1.5 tablet Given     05/17/24  0759 1.5 tablet Given      1244 1.5 tablet Given      1710 1.5 tablet Given      2131 1.5 tablet Given     05/18/24  0655 1.5 tablet Given      1342 1.5 tablet Given      1829 1.5 tablet Given      2128 1.5 tablet Given     05/19/24  0840 1.5 tablet Given      1336 1.5 tablet Given      1711 1.5 tablet Given      2005 1.5 tablet Given     05/20/24  0829 1.5 tablet Given      1303 1.5 tablet Given      1811 1.5 tablet Given     05/21/24  0439 1.5 tablet Given      0948 1.5 tablet Given      1229 1.5 tablet Given      1618 1.5 tablet Given      2104 1.5 tablet Given     05/22/24  0614 1.5 tablet Given      1313 1.5 tablet Given      1713 1.5 tablet Given      2122 1.5 tablet Given     05/23/24  0800 1.5 tablet Given      1444 1.5 tablet Given      1700 *1.5 tablet Missed      2104 1.5 tablet Given     05/24/24  0841 1.5 tablet Given      1333 1.5 tablet Given      1741 1.5 tablet Given      2151 1.5 tablet Given     05/25/24  0654 1.5 tablet Given      1307 1.5 tablet Given      1811 1.5 tablet Given      2100 1.5 tablet Given     05/26/24  0618 1.5 tablet Given      1320 1.5 tablet Given      1710 1.5 tablet Given      2100 1.5 tablet Given     05/27/24  0700 1.5 tablet Given      1359 1.5 tablet Given      1702 1.5 tablet Given      2154 1.5 tablet Given     05/28/24  0629 1.5 tablet Given      1314 1.5 tablet Given      1739 1.5 tablet Given      2047 1.5 tablet Given     05/29/24  0755 1.5 tablet Given      1213 1.5 tablet Given      1755 1.5 tablet Given      2120 1.5 tablet Given      05/30/24  0841 1.5 tablet Given               clopidogrel (Plavix) tablet 75 mg (mg) Total dose:  300 mg*   *Administration not included in total     Date/Time Rate/Dose/Volume Action       05/17/24  0802 75 mg Given      0931 *Not included in total Held by provider     05/18/24  0900 *Not included in total Automatically Held     05/19/24  0900 *Not included in total Automatically Held      0946 *Not included in total Unheld by provider      1053 *Not included in total Held by provider     05/20/24  0917 *Not included in total Unheld by provider      1205 75 mg Given      1212 75 mg Given     05/21/24  0947 75 mg Given      1126 *Not included in total Held by provider     05/22/24 0900 *Not included in total Automatically Held     05/23/24 0900 *75 mg Missed     05/24/24 0900 *75 mg Missed     05/25/24  0900 *75 mg Missed     05/26/24  0900 *75 mg Missed     05/27/24  0900 *Not included in total Automatically Held     05/28/24 0900 *75 mg Missed     05/29/24  0900 *75 mg Missed     05/30/24  0900 *75 mg Missed               finasteride (Proscar) tablet 5 mg (mg) Total dose:  70 mg Dosing weight:  62.6      Date/Time Rate/Dose/Volume Action       05/17/24  0802 5 mg Given     05/18/24  0931 5 mg Given     05/19/24  0841 5 mg Given     05/20/24  0829 5 mg Given     05/21/24  0947 5 mg Given     05/22/24  0933 5 mg Given     05/23/24  1039 5 mg Given     05/24/24  0841 5 mg Given     05/25/24  0920 5 mg Given     05/26/24  0909 5 mg Given     05/27/24  0850 5 mg Given     05/28/24  0849 5 mg Given     05/29/24  0950 5 mg Given     05/30/24  0842 5 mg Given               menthol-zinc oxide (Calmoseptine - Risamine) 0.44-20.6 % ointment 1 Application (Application) Total dose:  48 Application* Dosing weight:  62.6   *Administration not included in total     Date/Time Rate/Dose/Volume Action       05/16/24 2255 *1 Application Missed     05/17/24  0800 1 Application Given      1241 1 Application Given      1700 *1  Application Missed      2100 *1 Application Missed     05/18/24  0700 1 Application Given      1300 1 Application Given      1700 1 Application Given      2100 *1 Application Missed     05/19/24  0840 1 Application Given      1336 1 Application Given      1711 1 Application Given     05/20/24  0857 1 Application Given      1323 1 Application Given      1700 1 Application Given      2154 1 Application Given     05/21/24  0703 1 Application Given      1230 1 Application Given      1618 1 Application Given      2103 1 Application Given     05/22/24  0614 1 Application Given      1313 1 Application Given      1713 1 Application Given      2122 1 Application Given     05/23/24  0800 1 Application Given      1300 1 Application Given      1700 1 Application Given      2104 1 Application Given     05/24/24  0853 1 Application Given      1338 1 Application Given      1748 1 Application Given      2151 1 Application Given     05/25/24  0654 1 Application Given      1308 1 Application Given      1800 1 Application Given      2100 1 Application Given     05/26/24  0700 1 Application Given      1324 1 Application Given      1710 1 Application Given      2356 1 Application Given     05/27/24  0700 1 Application Given      1359 1 Application Given      1702 1 Application Given      2155 1 Application Given     05/28/24  0634 1 Application Given      1314 1 Application Given      1745 1 Application Given      2047 1 Application Given     05/29/24  0756 1 Application Given      1300 *1 Application Missed      1756 1 Application Given      2121 1 Application Given     05/30/24  0850 1 Application Given               metoprolol tartrate (Lopressor) tablet 25 mg (mg) Total dose:  300 mg*   *Administration not included in total     Date/Time Rate/Dose/Volume Action       05/17/24  0801 25 mg Given     05/18/24  0931 25 mg Given     05/19/24  0841 25 mg Given     05/20/24  0829 25 mg Given     05/21/24  0947 25 mg Given     05/22/24   0933 25 mg Given     05/23/24  1039 25 mg Given     05/24/24  0841 25 mg Given     05/25/24  0920 25 mg Given     05/26/24  0909 25 mg Given     05/27/24  0850 *25 mg Missed     05/28/24  0900 *25 mg Missed     05/29/24  0950 25 mg Given     05/30/24  0842 25 mg Given               tamsulosin (Flomax) 24 hr capsule 0.4 mg (mg) Total dose:  5.6 mg      Date/Time Rate/Dose/Volume Action       05/17/24  0805 0.4 mg Given     05/18/24  0931 0.4 mg Given     05/19/24  0841 0.4 mg Given     05/20/24  0900 0.4 mg Given     05/21/24  0948 0.4 mg Given     05/22/24  0933 0.4 mg Given     05/23/24  1039 0.4 mg Given     05/24/24  0842 0.4 mg Given     05/25/24  0920 0.4 mg Given     05/26/24  0909 0.4 mg Given     05/27/24  0850 0.4 mg Given     05/28/24  0849 0.4 mg Given     05/29/24  0950 0.4 mg Given     05/30/24  0842 0.4 mg Given               gadoterate meglumine (Dotarem) 0.5 mmol/mL contrast injection 15 mL (mL) Total volume:  12 mL Dosing weight:  62.6      Date/Time Rate/Dose/Volume Action       05/17/24  1155 12 mL Given               pantoprazole (ProtoNix) injection 40 mg (mg) Total dose:  720 mg Dosing weight:  62.6      Date/Time Rate/Dose/Volume Action       05/17/24  2131 40 mg Given     05/18/24  0932 40 mg Given      2129 40 mg Given     05/19/24  0841 40 mg Given     05/21/24  0947 40 mg Given     05/22/24  0933 40 mg Given     05/23/24  1039 40 mg Given     05/24/24  0842 40 mg Given     05/25/24  0920 40 mg Given     05/26/24  0909 40 mg Given      2100 40 mg Given     05/27/24  0849 40 mg Given      2155 40 mg Given     05/28/24  0850 40 mg Given      2047 40 mg Given     05/29/24  0950 40 mg Given      2120 40 mg Given     05/30/24  0843 40 mg Given               vancomycin (Vancocin) 1 g in dextrose 5% 250 mL IV (mL/hr) Total volume:  Not documented* Dosing weight:  62.6   *Total volume has not been documented. View each administration to see the amount administered.     Date/Time Rate/Dose/Volume  Action       05/17/24  2134 1 g - 250 mL/hr (over 60 min) New Bag      2234  (over 60 min) Stopped               pantoprazole (ProtoNix) EC tablet 40 mg (mg) Total dose:  40 mg* Dosing weight:  62.6   *Administration not included in total     Date/Time Rate/Dose/Volume Action       05/20/24  0829 40 mg Given     05/21/24  0730 *40 mg Missed               amoxicillin-pot clavulanate (Augmentin) 400-57 mg/5 mL suspension 500 mg (mg) Total dose:  2,500 mg* Dosing weight:  62.6   *Administration not included in total     Date/Time Rate/Dose/Volume Action       05/22/24  1800 *500 mg Missed      2122 500 mg Given     05/23/24  0900 500 mg Given      2104 500 mg Given     05/24/24  0859 500 mg Given      2151 500 mg Given     05/25/24  0900 *500 mg Missed               pancrelipase (Lip-Prot-Amyl) 2 tablet, sodium bicarbonate 650 mg Total volume:  Not documented* Dosing weight:  62.6   *Total volume has not been documented. View each administration to see the amount administered.     Date/Time Rate/Dose/Volume Action       05/23/24  2000  Given               immune globulin (human) (Gammagard Liquid 10%) infusion 20 g (g) Total dose:  60 g* Dosing weight:  62.6   *Administration not included in total     Date/Time Rate/Dose/Volume Action       05/23/24  2103 20 g New Bag     05/24/24  2150 20 g New Bag     05/25/24  2100 20 g New Bag     05/26/24  2308 *20 g Missed     05/27/24  0215 *20 g Missed      1522  Stopped               immune globulin (human) (Gammagard Liquid 10%) infusion 30 g (mL/hr) Total volume:  Not documented* Dosing weight:  62.6   *Total volume has not been documented. View each administration to see the amount administered.     Date/Time Rate/Dose/Volume Action       05/26/24  2308 30 g New Bag     05/27/24  0114  Stopped      2328 30 g - 32.3 mL/hr New Bag               diphenhydrAMINE (BENADryl) injection 25 mg (mg) Total dose:  125 mg Dosing weight:  62.6      Date/Time Rate/Dose/Volume Action        05/23/24 2104 25 mg Given     05/24/24  2250 25 mg Given     05/25/24 2120 25 mg Given     05/26/24  2235 25 mg Given     05/27/24  2254 25 mg Given               dexAMETHasone (Decadron) 40 mg in dextrose 5% 50 mL IV (mL/hr) Total dose:  40 mg* Dosing weight:  62.6   *From user-documented volume     Date/Time Rate/Dose/Volume Action       05/23/24  2100 40 mg - 216 mL/hr (over 15 min) New Bag      2115  (over 15 min) Stopped     05/24/24  2150 40 mg - 216 mL/hr (over 15 min) - 54 mL [vol] New Bag      2205  (over 15 min) Stopped     05/25/24  2100 40 mg - 216 mL/hr (over 15 min) New Bag      2115  (over 15 min) Stopped     05/26/24  2236 40 mg - 216 mL/hr (over 15 min) New Bag      2251  (over 15 min) Stopped               dextrose 50 % injection 12.5 g (g) Total dose:  12.5 g Dosing weight:  62.6      Date/Time Rate/Dose/Volume Action       05/29/24  1213 12.5 g Given               insulin lispro (HumaLOG) injection 0-5 Units (Units) Total dose:  2 Units Dosing weight:  62.6      Date/Time Rate/Dose/Volume Action       05/24/24  0830 *Not included in total Missed      1200 *Not included in total Missed      1700 *Not included in total Missed     05/25/24  0947 1 Units Given      1200 *Not included in total Missed      1700 *Not included in total Missed     05/26/24  0909 1 Units Given      1200 *Not included in total Missed      1700 *Not included in total Missed     05/27/24  0800 *Not included in total Missed      1200 *Not included in total Missed      1700 *Not included in total Missed     05/28/24  0800 *Not included in total Missed      1200 *Not included in total Missed      1700 *Not included in total Missed     05/29/24  0800 *Not included in total Missed      1200 *Not included in total Missed      1700 *Not included in total Missed     05/30/24  0800 *Not included in total Missed      1200 *Not included in total Missed               D5 % and 0.9 % sodium chloride infusion (mL/hr) Total volume:  Not  documented* Dosing weight:  62.6   *Total volume has not been documented. View each administration to see the amount administered.     Date/Time Rate/Dose/Volume Action       05/27/24  0945 *100 mL/hr Missed               predniSONE (Deltasone) tablet 62.5 mg (mg) Total volume:  Not documented* Dosing weight:  62.6   *Total volume has not been documented. View each administration to see the amount administered.     Date/Time Rate/Dose/Volume Action       05/27/24  1359 62.5 mg Given     05/28/24  0848 62.5 mg Given     05/29/24  1217 62.5 mg Given     05/30/24  0842 62.5 mg Given               romiPLOStim (Nplate) injection 190 mcg (mcg) Total dose:  190 mcg Dosing weight:  62.6      Date/Time Rate/Dose/Volume Action       05/28/24  1906 190 mcg Given               fentaNYL PF (Sublimaze) injection (mcg) Total dose:  50 mcg      Date/Time Rate/Dose/Volume Action       05/30/24  1330 50 mcg Given                   See detailed result report with images in PACS.    The patient tolerated the procedure well without incident or complication and is in stable condition.

## 2024-05-31 LAB
ABO GROUP (TYPE) IN BLOOD: NORMAL
ALBUMIN SERPL BCP-MCNC: 2.4 G/DL (ref 3.4–5)
ANION GAP SERPL CALC-SCNC: 15 MMOL/L (ref 10–20)
ANTIBODY SCREEN: NORMAL
BASOPHILS # BLD AUTO: 0.01 X10*3/UL (ref 0–0.1)
BASOPHILS # BLD AUTO: 0.01 X10*3/UL (ref 0–0.1)
BASOPHILS NFR BLD AUTO: 0.1 %
BASOPHILS NFR BLD AUTO: 0.1 %
BLOOD EXPIRATION DATE: NORMAL
BUN SERPL-MCNC: 178 MG/DL (ref 6–23)
CALCIUM SERPL-MCNC: 8.1 MG/DL (ref 8.6–10.6)
CHLORIDE SERPL-SCNC: 107 MMOL/L (ref 98–107)
CO2 SERPL-SCNC: 19 MMOL/L (ref 21–32)
CREAT SERPL-MCNC: 3.99 MG/DL (ref 0.5–1.3)
DISPENSE STATUS: NORMAL
EGFRCR SERPLBLD CKD-EPI 2021: 14 ML/MIN/1.73M*2
EOSINOPHIL # BLD AUTO: 0.01 X10*3/UL (ref 0–0.4)
EOSINOPHIL # BLD AUTO: 0.02 X10*3/UL (ref 0–0.4)
EOSINOPHIL NFR BLD AUTO: 0.1 %
EOSINOPHIL NFR BLD AUTO: 0.2 %
ERYTHROCYTE [DISTWIDTH] IN BLOOD BY AUTOMATED COUNT: 13.6 % (ref 11.5–14.5)
ERYTHROCYTE [DISTWIDTH] IN BLOOD BY AUTOMATED COUNT: 14 % (ref 11.5–14.5)
GLUCOSE BLD MANUAL STRIP-MCNC: 115 MG/DL (ref 74–99)
GLUCOSE BLD MANUAL STRIP-MCNC: 127 MG/DL (ref 74–99)
GLUCOSE BLD MANUAL STRIP-MCNC: 162 MG/DL (ref 74–99)
GLUCOSE BLD MANUAL STRIP-MCNC: 169 MG/DL (ref 74–99)
GLUCOSE SERPL-MCNC: 122 MG/DL (ref 74–99)
HCT VFR BLD AUTO: 18.6 % (ref 41–52)
HCT VFR BLD AUTO: 22 % (ref 41–52)
HGB BLD-MCNC: 6.7 G/DL (ref 13.5–17.5)
HGB BLD-MCNC: 7.3 G/DL (ref 13.5–17.5)
IMM GRANULOCYTES # BLD AUTO: 0.05 X10*3/UL (ref 0–0.5)
IMM GRANULOCYTES # BLD AUTO: 0.07 X10*3/UL (ref 0–0.5)
IMM GRANULOCYTES NFR BLD AUTO: 0.6 % (ref 0–0.9)
IMM GRANULOCYTES NFR BLD AUTO: 0.8 % (ref 0–0.9)
LYMPHOCYTES # BLD AUTO: 0.79 X10*3/UL (ref 0.8–3)
LYMPHOCYTES # BLD AUTO: 1.1 X10*3/UL (ref 0.8–3)
LYMPHOCYTES NFR BLD AUTO: 13.3 %
LYMPHOCYTES NFR BLD AUTO: 9.1 %
MAGNESIUM SERPL-MCNC: 2.17 MG/DL (ref 1.6–2.4)
MCH RBC QN AUTO: 29.8 PG (ref 26–34)
MCH RBC QN AUTO: 30.2 PG (ref 26–34)
MCHC RBC AUTO-ENTMCNC: 33.2 G/DL (ref 32–36)
MCHC RBC AUTO-ENTMCNC: 36 G/DL (ref 32–36)
MCV RBC AUTO: 83 FL (ref 80–100)
MCV RBC AUTO: 91 FL (ref 80–100)
MONOCYTES # BLD AUTO: 0.18 X10*3/UL (ref 0.05–0.8)
MONOCYTES # BLD AUTO: 0.89 X10*3/UL (ref 0.05–0.8)
MONOCYTES NFR BLD AUTO: 10.7 %
MONOCYTES NFR BLD AUTO: 2.1 %
NEUTROPHILS # BLD AUTO: 6.22 X10*3/UL (ref 1.6–5.5)
NEUTROPHILS # BLD AUTO: 7.64 X10*3/UL (ref 1.6–5.5)
NEUTROPHILS NFR BLD AUTO: 75.1 %
NEUTROPHILS NFR BLD AUTO: 87.8 %
NRBC BLD-RTO: 0 /100 WBCS (ref 0–0)
NRBC BLD-RTO: 0 /100 WBCS (ref 0–0)
PHOSPHATE SERPL-MCNC: 5.1 MG/DL (ref 2.5–4.9)
PLATELET # BLD AUTO: 4 X10*3/UL (ref 150–450)
PLATELET # BLD AUTO: 6 X10*3/UL (ref 150–450)
POTASSIUM SERPL-SCNC: 5 MMOL/L (ref 3.5–5.3)
PRODUCT BLOOD TYPE: 6200
PRODUCT CODE: NORMAL
RBC # BLD AUTO: 2.25 X10*6/UL (ref 4.5–5.9)
RBC # BLD AUTO: 2.42 X10*6/UL (ref 4.5–5.9)
RH FACTOR (ANTIGEN D): NORMAL
SCAN RESULT: ABNORMAL
SODIUM SERPL-SCNC: 136 MMOL/L (ref 136–145)
UNIT ABO: NORMAL
UNIT NUMBER: NORMAL
UNIT RH: NORMAL
UNIT VOLUME: 227
WBC # BLD AUTO: 8.3 X10*3/UL (ref 4.4–11.3)
WBC # BLD AUTO: 8.7 X10*3/UL (ref 4.4–11.3)

## 2024-05-31 PROCEDURE — 99233 SBSQ HOSP IP/OBS HIGH 50: CPT

## 2024-05-31 PROCEDURE — 80069 RENAL FUNCTION PANEL: CPT

## 2024-05-31 PROCEDURE — 83735 ASSAY OF MAGNESIUM: CPT

## 2024-05-31 PROCEDURE — 85025 COMPLETE CBC W/AUTO DIFF WBC: CPT

## 2024-05-31 PROCEDURE — 2500000004 HC RX 250 GENERAL PHARMACY W/ HCPCS (ALT 636 FOR OP/ED): Performed by: STUDENT IN AN ORGANIZED HEALTH CARE EDUCATION/TRAINING PROGRAM

## 2024-05-31 PROCEDURE — 97110 THERAPEUTIC EXERCISES: CPT | Mod: GP | Performed by: PHYSICAL THERAPIST

## 2024-05-31 PROCEDURE — 86901 BLOOD TYPING SEROLOGIC RH(D): CPT

## 2024-05-31 PROCEDURE — 82947 ASSAY GLUCOSE BLOOD QUANT: CPT

## 2024-05-31 PROCEDURE — 36415 COLL VENOUS BLD VENIPUNCTURE: CPT

## 2024-05-31 PROCEDURE — 2500000004 HC RX 250 GENERAL PHARMACY W/ HCPCS (ALT 636 FOR OP/ED)

## 2024-05-31 PROCEDURE — 2500000002 HC RX 250 W HCPCS SELF ADMINISTERED DRUGS (ALT 637 FOR MEDICARE OP, ALT 636 FOR OP/ED): Mod: MUE

## 2024-05-31 PROCEDURE — 85025 COMPLETE CBC W/AUTO DIFF WBC: CPT | Mod: 91

## 2024-05-31 PROCEDURE — 86900 BLOOD TYPING SEROLOGIC ABO: CPT | Mod: 91

## 2024-05-31 PROCEDURE — 2500000001 HC RX 250 WO HCPCS SELF ADMINISTERED DRUGS (ALT 637 FOR MEDICARE OP)

## 2024-05-31 PROCEDURE — 1100000001 HC PRIVATE ROOM DAILY

## 2024-05-31 PROCEDURE — 2500000002 HC RX 250 W HCPCS SELF ADMINISTERED DRUGS (ALT 637 FOR MEDICARE OP, ALT 636 FOR OP/ED)

## 2024-05-31 PROCEDURE — C9113 INJ PANTOPRAZOLE SODIUM, VIA: HCPCS | Performed by: STUDENT IN AN ORGANIZED HEALTH CARE EDUCATION/TRAINING PROGRAM

## 2024-05-31 PROCEDURE — 84100 ASSAY OF PHOSPHORUS: CPT

## 2024-05-31 RX ADMIN — ACETAMINOPHEN 650 MG: 325 TABLET ORAL at 23:20

## 2024-05-31 RX ADMIN — Medication 1 APPLICATION: at 19:01

## 2024-05-31 RX ADMIN — CARBIDOPA AND LEVODOPA 1.5 TABLET: 25; 100 TABLET ORAL at 18:51

## 2024-05-31 RX ADMIN — FINASTERIDE 5 MG: 5 TABLET, FILM COATED ORAL at 09:59

## 2024-05-31 RX ADMIN — METOPROLOL TARTRATE 25 MG: 50 TABLET, FILM COATED ORAL at 09:59

## 2024-05-31 RX ADMIN — TAMSULOSIN HYDROCHLORIDE 0.4 MG: 0.4 CAPSULE ORAL at 09:59

## 2024-05-31 RX ADMIN — PREDNISONE 62.5 MG: 10 TABLET ORAL at 09:59

## 2024-05-31 RX ADMIN — SODIUM CHLORIDE, POTASSIUM CHLORIDE, SODIUM LACTATE AND CALCIUM CHLORIDE 1000 ML: 600; 310; 30; 20 INJECTION, SOLUTION INTRAVENOUS at 10:26

## 2024-05-31 RX ADMIN — INSULIN LISPRO 1 UNITS: 100 INJECTION, SOLUTION INTRAVENOUS; SUBCUTANEOUS at 18:52

## 2024-05-31 RX ADMIN — Medication 1 APPLICATION: at 10:25

## 2024-05-31 RX ADMIN — CARBIDOPA AND LEVODOPA 1.5 TABLET: 25; 100 TABLET ORAL at 21:01

## 2024-05-31 RX ADMIN — ACETAMINOPHEN 650 MG: 325 TABLET ORAL at 15:03

## 2024-05-31 RX ADMIN — Medication 1 APPLICATION: at 21:00

## 2024-05-31 RX ADMIN — CARBIDOPA AND LEVODOPA 1.5 TABLET: 25; 100 TABLET ORAL at 15:03

## 2024-05-31 RX ADMIN — PANTOPRAZOLE SODIUM 40 MG: 40 INJECTION, POWDER, FOR SOLUTION INTRAVENOUS at 09:59

## 2024-05-31 RX ADMIN — PANTOPRAZOLE SODIUM 40 MG: 40 INJECTION, POWDER, FOR SOLUTION INTRAVENOUS at 21:02

## 2024-05-31 RX ADMIN — Medication 1 APPLICATION: at 15:04

## 2024-05-31 RX ADMIN — ACETAMINOPHEN 650 MG: 325 TABLET ORAL at 09:59

## 2024-05-31 RX ADMIN — CARBIDOPA AND LEVODOPA 1.5 TABLET: 25; 100 TABLET ORAL at 09:59

## 2024-05-31 ASSESSMENT — COGNITIVE AND FUNCTIONAL STATUS - GENERAL
MOVING FROM LYING ON BACK TO SITTING ON SIDE OF FLAT BED WITH BEDRAILS: TOTAL
MOVING FROM LYING ON BACK TO SITTING ON SIDE OF FLAT BED WITH BEDRAILS: TOTAL
MOVING TO AND FROM BED TO CHAIR: TOTAL
EATING MEALS: TOTAL
CLIMB 3 TO 5 STEPS WITH RAILING: TOTAL
MOBILITY SCORE: 6
TURNING FROM BACK TO SIDE WHILE IN FLAT BAD: TOTAL
WALKING IN HOSPITAL ROOM: TOTAL
MOBILITY SCORE: 6
STANDING UP FROM CHAIR USING ARMS: TOTAL
STANDING UP FROM CHAIR USING ARMS: TOTAL
MOVING TO AND FROM BED TO CHAIR: TOTAL
DAILY ACTIVITIY SCORE: 7
HELP NEEDED FOR BATHING: TOTAL
PERSONAL GROOMING: A LOT
TURNING FROM BACK TO SIDE WHILE IN FLAT BAD: TOTAL
DRESSING REGULAR LOWER BODY CLOTHING: TOTAL
TOILETING: TOTAL
CLIMB 3 TO 5 STEPS WITH RAILING: TOTAL
DRESSING REGULAR UPPER BODY CLOTHING: TOTAL
WALKING IN HOSPITAL ROOM: TOTAL

## 2024-05-31 ASSESSMENT — PAIN - FUNCTIONAL ASSESSMENT: PAIN_FUNCTIONAL_ASSESSMENT: 0-10

## 2024-05-31 ASSESSMENT — PAIN SCALES - GENERAL
PAINLEVEL_OUTOF10: 0 - NO PAIN
PAINLEVEL_OUTOF10: 0 - NO PAIN

## 2024-05-31 NOTE — PROGRESS NOTES
Markie Nobles is a 79 y.o. male on day 15 of admission presenting with Pneumonia.    Transitional Care Coordination Progress Note:  Patient discussed during interdisciplinary rounds.   Team members present: MD and TCC  Plan per Medical/Surgical team: Waiting on biopsy to come back and platelet count to go up.   Payor: [employer]  Discharge disposition: [facility]- [facility], Will need pre-cert restarted closer to discharge.  Potential Barriers: None  ADOD: 06/05/24      BRIAN BRODERICK RN

## 2024-05-31 NOTE — PROGRESS NOTES
"Markie Nobles is a 79 y.o. male on day 15 of admission presenting with Pneumonia.    Subjective   No acute events. Denies any fever, chills, chest pain, shortness of breath, abdominal pain, or other new issues.     Objective     Physical Exam  GEN: cachectic  HEENT: AT/NC  CARDIO: RRR, normal S1 and S2  PULM: clear to auscultation bilaterally  ABD: Soft, NT/ND, bowel sounds +, PEG in place  EXTR: Warm/well perfused; left wrist has cast in place  NEURO: strength grossly intact in bilateral UE and LE, [place] (knows Name, City; does not know year or month)   PSYCH: Appropriate mood/affect    Last Recorded Vitals  Blood pressure 144/72, pulse 62, temperature 36.6 °C (97.9 °F), resp. rate 14, height 1.778 m (5' 10\"), weight 58.1 kg (128 lb), SpO2 100%.  Intake/Output last 3 Shifts:  I/O last 3 completed shifts:  In: 1883.4 (32.4 mL/kg) [Blood:593.4; NG/GT:1290]  Out: 1700 (29.3 mL/kg) [Urine:1700 (0.8 mL/kg/hr)]  Weight: 58.1 kg     Relevant Results  Results for orders placed or performed during the hospital encounter of 05/16/24 (from the past 24 hour(s))   Prepare Platelets: 1 Units   Result Value Ref Range    PRODUCT CODE [id number]     Unit Number [id number]     Unit ABO A     Unit RH POS     Dispense Status IS     Blood Expiration Date June 01, 2024 23:59 EDT     PRODUCT BLOOD TYPE 6200     UNIT VOLUME 227    POCT GLUCOSE   Result Value Ref Range    POCT Glucose 100 (H) 74 - 99 mg/dL   POCT GLUCOSE   Result Value Ref Range    POCT Glucose 142 (H) 74 - 99 mg/dL   POCT GLUCOSE   Result Value Ref Range    POCT Glucose 113 (H) 74 - 99 mg/dL   POCT GLUCOSE   Result Value Ref Range    POCT Glucose 127 (H) 74 - 99 mg/dL     Scheduled medications  acetaminophen, 650 mg, oral, q8h  carbidopa-levodopa, 1.5 tablet, oral, 4x daily  [Held by provider] clopidogrel, 75 mg, oral, Daily  finasteride, 5 mg, oral, Daily  insulin lispro, 0-5 Units, subcutaneous, TID  menthol-zinc oxide, 1 Application, Topical, 4x daily  metoprolol " tartrate, 25 mg, oral, Daily  pantoprazole, 40 mg, intravenous, BID  predniSONE, 1 mg/kg, oral, Daily  tamsulosin, 0.4 mg, oral, Daily      Continuous medications     PRN medications  PRN medications: benzonatate, dextrose, dextrose, glucagon      Assessment and Plan:   79 year old Male w/ a PMHx MGUS, CAD (s/p CABG x3 in 2022), HTN, CKD IV, Anemia, ABEBE, hyperlipidemia, obesity, BPH, gout, NIDDM, HLD, Parkinsons (w/ dementia), sickle cell trait, dysphagia, s/p peg tube (04/2024), sacral pressure sores, s/p hospitalization for aspiration PNA (DC'd 5/1/24) initially admitted for PNA. Patient found to have lethargy and mild leukocytosis (WBC 10), likely infectious etiology. CT shows b/l lower lung findings suggestive of aspiration PNA, and given the patient's recent admission, this seems like the likely source of his infection. Additional sources include his sacral ulcer (not visualized) vs. PEG tube site (low suspicion) vs. UTI (ruled out w/ negative UA). He was found to be anemic and given 1x unit of blood transfusion with appropriate increment. BUN very elevated as well and GI was consulted for concerns of GI bleed from the PEG. 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 Patient's left wrist was swollen as well. Xray left wrist was obtained and showed scaphoid fracture. Ortho was consulted for this. Developed worsening anemia and thrombocytopenia on 5/20 after free water flushes increased and plavix resumed. Has had persistent thrombocytopenia despite course of IVIG and steroids. Suspecting post-transfusion purpura vs drug-induced ITP 2/2 abx.      Update 5/31:   - GI signed off, re-engage when PLT closer to 50 in order to consider endoscopic intervention for melena   - Heme following   - Will not give PLT unless life-threatening bleeding. Keep Hgb > 6.   - Bone marrow bx left iliac with IR yesterday without complication   - Per  nursing, no melena overnight or this AM however pt has Hgb drop, will not transfuse at this time unless Hgb <6   - Give 1L LR today (suspect worsening WARREN pre-renal given NPO status yesterday for bx)   - Today's notable labs: PLT 4 (from 3 yesterday), Cr 3.99/eGFR 14 (from Cr 3.8/eGFR 15), Hgb 6.7 (from 8.3)  - Newly resulted labs: SPEP with no monoclonal protein detected   - Pending lab workup: drug-dependent anti-PLT Ab, ADAMTS-13  - T+S last obtained 5/28 (ordered for 5/31 AM draw)   - Dispo planning: PT recommending SNF       # Thrombocytopenia  :: Ddx: Post-transfusion purpura vs drug-induced ITP 2/2 abx   :: Heme does not believe this is a consumptive process. No hepatomegaly, liver disease or splenomegaly. He has not been exposed to heparin, which excludes HIT.   :: S/p 1 unit PLT on 5/18, 5/25, 5/26, 5/27  :: Lab workup (completed): Hep B/C and HIV negative for acute infection. Parvovirus IgG (+) but PCR (-). EBV nuclear antigen IgG (+), EBV early antigen IgG (+), EBV VCA IgG (+), but EBV VCA IgM (-). CMV DNA PCR (-). Immunoglobulin free LT chain kappa:lambda ratio 1.33 (same as prior 4/17/24).   :: Spleen US with borderline splenomegaly, diffuse heterogenicity of splenic parenchyma with areas of peripheral wedge-shaped hyperechogenicity which may represent peripheral calcifications and chronic scarring although infarcts can't be excluded per radiology  - Heme following   - Should give antigen-negative (washed, leukoreduced) blood products if required.  - S/p IVIG (5/23 - 5/27) - 5 days   - S/p Dex (5/23 - 5/26) - 4 days   - AVOID giving PLT unless life-threatening bleeding   - Received first dose of romiplostim 190 mcg on 5/29 (plan for weekly)   - Cont. Pred 62.5 mg PO/day (1 mg/kg/day) (5/27 - )  - Bone marrow bx (left iliac) obtained on 5/30 with IR, pending results    # Normocytic anemia, acute on chronic   # Sickle Cell Trait  :: Anemia d/t anemia of chronic disease and CKD with acute portion likely  d/t recent infection +/- abx usage. Also likely d/t GI bleed given melena on 5/25-5/26 (likely slow oozing in s/o very low PLT)  :: No e/o hemolysis by smear or labs. DIC is not supported per heme.   :: Heme outpatient: Rosanne Casal, APRN for MGUS and anemia of CKD - per her last evaluation the kappa light chain elevation is mild and stable and patient was continued on Procrit 10k units q2 weeks. On EPO as an outpatient.   :: Hgb and platelets started down trending after free water flushes increased and also restarted plavix on 5/20  :: S/p 1 unit RBC in ED on 5/16 and 5/26   - Maintain two large bore IV, active T+S, daily CBC (Keep Hgb > 6)     #WARREN on CKD IV  :: Cr 3.64 (eGFR 16) on admission (5/16), Cr between 3.4-3.8 during hospital stay.   :: Baseline Cr: 3.18 on 5/1/24 however fluctuated before this from 3.4 to mid 4s. Cr was upper 2s prior to 7/2023.  :: FeNa 2.6% (intrinsic)- suspect d/t CKD and recent abx usage. PVR 17 ml.   :: UPEP with marked proteinuria but no monoclonal protein detected. SPEP with no monoclonal protein detected.   :: Renal US with relative atrophic Rt kidney, signs of medical renal disease, no hydronephrosis, bilateral renal cysts, and prostamegaly.   - Monitor       # CAD (S/p CABG x3 in 2022)  # Troponinemia  :: Troponin I  -> 516 w/out EKG changes, likely Type II MI  - HOLD home Clopidogrel 75mg daily in setting of thrombocytopenia   - Continue home Metoprolol tartrate 25mg daily (unclear why patient is on this dose frequency, however it populated in prior cardiologist note)     # Severe Protein Calorie Malnutrition  # Dysphagia s/p PEG tube 4/2024  - Tube feeds with Nepro goal 40cc/hr, free water flushes 150 ml q4h     # Upper GI bleed  :: Most likely d/t mucosal disruption of PEG tube initially; later developed melena with Hgb drop on 5/25 in setting of very low PLT<5 (likely slow oozing from low PLT per GI)   :: 2019 EGD/Colonoscopy - friable gastric mucosal atrophy,  duodenal erythema, and diverticulosis  - Re-engaged GI on 5/26 for melena - do not feel strongly about scoping given high risk of further trauma and significantly low platelets; pt currently HDS; believe pt may be bleeding due to how low platelets are.   - PPI IV BID in setting of melena     # Aspiration PNA (treated)   :: WBC 10 on admission (up from 7.9 on 5/1)  :: CT Chest: Tree-in-bud b/l lower lungs and secretions in trachea. C/f acute/chronic aspiration and/or PNA  :: Low concern for systemic infection (afebrile, HDS), urine strep/legionella (-)  :: S/p Vanc/Zosyn in ED  - S/p Vancomycin:  (5/16-5/17)  - S/p Meropenem 1g q12 (renal dosing): (5/16-5/22)  - S/p Augmentin (5/22-5/25)  - Aspiration precautions    # Steroid induced hyperglycemia  Started on steroids on 5/23 for possible ITP. Will place patient on sliding scale for as long as is on steroids.  - SSI (started 5/24) remove when patient stops steroids     # AMS (resolved)  # Parkinson's Disease w/ Dementia  :: Suspect AMS/lethargy due to PNA +/- anemia   :: Mentation improving after blood transfusion and abx  ::  MRI brain obtained on 5/17 demonstrated old vascular lesions but also ventricular dilation possibly consistent with NPH per radiology read  - Continue home Carbidopa-Levodopa 1.5tab QID     # BPH  - Continue home Finasteride 5mg daily  - Continue home Flomax 0.4mg daily     # Scaphoid Fracture  :: Xrays of left wrist obtained  - Ortho consulted  - Splint provided for L scaphoid (moderately displaced) fracture, pt will followup with ortho on 6/11 at 3:15PM with Dr. Agudelo (appointment scheduled)  - Per ortho on 5/28: ok for platform wt bearing through elbow, will likely need to be NWB through wrist for total of 6 wks      # Sacral Ulcer  - Wound care c/s        F: PRN  E: PRN  N: Tube feeds via PEG  A: PIV     GI ppx: PPI IV BID  DVT ppx: holding in setting of thrombocytopenia      Code Status: DNR/DNI (per palliative care discussion on  5/17)  NOK: Bree Nobles (Wife) - [phone]

## 2024-05-31 NOTE — PROGRESS NOTES
Physical Therapy    Physical Therapy Treatment    Patient Name: Markie Nobles  MRN: [id number]  Today's Date: 5/31/2024  Time Calculation  Start Time: 1502  Stop Time: 1515  Time Calculation (min): 13 min    Assessment/Plan   PT Assessment  PT Assessment Results: Decreased strength, Decreased endurance, Impaired balance, Decreased mobility, Decreased coordination, Decreased cognition, Impaired hearing, Impaired vision, Impaired tone, Orthopedic restrictions, Decreased range of motion, Decreased skin integrity, Pain  Rehab Prognosis: Fair  Evaluation/Treatment Tolerance: Other (Comment) (limited by fatigue and sacral pain (didn't want to sit up today, only do supine ROM))  Medical Staff Made Aware: Yes  Strengths: Support of Caregivers  Barriers to Participation:  (low platelets, sacral ulcer)  End of Session Communication: Bedside nurse, Care Coordinator  Assessment Comment: 78 yo male with PNA, Left scaphoid fx (NWB in cast), sacral ulcers, anemia, low platelets, NSTEMI and thrombocytopenia as wellas hx of MGUS and Parkinsons with dementia presents with significant bradykinesia, weakness, decreased activity tolerance and decreased balance. Recommend moderate intensity therapy as pt needs all 3 therapies, is not at baseline, appears to have good support and was willing to participate.  End of Session Patient Position: Bed, 3 rail up, Alarm on  PT Plan  Inpatient/Swing Bed or Outpatient: Inpatient  PT Plan  Treatment/Interventions: Bed mobility, Transfer training, Balance training, Neuromuscular re-education, Endurance training, Therapeutic exercise, Therapeutic activity, Home exercise program, Positioning, Postural re-education  PT Plan: Skilled PT  PT Frequency: 3 times per week  PT Discharge Recommendations: Moderate intensity level of continued care  Equipment Recommended upon Discharge: Other (comment) (TBD at SNF)  PT Recommended Transfer Status:  (not yet safe to do (dependent))  PT - OK to Discharge: Yes (PT  evaluation complted & DC recs made)      General Visit Information:   PT  Visit  PT Received On: 05/31/24  General  Reason for Referral: 79 y.o M brought to ER on 5/16/2024 for becoming unresponsive and for hypotension at his SNF ([facility]). Admitted for PNA and AOCA. Pt found to have L scaphoid fx.  Pt recently hospitalized (4/25 - 5/1) and treated for aspiration PNA and suspected UTI, sacral ulcer, anemic, NSTEMI, aspiration PNA, possible GIB, thrombocytopenia, anemia  Past Medical History Relevant to Rehab: MGUS, CAD (s/p CABG x3 in 2022), HTN, CKD IV, Anemia, ABEBE, hyperlipidemia, obesity, BPH, gout, NIDDM, HLD, Parkinsons (w/ dementia), dysphagia, s/p peg tube (04/2024), sacral pressure sores  Missed Visit: No  Missed Visit Reason:  (N/A)  Family/Caregiver Present: Yes  Caregiver Feedback: Wife present and very engaged  Co-Treatment:  (N/A)  Co-Treatment Reason:  (N/A)  Prior to Session Communication: Bedside nurse  Patient Position Received: Bed, 3 rail up, Alarm on  Preferred Learning Style: auditory, verbal  General Comment: Pt supine alert, flat affect, not speaking much, bradykinetic with movements and replies. Pt didn't want to try any mobility/sitting EOB but was open to doing ROM.    Subjective   Precautions:  Precautions  Hearing/Visual Limitations: glasses  UE Weight Bearing Status:  (NWB Left UE)  Medical Precautions: Fall precautions (Left scaphoid fx (NWB in cast; sling for comfort), PD, dementia, falls, NPO/dsyphagia/PEG, sacral pressure ulcers)  Splinting:  (Left thumb spica splint)  Precautions Comment:  (per Dr. Swift 5/28: fine for platform weight bearing through Left elbow, also okay for sling as long as he still ranges the elbow frequently; NWB through wrist for a total of 6 weeks, he will just need to f/u w/ Dr. Agudelo when discharged)  Vital Signs:       Objective   Pain:  Pain Assessment  Pain Assessment: 0-10  Pain Score:  (reported pain in tailbone, pre/during/post but didn't  rate)  Pain Interventions: Repositioned  Response to Interventions: appears to be the same level  Cognition:  Cognition  Overall Cognitive Status: Impaired at baseline  Arousal/Alertness: Delayed responses to stimuli  Orientation Level:  (pt not answering orientation questions, but identified and spoke with wife clearly)  Following Commands: Follows one step commands with repetition (also delayed)  Problem Solving: Assistance required to identify errors made  Cognition Comments: alert, flat affect, slow responses, not answering questions much  Memory: Exceptions to WFL  Short-Term Memory: Impaired  Problem Solving: Exceptions to WFL  Safety/Judgement: Exceptions to WFL  Insight: Moderate  Task Initiation: Delayed initiation  Processing Speed: Delayed  Coordination:     Postural Control:     Extremity/Trunk Assessments:  RUE   RUE : Exceptions to WFL  RUE PROM (degrees)  RUE PROM Comment: shoulder elevation spproximately 160 degrees, grasp/elbow grossly WFL  RUE Strength  RUE Overall Strength:  (shoulder elevation, elbow flex/ext and grasp >2+ (pt with variable effort today))  LUE   LUE: Exceptions to WFL  LUE PROM (degrees)  LUE PROM Comment:  (shoulder elevation, elbow flex/ext and grasp >2+ (pt with variable effort today))  LUE Strength  LUE Overall Strength:  (shoulder elevation, elbow flex/ext and grasp >2+ (pt with variable effort today))  RLE   RLE : Exceptions to WFL  PROM RLE (degrees)  RLE PROM Comment: DF to neutral with slight overpressure, knee extension  tight end range, hip/knee flexion WFL grossly  Strength RLE  RLE Overall Strength:  (ankle DF 0, knee flex/ext and hip flexion >2 (variable effort))  LLE   LLE : Exceptions to WFL  PROM LLE (degrees)  LLE PROM Comment:  (DF to neutral with slight overpressure, knee extension tight end range, hip/knee flexion WFL grossly)  Strength LLE  LLE Overall Strength:  (ankle DF 0, knee flex/ext and hip flexion >2 (variable effort))  Activity Tolerance:  Activity  "Tolerance  Endurance: Other (Comment) (supine AAROM only)  Treatments:  Therapeutic Exercise  Therapeutic Exercise Performed: Yes  Therapeutic Exercise Activity 1: PROM/little AAROM x 4 extremities supine    Outcome Measures:  [facility] Basic Mobility  Turning from your back to your side while in a flat bed without using bedrails: Total  Moving from lying on your back to sitting on the side of a flat bed without using bedrails: Total  Moving to and from bed to chair (including a wheelchair): Total  Standing up from a chair using your arms (e.g. wheelchair or bedside chair): Total  To walk in hospital room: Total  Climbing 3-5 steps with railing: Total  Basic Mobility - Total Score: 6    Education Documentation  Home Exercise Program, taught by Debbie Mahoney, PT at 5/31/2024  3:32 PM.  Learner: Patient  Readiness: Acceptance  Method: Explanation, Demonstration  Response: Needs Reinforcement  Comment: AAROM bilateral UEs/LE (wife and pt verbally educated), sling only for comfort    Education Comments  No comments found.             Encounter Problems       Encounter Problems (Active)       General Goals       sit to/from stand, bed to/from chair with WW with Left platform attachment with min A +2 (Not Progressing)       Start:  05/28/24    Expected End:  06/11/24            ambulate 15' with wheeled walker with Left platform attachment with min A +2 (Not Progressing)       Start:  05/28/24    Expected End:  06/11/24               PT Problem       supine to sit and sit to supine, HOB elevated 45 degrees, use of Right rail (NWB Left UE) with mod Assist x2 (Not Progressing)       Start:  05/19/24    Expected End:  06/11/24            Pt will demo static sit EOB x 10 min with UE support and SBA, no LOB; also reach with Right UE >6\" out of ROSALIO with CGA, no LOB (Not Progressing)       Start:  05/19/24    Expected End:  06/11/24            roll either direction (NWB Left UE) with SBA/cues (Not Progressing)       Start:  " 05/19/24    Expected End:  06/11/24            supine and sitting LE HEP with min A and cueing (Not Progressing)       Start:  05/19/24    Expected End:  06/11/24               Pain - Adult

## 2024-06-01 LAB
ALBUMIN SERPL BCP-MCNC: 2.5 G/DL (ref 3.4–5)
ANION GAP SERPL CALC-SCNC: 14 MMOL/L (ref 10–20)
APPEARANCE UR: CLEAR
BASOPHILS # BLD AUTO: 0.02 X10*3/UL (ref 0–0.1)
BASOPHILS NFR BLD AUTO: 0.2 %
BILIRUB UR STRIP.AUTO-MCNC: NEGATIVE MG/DL
BUN SERPL-MCNC: 174 MG/DL (ref 6–23)
CALCIUM SERPL-MCNC: 8.4 MG/DL (ref 8.6–10.6)
CHLORIDE SERPL-SCNC: 110 MMOL/L (ref 98–107)
CHLORIDE UR-SCNC: 24 MMOL/L
CHLORIDE/CREATININE (MMOL/G) IN URINE: 59 MMOL/G CREAT (ref 23–275)
CO2 SERPL-SCNC: 19 MMOL/L (ref 21–32)
COLOR UR: ABNORMAL
CREAT SERPL-MCNC: 3.96 MG/DL (ref 0.5–1.3)
CREAT UR-MCNC: 40.7 MG/DL (ref 20–370)
EGFRCR SERPLBLD CKD-EPI 2021: 15 ML/MIN/1.73M*2
EOSINOPHIL # BLD AUTO: 0.01 X10*3/UL (ref 0–0.4)
EOSINOPHIL NFR BLD AUTO: 0.1 %
ERYTHROCYTE [DISTWIDTH] IN BLOOD BY AUTOMATED COUNT: 13.7 % (ref 11.5–14.5)
ERYTHROCYTE [DISTWIDTH] IN BLOOD BY AUTOMATED COUNT: 14.1 % (ref 11.5–14.5)
GLUCOSE BLD MANUAL STRIP-MCNC: 120 MG/DL (ref 74–99)
GLUCOSE BLD MANUAL STRIP-MCNC: 125 MG/DL (ref 74–99)
GLUCOSE BLD MANUAL STRIP-MCNC: 167 MG/DL (ref 74–99)
GLUCOSE BLD MANUAL STRIP-MCNC: 171 MG/DL (ref 74–99)
GLUCOSE SERPL-MCNC: 107 MG/DL (ref 74–99)
GLUCOSE UR STRIP.AUTO-MCNC: ABNORMAL MG/DL
HCT VFR BLD AUTO: 20.5 % (ref 41–52)
HCT VFR BLD AUTO: 20.6 % (ref 41–52)
HGB BLD-MCNC: 6.9 G/DL (ref 13.5–17.5)
HGB BLD-MCNC: 7.4 G/DL (ref 13.5–17.5)
IMM GRANULOCYTES # BLD AUTO: 0.11 X10*3/UL (ref 0–0.5)
IMM GRANULOCYTES NFR BLD AUTO: 1.1 % (ref 0–0.9)
KETONES UR STRIP.AUTO-MCNC: NEGATIVE MG/DL
LEUKOCYTE ESTERASE UR QL STRIP.AUTO: NEGATIVE
LYMPHOCYTES # BLD AUTO: 1.09 X10*3/UL (ref 0.8–3)
LYMPHOCYTES NFR BLD AUTO: 10.8 %
MAGNESIUM SERPL-MCNC: 2.35 MG/DL (ref 1.6–2.4)
MCH RBC QN AUTO: 30.3 PG (ref 26–34)
MCH RBC QN AUTO: 30.5 PG (ref 26–34)
MCHC RBC AUTO-ENTMCNC: 33.5 G/DL (ref 32–36)
MCHC RBC AUTO-ENTMCNC: 36.1 G/DL (ref 32–36)
MCV RBC AUTO: 84 FL (ref 80–100)
MCV RBC AUTO: 90 FL (ref 80–100)
MONOCYTES # BLD AUTO: 0.82 X10*3/UL (ref 0.05–0.8)
MONOCYTES NFR BLD AUTO: 8.1 %
MUCOUS THREADS #/AREA URNS AUTO: ABNORMAL /LPF
NEUTROPHILS # BLD AUTO: 8.02 X10*3/UL (ref 1.6–5.5)
NEUTROPHILS NFR BLD AUTO: 79.7 %
NITRITE UR QL STRIP.AUTO: NEGATIVE
NRBC BLD-RTO: 0 /100 WBCS (ref 0–0)
NRBC BLD-RTO: 0 /100 WBCS (ref 0–0)
PH UR STRIP.AUTO: 6 [PH]
PHOSPHATE SERPL-MCNC: 4.4 MG/DL (ref 2.5–4.9)
PLATELET # BLD AUTO: 23 X10*3/UL (ref 150–450)
PLATELET # BLD AUTO: 4 X10*3/UL (ref 150–450)
POTASSIUM SERPL-SCNC: 5.1 MMOL/L (ref 3.5–5.3)
POTASSIUM UR-SCNC: 20 MMOL/L
POTASSIUM/CREAT UR-RTO: 49 MMOL/G CREAT
PROT UR STRIP.AUTO-MCNC: ABNORMAL MG/DL
RBC # BLD AUTO: 2.28 X10*6/UL (ref 4.5–5.9)
RBC # BLD AUTO: 2.43 X10*6/UL (ref 4.5–5.9)
RBC # UR STRIP.AUTO: ABNORMAL /UL
RBC #/AREA URNS AUTO: ABNORMAL /HPF
SODIUM SERPL-SCNC: 138 MMOL/L (ref 136–145)
SODIUM UR-SCNC: 39 MMOL/L
SODIUM/CREAT UR-RTO: 96 MMOL/G CREAT
SP GR UR STRIP.AUTO: 1.02
UROBILINOGEN UR STRIP.AUTO-MCNC: NORMAL MG/DL
WBC # BLD AUTO: 10.1 X10*3/UL (ref 4.4–11.3)
WBC # BLD AUTO: 10.1 X10*3/UL (ref 4.4–11.3)
WBC #/AREA URNS AUTO: ABNORMAL /HPF
YEAST BUDDING #/AREA UR COMP ASSIST: PRESENT /HPF

## 2024-06-01 PROCEDURE — 99233 SBSQ HOSP IP/OBS HIGH 50: CPT

## 2024-06-01 PROCEDURE — 2500000001 HC RX 250 WO HCPCS SELF ADMINISTERED DRUGS (ALT 637 FOR MEDICARE OP)

## 2024-06-01 PROCEDURE — 83735 ASSAY OF MAGNESIUM: CPT

## 2024-06-01 PROCEDURE — 2500000002 HC RX 250 W HCPCS SELF ADMINISTERED DRUGS (ALT 637 FOR MEDICARE OP, ALT 636 FOR OP/ED)

## 2024-06-01 PROCEDURE — 82947 ASSAY GLUCOSE BLOOD QUANT: CPT

## 2024-06-01 PROCEDURE — 36415 COLL VENOUS BLD VENIPUNCTURE: CPT

## 2024-06-01 PROCEDURE — 85025 COMPLETE CBC W/AUTO DIFF WBC: CPT

## 2024-06-01 PROCEDURE — 85027 COMPLETE CBC AUTOMATED: CPT

## 2024-06-01 PROCEDURE — 2500000002 HC RX 250 W HCPCS SELF ADMINISTERED DRUGS (ALT 637 FOR MEDICARE OP, ALT 636 FOR OP/ED): Mod: MUE

## 2024-06-01 PROCEDURE — 1100000001 HC PRIVATE ROOM DAILY

## 2024-06-01 PROCEDURE — 81001 URINALYSIS AUTO W/SCOPE: CPT

## 2024-06-01 PROCEDURE — C9113 INJ PANTOPRAZOLE SODIUM, VIA: HCPCS | Performed by: STUDENT IN AN ORGANIZED HEALTH CARE EDUCATION/TRAINING PROGRAM

## 2024-06-01 PROCEDURE — 2500000004 HC RX 250 GENERAL PHARMACY W/ HCPCS (ALT 636 FOR OP/ED)

## 2024-06-01 PROCEDURE — 2500000004 HC RX 250 GENERAL PHARMACY W/ HCPCS (ALT 636 FOR OP/ED): Performed by: STUDENT IN AN ORGANIZED HEALTH CARE EDUCATION/TRAINING PROGRAM

## 2024-06-01 PROCEDURE — 84100 ASSAY OF PHOSPHORUS: CPT

## 2024-06-01 PROCEDURE — 82436 ASSAY OF URINE CHLORIDE: CPT

## 2024-06-01 PROCEDURE — 84300 ASSAY OF URINE SODIUM: CPT | Mod: 91

## 2024-06-01 RX ADMIN — TAMSULOSIN HYDROCHLORIDE 0.4 MG: 0.4 CAPSULE ORAL at 08:15

## 2024-06-01 RX ADMIN — ACETAMINOPHEN 650 MG: 325 TABLET ORAL at 23:20

## 2024-06-01 RX ADMIN — CARBIDOPA AND LEVODOPA 1.5 TABLET: 25; 100 TABLET ORAL at 17:20

## 2024-06-01 RX ADMIN — Medication 1 APPLICATION: at 12:52

## 2024-06-01 RX ADMIN — ACETAMINOPHEN 650 MG: 325 TABLET ORAL at 15:56

## 2024-06-01 RX ADMIN — Medication 1 APPLICATION: at 17:37

## 2024-06-01 RX ADMIN — PANTOPRAZOLE SODIUM 40 MG: 40 INJECTION, POWDER, FOR SOLUTION INTRAVENOUS at 08:16

## 2024-06-01 RX ADMIN — PREDNISONE 62.5 MG: 10 TABLET ORAL at 08:15

## 2024-06-01 RX ADMIN — Medication 1 APPLICATION: at 21:00

## 2024-06-01 RX ADMIN — Medication 1 APPLICATION: at 07:55

## 2024-06-01 RX ADMIN — CARBIDOPA AND LEVODOPA 1.5 TABLET: 25; 100 TABLET ORAL at 14:16

## 2024-06-01 RX ADMIN — CARBIDOPA AND LEVODOPA 1.5 TABLET: 25; 100 TABLET ORAL at 20:47

## 2024-06-01 RX ADMIN — CARBIDOPA AND LEVODOPA 1.5 TABLET: 25; 100 TABLET ORAL at 07:53

## 2024-06-01 RX ADMIN — PANTOPRAZOLE SODIUM 40 MG: 40 INJECTION, POWDER, FOR SOLUTION INTRAVENOUS at 20:47

## 2024-06-01 RX ADMIN — FINASTERIDE 5 MG: 5 TABLET, FILM COATED ORAL at 08:32

## 2024-06-01 RX ADMIN — INSULIN LISPRO 1 UNITS: 100 INJECTION, SOLUTION INTRAVENOUS; SUBCUTANEOUS at 14:03

## 2024-06-01 RX ADMIN — ACETAMINOPHEN 650 MG: 325 TABLET ORAL at 07:53

## 2024-06-01 ASSESSMENT — COGNITIVE AND FUNCTIONAL STATUS - GENERAL
MOVING FROM LYING ON BACK TO SITTING ON SIDE OF FLAT BED WITH BEDRAILS: TOTAL
EATING MEALS: TOTAL
WALKING IN HOSPITAL ROOM: TOTAL
MOVING TO AND FROM BED TO CHAIR: TOTAL
PERSONAL GROOMING: TOTAL
HELP NEEDED FOR BATHING: TOTAL
MOBILITY SCORE: 6
DRESSING REGULAR LOWER BODY CLOTHING: TOTAL
DAILY ACTIVITIY SCORE: 6
CLIMB 3 TO 5 STEPS WITH RAILING: TOTAL
DRESSING REGULAR UPPER BODY CLOTHING: TOTAL
STANDING UP FROM CHAIR USING ARMS: TOTAL
TOILETING: TOTAL
TURNING FROM BACK TO SIDE WHILE IN FLAT BAD: TOTAL

## 2024-06-01 ASSESSMENT — PAIN - FUNCTIONAL ASSESSMENT
PAIN_FUNCTIONAL_ASSESSMENT: 0-10
PAIN_FUNCTIONAL_ASSESSMENT: 0-10

## 2024-06-01 ASSESSMENT — PAIN SCALES - GENERAL: PAINLEVEL_OUTOF10: 0 - NO PAIN

## 2024-06-01 NOTE — CARE PLAN
The patient's goals for the shift include will be safe and free through out the shift    The clinical goals for the shift include pt will be pt Hgb will be >7 by end of shift      Problem: Skin  Goal: Decreased wound size/increased tissue granulation at next dressing change  Recent Flowsheet Documentation  Taken 6/1/2024 1823 by Angela Hastings RN  Decreased wound size/increased tissue granulation at next dressing change:   Promote sleep for wound healing   Protective dressings over bony prominences   Utilize specialty bed per algorithm  Goal: Participates in plan/prevention/treatment measures  Recent Flowsheet Documentation  Taken 6/1/2024 1823 by Angela Hastings RN  Participates in plan/prevention/treatment measures:   Discuss with provider PT/OT consult   Elevate heels  Goal: Prevent/manage excess moisture  Recent Flowsheet Documentation  Taken 6/1/2024 1823 by Angela Hastings RN  Prevent/manage excess moisture:   Monitor for/manage infection if present   Cleanse incontinence/protect with barrier cream   Follow provider orders for dressing changes   Moisturize dry skin  Goal: Promote skin healing  Recent Flowsheet Documentation  Taken 6/1/2024 1823 by Angela Hastings RN  Promote skin healing:   Assess skin/pad under line(s)/device(s)   Protective dressings over bony prominences   Turn/reposition every 2 hours/use positioning/transfer devices   Ensure correct size (line/device) and apply per  instructions     Over the shift, the patient did not make progress toward the following goals. Barriers to progression include  noncompliance with medication and care management. Recommendations to address these barriers include redirection and cues..

## 2024-06-01 NOTE — CARE PLAN
The patient's goals for the shift include  feel better  The clinical goals for the shift include Pt will remain free of falls and injury during this shift.    Over the shift, the patient made progress towards all goals.

## 2024-06-01 NOTE — CONSULTS
"Markie Nobles   79 y.o.      Vitals:    05/30/24 1240   Weight: 58.1 kg (128 lb)      MRN/Room: [id number]    HPI:  Markie Nobles is a 79 y.o. male with PMH CKD IV (baseline Cr 3.4-3.6), HTN, DM2, Parkinson's dementia, dysphagia s/p PEG tube (04/2024), obesity, gout, ABEBE, MGUS, CASD (s/p CABG 2022) who was admitted back on 05/17 for aspiration pneumonia. His hospital course has been complicated by thrombocytopenia, for which Haem-Onc has been consulted, and patient is currently receiving steroids. Nephrology has been consulted due to azotemia.     Mr Nobles follows outpatient with Dr Plunkett, last seen in Jan 2024. CKD Stage G4A3 attributed to longstanding DM/HTN with possible contribution from sickle cell trait.     Renal function notable for gradually worsening BUN. Nephrology consulted today due to concern for altered mentation and possible uremic encephalopathy. Upon interview with patient and wife at bedside, patient was A&O x 3, and wife said that patient was at baseline mentation. Does endorse a history of dementia prior to admission. Denies nausea, vomiting or shortness of breath.          /70   Pulse 69   Temp 37 °C (98.6 °F)   Resp 16   Ht 1.778 m (5' 10\")   Wt 58.1 kg (128 lb)   SpO2 100%   BMI 18.37 kg/m²      Past Medical History:   Diagnosis Date    Encounter for immunization 09/29/2016    Encounter for administration of vaccine    Encounter for screening, unspecified 04/28/2014    Screening    Gout, unspecified 10/26/2017    Acute gout    Idiopathic gout, right hand 01/17/2020    Acute idiopathic gout of right hand    Localized edema 07/06/2015    Pedal edema    Other conditions influencing health status 08/03/2015    Paronychia    Other symptoms and signs involving cognitive functions and awareness 06/09/2016    Cognitive changes    Pain in unspecified foot 08/03/2015    Foot pain    Personal history of other diseases of the circulatory system     History of hypertension    " Personal history of other diseases of the digestive system 08/13/2019    History of acute gastritis    Personal history of other drug therapy     History of influenza vaccination    Personal history of other specified conditions 03/17/2016    History of diarrhea    Personal history of other specified conditions 02/19/2015    History of elevated prostate specific antigen (PSA)    Strain of muscle and tendon of unspecified wall of thorax, initial encounter 12/05/2016    Strain of thoracic region, initial encounter      Past Surgical History:   Procedure Laterality Date    CHOLECYSTECTOMY  04/20/2018    Cholecystectomy    CORONARY ARTERY BYPASS GRAFT  12/16/2022    CABG    CT ANGIO NECK  07/29/2019    CT NECK ANGIO W AND WO IV CONTRAST 7/29/2019 [facility]    CT HEAD ANGIO W AND WO IV CONTRAST  07/29/2019    CT HEAD ANGIO W AND WO IV CONTRAST 7/29/2019 [facility]    GASTROSTOMY TUBE PLACEMENT  04/29/2024      Family History   Problem Relation Name Age of Onset    Other (ESRD) Mother          on dialysis    Hypertension Father      Dementia Other      Diabetes Other      Liver cancer Other      Kidney disease Other          Reported prior     Social History     Socioeconomic History    Marital status:      Spouse name: Not on file    Number of children: Not on file    Years of education: Not on file    Highest education level: Not on file   Occupational History    Not on file   Tobacco Use    Smoking status: Former     Types: Cigarettes     Passive exposure: Never    Smokeless tobacco: Never   Vaping Use    Vaping status: Never Used   Substance and Sexual Activity    Alcohol use: Not Currently    Drug use: Not Currently    Sexual activity: Not on file   Other Topics Concern    Not on file   Social History Narrative    Not on file     Social Determinants of Health     Financial Resource Strain: Low Risk  (5/21/2024)    Overall Financial Resource Strain (CARDIA)     Difficulty of Paying Living  Expenses: Not hard at all   Food Insecurity: Not on file   Transportation Needs: No Transportation Needs (2024)    PRAPARE - Transportation     Lack of Transportation (Medical): No     Lack of Transportation (Non-Medical): No   Recent Concern: Transportation Needs - Unmet Transportation Needs (2024)    PRAPARE - Transportation     Lack of Transportation (Medical): Yes     Lack of Transportation (Non-Medical): No   Physical Activity: Not on file   Stress: Not on file   Social Connections: Feeling Socially Integrated (3/27/2024)    OASIS : Social Isolation     Frequency of experiencing loneliness or isolation: Never   Intimate Partner Violence: Not on file   Housing Stability: Low Risk  (2024)    Housing Stability Vital Sign     Unable to Pay for Housing in the Last Year: No     Number of Places Lived in the Last Year: 1     Unstable Housing in the Last Year: No       No Known Allergies     Prior to Admission Medications   Prescriptions Last Dose Informant Patient Reported? Taking?   acetaminophen (Tylenol Extra Strength) 500 mg tablet   Yes No   Si tablets (1,000 mg) by g-tube route every 8 hours if needed for mild pain (1 - 3).   allopurinol (Zyloprim) 100 mg tablet   No No   Sig: Take 1 tablet (100 mg) by mouth once daily as needed (gout). Continue to hold due to ziggy on ckd   Patient taking differently: 1 tablet (100 mg) by g-tube route once daily as needed (gout). Continue to hold due to ziggy on ckd   atorvastatin (Lipitor) 80 mg tablet   No No   Sig: TAKE 1 TABLET BY MOUTH ONCE  DAILY   Patient taking differently: 1 tablet (80 mg) by g-tube route once daily.   benzonatate (Tessalon) 100 mg capsule   No No   Sig: Take 1 capsule (100 mg) by mouth 3 times a day as needed for cough. Do not crush or chew.   carbidopa-levodopa (Sinemet)  mg tablet   Yes No   Si.5 tablets by g-tube route 4 times a day. 8am, 11am, 2pm, 5pm   clopidogrel (Plavix) 75 mg tablet   No No   Sig: TAKE 1 TABLET  BY MOUTH ONCE  DAILY   Patient taking differently: 1 tablet (75 mg) by g-tube route once daily.   docusate sodium (Colace) 100 mg capsule   No No   Sig: TAKE 1 CAPSULE BY MOUTH EVERY DAY AS DIRECTED   finasteride (Proscar) 5 mg tablet   Yes No   Si tablet (5 mg) by g-tube route once daily.   melatonin 3 mg tablet   No No   Si tablet (3 mg) by g-tube route as needed at bedtime for sleep.   menthol-zinc oxide (Calmoseptine - Risamine) 0.44-20.6 % ointment   No No   Sig: Apply 1 Application topically 4 times a day.   metoprolol tartrate (Lopressor) 25 mg tablet   No No   Sig: Take 1 tablet (25 mg) by mouth once daily. Don't crush   Patient taking differently: 0.5 tablets (12.5 mg) by g-tube route once daily. Don't crush   nitroglycerin (Nitrostat) 0.4 mg SL tablet   Yes No   Sig: Place 1 tablet (0.4 mg) under the tongue every 5 minutes if needed for chest pain.   ondansetron (Zofran) 4 mg tablet   Yes No   Sig: Take 1 tablet (4 mg) by mouth every 8 hours if needed for nausea.   polyethylene glycol (Glycolax, Miralax) 17 gram packet   No No   Sig: Take 17 g by mouth once daily.   tamsulosin (Flomax) 0.4 mg 24 hr capsule   No No   Sig: TAKE 2 CAPSULES BY MOUTH  DAILY 1/2 HOUR AFTER SAME  MEAL OR AT BEDTIME WITH A  SNACK      Facility-Administered Medications: None        Meds:   acetaminophen, 650 mg, q8h  carbidopa-levodopa, 1.5 tablet, 4x daily  [Held by provider] clopidogrel, 75 mg, Daily  finasteride, 5 mg, Daily  insulin lispro, 0-5 Units, TID  menthol-zinc oxide, 1 Application, 4x daily  metoprolol tartrate, 25 mg, Daily  pantoprazole, 40 mg, BID  predniSONE, 1 mg/kg, Daily  tamsulosin, 0.4 mg, Daily         benzonatate, 100 mg, TID PRN  dextrose, 12.5 g, q15 min PRN  dextrose, 25 g, q15 min PRN  glucagon, 1 mg, q15 min PRN        Vitals:    24 1553   BP: 136/70   Pulse: 69   Resp: 16   Temp: 37 °C (98.6 °F)   SpO2: 100%        0 -  0659  In: 450   Out: 1150 [Urine:1150]      General  appearance: AAOx3. No distress  Eyes: non-icteric  Skin: no apparent rash  Heart: S1 S2 regular. No rub  Lungs: CTA bilat.  No wheezing/crackles  Abdomen: soft, nt/nd  Extremities: No edema bilat  : No Kramer  Neuro: No FND, No asterixis   ACCESS: No HD access    Blood Labs:  Results for orders placed or performed during the hospital encounter of 05/16/24 (from the past 24 hour(s))   POCT GLUCOSE   Result Value Ref Range    POCT Glucose 169 (H) 74 - 99 mg/dL   CBC and Auto Differential   Result Value Ref Range    WBC 8.7 4.4 - 11.3 x10*3/uL    nRBC 0.0 0.0 - 0.0 /100 WBCs    RBC 2.42 (L) 4.50 - 5.90 x10*6/uL    Hemoglobin 7.3 (L) 13.5 - 17.5 g/dL    Hematocrit 22.0 (L) 41.0 - 52.0 %    MCV 91 80 - 100 fL    MCH 30.2 26.0 - 34.0 pg    MCHC 33.2 32.0 - 36.0 g/dL    RDW 14.0 11.5 - 14.5 %    Platelets 6 (LL) 150 - 450 x10*3/uL    Neutrophils % 87.8 40.0 - 80.0 %    Immature Granulocytes %, Automated 0.8 0.0 - 0.9 %    Lymphocytes % 9.1 13.0 - 44.0 %    Monocytes % 2.1 2.0 - 10.0 %    Eosinophils % 0.1 0.0 - 6.0 %    Basophils % 0.1 0.0 - 2.0 %    Neutrophils Absolute 7.64 (H) 1.60 - 5.50 x10*3/uL    Immature Granulocytes Absolute, Automated 0.07 0.00 - 0.50 x10*3/uL    Lymphocytes Absolute 0.79 (L) 0.80 - 3.00 x10*3/uL    Monocytes Absolute 0.18 0.05 - 0.80 x10*3/uL    Eosinophils Absolute 0.01 0.00 - 0.40 x10*3/uL    Basophils Absolute 0.01 0.00 - 0.10 x10*3/uL   POCT GLUCOSE   Result Value Ref Range    POCT Glucose 162 (H) 74 - 99 mg/dL   Magnesium   Result Value Ref Range    Magnesium 2.35 1.60 - 2.40 mg/dL   Renal Function Panel   Result Value Ref Range    Glucose 107 (H) 74 - 99 mg/dL    Sodium 138 136 - 145 mmol/L    Potassium 5.1 3.5 - 5.3 mmol/L    Chloride 110 (H) 98 - 107 mmol/L    Bicarbonate 19 (L) 21 - 32 mmol/L    Anion Gap 14 10 - 20 mmol/L    Urea Nitrogen 174 (HH) 6 - 23 mg/dL    Creatinine 3.96 (H) 0.50 - 1.30 mg/dL    eGFR 15 (L) >60 mL/min/1.73m*2    Calcium 8.4 (L) 8.6 - 10.6 mg/dL    Phosphorus  4.4 2.5 - 4.9 mg/dL    Albumin 2.5 (L) 3.4 - 5.0 g/dL   CBC and Auto Differential   Result Value Ref Range    WBC 10.1 4.4 - 11.3 x10*3/uL    nRBC 0.0 0.0 - 0.0 /100 WBCs    RBC 2.43 (L) 4.50 - 5.90 x10*6/uL    Hemoglobin 7.4 (L) 13.5 - 17.5 g/dL    Hematocrit 20.5 (L) 41.0 - 52.0 %    MCV 84 80 - 100 fL    MCH 30.5 26.0 - 34.0 pg    MCHC 36.1 (H) 32.0 - 36.0 g/dL    RDW 13.7 11.5 - 14.5 %    Platelets 4 (LL) 150 - 450 x10*3/uL    Neutrophils % 79.7 40.0 - 80.0 %    Immature Granulocytes %, Automated 1.1 (H) 0.0 - 0.9 %    Lymphocytes % 10.8 13.0 - 44.0 %    Monocytes % 8.1 2.0 - 10.0 %    Eosinophils % 0.1 0.0 - 6.0 %    Basophils % 0.2 0.0 - 2.0 %    Neutrophils Absolute 8.02 (H) 1.60 - 5.50 x10*3/uL    Immature Granulocytes Absolute, Automated 0.11 0.00 - 0.50 x10*3/uL    Lymphocytes Absolute 1.09 0.80 - 3.00 x10*3/uL    Monocytes Absolute 0.82 (H) 0.05 - 0.80 x10*3/uL    Eosinophils Absolute 0.01 0.00 - 0.40 x10*3/uL    Basophils Absolute 0.02 0.00 - 0.10 x10*3/uL   POCT GLUCOSE   Result Value Ref Range    POCT Glucose 120 (H) 74 - 99 mg/dL   POCT GLUCOSE   Result Value Ref Range    POCT Glucose 171 (H) 74 - 99 mg/dL   POCT GLUCOSE   Result Value Ref Range    POCT Glucose 167 (H) 74 - 99 mg/dL      ASSESSMENT:  aMrkie Nobles is a 79 y.o. male with PMH CKD IV (baseline Cr 3.4-3.6), HTN, DM2, Parkinson's dementia, dysphagia s/p PEG tube (04/2024), obesity, gout, ABEBE, MGUS, CASD (s/p CABG 2022) who was admitted back on 05/17 for aspiration pneumonia. His hospital course has been complicated by thrombocytopenia, for which Haem-Onc has been consulted, and patient is currently receiving steroids. Nephrology has been consulted due to azotemia.     Mr Nobles follows outpatient with Dr Plunkett, last seen in Jan 2024. CKD Stage G4A3 attributed to longstanding DM/HTN with possible contribution from sickle cell trait.    #Acute kidney injury on CKD IV, non-oliguric  #CKD due to longstanding DM/HTN  -Etiology: Worsened  renal function on this admission may be due to hemodynamic changes earlier in hospital course given GI bleeding, aspiration pneumonia     #Azotemia  -Due to steroids, tube feeds      RECOMMENDATIONS:  -No indication for dialysis at present, but will continue to assess daily. May become necessary for clearance over coming days  -Increase free water flushes 300cc q4h  -repeat UA, urine electrolytes   -Strict I and O charting  -Avoid contrast  -Dose medications for renal function  -Will follow     Maisha Davalos MD  Nephrology Fellow   Daytime / Weekend Renal Pager [phone]  After 7 pm Emergencies [phone] Pager [phone]

## 2024-06-01 NOTE — PROGRESS NOTES
"Markie Nobles is a 79 y.o. male on day 16 of admission presenting with Pneumonia.    Subjective   NAEON. Pt refusing all vitals and labs overnight. \"Not feeling well\" but unable to voice specific complaints.        Objective     Physical Exam  GEN: cachectic, slow to respond  HEENT: AT/NC  CARDIO: RRR, normal S1 and S2  PULM: clear to auscultation bilaterally  ABD: Soft, NT/ND, bowel sounds +, PEG in place  EXTR: Warm/well perfused; left wrist has cast in place  NEURO: strength grossly intact in bilateral UE and LE, Aox2 (knows Name, City; does not know year or month)   PSYCH: Appropriate mood/affect    Last Recorded Vitals  Blood pressure 134/72, pulse 60, temperature 36.6 °C (97.9 °F), resp. rate 14, height 1.778 m (5' 10\"), weight 58.1 kg (128 lb), SpO2 100%.  Intake/Output last 3 Shifts:  I/O last 3 completed shifts:  In: 450 (7.8 mL/kg) [NG/GT:450]  Out: 1150 (19.8 mL/kg) [Urine:1150 (0.6 mL/kg/hr)]  Weight: 58.1 kg     Relevant Results  Results for orders placed or performed during the hospital encounter of 05/16/24 (from the past 24 hour(s))   POCT GLUCOSE   Result Value Ref Range    POCT Glucose 115 (H) 74 - 99 mg/dL   POCT GLUCOSE   Result Value Ref Range    POCT Glucose 169 (H) 74 - 99 mg/dL   CBC and Auto Differential   Result Value Ref Range    WBC 8.7 4.4 - 11.3 x10*3/uL    nRBC 0.0 0.0 - 0.0 /100 WBCs    RBC 2.42 (L) 4.50 - 5.90 x10*6/uL    Hemoglobin 7.3 (L) 13.5 - 17.5 g/dL    Hematocrit 22.0 (L) 41.0 - 52.0 %    MCV 91 80 - 100 fL    MCH 30.2 26.0 - 34.0 pg    MCHC 33.2 32.0 - 36.0 g/dL    RDW 14.0 11.5 - 14.5 %    Platelets 6 (LL) 150 - 450 x10*3/uL    Neutrophils % 87.8 40.0 - 80.0 %    Immature Granulocytes %, Automated 0.8 0.0 - 0.9 %    Lymphocytes % 9.1 13.0 - 44.0 %    Monocytes % 2.1 2.0 - 10.0 %    Eosinophils % 0.1 0.0 - 6.0 %    Basophils % 0.1 0.0 - 2.0 %    Neutrophils Absolute 7.64 (H) 1.60 - 5.50 x10*3/uL    Immature Granulocytes Absolute, Automated 0.07 0.00 - 0.50 x10*3/uL    " Lymphocytes Absolute 0.79 (L) 0.80 - 3.00 x10*3/uL    Monocytes Absolute 0.18 0.05 - 0.80 x10*3/uL    Eosinophils Absolute 0.01 0.00 - 0.40 x10*3/uL    Basophils Absolute 0.01 0.00 - 0.10 x10*3/uL   POCT GLUCOSE   Result Value Ref Range    POCT Glucose 162 (H) 74 - 99 mg/dL   CBC and Auto Differential   Result Value Ref Range    WBC 10.1 4.4 - 11.3 x10*3/uL    nRBC 0.0 0.0 - 0.0 /100 WBCs    RBC 2.43 (L) 4.50 - 5.90 x10*6/uL    Hemoglobin 7.4 (L) 13.5 - 17.5 g/dL    Hematocrit 20.5 (L) 41.0 - 52.0 %    MCV 84 80 - 100 fL    MCH 30.5 26.0 - 34.0 pg    MCHC 36.1 (H) 32.0 - 36.0 g/dL    RDW 13.7 11.5 - 14.5 %    Platelets 4 (LL) 150 - 450 x10*3/uL    Neutrophils % 79.7 40.0 - 80.0 %    Immature Granulocytes %, Automated 1.1 (H) 0.0 - 0.9 %    Lymphocytes % 10.8 13.0 - 44.0 %    Monocytes % 8.1 2.0 - 10.0 %    Eosinophils % 0.1 0.0 - 6.0 %    Basophils % 0.2 0.0 - 2.0 %    Neutrophils Absolute 8.02 (H) 1.60 - 5.50 x10*3/uL    Immature Granulocytes Absolute, Automated 0.11 0.00 - 0.50 x10*3/uL    Lymphocytes Absolute 1.09 0.80 - 3.00 x10*3/uL    Monocytes Absolute 0.82 (H) 0.05 - 0.80 x10*3/uL    Eosinophils Absolute 0.01 0.00 - 0.40 x10*3/uL    Basophils Absolute 0.02 0.00 - 0.10 x10*3/uL   POCT GLUCOSE   Result Value Ref Range    POCT Glucose 120 (H) 74 - 99 mg/dL                  Assessment/Plan   Principal Problem:    Pneumonia  Active Problems:    NSTEMI (non-ST elevated myocardial infarction) (Multi)    Thrombocytopenia (CMS-HCC)      79 year old Male w/ a PMHx MGUS, CAD (s/p CABG x3 in 2022), HTN, CKD IV, Anemia, ABEBE, hyperlipidemia, obesity, BPH, gout, NIDDM, HLD, Parkinsons (w/ dementia), sickle cell trait, dysphagia, s/p peg tube (04/2024), sacral pressure sores, s/p hospitalization for aspiration PNA (DC'd 5/1/24) initially admitted for PNA. Patient found to have lethargy and mild leukocytosis (WBC 10), likely infectious etiology. CT shows b/l lower lung findings suggestive of aspiration PNA, and given the  patient's recent admission, this seems like the likely source of his infection. Additional sources include his sacral ulcer (not visualized) vs. PEG tube site (low suspicion) vs. UTI (ruled out w/ negative UA). He was found to be anemic and given 1x unit of blood transfusion with appropriate increment. BUN very elevated as well and GI was consulted for concerns of GI bleed from the PEG. 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 Patient's left wrist was swollen as well. Xray left wrist was obtained and showed scaphoid fracture. Ortho was consulted for this. Developed worsening anemia and thrombocytopenia on 5/20 after free water flushes increased and plavix resumed. Has had persistent thrombocytopenia despite course of IVIG and steroids. Suspecting post-transfusion purpura vs drug-induced ITP 2/2 abx. Creatinine and BUN still uptrending, pt's mentation appears to be worsening, nephrology consulted 6/1.      Update 6/1  - Heme following - will reach out for updates since no notes since 5/28.   - Will not give PLT unless life-threatening bleeding. Keep Hgb > 6.   - Bone marrow bx left iliac with IR yesterday without complication   - Per nursing, no melena overnight or this AM however pt has Hgb drop, will not transfuse at this time unless Hgb <6   - Give 1L LR today (suspect worsening WARREN pre-renal given NPO status yesterday for bx)   - Today's notable labs: PLT 4 (from 6 yesterday), Cr 3.96, (from Cr 3.99), Hgb 7.4 stable  - Newly resulted labs: drug-dependent anti-PLT Ab negative  - Pending lab workup: drug-dependent anti-PLT Ab, [id number]  - T+S last obtained 5/28 (ordered for 5/31 AM draw)   - Dispo planning: PT recommending SNF         # Thrombocytopenia  :: Ddx: Post-transfusion purpura vs drug-induced ITP 2/2 abx   :: Heme does not believe this is a consumptive process. No hepatomegaly, liver disease or splenomegaly. He has  not been exposed to heparin, which excludes HIT.   :: S/p 1 unit PLT on 5/18, 5/25, 5/26, 5/27  :: Lab workup (completed): Hep B/C and HIV negative for acute infection. Parvovirus IgG (+) but PCR (-). EBV nuclear antigen IgG (+), EBV early antigen IgG (+), EBV VCA IgG (+), but EBV VCA IgM (-). CMV DNA PCR (-). Immunoglobulin free LT chain kappa:lambda ratio 1.33 (same as prior 4/17/24).   :: Spleen US with borderline splenomegaly, diffuse heterogenicity of splenic parenchyma with areas of peripheral wedge-shaped hyperechogenicity which may represent peripheral calcifications and chronic scarring although infarcts can't be excluded per radiology  - Heme following   - Should give antigen-negative (washed, leukoreduced) blood products if required.  - S/p IVIG (5/23 - 5/27) - 5 days   - S/p Dex (5/23 - 5/26) - 4 days   - AVOID giving PLT unless life-threatening bleeding   - Received first dose of romiplostim 190 mcg on 5/29 (plan for weekly)   - Cont. Pred 62.5 mg PO/day (1 mg/kg/day) (5/27 - )  - Bone marrow bx (left iliac) obtained on 5/30 with IR, pending results     # Normocytic anemia, acute on chronic   # Sickle Cell Trait  :: Anemia d/t anemia of chronic disease and CKD with acute portion likely d/t recent infection +/- abx usage. Also likely d/t GI bleed given melena on 5/25-5/26 (likely slow oozing in s/o very low PLT)  :: No e/o hemolysis by smear or labs. DIC is not supported per heme.   :: Heme outpatient: Rosanne Casal, APRN for MGUS and anemia of CKD - per her last evaluation the kappa light chain elevation is mild and stable and patient was continued on Procrit 10k units q2 weeks. On EPO as an outpatient.   :: Hgb and platelets started down trending after free water flushes increased and also restarted plavix on 5/20  :: S/p 1 unit RBC in ED on 5/16 and 5/26   - Maintain two large bore IV, active T+S, daily CBC (Keep Hgb > 6)      #WARREN on CKD IV  :: Cr 3.64 (eGFR 16) on admission (5/16), Cr between  3.4-3.8 during hospital stay.   :: Baseline Cr: 3.18 on 5/1/24 however fluctuated before this from 3.4 to mid 4s. Cr was upper 2s prior to 7/2023.  :: FeNa 2.6% (intrinsic)- suspect d/t CKD and recent abx usage. PVR 17 ml.   :: UPEP with marked proteinuria but no monoclonal protein detected. SPEP with no monoclonal protein detected.   :: Renal US with relative atrophic Rt kidney, signs of medical renal disease, no hydronephrosis, bilateral renal cysts, and prostamegaly.   - Cr up to 3.99 and BUN up to 178 uptrending, mentation worsening, nephrology consulted      # CAD (S/p CABG x3 in 2022)  # Troponinemia  :: Troponin I  -> 516 w/out EKG changes, likely Type II MI  - HOLD home Clopidogrel 75mg daily in setting of thrombocytopenia   - Continue home Metoprolol tartrate 25mg daily (unclear why patient is on this dose frequency, however it populated in prior cardiologist note)     # Severe Protein Calorie Malnutrition  # Dysphagia s/p PEG tube 4/2024  - Tube feeds with Nepro goal 40cc/hr, free water flushes 150 ml q4h     # Upper GI bleed  :: Most likely d/t mucosal disruption of PEG tube initially; later developed melena with Hgb drop on 5/25 in setting of very low PLT<5 (likely slow oozing from low PLT per GI)   :: 2019 EGD/Colonoscopy - friable gastric mucosal atrophy, duodenal erythema, and diverticulosis  - Re-engaged GI on 5/26 for melena - do not feel strongly about scoping given high risk of further trauma and significantly low platelets; pt currently HDS; believe pt may be bleeding due to how low platelets are.   - PPI IV BID in setting of melena      # Aspiration PNA (treated)   :: WBC 10 on admission (up from 7.9 on 5/1)  :: CT Chest: Tree-in-bud b/l lower lungs and secretions in trachea. C/f acute/chronic aspiration and/or PNA  :: Low concern for systemic infection (afebrile, HDS), urine strep/legionella (-)  :: S/p Vanc/Zosyn in ED  - S/p Vancomycin:  (5/16-5/17)  - S/p Meropenem 1g q12 (renal  dosing): (5/16-5/22)  - S/p Augmentin (5/22-5/25)  - Aspiration precautions     # Steroid induced hyperglycemia  Started on steroids on 5/23 for possible ITP. Will place patient on sliding scale for as long as is on steroids.  - SSI (started 5/24) remove when patient stops steroids     # AMS (resolved)  # Parkinson's Disease w/ Dementia  :: Suspect AMS/lethargy due to PNA +/- anemia   :: Mentation improving after blood transfusion and abx  ::  MRI brain obtained on 5/17 demonstrated old vascular lesions but also ventricular dilation possibly consistent with NPH per radiology read  - Continue home Carbidopa-Levodopa 1.5tab QID     # BPH  - Continue home Finasteride 5mg daily  - Continue home Flomax 0.4mg daily     # Scaphoid Fracture  :: Xrays of left wrist obtained  - Ortho consulted  - Splint provided for L scaphoid (moderately displaced) fracture, pt will followup with ortho on 6/11 at 3:15PM with Dr. Agudelo (appointment scheduled)  - Per ortho on 5/28: ok for platform wt bearing through elbow, will likely need to be NWB through wrist for total of 6 wks      # Sacral Ulcer  - Wound care c/s        F: PRN  E: PRN  N: Tube feeds via PEG  A: PIV     GI ppx: PPI IV BID  DVT ppx: holding in setting of thrombocytopenia      Code Status: DNR/DNI (per palliative care discussion on 5/17)  NOK: Bree Nobles (Wife) - [phone]           Yolis Blue MD

## 2024-06-02 ENCOUNTER — APPOINTMENT (OUTPATIENT)
Dept: RADIOLOGY | Facility: HOSPITAL | Age: 79
DRG: 177 | End: 2024-06-02
Payer: MEDICARE

## 2024-06-02 LAB
ALBUMIN SERPL BCP-MCNC: 2.3 G/DL (ref 3.4–5)
ANION GAP SERPL CALC-SCNC: 12 MMOL/L (ref 10–20)
BASOPHILS # BLD AUTO: 0.01 X10*3/UL (ref 0–0.1)
BASOPHILS NFR BLD AUTO: 0.1 %
BUN SERPL-MCNC: 176 MG/DL (ref 6–23)
BURR CELLS BLD QL SMEAR: NORMAL
CALCIUM SERPL-MCNC: 8.5 MG/DL (ref 8.6–10.6)
CHLORIDE SERPL-SCNC: 111 MMOL/L (ref 98–107)
CO2 SERPL-SCNC: 21 MMOL/L (ref 21–32)
CREAT SERPL-MCNC: 4.17 MG/DL (ref 0.5–1.3)
EGFRCR SERPLBLD CKD-EPI 2021: 14 ML/MIN/1.73M*2
EOSINOPHIL # BLD AUTO: 0.01 X10*3/UL (ref 0–0.4)
EOSINOPHIL NFR BLD AUTO: 0.1 %
ERYTHROCYTE [DISTWIDTH] IN BLOOD BY AUTOMATED COUNT: 14.5 % (ref 11.5–14.5)
GLUCOSE BLD MANUAL STRIP-MCNC: 119 MG/DL (ref 74–99)
GLUCOSE BLD MANUAL STRIP-MCNC: 120 MG/DL (ref 74–99)
GLUCOSE BLD MANUAL STRIP-MCNC: 123 MG/DL (ref 74–99)
GLUCOSE BLD MANUAL STRIP-MCNC: 126 MG/DL (ref 74–99)
GLUCOSE BLD MANUAL STRIP-MCNC: 135 MG/DL (ref 74–99)
GLUCOSE BLD MANUAL STRIP-MCNC: 148 MG/DL (ref 74–99)
GLUCOSE SERPL-MCNC: 111 MG/DL (ref 74–99)
HCT VFR BLD AUTO: 19.7 % (ref 41–52)
HGB BLD-MCNC: 6.5 G/DL (ref 13.5–17.5)
HOLD SPECIMEN: NORMAL
IMM GRANULOCYTES # BLD AUTO: 0.1 X10*3/UL (ref 0–0.5)
IMM GRANULOCYTES NFR BLD AUTO: 1 % (ref 0–0.9)
LYMPHOCYTES # BLD AUTO: 1.11 X10*3/UL (ref 0.8–3)
LYMPHOCYTES NFR BLD AUTO: 10.9 %
MAGNESIUM SERPL-MCNC: 2.17 MG/DL (ref 1.6–2.4)
MCH RBC QN AUTO: 30.4 PG (ref 26–34)
MCHC RBC AUTO-ENTMCNC: 33 G/DL (ref 32–36)
MCV RBC AUTO: 92 FL (ref 80–100)
MONOCYTES # BLD AUTO: 1.07 X10*3/UL (ref 0.05–0.8)
MONOCYTES NFR BLD AUTO: 10.5 %
NEUTROPHILS # BLD AUTO: 7.89 X10*3/UL (ref 1.6–5.5)
NEUTROPHILS NFR BLD AUTO: 77.4 %
NRBC BLD-RTO: 0 /100 WBCS (ref 0–0)
OVALOCYTES BLD QL SMEAR: NORMAL
PHOSPHATE SERPL-MCNC: 3.3 MG/DL (ref 2.5–4.9)
PLATELET # BLD AUTO: 25 X10*3/UL (ref 150–450)
POLYCHROMASIA BLD QL SMEAR: NORMAL
POTASSIUM SERPL-SCNC: 5.1 MMOL/L (ref 3.5–5.3)
RBC # BLD AUTO: 2.14 X10*6/UL (ref 4.5–5.9)
RBC MORPH BLD: NORMAL
SODIUM SERPL-SCNC: 139 MMOL/L (ref 136–145)
WBC # BLD AUTO: 10.2 X10*3/UL (ref 4.4–11.3)

## 2024-06-02 PROCEDURE — 85025 COMPLETE CBC W/AUTO DIFF WBC: CPT

## 2024-06-02 PROCEDURE — 2500000001 HC RX 250 WO HCPCS SELF ADMINISTERED DRUGS (ALT 637 FOR MEDICARE OP)

## 2024-06-02 PROCEDURE — C9113 INJ PANTOPRAZOLE SODIUM, VIA: HCPCS | Performed by: STUDENT IN AN ORGANIZED HEALTH CARE EDUCATION/TRAINING PROGRAM

## 2024-06-02 PROCEDURE — 82947 ASSAY GLUCOSE BLOOD QUANT: CPT | Mod: 91

## 2024-06-02 PROCEDURE — 83735 ASSAY OF MAGNESIUM: CPT

## 2024-06-02 PROCEDURE — 99222 1ST HOSP IP/OBS MODERATE 55: CPT

## 2024-06-02 PROCEDURE — 1100000001 HC PRIVATE ROOM DAILY

## 2024-06-02 PROCEDURE — 2500000004 HC RX 250 GENERAL PHARMACY W/ HCPCS (ALT 636 FOR OP/ED)

## 2024-06-02 PROCEDURE — 2500000002 HC RX 250 W HCPCS SELF ADMINISTERED DRUGS (ALT 637 FOR MEDICARE OP, ALT 636 FOR OP/ED): Mod: MUE

## 2024-06-02 PROCEDURE — 2500000004 HC RX 250 GENERAL PHARMACY W/ HCPCS (ALT 636 FOR OP/ED): Performed by: STUDENT IN AN ORGANIZED HEALTH CARE EDUCATION/TRAINING PROGRAM

## 2024-06-02 PROCEDURE — 70450 CT HEAD/BRAIN W/O DYE: CPT | Performed by: STUDENT IN AN ORGANIZED HEALTH CARE EDUCATION/TRAINING PROGRAM

## 2024-06-02 PROCEDURE — 70450 CT HEAD/BRAIN W/O DYE: CPT

## 2024-06-02 PROCEDURE — 99233 SBSQ HOSP IP/OBS HIGH 50: CPT

## 2024-06-02 PROCEDURE — 80069 RENAL FUNCTION PANEL: CPT

## 2024-06-02 PROCEDURE — 36415 COLL VENOUS BLD VENIPUNCTURE: CPT

## 2024-06-02 RX ORDER — SODIUM BICARBONATE 650 MG/1
650 TABLET ORAL ONCE
Status: DISCONTINUED | OUTPATIENT
Start: 2024-06-02 | End: 2024-06-06

## 2024-06-02 RX ADMIN — PANTOPRAZOLE SODIUM 40 MG: 40 INJECTION, POWDER, FOR SOLUTION INTRAVENOUS at 09:39

## 2024-06-02 RX ADMIN — TAMSULOSIN HYDROCHLORIDE 0.4 MG: 0.4 CAPSULE ORAL at 08:45

## 2024-06-02 RX ADMIN — METOPROLOL TARTRATE 25 MG: 50 TABLET, FILM COATED ORAL at 08:45

## 2024-06-02 RX ADMIN — ACETAMINOPHEN 650 MG: 325 TABLET ORAL at 08:45

## 2024-06-02 RX ADMIN — Medication 1 APPLICATION: at 15:26

## 2024-06-02 RX ADMIN — PANTOPRAZOLE SODIUM 40 MG: 40 INJECTION, POWDER, FOR SOLUTION INTRAVENOUS at 20:33

## 2024-06-02 RX ADMIN — CARBIDOPA AND LEVODOPA 1.5 TABLET: 25; 100 TABLET ORAL at 08:47

## 2024-06-02 RX ADMIN — PREDNISONE 62.5 MG: 10 TABLET ORAL at 08:45

## 2024-06-02 RX ADMIN — Medication 1 APPLICATION: at 17:00

## 2024-06-02 RX ADMIN — ACETAMINOPHEN 650 MG: 325 TABLET ORAL at 15:26

## 2024-06-02 RX ADMIN — CARBIDOPA AND LEVODOPA 1.5 TABLET: 25; 100 TABLET ORAL at 15:26

## 2024-06-02 RX ADMIN — CARBIDOPA AND LEVODOPA 1.5 TABLET: 25; 100 TABLET ORAL at 20:33

## 2024-06-02 RX ADMIN — ACETAMINOPHEN 650 MG: 325 TABLET ORAL at 23:20

## 2024-06-02 RX ADMIN — Medication 1 APPLICATION: at 21:00

## 2024-06-02 RX ADMIN — FINASTERIDE 5 MG: 5 TABLET, FILM COATED ORAL at 08:45

## 2024-06-02 ASSESSMENT — PAIN - FUNCTIONAL ASSESSMENT
PAIN_FUNCTIONAL_ASSESSMENT: 0-10
PAIN_FUNCTIONAL_ASSESSMENT: 0-10

## 2024-06-02 ASSESSMENT — PAIN SCALES - GENERAL
PAINLEVEL_OUTOF10: 0 - NO PAIN
PAINLEVEL_OUTOF10: 0 - NO PAIN

## 2024-06-02 NOTE — PROGRESS NOTES
"Markie Nobles is a 79 y.o. male on day 17 of admission presenting with Pneumonia.    Subjective   Episode of melena overnight (was HDS). Refused vitals multiple times over the past day. This AM, pt denies any pain but shakes his head side to side when asked how he is doing. Would not provide further information when asked why he is shaking his head. Able to state his name but not city or year. When asked if he knows the year, pt states \"yes\" but then was unable to provide a year. Did not endorse any other new issues.        Objective     Physical Exam  GEN: cachectic, slow to respond and more confused appearing   HEENT: AT/NC  CARDIO: RRR, normal S1 and S2  PULM: clear to auscultation bilaterally  ABD: Soft, NT/ND, bowel sounds +, PEG in place  EXTR: Warm/well perfused; left wrist has cast in place  NEURO: strength grossly intact in bilateral UE and LE, Aox1 (knows Name)  PSYCH: Appropriate mood/affect    Last Recorded Vitals  Blood pressure 146/77, pulse 69, temperature 36.6 °C (97.9 °F), resp. rate 16, height 1.778 m (5' 10\"), weight 58.1 kg (128 lb), SpO2 100%.  Intake/Output last 3 Shifts:  I/O last 3 completed shifts:  In: - (0 mL/kg)   Out: 1100 (18.9 mL/kg) [Urine:1100 (0.5 mL/kg/hr)]  Weight: 58.1 kg     Relevant Results  Results for orders placed or performed during the hospital encounter of 05/16/24 (from the past 24 hour(s))   Magnesium   Result Value Ref Range    Magnesium 2.35 1.60 - 2.40 mg/dL   Renal Function Panel   Result Value Ref Range    Glucose 107 (H) 74 - 99 mg/dL    Sodium 138 136 - 145 mmol/L    Potassium 5.1 3.5 - 5.3 mmol/L    Chloride 110 (H) 98 - 107 mmol/L    Bicarbonate 19 (L) 21 - 32 mmol/L    Anion Gap 14 10 - 20 mmol/L    Urea Nitrogen 174 (HH) 6 - 23 mg/dL    Creatinine 3.96 (H) 0.50 - 1.30 mg/dL    eGFR 15 (L) >60 mL/min/1.73m*2    Calcium 8.4 (L) 8.6 - 10.6 mg/dL    Phosphorus 4.4 2.5 - 4.9 mg/dL    Albumin 2.5 (L) 3.4 - 5.0 g/dL   CBC and Auto Differential   Result Value Ref " Range    WBC 10.1 4.4 - 11.3 x10*3/uL    nRBC 0.0 0.0 - 0.0 /100 WBCs    RBC 2.43 (L) 4.50 - 5.90 x10*6/uL    Hemoglobin 7.4 (L) 13.5 - 17.5 g/dL    Hematocrit 20.5 (L) 41.0 - 52.0 %    MCV 84 80 - 100 fL    MCH 30.5 26.0 - 34.0 pg    MCHC 36.1 (H) 32.0 - 36.0 g/dL    RDW 13.7 11.5 - 14.5 %    Platelets 4 (LL) 150 - 450 x10*3/uL    Neutrophils % 79.7 40.0 - 80.0 %    Immature Granulocytes %, Automated 1.1 (H) 0.0 - 0.9 %    Lymphocytes % 10.8 13.0 - 44.0 %    Monocytes % 8.1 2.0 - 10.0 %    Eosinophils % 0.1 0.0 - 6.0 %    Basophils % 0.2 0.0 - 2.0 %    Neutrophils Absolute 8.02 (H) 1.60 - 5.50 x10*3/uL    Immature Granulocytes Absolute, Automated 0.11 0.00 - 0.50 x10*3/uL    Lymphocytes Absolute 1.09 0.80 - 3.00 x10*3/uL    Monocytes Absolute 0.82 (H) 0.05 - 0.80 x10*3/uL    Eosinophils Absolute 0.01 0.00 - 0.40 x10*3/uL    Basophils Absolute 0.02 0.00 - 0.10 x10*3/uL   POCT GLUCOSE   Result Value Ref Range    POCT Glucose 120 (H) 74 - 99 mg/dL   POCT GLUCOSE   Result Value Ref Range    POCT Glucose 171 (H) 74 - 99 mg/dL   POCT GLUCOSE   Result Value Ref Range    POCT Glucose 167 (H) 74 - 99 mg/dL   POCT GLUCOSE   Result Value Ref Range    POCT Glucose 125 (H) 74 - 99 mg/dL   Urinalysis with Reflex Culture and Microscopic   Result Value Ref Range    Color, Urine Light-Yellow Light-Yellow, Yellow, Dark-Yellow    Appearance, Urine Clear Clear    Specific Gravity, Urine 1.017 1.005 - 1.035    pH, Urine 6.0 5.0, 5.5, 6.0, 6.5, 7.0, 7.5, 8.0    Protein, Urine 200 (2+) (A) NEGATIVE, 10 (TRACE), 20 (TRACE) mg/dL    Glucose, Urine 30 (TRACE) (A) Normal mg/dL    Blood, Urine 0.03 (TRACE) (A) NEGATIVE    Ketones, Urine NEGATIVE NEGATIVE mg/dL    Bilirubin, Urine NEGATIVE NEGATIVE    Urobilinogen, Urine Normal Normal mg/dL    Nitrite, Urine NEGATIVE NEGATIVE    Leukocyte Esterase, Urine NEGATIVE NEGATIVE   Extra Urine Gray Tube   Result Value Ref Range    Extra Tube Hold for add-ons.    Urinalysis Microscopic   Result Value  Ref Range    WBC, Urine NONE 1-5, NONE /HPF    RBC, Urine 3-5 NONE, 1-2, 3-5 /HPF    Budding Yeast, Urine PRESENT (A) NONE /HPF    Mucus, Urine FEW Reference range not established. /LPF   Urine electrolytes   Result Value Ref Range    Sodium, Urine Random 39 mmol/L    Sodium/Creatinine Ratio 96 Not established. mmol/g Creat    Potassium, Urine Random 20 mmol/L    Potassium/Creatinine Ratio 49 Not established mmol/g Creat    Chloride, Urine Random 24 mmol/L    Chloride/Creatinine Ratio 59 23 - 275 mmol/g creat    Creatinine, Urine Random 40.7 20.0 - 370.0 mg/dL   CBC   Result Value Ref Range    WBC 10.1 4.4 - 11.3 x10*3/uL    nRBC 0.0 0.0 - 0.0 /100 WBCs    RBC 2.28 (L) 4.50 - 5.90 x10*6/uL    Hemoglobin 6.9 (L) 13.5 - 17.5 g/dL    Hematocrit 20.6 (L) 41.0 - 52.0 %    MCV 90 80 - 100 fL    MCH 30.3 26.0 - 34.0 pg    MCHC 33.5 32.0 - 36.0 g/dL    RDW 14.1 11.5 - 14.5 %    Platelets 23 (LL) 150 - 450 x10*3/uL   POCT GLUCOSE   Result Value Ref Range    POCT Glucose 148 (H) 74 - 99 mg/dL     Assessment and Plan:   79 year old Male w/ a PMHx MGUS, CAD (s/p CABG x3 in 2022), HTN, CKD IV, Anemia, ABEBE, hyperlipidemia, obesity, BPH, gout, NIDDM, HLD, Parkinsons (w/ dementia), sickle cell trait, dysphagia, s/p peg tube (04/2024), sacral pressure sores, s/p hospitalization for aspiration PNA (DC'd 5/1/24) initially admitted for PNA. Patient found to have lethargy and mild leukocytosis (WBC 10), likely infectious etiology. CT shows b/l lower lung findings suggestive of aspiration PNA, and given the patient's recent admission, this seems like the likely source of his infection. Additional sources include his sacral ulcer (not visualized) vs. PEG tube site (low suspicion) vs. UTI (ruled out w/ negative UA). He was found to be anemic and given 1x unit of blood transfusion with appropriate increment. BUN very elevated as well and GI was consulted for concerns of GI bleed from the PEG. 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 Patient's left wrist was swollen as well. Xray left wrist was obtained and showed scaphoid fracture. Ortho was consulted for this. Developed worsening anemia and thrombocytopenia on 5/20 after free water flushes increased and plavix resumed. Has had persistent thrombocytopenia despite course of IVIG and steroids. Suspecting post-transfusion purpura vs drug-induced ITP 2/2 abx. Creatinine and BUN still uptrending, pt's mentation appears to be worsening, nephrology consulted 6/1.      Update 6/2:   - Heme and nephrology following  - Will not give PLT unless life-threatening bleeding. Keep Hgb > 6.   - Bone marrow bx results pending   - Episode of melena overnight, HDS  - Pt appearing more confused and now Aox1 from Aox2, suspect d/t uremia  - Obtain CT head non-con to r/o intracranial bleed given change in mental status and thrombocytopenia   - UA with 2+ protein, trace glucose, trace blood, (-) leuk esterase/nitrite, budding yeast present   - FeNa 2.7% (intrinsic)   - Free water flushes increased from 150 to 300 ml q4hr yesterday   - Today's notable labs: PLT 25 (from 23 yesterday), Cr 4.17 (from Cr 3.96 yesterday), Hgb 6.5 today (from 6.9 yesterday)  - Newly resulted labs: none   - Pending lab workup: ADAMTS-13  - T+S last obtained 5/31 (ordered for 6/3 AM draw)   - Dispo planning: PT recommending SNF         # Thrombocytopenia  :: Ddx: Post-transfusion purpura vs drug-induced ITP 2/2 abx   :: Heme does not believe this is a consumptive process. No hepatomegaly, liver disease or splenomegaly. He has not been exposed to heparin, which excludes HIT.   :: S/p 1 unit PLT on 5/18, 5/25, 5/26, 5/27  :: Lab workup (completed): Hep B/C and HIV negative for acute infection. Parvovirus IgG (+) but PCR (-). EBV nuclear antigen IgG (+), EBV early antigen IgG (+), EBV VCA IgG (+), but EBV VCA IgM (-). CMV DNA PCR (-). Immunoglobulin free LT  chain kappa:lambda ratio 1.33 (same as prior 4/17/24). Drug-dependent anti-PLT Ab with presence of PLT-reactive Ab and/or immune complexes but not supportive of drug-induced ITP however cannot completely r/o per lab.   :: Spleen US with borderline splenomegaly, diffuse heterogenicity of splenic parenchyma with areas of peripheral wedge-shaped hyperechogenicity which may represent peripheral calcifications and chronic scarring although infarcts can't be excluded per radiology  - Heme following   - Should give antigen-negative (washed, leukoreduced) blood products if required.  - S/p IVIG (5/23 - 5/27) - 5 days   - S/p Dex (5/23 - 5/26) - 4 days   - AVOID giving PLT unless life-threatening bleeding   - Received first dose of romiplostim 190 mcg on 5/29 (plan for weekly)   - Cont. Pred 62.5 mg PO/day (1 mg/kg/day) (5/27 - )  - Bone marrow bx (left iliac) obtained on 5/30 with IR, pending results     # WARREN on CKD IV  # Uremia  :: Cr 3.64 (eGFR 16) on admission (5/16), Cr between 3.4-3.8 during hospital stay.   :: Baseline Cr: 3.18 on 5/1/24 however fluctuated before this from 3.4 to mid 4s. Cr was upper 2s prior to 7/2023.  :: FeNa 2.6% (intrinsic)- suspect d/t CKD and recent abx usage. PVR 17 ml.   :: UPEP with marked proteinuria but no monoclonal protein detected. SPEP with no monoclonal protein detected.   :: Renal US with relative atrophic Rt kidney, signs of medical renal disease, no hydronephrosis, bilateral renal cysts, and prostamegaly.   - Nephrology following      # Normocytic anemia, acute on chronic   # Sickle Cell Trait  :: Anemia d/t anemia of chronic disease and CKD with acute portion likely d/t recent infection +/- abx usage. Also likely d/t GI bleed given melena on 5/25-5/26 (likely slow oozing in s/o very low PLT)  :: No e/o hemolysis by smear or labs. DIC is not supported per heme.   :: Heme outpatient: Rosanne Casal, APRN for MGUS and anemia of CKD - per her last evaluation the kappa light chain  elevation is mild and stable and patient was continued on Procrit 10k units q2 weeks. On EPO as an outpatient.   :: Hgb and platelets started down trending after free water flushes increased and also restarted plavix on 5/20  :: S/p 1 unit RBC in ED on 5/16 and 5/26   - Maintain two large bore IV, active T+S, daily CBC (Keep Hgb > 6)     # Upper GI bleed  :: Most likely d/t mucosal disruption of PEG tube initially; later developed melena with Hgb drop on 5/25 in setting of very low PLT<5 (likely slow oozing from low PLT per GI)   :: 2019 EGD/Colonoscopy - friable gastric mucosal atrophy, duodenal erythema, and diverticulosis  - Re-engaged GI on 5/26 for melena - do not feel strongly about scoping given high risk of further trauma and significantly low platelets; pt currently HDS; believe pt may be bleeding due to how low platelets are.   - PPI IV BID in setting of melena     # CAD (S/p CABG x3 in 2022)  # Troponinemia  :: Troponin I  -> 516 w/out EKG changes, likely Type II MI  - HOLD home Clopidogrel 75mg daily in setting of thrombocytopenia   - Continue home Metoprolol tartrate 25mg daily (unclear why patient is on this dose frequency, however it populated in prior cardiologist note)     # Severe Protein Calorie Malnutrition  # Dysphagia s/p PEG tube 4/2024  - Tube feeds with Nepro goal 40cc/hr, free water flushes 300 ml q4h     # Aspiration PNA (treated)   :: WBC 10 on admission (up from 7.9 on 5/1)  :: CT Chest: Tree-in-bud b/l lower lungs and secretions in trachea. C/f acute/chronic aspiration and/or PNA  :: Low concern for systemic infection (afebrile, HDS), urine strep/legionella (-)  :: S/p Vanc/Zosyn in ED  - S/p Vancomycin:  (5/16-5/17)  - S/p Meropenem 1g q12 (renal dosing): (5/16-5/22)  - S/p Augmentin (5/22-5/25)  - Aspiration precautions     # Steroid induced hyperglycemia  Started on steroids on 5/23 for possible ITP. Will place patient on sliding scale for as long as is on steroids.  - SSI  (started 5/24) remove when patient stops steroids     # AMS (resolved)  # Parkinson's Disease w/ Dementia  :: Suspect AMS/lethargy due to PNA +/- anemia   :: Mentation improving after blood transfusion and abx  ::  MRI brain obtained on 5/17 demonstrated old vascular lesions but also ventricular dilation possibly consistent with NPH per radiology read  - Continue home Carbidopa-Levodopa 1.5tab QID     # BPH  - Continue home Finasteride 5mg daily  - Continue home Flomax 0.4mg daily     # Scaphoid Fracture  :: Xrays of left wrist obtained  - Ortho consulted  - Splint provided for L scaphoid (moderately displaced) fracture, pt will followup with ortho on 6/11 at 3:15PM with Dr. Agudelo (appointment scheduled)  - Per ortho on 5/28: ok for platform wt bearing through elbow, will likely need to be NWB through wrist for total of 6 wks      # Sacral Ulcer  - Wound care c/s        F: PRN  E: PRN  N: Tube feeds via PEG  A: PIV     GI ppx: PPI IV BID  DVT ppx: holding in setting of thrombocytopenia      Code Status: DNR/DNI (per palliative care discussion on 5/17)  NOK: Bree Nobles (Wife) - [phone]

## 2024-06-02 NOTE — PROGRESS NOTES
Markie Nobles   79 y.o.    MRN/Room: [id number]    Subjective:   Seen with wife at bedside  Says that she feels he is doing a bit better today than yesterday     Objective:     Meds:   acetaminophen, 650 mg, q8h  carbidopa-levodopa, 1.5 tablet, 4x daily  [Held by provider] clopidogrel, 75 mg, Daily  finasteride, 5 mg, Daily  insulin lispro, 0-5 Units, TID  menthol-zinc oxide, 1 Application, 4x daily  metoprolol tartrate, 25 mg, Daily  pantoprazole, 40 mg, BID  predniSONE, 1 mg/kg, Daily  tamsulosin, 0.4 mg, Daily         benzonatate, 100 mg, TID PRN  dextrose, 12.5 g, q15 min PRN  dextrose, 25 g, q15 min PRN  glucagon, 1 mg, q15 min PRN        Vitals:    06/02/24 0700   BP: 158/76   Pulse: 63   Resp: 17   Temp: 36.8 °C (98.3 °F)   SpO2: 100%          Intake/Output Summary (Last 24 hours) at 6/2/2024 1530  Last data filed at 6/1/2024 1841  Gross per 24 hour   Intake --   Output 300 ml   Net -300 ml       General appearance: AAOx3. No distress  Eyes: non-icteric  Skin: no apparent rash  Heart: S1 S2 regular. No rub  Lungs: CTA bilat.  No wheezing/crackles  Abdomen: soft, nt/nd  Extremities: No edema bilat  : No Kramer  Neuro: No FND, No asterixis   ACCESS: No HD access    Blood Labs:  Results for orders placed or performed during the hospital encounter of 05/16/24 (from the past 24 hour(s))   POCT GLUCOSE   Result Value Ref Range    POCT Glucose 125 (H) 74 - 99 mg/dL   Urinalysis with Reflex Culture and Microscopic   Result Value Ref Range    Color, Urine Light-Yellow Light-Yellow, Yellow, Dark-Yellow    Appearance, Urine Clear Clear    Specific Gravity, Urine 1.017 1.005 - 1.035    pH, Urine 6.0 5.0, 5.5, 6.0, 6.5, 7.0, 7.5, 8.0    Protein, Urine 200 (2+) (A) NEGATIVE, 10 (TRACE), 20 (TRACE) mg/dL    Glucose, Urine 30 (TRACE) (A) Normal mg/dL    Blood, Urine 0.03 (TRACE) (A) NEGATIVE    Ketones, Urine NEGATIVE NEGATIVE mg/dL    Bilirubin, Urine NEGATIVE NEGATIVE    Urobilinogen, Urine Normal Normal mg/dL     Nitrite, Urine NEGATIVE NEGATIVE    Leukocyte Esterase, Urine NEGATIVE NEGATIVE   Extra Urine Gray Tube   Result Value Ref Range    Extra Tube Hold for add-ons.    Urinalysis Microscopic   Result Value Ref Range    WBC, Urine NONE 1-5, NONE /HPF    RBC, Urine 3-5 NONE, 1-2, 3-5 /HPF    Budding Yeast, Urine PRESENT (A) NONE /HPF    Mucus, Urine FEW Reference range not established. /LPF   Urine electrolytes   Result Value Ref Range    Sodium, Urine Random 39 mmol/L    Sodium/Creatinine Ratio 96 Not established. mmol/g Creat    Potassium, Urine Random 20 mmol/L    Potassium/Creatinine Ratio 49 Not established mmol/g Creat    Chloride, Urine Random 24 mmol/L    Chloride/Creatinine Ratio 59 23 - 275 mmol/g creat    Creatinine, Urine Random 40.7 20.0 - 370.0 mg/dL   CBC   Result Value Ref Range    WBC 10.1 4.4 - 11.3 x10*3/uL    nRBC 0.0 0.0 - 0.0 /100 WBCs    RBC 2.28 (L) 4.50 - 5.90 x10*6/uL    Hemoglobin 6.9 (L) 13.5 - 17.5 g/dL    Hematocrit 20.6 (L) 41.0 - 52.0 %    MCV 90 80 - 100 fL    MCH 30.3 26.0 - 34.0 pg    MCHC 33.5 32.0 - 36.0 g/dL    RDW 14.1 11.5 - 14.5 %    Platelets 23 (LL) 150 - 450 x10*3/uL   POCT GLUCOSE   Result Value Ref Range    POCT Glucose 148 (H) 74 - 99 mg/dL   CBC and Auto Differential   Result Value Ref Range    WBC 10.2 4.4 - 11.3 x10*3/uL    nRBC 0.0 0.0 - 0.0 /100 WBCs    RBC 2.14 (L) 4.50 - 5.90 x10*6/uL    Hemoglobin 6.5 (LL) 13.5 - 17.5 g/dL    Hematocrit 19.7 (L) 41.0 - 52.0 %    MCV 92 80 - 100 fL    MCH 30.4 26.0 - 34.0 pg    MCHC 33.0 32.0 - 36.0 g/dL    RDW 14.5 11.5 - 14.5 %    Platelets 25 (LL) 150 - 450 x10*3/uL    Neutrophils % 77.4 40.0 - 80.0 %    Immature Granulocytes %, Automated 1.0 (H) 0.0 - 0.9 %    Lymphocytes % 10.9 13.0 - 44.0 %    Monocytes % 10.5 2.0 - 10.0 %    Eosinophils % 0.1 0.0 - 6.0 %    Basophils % 0.1 0.0 - 2.0 %    Neutrophils Absolute 7.89 (H) 1.60 - 5.50 x10*3/uL    Immature Granulocytes Absolute, Automated 0.10 0.00 - 0.50 x10*3/uL    Lymphocytes  Absolute 1.11 0.80 - 3.00 x10*3/uL    Monocytes Absolute 1.07 (H) 0.05 - 0.80 x10*3/uL    Eosinophils Absolute 0.01 0.00 - 0.40 x10*3/uL    Basophils Absolute 0.01 0.00 - 0.10 x10*3/uL   Renal Function Panel   Result Value Ref Range    Glucose 111 (H) 74 - 99 mg/dL    Sodium 139 136 - 145 mmol/L    Potassium 5.1 3.5 - 5.3 mmol/L    Chloride 111 (H) 98 - 107 mmol/L    Bicarbonate 21 21 - 32 mmol/L    Anion Gap 12 10 - 20 mmol/L    Urea Nitrogen 176 (HH) 6 - 23 mg/dL    Creatinine 4.17 (H) 0.50 - 1.30 mg/dL    eGFR 14 (L) >60 mL/min/1.73m*2    Calcium 8.5 (L) 8.6 - 10.6 mg/dL    Phosphorus 3.3 2.5 - 4.9 mg/dL    Albumin 2.3 (L) 3.4 - 5.0 g/dL   Magnesium   Result Value Ref Range    Magnesium 2.17 1.60 - 2.40 mg/dL   Morphology   Result Value Ref Range    RBC Morphology See Below     Polychromasia Mild     Ovalocytes Few     Gigi Cells Few    POCT GLUCOSE   Result Value Ref Range    POCT Glucose 123 (H) 74 - 99 mg/dL   POCT GLUCOSE   Result Value Ref Range    POCT Glucose 120 (H) 74 - 99 mg/dL      ASSESSMENT:  Markie Nobles is a 79 y.o. male with PMH CKD IV (baseline Cr 3.4-3.6), HTN, DM2, Parkinson's dementia, dysphagia s/p PEG tube (04/2024), obesity, gout, ABEBE, MGUS, CASD (s/p CABG 2022) who was admitted back on 05/17 for aspiration pneumonia. His hospital course has been complicated by thrombocytopenia, for which Haem-Onc has been consulted, and patient is currently receiving steroids. Nephrology has been consulted due to azotemia.      Mr Nobles follows outpatient with Dr Plunkett, last seen in Jan 2024. CKD Stage G4A3 attributed to longstanding DM/HTN with possible contribution from sickle cell trait.     #Acute kidney injury on CKD IV, non-oliguric  #CKD due to longstanding DM/HTN  -Etiology: Worsened renal function on this admission may be due to hemodynamic changes earlier in hospital course given GI bleeding, aspiration pneumonia      #Azotemia  -Due to steroids, tube feeds    Updates 06/02:  -Remains  non-oliguric with urine output charted at 1100cc  -However, BUN remains elevated to 176, with serum Cr 4.2 today     RECOMMENDATIONS:  -No urgent indication for HD today, but appears that this will become necessary given trend in renal function. Thrombocytopenia and anemia noted. Please discuss case with IR tomorrow to determine if they are comfortable placing a temporary vs tunneled dialysis catheter for Mr Nobles in setting of thrombocytopenia/anemia.   -Continue strict I and O charting  -Avoid contrast  -Dose medications for renal function     SW Dr Rebekah Davalos MD  Nephrology Fellow   Daytime / Weekend Renal Pager [phone]  After 7 pm Emergencies [phone] Pager [phone]

## 2024-06-03 ENCOUNTER — HOSPITAL ENCOUNTER (OUTPATIENT)
Dept: CARDIOLOGY | Facility: HOSPITAL | Age: 79
Discharge: HOME | End: 2024-06-03
Payer: MEDICARE

## 2024-06-03 LAB
ABO GROUP (TYPE) IN BLOOD: NORMAL
ALBUMIN SERPL BCP-MCNC: 2.5 G/DL (ref 3.4–5)
ALBUMIN SERPL BCP-MCNC: 2.5 G/DL (ref 3.4–5)
ANION GAP SERPL CALC-SCNC: 13 MMOL/L (ref 10–20)
ANION GAP SERPL CALC-SCNC: 14 MMOL/L (ref 10–20)
ANTIBODY SCREEN: NORMAL
BASOPHILS # BLD AUTO: 0.02 X10*3/UL (ref 0–0.1)
BASOPHILS NFR BLD AUTO: 0.2 %
BUN SERPL-MCNC: 178 MG/DL (ref 6–23)
BUN SERPL-MCNC: 191 MG/DL (ref 6–23)
CALCIUM SERPL-MCNC: 8.5 MG/DL (ref 8.6–10.6)
CALCIUM SERPL-MCNC: 8.5 MG/DL (ref 8.6–10.6)
CELL COUNT (BLOOD): 34.86 X10*3/UL
CELL POPULATIONS: NORMAL
CHLORIDE SERPL-SCNC: 109 MMOL/L (ref 98–107)
CHLORIDE SERPL-SCNC: 111 MMOL/L (ref 98–107)
CO2 SERPL-SCNC: 20 MMOL/L (ref 21–32)
CO2 SERPL-SCNC: 20 MMOL/L (ref 21–32)
CREAT SERPL-MCNC: 4.28 MG/DL (ref 0.5–1.3)
CREAT SERPL-MCNC: 4.31 MG/DL (ref 0.5–1.3)
DIAGNOSIS: NORMAL
EGFRCR SERPLBLD CKD-EPI 2021: 13 ML/MIN/1.73M*2
EGFRCR SERPLBLD CKD-EPI 2021: 13 ML/MIN/1.73M*2
EOSINOPHIL # BLD AUTO: 0.01 X10*3/UL (ref 0–0.4)
EOSINOPHIL NFR BLD AUTO: 0.1 %
ERYTHROCYTE [DISTWIDTH] IN BLOOD BY AUTOMATED COUNT: 14.6 % (ref 11.5–14.5)
FLOW DIFFERENTIAL: NORMAL
FLOW TEST ORDERED: NORMAL
GLUCOSE BLD MANUAL STRIP-MCNC: 126 MG/DL (ref 74–99)
GLUCOSE BLD MANUAL STRIP-MCNC: 141 MG/DL (ref 74–99)
GLUCOSE BLD MANUAL STRIP-MCNC: 146 MG/DL (ref 74–99)
GLUCOSE BLD MANUAL STRIP-MCNC: 198 MG/DL (ref 74–99)
GLUCOSE SERPL-MCNC: 117 MG/DL (ref 74–99)
GLUCOSE SERPL-MCNC: 201 MG/DL (ref 74–99)
HCT VFR BLD AUTO: 21.6 % (ref 41–52)
HGB BLD-MCNC: 6.9 G/DL (ref 13.5–17.5)
IMM GRANULOCYTES # BLD AUTO: 0.14 X10*3/UL (ref 0–0.5)
IMM GRANULOCYTES NFR BLD AUTO: 1.2 % (ref 0–0.9)
LAB TEST METHOD: NORMAL
LYMPHOCYTES # BLD AUTO: 1.4 X10*3/UL (ref 0.8–3)
LYMPHOCYTES NFR BLD AUTO: 11.7 %
MAGNESIUM SERPL-MCNC: 2.23 MG/DL (ref 1.6–2.4)
MCH RBC QN AUTO: 29.5 PG (ref 26–34)
MCHC RBC AUTO-ENTMCNC: 31.9 G/DL (ref 32–36)
MCV RBC AUTO: 92 FL (ref 80–100)
MONOCYTES # BLD AUTO: 1.21 X10*3/UL (ref 0.05–0.8)
MONOCYTES NFR BLD AUTO: 10.1 %
NEUTROPHILS # BLD AUTO: 9.23 X10*3/UL (ref 1.6–5.5)
NEUTROPHILS NFR BLD AUTO: 76.7 %
NRBC BLD-RTO: 0 /100 WBCS (ref 0–0)
NUMBER OF CELLS COLLECTED: NORMAL
PATH REPORT.TOTAL CANCER: NORMAL
PHOSPHATE SERPL-MCNC: 3.5 MG/DL (ref 2.5–4.9)
PHOSPHATE SERPL-MCNC: 3.7 MG/DL (ref 2.5–4.9)
PLATELET # BLD AUTO: 48 X10*3/UL (ref 150–450)
POTASSIUM SERPL-SCNC: 5.4 MMOL/L (ref 3.5–5.3)
POTASSIUM SERPL-SCNC: 5.6 MMOL/L (ref 3.5–5.3)
RBC # BLD AUTO: 2.34 X10*6/UL (ref 4.5–5.9)
RH FACTOR (ANTIGEN D): NORMAL
SCAN RESULT: ABNORMAL
SIGNATURE COMMENT: NORMAL
SODIUM SERPL-SCNC: 136 MMOL/L (ref 136–145)
SODIUM SERPL-SCNC: 140 MMOL/L (ref 136–145)
SPECIMEN VIABILITY: NORMAL
VWF CP ACT/NOR PPP CHRO: 47 %
WBC # BLD AUTO: 12 X10*3/UL (ref 4.4–11.3)

## 2024-06-03 PROCEDURE — 82947 ASSAY GLUCOSE BLOOD QUANT: CPT | Mod: 91

## 2024-06-03 PROCEDURE — 93005 ELECTROCARDIOGRAM TRACING: CPT

## 2024-06-03 PROCEDURE — 36415 COLL VENOUS BLD VENIPUNCTURE: CPT

## 2024-06-03 PROCEDURE — 86901 BLOOD TYPING SEROLOGIC RH(D): CPT

## 2024-06-03 PROCEDURE — 83735 ASSAY OF MAGNESIUM: CPT

## 2024-06-03 PROCEDURE — 86922 COMPATIBILITY TEST ANTIGLOB: CPT

## 2024-06-03 PROCEDURE — 1100000001 HC PRIVATE ROOM DAILY

## 2024-06-03 PROCEDURE — 99233 SBSQ HOSP IP/OBS HIGH 50: CPT | Performed by: INTERNAL MEDICINE

## 2024-06-03 PROCEDURE — 93010 ELECTROCARDIOGRAM REPORT: CPT | Performed by: INTERNAL MEDICINE

## 2024-06-03 PROCEDURE — C9113 INJ PANTOPRAZOLE SODIUM, VIA: HCPCS | Performed by: STUDENT IN AN ORGANIZED HEALTH CARE EDUCATION/TRAINING PROGRAM

## 2024-06-03 PROCEDURE — 85025 COMPLETE CBC W/AUTO DIFF WBC: CPT

## 2024-06-03 PROCEDURE — 2500000002 HC RX 250 W HCPCS SELF ADMINISTERED DRUGS (ALT 637 FOR MEDICARE OP, ALT 636 FOR OP/ED)

## 2024-06-03 PROCEDURE — 86850 RBC ANTIBODY SCREEN: CPT | Mod: 91

## 2024-06-03 PROCEDURE — 99233 SBSQ HOSP IP/OBS HIGH 50: CPT

## 2024-06-03 PROCEDURE — 2500000004 HC RX 250 GENERAL PHARMACY W/ HCPCS (ALT 636 FOR OP/ED): Performed by: STUDENT IN AN ORGANIZED HEALTH CARE EDUCATION/TRAINING PROGRAM

## 2024-06-03 PROCEDURE — 80069 RENAL FUNCTION PANEL: CPT | Mod: 91,MUE

## 2024-06-03 PROCEDURE — 2500000004 HC RX 250 GENERAL PHARMACY W/ HCPCS (ALT 636 FOR OP/ED)

## 2024-06-03 PROCEDURE — 80069 RENAL FUNCTION PANEL: CPT

## 2024-06-03 PROCEDURE — 2500000001 HC RX 250 WO HCPCS SELF ADMINISTERED DRUGS (ALT 637 FOR MEDICARE OP)

## 2024-06-03 RX ADMIN — METOPROLOL TARTRATE 25 MG: 50 TABLET, FILM COATED ORAL at 09:18

## 2024-06-03 RX ADMIN — CARBIDOPA AND LEVODOPA 1.5 TABLET: 25; 100 TABLET ORAL at 18:21

## 2024-06-03 RX ADMIN — CARBIDOPA AND LEVODOPA 1.5 TABLET: 25; 100 TABLET ORAL at 13:42

## 2024-06-03 RX ADMIN — ACETAMINOPHEN 650 MG: 325 TABLET ORAL at 16:09

## 2024-06-03 RX ADMIN — CARBIDOPA AND LEVODOPA 1.5 TABLET: 25; 100 TABLET ORAL at 22:10

## 2024-06-03 RX ADMIN — ACETAMINOPHEN 650 MG: 325 TABLET ORAL at 23:41

## 2024-06-03 RX ADMIN — Medication 1 APPLICATION: at 22:11

## 2024-06-03 RX ADMIN — CARBIDOPA AND LEVODOPA 1.5 TABLET: 25; 100 TABLET ORAL at 09:16

## 2024-06-03 RX ADMIN — ACETAMINOPHEN 650 MG: 325 TABLET ORAL at 09:18

## 2024-06-03 RX ADMIN — FINASTERIDE 5 MG: 5 TABLET, FILM COATED ORAL at 09:18

## 2024-06-03 RX ADMIN — Medication 1 APPLICATION: at 09:18

## 2024-06-03 RX ADMIN — PANTOPRAZOLE SODIUM 40 MG: 40 INJECTION, POWDER, FOR SOLUTION INTRAVENOUS at 09:16

## 2024-06-03 RX ADMIN — Medication 1 APPLICATION: at 13:42

## 2024-06-03 RX ADMIN — TAMSULOSIN HYDROCHLORIDE 0.4 MG: 0.4 CAPSULE ORAL at 09:17

## 2024-06-03 RX ADMIN — PREDNISONE 62.5 MG: 10 TABLET ORAL at 09:17

## 2024-06-03 RX ADMIN — Medication 1 APPLICATION: at 18:22

## 2024-06-03 RX ADMIN — SODIUM ZIRCONIUM CYCLOSILICATE 10 G: 10 POWDER, FOR SUSPENSION ORAL at 23:41

## 2024-06-03 RX ADMIN — PANTOPRAZOLE SODIUM 40 MG: 40 INJECTION, POWDER, FOR SOLUTION INTRAVENOUS at 22:13

## 2024-06-03 ASSESSMENT — COGNITIVE AND FUNCTIONAL STATUS - GENERAL
PERSONAL GROOMING: TOTAL
PERSONAL GROOMING: TOTAL
STANDING UP FROM CHAIR USING ARMS: TOTAL
TOILETING: TOTAL
TOILETING: TOTAL
EATING MEALS: TOTAL
MOVING TO AND FROM BED TO CHAIR: TOTAL
MOVING FROM LYING ON BACK TO SITTING ON SIDE OF FLAT BED WITH BEDRAILS: TOTAL
DAILY ACTIVITIY SCORE: 6
DRESSING REGULAR UPPER BODY CLOTHING: TOTAL
DRESSING REGULAR LOWER BODY CLOTHING: TOTAL
TURNING FROM BACK TO SIDE WHILE IN FLAT BAD: TOTAL
MOVING TO AND FROM BED TO CHAIR: TOTAL
HELP NEEDED FOR BATHING: TOTAL
TURNING FROM BACK TO SIDE WHILE IN FLAT BAD: TOTAL
WALKING IN HOSPITAL ROOM: TOTAL
HELP NEEDED FOR BATHING: TOTAL
CLIMB 3 TO 5 STEPS WITH RAILING: TOTAL
WALKING IN HOSPITAL ROOM: TOTAL
STANDING UP FROM CHAIR USING ARMS: TOTAL
DRESSING REGULAR UPPER BODY CLOTHING: TOTAL
DRESSING REGULAR LOWER BODY CLOTHING: TOTAL
EATING MEALS: TOTAL
MOBILITY SCORE: 6
MOVING FROM LYING ON BACK TO SITTING ON SIDE OF FLAT BED WITH BEDRAILS: TOTAL
DAILY ACTIVITIY SCORE: 6

## 2024-06-03 ASSESSMENT — PAIN SCALES - GENERAL
PAINLEVEL_OUTOF10: 0 - NO PAIN

## 2024-06-03 ASSESSMENT — PAIN - FUNCTIONAL ASSESSMENT
PAIN_FUNCTIONAL_ASSESSMENT: 0-10
PAIN_FUNCTIONAL_ASSESSMENT: UNABLE TO SELF-REPORT
PAIN_FUNCTIONAL_ASSESSMENT: 0-10
PAIN_FUNCTIONAL_ASSESSMENT: 0-10

## 2024-06-03 NOTE — PROGRESS NOTES
Subjective     Interval History: Markie Nobles has no new complaints    Medications    Current Facility-Administered Medications:     acetaminophen (Tylenol) tablet 650 mg, 650 mg, oral, q8h, Deondre Grimes MD PhD, 650 mg at 06/03/24 0918    benzonatate (Tessalon) capsule 100 mg, 100 mg, oral, TID PRN, Emanuel Dave MD    carbidopa-levodopa (Sinemet)  mg per tablet 1.5 tablet, 1.5 tablet, oral, 4x daily, Emanuel Dave MD, 1.5 tablet at 06/03/24 1342    [Held by provider] clopidogrel (Plavix) tablet 75 mg, 75 mg, oral, Daily, Deondre Grimes MD PhD, 75 mg at 05/21/24 0947    dextrose 50 % injection 12.5 g, 12.5 g, intravenous, q15 min PRN, Deondre Grimes MD PhD, 12.5 g at 05/29/24 1213    dextrose 50 % injection 25 g, 25 g, intravenous, q15 min PRN, Deondre Grimes MD PhD    finasteride (Proscar) tablet 5 mg, 5 mg, oral, Daily, Emanuel Dave MD, 5 mg at 06/03/24 0918    glucagon (Glucagen) injection 1 mg, 1 mg, intramuscular, q15 min PRN, Deondre Grimes MD PhD    insulin lispro (HumaLOG) injection 0-5 Units, 0-5 Units, subcutaneous, TID, Deondre Grimes MD PhD, 1 Units at 06/01/24 1403    menthol-zinc oxide (Calmoseptine - Risamine) 0.44-20.6 % ointment 1 Application, 1 Application, Topical, 4x daily, Emanuel Dave MD, 1 Application at 06/03/24 1342    metoprolol tartrate (Lopressor) tablet 25 mg, 25 mg, oral, Daily, Emanuel Dave MD, 25 mg at 06/03/24 0918    pancrelipase (Lip-Prot-Amyl) (Viokace) 10,440-39,150- 39,150 unit per tablet 1 tablet, 1 tablet, g-tube, Once, Mainor Mckeon MD    pantoprazole (ProtoNix) injection 40 mg, 40 mg, intravenous, BID, Elda O Olasehinde, MD, 40 mg at 06/03/24 0916    predniSONE (Deltasone) tablet 62.5 mg, 1 mg/kg, oral, Daily, Dionna Peña MD, 62.5 mg at 06/03/24 0917    sodium bicarbonate tablet 650 mg, 650 mg, g-tube, Once, Mainor Mckeon MD    tamsulosin (Flomax) 24 hr capsule 0.4 mg, 0.4 mg, oral, Daily, Emanuel Dave MD, 0.4 mg at 06/03/24 0917    Objective      Physical Exam  Heart S1 S2 RRR, Lungs CTA, no edema  No rub on exam    Vital signs in last 24 hours:  Temp:  [36.3 °C (97.4 °F)-37.1 °C (98.8 °F)] 36.4 °C (97.5 °F)  Heart Rate:  [61-65] 63  Resp:  [12-16] 16  BP: (132-157)/(53-83) 157/83       Intake/Output last 3 shifts:  I/O last 3 completed shifts:  In: 600 (10.3 mL/kg) [NG/GT:600]  Out: 650 (11.2 mL/kg) [Urine:650 (0.3 mL/kg/hr)]  Weight: 58.1 kg     Labs:  Results from last 7 days   Lab Units 06/03/24  0755   WBC AUTO x10*3/uL 12.0*   RBC AUTO x10*6/uL 2.34*   HEMOGLOBIN g/dL 6.9*   HEMATOCRIT % 21.6*     Results from last 7 days   Lab Units 06/03/24  0755   SODIUM mmol/L 140   POTASSIUM mmol/L 5.4*   CHLORIDE mmol/L 111*   CO2 mmol/L 20*   BUN mg/dL 178*   CREATININE mg/dL 4.31*   CALCIUM mg/dL 8.5*   PHOSPHORUS mg/dL 3.7   MAGNESIUM mg/dL 2.23       Assessment/Plan     Principal Problem:    Pneumonia  Active Problems:    NSTEMI (non-ST elevated myocardial infarction) (Multi)    Thrombocytopenia (CMS-HCC)  WARREN on CKD, with persistently high BUN levels.    Discussed at length with patient and wife; recommended initiation of dialysis. Family considering, but have not made a final decision.    They wanted us to touch base with her primary nephrologist,  which we did, and she is supportive of initiation of HD.    If patient and family agree, please arrange for placement of tunelled catheter. Plan initiation of HD after catheter placed.        Alisson Welch MD  6/3/2024  2:21 PM

## 2024-06-03 NOTE — PROGRESS NOTES
"Markie Nobles is a 79 y.o. male on day 18 of admission presenting with Pneumonia.    Subjective   No acute events. Denies any chest pain, shortness of breath, or other new issues.        Objective     Physical Exam  GEN: cachectic, slow to respond and more confused appearing   HEENT: AT/NC  CARDIO: RRR, normal S1 and S2  PULM: clear to auscultation bilaterally  ABD: Soft, NT/ND, bowel sounds +, PEG in place  EXTR: Warm/well perfused; left wrist has cast in place  NEURO: strength grossly intact in bilateral UE and LE, Aox1 (knows Name)  PSYCH: Appropriate mood/affect    Last Recorded Vitals  Blood pressure 155/53, pulse 65, temperature 36.3 °C (97.4 °F), temperature source Temporal, resp. rate 12, height 1.778 m (5' 10\"), weight 58.1 kg (128 lb), SpO2 100%.  Intake/Output last 3 Shifts:  I/O last 3 completed shifts:  In: 600 (10.3 mL/kg) [NG/GT:600]  Out: 650 (11.2 mL/kg) [Urine:650 (0.3 mL/kg/hr)]  Weight: 58.1 kg     Relevant Results  Results for orders placed or performed during the hospital encounter of 05/16/24 (from the past 24 hour(s))   POCT GLUCOSE   Result Value Ref Range    POCT Glucose 120 (H) 74 - 99 mg/dL   POCT GLUCOSE   Result Value Ref Range    POCT Glucose 135 (H) 74 - 99 mg/dL   POCT GLUCOSE   Result Value Ref Range    POCT Glucose 119 (H) 74 - 99 mg/dL   POCT GLUCOSE   Result Value Ref Range    POCT Glucose 126 (H) 74 - 99 mg/dL   Magnesium   Result Value Ref Range    Magnesium 2.23 1.60 - 2.40 mg/dL   Renal Function Panel   Result Value Ref Range    Glucose 117 (H) 74 - 99 mg/dL    Sodium 140 136 - 145 mmol/L    Potassium 5.4 (H) 3.5 - 5.3 mmol/L    Chloride 111 (H) 98 - 107 mmol/L    Bicarbonate 20 (L) 21 - 32 mmol/L    Anion Gap 14 10 - 20 mmol/L    Urea Nitrogen 178 (HH) 6 - 23 mg/dL    Creatinine 4.31 (H) 0.50 - 1.30 mg/dL    eGFR 13 (L) >60 mL/min/1.73m*2    Calcium 8.5 (L) 8.6 - 10.6 mg/dL    Phosphorus 3.7 2.5 - 4.9 mg/dL    Albumin 2.5 (L) 3.4 - 5.0 g/dL   CBC and Auto Differential "   Result Value Ref Range    WBC 12.0 (H) 4.4 - 11.3 x10*3/uL    nRBC 0.0 0.0 - 0.0 /100 WBCs    RBC 2.34 (L) 4.50 - 5.90 x10*6/uL    Hemoglobin 6.9 (L) 13.5 - 17.5 g/dL    Hematocrit 21.6 (L) 41.0 - 52.0 %    MCV 92 80 - 100 fL    MCH 29.5 26.0 - 34.0 pg    MCHC 31.9 (L) 32.0 - 36.0 g/dL    RDW 14.6 (H) 11.5 - 14.5 %    Platelets 48 (L) 150 - 450 x10*3/uL    Neutrophils % 76.7 40.0 - 80.0 %    Immature Granulocytes %, Automated 1.2 (H) 0.0 - 0.9 %    Lymphocytes % 11.7 13.0 - 44.0 %    Monocytes % 10.1 2.0 - 10.0 %    Eosinophils % 0.1 0.0 - 6.0 %    Basophils % 0.2 0.0 - 2.0 %    Neutrophils Absolute 9.23 (H) 1.60 - 5.50 x10*3/uL    Immature Granulocytes Absolute, Automated 0.14 0.00 - 0.50 x10*3/uL    Lymphocytes Absolute 1.40 0.80 - 3.00 x10*3/uL    Monocytes Absolute 1.21 (H) 0.05 - 0.80 x10*3/uL    Eosinophils Absolute 0.01 0.00 - 0.40 x10*3/uL    Basophils Absolute 0.02 0.00 - 0.10 x10*3/uL   Type And Screen   Result Value Ref Range    ABO TYPE O     Rh TYPE POS     ANTIBODY SCREEN NEG    POCT GLUCOSE   Result Value Ref Range    POCT Glucose 126 (H) 74 - 99 mg/dL     Assessment and Plan:   79 year old Male w/ a PMHx MGUS, CAD (s/p CABG x3 in 2022), HTN, CKD IV, Anemia, ABEBE, hyperlipidemia, obesity, BPH, gout, NIDDM, HLD, Parkinsons (w/ dementia), sickle cell trait, dysphagia, s/p peg tube (04/2024), sacral pressure sores, s/p hospitalization for aspiration PNA (DC'd 5/1/24) initially admitted for PNA. Patient found to have lethargy and mild leukocytosis (WBC 10), likely infectious etiology. CT shows b/l lower lung findings suggestive of aspiration PNA, and given the patient's recent admission, this seems like the likely source of his infection. Additional sources include his sacral ulcer (not visualized) vs. PEG tube site (low suspicion) vs. UTI (ruled out w/ negative UA). He was found to be anemic and given 1x unit of blood transfusion with appropriate increment. BUN very elevated as well and GI was  consulted for concerns of GI bleed from the PEG. 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 Patient's left wrist was swollen as well. Xray left wrist was obtained and showed scaphoid fracture. Ortho was consulted for this. Developed worsening anemia and thrombocytopenia on 5/20 after free water flushes increased and plavix resumed. Has had persistent thrombocytopenia despite course of IVIG and steroids. Suspecting post-transfusion purpura vs drug-induced ITP 2/2 abx. Creatinine and BUN still uptrending, pt's mentation appears to be worsening, nephrology consulted 6/1.      Update 6/3:   - Heme and nephrology following  - Will not give PLT unless life-threatening bleeding. Keep Hgb > 6.   - Bone marrow bx results pending   - Will likely need dialysis soon, pending decision from nephro regarding temp vs tunneled dialysis catheter. IR ok with placing either at current PLT and Hgb levels.   - Obtain EKG given K 5.4   - CT head with no new changes from prior to explain change in mental status, suspect mental status changes from uremia   - Today's notable labs: PLT 48 (from 25 yesterday), Cr 4.31 (from Cr 4.17 yesterday), Hgb 6.9 today (from 6.5 yesterday)  - Newly resulted labs: none   - Pending lab workup: ADAMTS-13  - T+S last obtained 6/3  - Dispo planning: PT recommending SNF         # Thrombocytopenia  :: Ddx: Post-transfusion purpura vs drug-induced ITP 2/2 abx   :: Heme does not believe this is a consumptive process. No hepatomegaly, liver disease or splenomegaly. He has not been exposed to heparin, which excludes HIT.   :: S/p 1 unit PLT on 5/18, 5/25, 5/26, 5/27  :: Lab workup (completed): Hep B/C and HIV negative for acute infection. Parvovirus IgG (+) but PCR (-). EBV nuclear antigen IgG (+), EBV early antigen IgG (+), EBV VCA IgG (+), but EBV VCA IgM (-). CMV DNA PCR (-). Immunoglobulin free LT chain kappa:lambda ratio  1.33 (same as prior 4/17/24). Drug-dependent anti-PLT Ab with presence of PLT-reactive Ab and/or immune complexes but not supportive of drug-induced ITP however cannot completely r/o per lab.   :: Spleen US with borderline splenomegaly, diffuse heterogenicity of splenic parenchyma with areas of peripheral wedge-shaped hyperechogenicity which may represent peripheral calcifications and chronic scarring although infarcts can't be excluded per radiology  - Heme following   - Should give antigen-negative (washed, leukoreduced) blood products if required.  - S/p IVIG (5/23 - 5/27) - 5 days   - S/p Dex (5/23 - 5/26) - 4 days   - AVOID giving PLT unless life-threatening bleeding   - Received first dose of romiplostim 190 mcg on 5/29 (plan for weekly)   - Cont. Pred 62.5 mg PO/day (1 mg/kg/day) (5/27 - )  - Bone marrow bx (left iliac) obtained on 5/30 with IR, pending results     # WARREN on CKD IV  # Uremia  :: Cr 3.64 (eGFR 16) on admission (5/16), Cr between 3.4-3.8 during hospital stay.   :: Baseline Cr: 3.18 on 5/1/24 however fluctuated before this from 3.4 to mid 4s. Cr was upper 2s prior to 7/2023.  :: FeNa 2.6% (intrinsic)- suspect d/t CKD and recent abx usage. PVR 17 ml.   :: UPEP with marked proteinuria but no monoclonal protein detected. SPEP with no monoclonal protein detected.   :: Renal US with relative atrophic Rt kidney, signs of medical renal disease, no hydronephrosis, bilateral renal cysts, and prostamegaly.   - Nephrology following      # Normocytic anemia, acute on chronic   # Sickle Cell Trait  :: Anemia d/t anemia of chronic disease and CKD with acute portion likely d/t recent infection +/- abx usage. Also likely d/t GI bleed given melena on 5/25-5/26 (likely slow oozing in s/o very low PLT)  :: No e/o hemolysis by smear or labs. DIC is not supported per heme.   :: Heme outpatient: Rosanne Casal, APRN for MGUS and anemia of CKD - per her last evaluation the kappa light chain elevation is mild and stable  and patient was continued on Procrit 10k units q2 weeks. On EPO as an outpatient.   :: Hgb and platelets started down trending after free water flushes increased and also restarted plavix on 5/20  :: S/p 1 unit RBC in ED on 5/16 and 5/26   - Maintain two large bore IV, active T+S, daily CBC (Keep Hgb > 6)     # Upper GI bleed  :: Most likely d/t mucosal disruption of PEG tube initially; later developed melena with Hgb drop on 5/25 in setting of very low PLT<5 (likely slow oozing from low PLT per GI)   :: 2019 EGD/Colonoscopy - friable gastric mucosal atrophy, duodenal erythema, and diverticulosis  - Re-engaged GI on 5/26 for melena - do not feel strongly about scoping given high risk of further trauma and significantly low platelets; pt currently HDS; believe pt may be bleeding due to how low platelets are.   - PPI IV BID in setting of melena     # CAD (S/p CABG x3 in 2022)  # Troponinemia  :: Troponin I  -> 516 w/out EKG changes, likely Type II MI  - HOLD home Clopidogrel 75mg daily in setting of thrombocytopenia   - Continue home Metoprolol tartrate 25mg daily (unclear why patient is on this dose frequency, however it populated in prior cardiologist note)     # Severe Protein Calorie Malnutrition  # Dysphagia s/p PEG tube 4/2024  - Tube feeds with Nepro goal 40cc/hr, free water flushes 300 ml q4h     # Aspiration PNA (treated)   :: WBC 10 on admission (up from 7.9 on 5/1)  :: CT Chest: Tree-in-bud b/l lower lungs and secretions in trachea. C/f acute/chronic aspiration and/or PNA  :: Low concern for systemic infection (afebrile, HDS), urine strep/legionella (-)  :: S/p Vanc/Zosyn in ED  - S/p Vancomycin:  (5/16-5/17)  - S/p Meropenem 1g q12 (renal dosing): (5/16-5/22)  - S/p Augmentin (5/22-5/25)  - Aspiration precautions     # Steroid induced hyperglycemia  Started on steroids on 5/23 for possible ITP. Will place patient on sliding scale for as long as is on steroids.  - SSI (started 5/24) remove when  patient stops steroids     # AMS (resolved)  # Parkinson's Disease w/ Dementia  :: Suspect AMS/lethargy due to PNA +/- anemia   :: Mentation improving after blood transfusion and abx  ::  MRI brain obtained on 5/17 demonstrated old vascular lesions but also ventricular dilation possibly consistent with NPH per radiology read  - Continue home Carbidopa-Levodopa 1.5tab QID     # BPH  - Continue home Finasteride 5mg daily  - Continue home Flomax 0.4mg daily     # Scaphoid Fracture  :: Xrays of left wrist obtained  - Ortho consulted  - Splint provided for L scaphoid (moderately displaced) fracture, pt will followup with ortho on 6/11 at 3:15PM with Dr. Agudelo (appointment scheduled)  - Per ortho on 5/28: ok for platform wt bearing through elbow, will likely need to be NWB through wrist for total of 6 wks      # Sacral Ulcer  - Wound care c/s        F: PRN  E: PRN  N: Tube feeds via PEG  A: PIV     GI ppx: PPI IV BID  DVT ppx: holding in setting of thrombocytopenia      Code Status: DNR/DNI (per palliative care discussion on 5/17)  NOK: Bree Lorenzoen (Wife) - [phone]

## 2024-06-03 NOTE — CARE PLAN
The patient's goals for the shift include  get out of here    The clinical goals for the shift include pt will remain free from falls/injury for duration of shift.     Over the shift, the patient made progress towards the following goals.

## 2024-06-03 NOTE — PROGRESS NOTES
Markie Nobles is a 79 y.o. male on day 18 of admission presenting with Pneumonia.    Transitional Care Coordination Progress Note:  Patient discussed during interdisciplinary rounds.   Team members present: MD and TCC  Plan per Medical/Surgical team: Waiting on bone marrow biopsy. Now having Altered Mental Status. Waiting on Nephrology recommendations for possible dialysis.  Payor: [employer]  Discharge disposition: [facility]- [facility]- may have to add new dialysis.  Potential Barriers: None  ADOD: 06/06/24    BRIAN BRODERICK RN

## 2024-06-04 ENCOUNTER — APPOINTMENT (OUTPATIENT)
Dept: CARDIOLOGY | Facility: HOSPITAL | Age: 79
DRG: 177 | End: 2024-06-04
Payer: MEDICARE

## 2024-06-04 LAB
ALBUMIN SERPL BCP-MCNC: 2.5 G/DL (ref 3.4–5)
ANION GAP SERPL CALC-SCNC: 14 MMOL/L (ref 10–20)
BASOPHILS # BLD AUTO: 0.01 X10*3/UL (ref 0–0.1)
BASOPHILS NFR BLD AUTO: 0.1 %
BUN SERPL-MCNC: 190 MG/DL (ref 6–23)
CALCIUM SERPL-MCNC: 8.6 MG/DL (ref 8.6–10.6)
CHLORIDE SERPL-SCNC: 111 MMOL/L (ref 98–107)
CO2 SERPL-SCNC: 22 MMOL/L (ref 21–32)
CREAT SERPL-MCNC: 4.28 MG/DL (ref 0.5–1.3)
EGFRCR SERPLBLD CKD-EPI 2021: 13 ML/MIN/1.73M*2
EOSINOPHIL # BLD AUTO: 0 X10*3/UL (ref 0–0.4)
EOSINOPHIL NFR BLD AUTO: 0 %
ERYTHROCYTE [DISTWIDTH] IN BLOOD BY AUTOMATED COUNT: 14.7 % (ref 11.5–14.5)
GLUCOSE BLD MANUAL STRIP-MCNC: 101 MG/DL (ref 74–99)
GLUCOSE BLD MANUAL STRIP-MCNC: 109 MG/DL (ref 74–99)
GLUCOSE BLD MANUAL STRIP-MCNC: 140 MG/DL (ref 74–99)
GLUCOSE BLD MANUAL STRIP-MCNC: 97 MG/DL (ref 74–99)
GLUCOSE SERPL-MCNC: 110 MG/DL (ref 74–99)
HCT VFR BLD AUTO: 21.8 % (ref 41–52)
HGB BLD-MCNC: 7 G/DL (ref 13.5–17.5)
IMM GRANULOCYTES # BLD AUTO: 0.1 X10*3/UL (ref 0–0.5)
IMM GRANULOCYTES NFR BLD AUTO: 0.9 % (ref 0–0.9)
LYMPHOCYTES # BLD AUTO: 1.27 X10*3/UL (ref 0.8–3)
LYMPHOCYTES NFR BLD AUTO: 10.9 %
MAGNESIUM SERPL-MCNC: 2.15 MG/DL (ref 1.6–2.4)
MCH RBC QN AUTO: 29.4 PG (ref 26–34)
MCHC RBC AUTO-ENTMCNC: 32.1 G/DL (ref 32–36)
MCV RBC AUTO: 92 FL (ref 80–100)
MONOCYTES # BLD AUTO: 0.88 X10*3/UL (ref 0.05–0.8)
MONOCYTES NFR BLD AUTO: 7.5 %
NEUTROPHILS # BLD AUTO: 9.44 X10*3/UL (ref 1.6–5.5)
NEUTROPHILS NFR BLD AUTO: 80.6 %
NRBC BLD-RTO: 0 /100 WBCS (ref 0–0)
PHOSPHATE SERPL-MCNC: 3.6 MG/DL (ref 2.5–4.9)
PLATELET # BLD AUTO: 74 X10*3/UL (ref 150–450)
POTASSIUM SERPL-SCNC: 5.6 MMOL/L (ref 3.5–5.3)
RBC # BLD AUTO: 2.38 X10*6/UL (ref 4.5–5.9)
SODIUM SERPL-SCNC: 141 MMOL/L (ref 136–145)
WBC # BLD AUTO: 11.7 X10*3/UL (ref 4.4–11.3)

## 2024-06-04 PROCEDURE — 99233 SBSQ HOSP IP/OBS HIGH 50: CPT

## 2024-06-04 PROCEDURE — 82947 ASSAY GLUCOSE BLOOD QUANT: CPT | Mod: 91

## 2024-06-04 PROCEDURE — 2500000002 HC RX 250 W HCPCS SELF ADMINISTERED DRUGS (ALT 637 FOR MEDICARE OP, ALT 636 FOR OP/ED): Mod: MUE

## 2024-06-04 PROCEDURE — 92526 ORAL FUNCTION THERAPY: CPT | Mod: GN

## 2024-06-04 PROCEDURE — 2500000004 HC RX 250 GENERAL PHARMACY W/ HCPCS (ALT 636 FOR OP/ED): Performed by: STUDENT IN AN ORGANIZED HEALTH CARE EDUCATION/TRAINING PROGRAM

## 2024-06-04 PROCEDURE — 93005 ELECTROCARDIOGRAM TRACING: CPT

## 2024-06-04 PROCEDURE — 2500000001 HC RX 250 WO HCPCS SELF ADMINISTERED DRUGS (ALT 637 FOR MEDICARE OP)

## 2024-06-04 PROCEDURE — 85025 COMPLETE CBC W/AUTO DIFF WBC: CPT

## 2024-06-04 PROCEDURE — 83735 ASSAY OF MAGNESIUM: CPT

## 2024-06-04 PROCEDURE — 2500000002 HC RX 250 W HCPCS SELF ADMINISTERED DRUGS (ALT 637 FOR MEDICARE OP, ALT 636 FOR OP/ED)

## 2024-06-04 PROCEDURE — 2500000004 HC RX 250 GENERAL PHARMACY W/ HCPCS (ALT 636 FOR OP/ED): Mod: JZ | Performed by: STUDENT IN AN ORGANIZED HEALTH CARE EDUCATION/TRAINING PROGRAM

## 2024-06-04 PROCEDURE — 2500000004 HC RX 250 GENERAL PHARMACY W/ HCPCS (ALT 636 FOR OP/ED)

## 2024-06-04 PROCEDURE — 80069 RENAL FUNCTION PANEL: CPT

## 2024-06-04 PROCEDURE — 1100000001 HC PRIVATE ROOM DAILY

## 2024-06-04 PROCEDURE — C9113 INJ PANTOPRAZOLE SODIUM, VIA: HCPCS | Performed by: STUDENT IN AN ORGANIZED HEALTH CARE EDUCATION/TRAINING PROGRAM

## 2024-06-04 PROCEDURE — 36415 COLL VENOUS BLD VENIPUNCTURE: CPT

## 2024-06-04 PROCEDURE — 93010 ELECTROCARDIOGRAM REPORT: CPT | Performed by: INTERNAL MEDICINE

## 2024-06-04 RX ORDER — FUROSEMIDE 10 MG/ML
40 INJECTION INTRAMUSCULAR; INTRAVENOUS ONCE
Status: COMPLETED | OUTPATIENT
Start: 2024-06-04 | End: 2024-06-04

## 2024-06-04 RX ADMIN — PREDNISONE 62.5 MG: 10 TABLET ORAL at 09:35

## 2024-06-04 RX ADMIN — PANTOPRAZOLE SODIUM 40 MG: 40 INJECTION, POWDER, FOR SOLUTION INTRAVENOUS at 08:15

## 2024-06-04 RX ADMIN — CARBIDOPA AND LEVODOPA 1.5 TABLET: 25; 100 TABLET ORAL at 20:17

## 2024-06-04 RX ADMIN — ACETAMINOPHEN 650 MG: 325 TABLET ORAL at 16:16

## 2024-06-04 RX ADMIN — METOPROLOL TARTRATE 25 MG: 50 TABLET, FILM COATED ORAL at 08:13

## 2024-06-04 RX ADMIN — FUROSEMIDE 40 MG: 10 INJECTION, SOLUTION INTRAMUSCULAR; INTRAVENOUS at 12:01

## 2024-06-04 RX ADMIN — Medication 1 APPLICATION: at 20:33

## 2024-06-04 RX ADMIN — TAMSULOSIN HYDROCHLORIDE 0.4 MG: 0.4 CAPSULE ORAL at 08:14

## 2024-06-04 RX ADMIN — ROMIPLOSTIM 175 MCG: 250 INJECTION, POWDER, LYOPHILIZED, FOR SOLUTION SUBCUTANEOUS at 20:17

## 2024-06-04 RX ADMIN — CARBIDOPA AND LEVODOPA 1.5 TABLET: 25; 100 TABLET ORAL at 12:02

## 2024-06-04 RX ADMIN — FINASTERIDE 5 MG: 5 TABLET, FILM COATED ORAL at 08:13

## 2024-06-04 RX ADMIN — PANTOPRAZOLE SODIUM 40 MG: 40 INJECTION, POWDER, FOR SOLUTION INTRAVENOUS at 20:17

## 2024-06-04 RX ADMIN — CARBIDOPA AND LEVODOPA 1.5 TABLET: 25; 100 TABLET ORAL at 17:11

## 2024-06-04 RX ADMIN — SODIUM ZIRCONIUM CYCLOSILICATE 10 G: 10 POWDER, FOR SUSPENSION ORAL at 08:14

## 2024-06-04 RX ADMIN — Medication 1 APPLICATION: at 16:18

## 2024-06-04 RX ADMIN — SODIUM ZIRCONIUM CYCLOSILICATE 10 G: 10 POWDER, FOR SUSPENSION ORAL at 11:45

## 2024-06-04 RX ADMIN — Medication 1 APPLICATION: at 12:03

## 2024-06-04 ASSESSMENT — PAIN SCALES - GENERAL
PAINLEVEL_OUTOF10: 0 - NO PAIN

## 2024-06-04 ASSESSMENT — COGNITIVE AND FUNCTIONAL STATUS - GENERAL
EATING MEALS: TOTAL
MOBILITY SCORE: 6
MOVING FROM LYING ON BACK TO SITTING ON SIDE OF FLAT BED WITH BEDRAILS: TOTAL
MOVING TO AND FROM BED TO CHAIR: TOTAL
MOVING TO AND FROM BED TO CHAIR: TOTAL
DRESSING REGULAR LOWER BODY CLOTHING: TOTAL
HELP NEEDED FOR BATHING: TOTAL
PERSONAL GROOMING: TOTAL
TOILETING: TOTAL
TURNING FROM BACK TO SIDE WHILE IN FLAT BAD: TOTAL
EATING MEALS: TOTAL
DRESSING REGULAR LOWER BODY CLOTHING: TOTAL
WALKING IN HOSPITAL ROOM: TOTAL
MOBILITY SCORE: 6
DRESSING REGULAR UPPER BODY CLOTHING: TOTAL
CLIMB 3 TO 5 STEPS WITH RAILING: TOTAL
MOVING FROM LYING ON BACK TO SITTING ON SIDE OF FLAT BED WITH BEDRAILS: TOTAL
TOILETING: TOTAL
CLIMB 3 TO 5 STEPS WITH RAILING: TOTAL
HELP NEEDED FOR BATHING: TOTAL
TURNING FROM BACK TO SIDE WHILE IN FLAT BAD: TOTAL
WALKING IN HOSPITAL ROOM: TOTAL
DRESSING REGULAR UPPER BODY CLOTHING: TOTAL
PERSONAL GROOMING: TOTAL
STANDING UP FROM CHAIR USING ARMS: TOTAL
STANDING UP FROM CHAIR USING ARMS: TOTAL
DAILY ACTIVITIY SCORE: 6
DAILY ACTIVITIY SCORE: 6

## 2024-06-04 ASSESSMENT — PAIN - FUNCTIONAL ASSESSMENT
PAIN_FUNCTIONAL_ASSESSMENT: 0-10

## 2024-06-04 NOTE — PROGRESS NOTES
"Markie Nobles is a 79 y.o. male on day 19 of admission presenting with Pneumonia.    Subjective   No acute events. Denies any chest pain, shortness of breath, or other new issues.        Objective     Physical Exam  GEN: cachectic, slow to respond and more confused appearing   HEENT: AT/NC  CARDIO: RRR, normal S1 and S2  PULM: clear to auscultation bilaterally  ABD: Soft, NT/ND, bowel sounds +, PEG in place  EXTR: Warm/well perfused; left wrist has cast in place  NEURO: strength grossly intact in bilateral UE and LE, Aox1 (knows Name)  PSYCH: Appropriate mood/affect    Last Recorded Vitals  Blood pressure 139/69, pulse 63, temperature 36.6 °C (97.9 °F), resp. rate 16, height 1.778 m (5' 10\"), weight 58.1 kg (128 lb), SpO2 100%.  Intake/Output last 3 Shifts:  I/O last 3 completed shifts:  In: 975 (16.8 mL/kg) [NG/GT:975]  Out: 1550 (26.7 mL/kg) [Urine:1550 (0.7 mL/kg/hr)]  Weight: 58.1 kg     Relevant Results  Results for orders placed or performed during the hospital encounter of 05/16/24 (from the past 24 hour(s))   POCT GLUCOSE   Result Value Ref Range    POCT Glucose 141 (H) 74 - 99 mg/dL   POCT GLUCOSE   Result Value Ref Range    POCT Glucose 146 (H) 74 - 99 mg/dL   Renal Function Panel   Result Value Ref Range    Glucose 201 (H) 74 - 99 mg/dL    Sodium 136 136 - 145 mmol/L    Potassium 5.6 (H) 3.5 - 5.3 mmol/L    Chloride 109 (H) 98 - 107 mmol/L    Bicarbonate 20 (L) 21 - 32 mmol/L    Anion Gap 13 10 - 20 mmol/L    Urea Nitrogen 191 (HH) 6 - 23 mg/dL    Creatinine 4.28 (H) 0.50 - 1.30 mg/dL    eGFR 13 (L) >60 mL/min/1.73m*2    Calcium 8.5 (L) 8.6 - 10.6 mg/dL    Phosphorus 3.5 2.5 - 4.9 mg/dL    Albumin 2.5 (L) 3.4 - 5.0 g/dL   POCT GLUCOSE   Result Value Ref Range    POCT Glucose 198 (H) 74 - 99 mg/dL   CBC and Auto Differential   Result Value Ref Range    WBC 11.7 (H) 4.4 - 11.3 x10*3/uL    nRBC 0.0 0.0 - 0.0 /100 WBCs    RBC 2.38 (L) 4.50 - 5.90 x10*6/uL    Hemoglobin 7.0 (L) 13.5 - 17.5 g/dL    Hematocrit " 21.8 (L) 41.0 - 52.0 %    MCV 92 80 - 100 fL    MCH 29.4 26.0 - 34.0 pg    MCHC 32.1 32.0 - 36.0 g/dL    RDW 14.7 (H) 11.5 - 14.5 %    Platelets 74 (L) 150 - 450 x10*3/uL    Neutrophils % 80.6 40.0 - 80.0 %    Immature Granulocytes %, Automated 0.9 0.0 - 0.9 %    Lymphocytes % 10.9 13.0 - 44.0 %    Monocytes % 7.5 2.0 - 10.0 %    Eosinophils % 0.0 0.0 - 6.0 %    Basophils % 0.1 0.0 - 2.0 %    Neutrophils Absolute 9.44 (H) 1.60 - 5.50 x10*3/uL    Immature Granulocytes Absolute, Automated 0.10 0.00 - 0.50 x10*3/uL    Lymphocytes Absolute 1.27 0.80 - 3.00 x10*3/uL    Monocytes Absolute 0.88 (H) 0.05 - 0.80 x10*3/uL    Eosinophils Absolute 0.00 0.00 - 0.40 x10*3/uL    Basophils Absolute 0.01 0.00 - 0.10 x10*3/uL   Magnesium   Result Value Ref Range    Magnesium 2.15 1.60 - 2.40 mg/dL   Renal Function Panel   Result Value Ref Range    Glucose 110 (H) 74 - 99 mg/dL    Sodium 141 136 - 145 mmol/L    Potassium 5.6 (H) 3.5 - 5.3 mmol/L    Chloride 111 (H) 98 - 107 mmol/L    Bicarbonate 22 21 - 32 mmol/L    Anion Gap 14 10 - 20 mmol/L    Urea Nitrogen 190 (HH) 6 - 23 mg/dL    Creatinine 4.28 (H) 0.50 - 1.30 mg/dL    eGFR 13 (L) >60 mL/min/1.73m*2    Calcium 8.6 8.6 - 10.6 mg/dL    Phosphorus 3.6 2.5 - 4.9 mg/dL    Albumin 2.5 (L) 3.4 - 5.0 g/dL   POCT GLUCOSE   Result Value Ref Range    POCT Glucose 97 74 - 99 mg/dL     Assessment and Plan:   79 year old Male w/ a PMHx MGUS, CAD (s/p CABG x3 in 2022), HTN, CKD IV, Anemia, ABEBE, hyperlipidemia, obesity, BPH, gout, NIDDM, HLD, Parkinsons (w/ dementia), sickle cell trait, dysphagia, s/p peg tube (04/2024), sacral pressure sores, s/p hospitalization for aspiration PNA (DC'd 5/1/24) initially admitted for PNA. Patient found to have lethargy and mild leukocytosis (WBC 10), likely infectious etiology. CT shows b/l lower lung findings suggestive of aspiration PNA, and given the patient's recent admission, this seems like the likely source of his infection. Additional sources include  his sacral ulcer (not visualized) vs. PEG tube site (low suspicion) vs. UTI (ruled out w/ negative UA). He was found to be anemic and given 1x unit of blood transfusion with appropriate increment. BUN very elevated as well and GI was consulted for concerns of GI bleed from the PEG. 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 Patient's left wrist was swollen as well. Xray left wrist was obtained and showed scaphoid fracture. Ortho was consulted for this. Developed worsening anemia and thrombocytopenia on 5/20 after free water flushes increased and plavix resumed. Has had persistent thrombocytopenia despite course of IVIG and steroids. Suspecting post-transfusion purpura vs drug-induced ITP 2/2 abx. Creatinine and BUN still uptrending, pt's mentation appears to be worsening, nephrology consulted 6/1.      Update 6/4:   - Heme and nephrology following  - Will not give PLT unless life-threatening bleeding. Keep Hgb > 6.   - Bone marrow bx results pending   - Pending further discussion with wife about placement of tunneled dialysis catheter. Nephrology rec'd starting dialysis.   - Given lokelma 10 gm for K 5.6 yesterday evening. AM RFP still 5.6 before receiving second dose of lokelma 10 gm. Will order another dose of lokelma and repeat RFP this evening.   - Lasix 40 mg IV 1x for hyperkalemia   - Today's notable labs: PLT 74 (from 48 yesterday), Cr 4.28 (from Cr 4.28 yesterday), Hgb 7 today (from 6.9 yesterday)  - Newly resulted labs: none   - Pending lab workup: ADAMTS-13  - T+S last obtained 6/3  - Dispo planning: PT recommending SNF         # Thrombocytopenia  :: Ddx: Post-transfusion purpura vs drug-induced ITP 2/2 abx   :: Heme does not believe this is a consumptive process. No hepatomegaly, liver disease or splenomegaly. He has not been exposed to heparin, which excludes HIT.   :: S/p 1 unit PLT on 5/18, 5/25, 5/26, 5/27  :: Lab  workup (completed): Hep B/C and HIV negative for acute infection. Parvovirus IgG (+) but PCR (-). EBV nuclear antigen IgG (+), EBV early antigen IgG (+), EBV VCA IgG (+), but EBV VCA IgM (-). CMV DNA PCR (-). Immunoglobulin free LT chain kappa:lambda ratio 1.33 (same as prior 4/17/24). Drug-dependent anti-PLT Ab with presence of PLT-reactive Ab and/or immune complexes but not supportive of drug-induced ITP however cannot completely r/o per lab.   :: Spleen US with borderline splenomegaly, diffuse heterogenicity of splenic parenchyma with areas of peripheral wedge-shaped hyperechogenicity which may represent peripheral calcifications and chronic scarring although infarcts can't be excluded per radiology  - Heme following   - Should give antigen-negative (washed, leukoreduced) blood products if required.  - S/p IVIG (5/23 - 5/27) - 5 days   - S/p Dex (5/23 - 5/26) - 4 days   - AVOID giving PLT unless life-threatening bleeding   - Received first dose of romiplostim 190 mcg on 5/29 (plan for weekly)   - Cont. Pred 62.5 mg PO/day (1 mg/kg/day) (5/27 - )  - Bone marrow bx (left iliac) obtained on 5/30 with IR, pending results     # WARREN on CKD IV  # Uremic encephalopathy   :: Cr 3.64 (eGFR 16) on admission (5/16), Cr between 3.4-3.8 during hospital stay.   :: Baseline Cr: 3.18 on 5/1/24 however fluctuated before this from 3.4 to mid 4s. Cr was upper 2s prior to 7/2023.  :: FeNa 2.6% (intrinsic)- suspect d/t CKD and recent abx usage. PVR 17 ml.   :: UPEP with marked proteinuria but no monoclonal protein detected. SPEP with no monoclonal protein detected.   :: Renal US with relative atrophic Rt kidney, signs of medical renal disease, no hydronephrosis, bilateral renal cysts, and prostamegaly.   - Nephrology following, pending approval from wife to place tunneled dialysis cath for dialysis initiation      # Normocytic anemia, acute on chronic   # Sickle Cell Trait  :: Anemia d/t anemia of chronic disease and CKD with acute  portion likely d/t recent infection +/- abx usage. Also likely d/t GI bleed given melena on 5/25-5/26 (likely slow oozing in s/o very low PLT)  :: No e/o hemolysis by smear or labs. DIC is not supported per heme.   :: Heme outpatient: Rosanne Casal, APRN for MGUS and anemia of CKD - per her last evaluation the kappa light chain elevation is mild and stable and patient was continued on Procrit 10k units q2 weeks. On EPO as an outpatient.   :: Hgb and platelets started down trending after free water flushes increased and also restarted plavix on 5/20  :: S/p 1 unit RBC in ED on 5/16 and 5/26   - Maintain two large bore IV, active T+S, daily CBC (Keep Hgb > 6)     # Upper GI bleed  :: Most likely d/t mucosal disruption of PEG tube initially; later developed melena with Hgb drop on 5/25 in setting of very low PLT<5 (likely slow oozing from low PLT per GI)   :: 2019 EGD/Colonoscopy - friable gastric mucosal atrophy, duodenal erythema, and diverticulosis  - Re-engaged GI on 5/26 for melena - do not feel strongly about scoping given high risk of further trauma and significantly low platelets; pt currently HDS; believe pt may be bleeding due to how low platelets are.   - PPI IV BID in setting of melena     # CAD (S/p CABG x3 in 2022)  # Troponinemia  :: Troponin I  -> 516 w/out EKG changes, likely Type II MI  - HOLD home Clopidogrel 75mg daily in setting of thrombocytopenia   - Continue home Metoprolol tartrate 25mg daily (unclear why patient is on this dose frequency, however it populated in prior cardiologist note)     # Severe Protein Calorie Malnutrition  # Dysphagia s/p PEG tube 4/2024  - Tube feeds with Nepro goal 40cc/hr, free water flushes 300 ml q4h     # Aspiration PNA (treated)   :: WBC 10 on admission (up from 7.9 on 5/1)  :: CT Chest: Tree-in-bud b/l lower lungs and secretions in trachea. C/f acute/chronic aspiration and/or PNA  :: Low concern for systemic infection (afebrile, HDS), urine strep/legionella  (-)  :: S/p Vanc/Zosyn in ED  - S/p Vancomycin:  (5/16-5/17)  - S/p Meropenem 1g q12 (renal dosing): (5/16-5/22)  - S/p Augmentin (5/22-5/25)  - Aspiration precautions     # Steroid induced hyperglycemia  Started on steroids on 5/23 for possible ITP. Will place patient on sliding scale for as long as is on steroids.  - SSI (started 5/24) remove when patient stops steroids     # AMS (resolved)  # Parkinson's Disease w/ Dementia  :: Suspect AMS/lethargy due to PNA +/- anemia   :: Mentation improving after blood transfusion and abx  ::  MRI brain obtained on 5/17 demonstrated old vascular lesions but also ventricular dilation possibly consistent with NPH per radiology read  - Continue home Carbidopa-Levodopa 1.5tab QID     # BPH  - Continue home Finasteride 5mg daily  - Continue home Flomax 0.4mg daily     # Scaphoid Fracture  :: Xrays of left wrist obtained  - Ortho consulted  - Splint provided for L scaphoid (moderately displaced) fracture, pt will followup with ortho on 6/11 at 3:15PM with Dr. Agudelo (appointment scheduled)  - Per ortho on 5/28: ok for platform wt bearing through elbow, will likely need to be NWB through wrist for total of 6 wks      # Sacral Ulcer  - Wound care c/s        F: PRN  E: PRN  N: Tube feeds via PEG  A: PIV     GI ppx: PPI IV BID  DVT ppx: holding in setting of thrombocytopenia      Code Status: DNR/DNI (per palliative care discussion on 5/17)  NOK: Bree Nobles (Wife) - [phone]

## 2024-06-04 NOTE — CARE PLAN
The patient's goals for the shift include      The clinical goals for the shift include Pt will  be safe and free from falls through out the shift    Problem: Skin  Goal: Decreased wound size/increased tissue granulation at next dressing change  Recent Flowsheet Documentation  Taken 6/4/2024 1833 by Angela Hastings RN  Decreased wound size/increased tissue granulation at next dressing change:   Promote sleep for wound healing   Protective dressings over bony prominences   Utilize specialty bed per algorithm  Goal: Participates in plan/prevention/treatment measures  Recent Flowsheet Documentation  Taken 6/4/2024 1833 by Angela Hastings RN  Participates in plan/prevention/treatment measures: Discuss with provider PT/OT consult  Goal: Prevent/manage excess moisture  Recent Flowsheet Documentation  Taken 6/4/2024 1833 by Angela Hastings RN  Prevent/manage excess moisture:   Cleanse incontinence/protect with barrier cream   Follow provider orders for dressing changes   Moisturize dry skin  Goal: Promote skin healing  Recent Flowsheet Documentation  Taken 6/4/2024 1833 by Angela Hastings RN  Promote skin healing:   Assess skin/pad under line(s)/device(s)   Protective dressings over bony prominences

## 2024-06-04 NOTE — PROGRESS NOTES
Markie Nobles   79 y.o.    MRN/Room: [id number]    Subjective:   Seen with wife at bedside  Wife says that they do not wish to proceed with starting dialysis     Objective:     Meds:   acetaminophen, 650 mg, q8h  carbidopa-levodopa, 1.5 tablet, 4x daily  [Held by provider] clopidogrel, 75 mg, Daily  finasteride, 5 mg, Daily  insulin lispro, 0-5 Units, TID  menthol-zinc oxide, 1 Application, 4x daily  metoprolol tartrate, 25 mg, Daily  pancrelipase (Lip-Prot-Amyl), 1 tablet, Once  pantoprazole, 40 mg, BID  predniSONE, 1 mg/kg, Daily  sodium bicarbonate, 650 mg, Once  tamsulosin, 0.4 mg, Daily         benzonatate, 100 mg, TID PRN  dextrose, 12.5 g, q15 min PRN  dextrose, 25 g, q15 min PRN  glucagon, 1 mg, q15 min PRN        Vitals:    06/04/24 0736   BP: 139/69   Pulse: 63   Resp: 16   Temp: 36.6 °C (97.9 °F)   SpO2: 100%          Intake/Output Summary (Last 24 hours) at 6/4/2024 1515  Last data filed at 6/4/2024 0633  Gross per 24 hour   Intake 755 ml   Output 550 ml   Net 205 ml       General appearance: AAOx3. No distress  Eyes: non-icteric  Skin: no apparent rash  Heart: S1 S2 regular. No rub  Lungs: CTA bilat.  No wheezing/crackles  Abdomen: soft, nt/nd  Extremities: No edema bilat  : No Kramer  Neuro: No FND, No asterixis   ACCESS: No HD access    Blood Labs:  Results for orders placed or performed during the hospital encounter of 05/16/24 (from the past 24 hour(s))   POCT GLUCOSE   Result Value Ref Range    POCT Glucose 146 (H) 74 - 99 mg/dL   Renal Function Panel   Result Value Ref Range    Glucose 201 (H) 74 - 99 mg/dL    Sodium 136 136 - 145 mmol/L    Potassium 5.6 (H) 3.5 - 5.3 mmol/L    Chloride 109 (H) 98 - 107 mmol/L    Bicarbonate 20 (L) 21 - 32 mmol/L    Anion Gap 13 10 - 20 mmol/L    Urea Nitrogen 191 (HH) 6 - 23 mg/dL    Creatinine 4.28 (H) 0.50 - 1.30 mg/dL    eGFR 13 (L) >60 mL/min/1.73m*2    Calcium 8.5 (L) 8.6 - 10.6 mg/dL    Phosphorus 3.5 2.5 - 4.9 mg/dL    Albumin 2.5 (L) 3.4 -  5.0 g/dL   POCT GLUCOSE   Result Value Ref Range    POCT Glucose 198 (H) 74 - 99 mg/dL   CBC and Auto Differential   Result Value Ref Range    WBC 11.7 (H) 4.4 - 11.3 x10*3/uL    nRBC 0.0 0.0 - 0.0 /100 WBCs    RBC 2.38 (L) 4.50 - 5.90 x10*6/uL    Hemoglobin 7.0 (L) 13.5 - 17.5 g/dL    Hematocrit 21.8 (L) 41.0 - 52.0 %    MCV 92 80 - 100 fL    MCH 29.4 26.0 - 34.0 pg    MCHC 32.1 32.0 - 36.0 g/dL    RDW 14.7 (H) 11.5 - 14.5 %    Platelets 74 (L) 150 - 450 x10*3/uL    Neutrophils % 80.6 40.0 - 80.0 %    Immature Granulocytes %, Automated 0.9 0.0 - 0.9 %    Lymphocytes % 10.9 13.0 - 44.0 %    Monocytes % 7.5 2.0 - 10.0 %    Eosinophils % 0.0 0.0 - 6.0 %    Basophils % 0.1 0.0 - 2.0 %    Neutrophils Absolute 9.44 (H) 1.60 - 5.50 x10*3/uL    Immature Granulocytes Absolute, Automated 0.10 0.00 - 0.50 x10*3/uL    Lymphocytes Absolute 1.27 0.80 - 3.00 x10*3/uL    Monocytes Absolute 0.88 (H) 0.05 - 0.80 x10*3/uL    Eosinophils Absolute 0.00 0.00 - 0.40 x10*3/uL    Basophils Absolute 0.01 0.00 - 0.10 x10*3/uL   Magnesium   Result Value Ref Range    Magnesium 2.15 1.60 - 2.40 mg/dL   Renal Function Panel   Result Value Ref Range    Glucose 110 (H) 74 - 99 mg/dL    Sodium 141 136 - 145 mmol/L    Potassium 5.6 (H) 3.5 - 5.3 mmol/L    Chloride 111 (H) 98 - 107 mmol/L    Bicarbonate 22 21 - 32 mmol/L    Anion Gap 14 10 - 20 mmol/L    Urea Nitrogen 190 (HH) 6 - 23 mg/dL    Creatinine 4.28 (H) 0.50 - 1.30 mg/dL    eGFR 13 (L) >60 mL/min/1.73m*2    Calcium 8.6 8.6 - 10.6 mg/dL    Phosphorus 3.6 2.5 - 4.9 mg/dL    Albumin 2.5 (L) 3.4 - 5.0 g/dL   POCT GLUCOSE   Result Value Ref Range    POCT Glucose 97 74 - 99 mg/dL   POCT GLUCOSE   Result Value Ref Range    POCT Glucose 101 (H) 74 - 99 mg/dL      ASSESSMENT:  Markie Nobles is a 79 y.o. male with PMH CKD IV (baseline Cr 3.4-3.6), HTN, DM2, Parkinson's dementia, dysphagia s/p PEG tube (04/2024), obesity, gout, ABEBE, MGUS, CASD (s/p CABG 2022) who was admitted back on 05/17 for  aspiration pneumonia. His hospital course has been complicated by thrombocytopenia, for which Haem-Onc has been consulted, and patient is currently receiving steroids. Nephrology has been consulted due to azotemia.      Mr Giuliano follows outpatient with Dr Plunkett, last seen in Jan 2024. CKD Stage G4A3 attributed to longstanding DM/HTN with possible contribution from sickle cell trait.     #Acute kidney injury on CKD IV, non-oliguric  #CKD due to longstanding DM/HTN  -Etiology: Worsened renal function on this admission may be due to hemodynamic changes earlier in hospital course given GI bleeding, aspiration pneumonia      #Azotemia  -Due to steroids, tube feeds    Updates 06/04:  -Recommended to patient and wife yesterday (06/03) that initiation of hemodialysis would be appropriate, given worsening renal parameters.   -Spoke with wife at bedside again today, and she said that after discussions between herself and the patient yesterday afternoon, they have decided not to proceed with dialysis. Counseled about risks of further clinical deterioration, and wife expressed understanding of same      RECOMMENDATIONS:  -Consider palliative care consult, given risk of deterioration and patient/wife's desire to not proceed with renal replacement therapy  -For now, continue supportive management of WARREN on CKD. Azotemia should improve once off steroids.  -Continue strict I and O charting  -Avoid contrast  -Dose medications for renal function     SW Dr Rebekah Davalos MD  Nephrology Fellow   Daytime / Weekend Renal Pager [phone]  After 7 pm Emergencies [phone] Pager [phone]

## 2024-06-04 NOTE — PROGRESS NOTES
Speech-Language Pathology    SLP Adult Inpatient  Speech-Language Pathology Treatment     Patient Name: Markie Nobles  MRN: [id number]  Today's Date: 6/4/2024  Time Calculation  Start Time: 0907  Stop Time: 0927  Time Calculation (min): 20 min         Assessment:  Severe oropharyngeal dysphagia per MBSS completed 4/26/24  Swallowing function impacted by Parkinson's Disease      Recommendations:  NPO  Continued alternative means of nutrition/hydration/medication support; PEG placed 4/29/24.     Frequent, aggressive oral care as tolerated to improve infection control.     OK for small amounts of ice chips (one at a time, 4-5x/hour) for oral comfort and to prevent swallow disuse atrophy, but only after aggressive oral care to avoid colonization of bacteria within the oral cavity.        Plan:  SLP Plan: Skilled SLP  SLP Frequency: 1-2x per week  Duration:  4 weeks  Discussed POC: Pt, family  *Trial period of therapy.  Due to progressive nature of dysphagia r/t PD there is the possibility that therapy may not functionally/meaningfully improve swallowing function.  Pt/family aware.       Goals:  Goals created by previous SLP following MBSS.  Pt had been participating in these goals w/ HC ST, ST during previous hospitalization, and SNF ST.       Pt will complete pharyngeal strengthening exercises x30 per session with good effort and efficiency (effortful swallows, tongue retractions, tongue raises).                Date initiate: 5/18/24              Status: Progressing- see note below     Pt/family will indicate understanding of dysphagia, risk for aspiration, strategies to mitigate aspiration PNA, and overall SLP POC via teach back method with > 90% accuracy.              Date initiated: 5/18/24              Status: Progressing- see note below               Subjective:  Pt properly identified and treated bedside.  Awake and alert, with no c/o pain.  Room air.  Wife present.  Flat affect.  Limited verbal communication;  required MAX cues for participation.  CTH completed yesterday showed no acute changes.  Physician suspecting uremia causing current mental status presentation.      Pt admitted with PNA and acute on chronic anemia.  PMHx pertinent to SLP: Parkinson's Disease, dementia, recurrent PNA.       Pt completed an MBSS on 4/26/24 that showed silent aspiration with all liquids; risk apparent for aspiration with solids.  Recommended NPO w/ pharyngeal strengthening tx.  PEG placed 4/29/24.  Pt was doing ST at SNF prior to admission.         Objective:   Pt required significant verbal cues and prompting for participation in Speech Therapy this AM.  Completed effortful swallows x5 with max cues.  Politely declined further participation in therapy, but did again report that he wished for continued Speech Therapy this admission.  SLP reviewed POC/goals.  Understanding indicated.  No needs voiced at end of session.  Call bell within reach.           06/04/24 at 10:46 AM - Lilia Abraham, SLP

## 2024-06-04 NOTE — PROGRESS NOTES
Physical Therapy                 Therapy Communication Note    Patient Name: Markie Nobles  MRN: [id number]  Today's Date: 6/4/2024     Discipline: Physical Therapy    Missed Visit Reason: Missed Visit Reason: Other (Comment) (per TCC pt/family considering hospice; will hold off for now. Per OT pt refused.)    Missed Time: Attempt    Comment: 15:39pm

## 2024-06-04 NOTE — SIGNIFICANT EVENT
Hematology fup note    Markie Nobles is a 79 y.o. male with a history of MGUS, CAD (s/p CABG x3 in 2022), HTN, CKD IV, Anemia, sickle cell trait, ABEBE, hyperlipidemia, obesity, BPH, gout, NIDDM, HLD, Parkinsons (w/ dementia), dysphagia, s/p peg tube (04/2024), sacral pressure sores, s/p recent hospitalization for aspiration PNA  (5/1/24) and now admitted for and being treated for aspiration pneumonia. Hematology is consulted for anemia and thrombocytopenia.      Peripheral Smear Reviewed 5/21  WBCs: unremarkable  RBCs: Normochronic, anisocytosis, <1 RBC frag/hpf  Plt: reduced in number, no clumping     Patient's thrombocytopenia initially thought to be multifactorial from infection and antibiotic use. The acute component of his anemia is likely from the same. The chronic portion is from anemia of chronic disease and CKD. He is on EPO as an outpatient. No e/o hemolysis by smear or labs. DIC is not supported. Folate, B12 nl. No hepatomegaly, liver disease or splenomegaly. Hepatitis B, C, HIV negative. He has not been exposed to heparin, which excludes HIT.      Worsening thrombocytopenia to the single digits suggests an immune thrombocytopenia, which may be drug-induced (meropenem vs. Vancomycin). He received 4x dexamthesone 5/23-26 and IVIG 5/23-27.     His platelet count did not improve and his hemoglobin down trended, in the setting of acute GI bleed given melena. Post-transfusion purpura was in the differential given pRBC transfusion 5/16, therefore PTP testing sent. dITP less likely since no change in plt counts following at least 5 days of cessation of meropenem and vancomycin. dITP antibodies sent and negative.      Patient restarted on steroids (prednisone 1mg/kg 5/27) and given romiplostim 3 mcg/kg 5/28. S/p BMBx pending results. Plt count improving, today 74. Due for romiplostim, will order same dose. 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      Recommend:  - trend CBC daily  -  romiplostim 3mcg/kg today (ordered)   - continue prednisone 1mg/kg, will plan for slow taper  - will fup BMBx results  - follow up PTP send out testing  - please let us know when close to discharge to arrange fup    Discussed with Dr Hager.    Chioma Bedoya MD  Hematology Oncology fellow, PGY-5  Pager [phone]

## 2024-06-04 NOTE — PROGRESS NOTES
Markie Nobles is a 79 y.o. male on day 19 of admission presenting with Pneumonia.    Transitional Care Coordination Progress Note:  Patient discussed during interdisciplinary rounds.   Team members present: MD and TCC  Plan per Medical/Surgical team: Monitoring Hyperkalemia. SW and doctor talking to family about possible Hospice since patient is refusing HD. Patient also refusing PT/OT.  Payor: [employer]  Discharge disposition: The outcome of hospice meeting will decide where patient will go on discharge.  Potential Barriers: None  ADOD: 06/06/24    BRIAN BRODERICK RN

## 2024-06-04 NOTE — PROGRESS NOTES
Occupational Therapy                 Therapy Communication Note    Patient Name: Markie Nobles  MRN: [id number]  Today's Date: 6/4/2024     Discipline: Occupational Therapy    Missed Visit Reason: Missed Visit Reason: Patient refused (Pt denies participation at this time.)    Missed Time: Attempt

## 2024-06-04 NOTE — CONSULTS
"Nutrition Note:   Nutrition Assessment    Reason for Assessment: Provider consult order (Consulted per provider via secure chat: Considering switching  from continuous feeds to bolus, recs for bolus feeds?)    Patient is a 79 y.o. male presenting with Pneumonia.  PMHx MGUS, CAD (s/p CABG x3 in 2022), HTN, CKD IV, Anemia, ABEBE, hyperlipidemia, obesity, BPH, gout, NIDDM, HLD, Parkinsons (w/ dementia), sickle cell trait, dysphagia, s/p peg tube (04/2024), sacral pressure sores, s/p hospitalization for aspiration PNA (DC'd 5/1/24) initially admitted for PNA.      Nutrition History:  Food and Nutrient History: Patient reached goal of Nepro @ 40ml/hr.  SLP continues to recommend NPO.  Nephrology recommends dialysis.      Anthropometrics:  Height: 177.8 cm (5' 10\")   Weight: 58.1 kg (128 lb)   BMI (Calculated): 18.37  IBW/kg (Dietitian Calculated): 75.5 kg  Percent of IBW: 83 %       Weight History:   Wt Readings from Last 21 Encounters:   05/30/24 58.1 kg (128 lb)   05/01/24 62.6 kg (138 lb 0.1 oz)   03/07/24 59.9 kg (132 lb)   02/26/24 59.4 kg (131 lb)   02/26/24 60.2 kg (132 lb 11.5 oz)   02/12/24 59.9 kg (132 lb)   01/29/24 63.1 kg (139 lb 3.2 oz)   01/24/24 59.9 kg (132 lb)   01/23/24 59.9 kg (132 lb)   01/12/24 60 kg (132 lb 3.2 oz)   12/14/23 59.6 kg (131 lb 4.8 oz)   11/20/23 62 kg (136 lb 9.6 oz)   11/15/23 59.9 kg (132 lb)   10/31/23 60.2 kg (132 lb 11.5 oz)   10/18/23 66 kg (145 lb 6.4 oz)   10/17/23 64.8 kg (142 lb 13.7 oz)   10/03/23 66.9 kg (147 lb 7.8 oz)   07/17/23 66.7 kg (147 lb)   07/10/23 68.5 kg (151 lb)   06/08/23 69.1 kg (152 lb 6.4 oz)   04/25/23 66.7 kg (147 lb)       Weight Change %:  Weight History / % Weight Change: 5/16/24) 62.6kg, 5/30/24) 58.1kg    Nutrition Significant Labs:  CBC Trend:   Results from last 7 days   Lab Units 06/04/24  0703 06/03/24  0755 06/02/24  0725 06/01/24  2250   WBC AUTO x10*3/uL 11.7* 12.0* 10.2 10.1   RBC AUTO x10*6/uL 2.38* 2.34* 2.14* 2.28*   HEMOGLOBIN g/dL 7.0* " 6.9* 6.5* 6.9*   HEMATOCRIT % 21.8* 21.6* 19.7* 20.6*   MCV fL 92 92 92 90   PLATELETS AUTO x10*3/uL 74* 48* 25* 23*    , Renal Lab Trend:   Results from last 7 days   Lab Units 06/04/24  0704 06/03/24  1953 06/03/24  0755 06/02/24  0725   POTASSIUM mmol/L 5.6* 5.6* 5.4* 5.1   PHOSPHORUS mg/dL 3.6 3.5 3.7 3.3   SODIUM mmol/L 141 136 140 139   MAGNESIUM mg/dL 2.15  --  2.23 2.17   EGFR mL/min/1.73m*2 13* 13* 13* 14*   BUN mg/dL 190* 191* 178* 176*   CREATININE mg/dL 4.28* 4.28* 4.31* 4.17*        Nutrition Specific Medications:  Scheduled medications  acetaminophen, 650 mg, oral, q8h  carbidopa-levodopa, 1.5 tablet, oral, 4x daily  [Held by provider] clopidogrel, 75 mg, oral, Daily  finasteride, 5 mg, oral, Daily  insulin lispro, 0-5 Units, subcutaneous, TID  menthol-zinc oxide, 1 Application, Topical, 4x daily  metoprolol tartrate, 25 mg, oral, Daily  pancrelipase (Lip-Prot-Amyl), 1 tablet, g-tube, Once  pantoprazole, 40 mg, intravenous, BID  predniSONE, 1 mg/kg, oral, Daily  sodium bicarbonate, 650 mg, g-tube, Once  tamsulosin, 0.4 mg, oral, Daily      I/O:   Last BM Date: 06/02/24; Stool Appearance: Loose (06/04/24 1100)    Dietary Orders (From admission, onward)       Start     Ordered    06/04/24 0001  NPO Diet; Effective midnight  Diet effective midnight         06/03/24 1533                     Estimated Needs:   Total Energy Estimated Needs (kCal): 1800 kCal  Method for Estimating Needs: 30kcal/kg  Total Protein Estimated Needs (g):  (~65-75)  Method for Estimating Needs: ~1.0-1.2 gm/kg using acutal BW 62.6 kg        Nutrition Diagnosis   Malnutrition Diagnosis  Patient has Malnutrition Diagnosis: Yes  Diagnosis Status: Ongoing  Malnutrition Diagnosis: Severe malnutrition related to chronic disease or condition  As Evidenced by: areas of severe muscle wasting and adipose loss visually evident on exam, BMI 19.8 and 83% ideal/desired BW            Nutrition Interventions/Recommendations         Nutrition  Prescription:  Individualized Nutrition Prescription Provided for : Recommend 1) For bolus/intermittent feeds can infuse Nepro @ 240ml QID to provide 1728kcal, 78g protein, ~700ml H20/d.        Time Spent/Follow-up Reminder:   Time Spent (min): 30 minutes  Last Date of Nutrition Visit: 06/04/24  Nutrition Follow-Up Needed?: Dietitian to reassess per policy  Follow up Comment: Nepro @ 240ml QID

## 2024-06-04 NOTE — PROGRESS NOTES
SW approached by MD who discussed hospice with patient and family as HD is required, but patient and family are refusing. Patient and family understand that hospice is the only alternative to HD at this time. Referral sent to [facility] to set up family meeting.     EMPERATRIZ Waite

## 2024-06-04 NOTE — CARE PLAN
The patient's goals for the shift include  getting rest    The clinical goals for the shift include pt will remain free from falls and injury during shift      Problem: Skin  Goal: Prevent/manage excess moisture  Recent Flowsheet Documentation  Taken 6/3/2024 2010 by Rose Morton RN  Prevent/manage excess moisture:   Cleanse incontinence/protect with barrier cream   Moisturize dry skin     Problem: Skin  Goal: Prevent/manage excess moisture  Recent Flowsheet Documentation  Taken 6/3/2024 2010 by Rose Morton RN  Prevent/manage excess moisture:   Cleanse incontinence/protect with barrier cream   Moisturize dry skin     Problem: Skin  Goal: Prevent/manage excess moisture  Recent Flowsheet Documentation  Taken 6/3/2024 2010 by Rose Morton RN  Prevent/manage excess moisture:   Cleanse incontinence/protect with barrier cream   Moisturize dry skin     Problem: Skin  Goal: Decreased wound size/increased tissue granulation at next dressing change  Outcome: Progressing  Goal: Prevent/manage excess moisture  Outcome: Progressing  Flowsheets (Taken 6/3/2024 2010)  Prevent/manage excess moisture:   Cleanse incontinence/protect with barrier cream   Moisturize dry skin     Problem: Pain - Adult  Goal: Verbalizes/displays adequate comfort level or baseline comfort level  Outcome: Progressing     Problem: Safety - Adult  Goal: Free from fall injury  Outcome: Progressing

## 2024-06-04 NOTE — PROGRESS NOTES
Music Therapy Note    Markie Nobles     Therapy Session  Referral Type: New referral this admission  Visit Type: Follow-up visit  Session Start Time: 1459  Session End Time: 1500  Intervention Delivery: In-person  Conflict of Service: None  Number of family members present: 1  Family Present for Session: Spouse/Significant Other     Pre-assessment  Unable to Assess Reason: Outcomes not assessed  Mood/Affect: Tired/lethargic    Post-assessment  Unable to Assess Reason: Did not provide expressive therapy intervention  Mood/Affect: Tired/lethargic  Continue Visiting: Yes  Total Session Time (min): 1 minutes    Narrative  Assessment Detail: Pt presented asleep in bed displaying lethargic affect at time of arrival. Pt spouse requested follow up another day given recent decision to transfer Kyle to hospice. MTI to follow up.

## 2024-06-05 ENCOUNTER — APPOINTMENT (OUTPATIENT)
Dept: RADIOLOGY | Facility: HOSPITAL | Age: 79
DRG: 177 | End: 2024-06-05
Payer: MEDICARE

## 2024-06-05 LAB
ALBUMIN SERPL BCP-MCNC: 2.3 G/DL (ref 3.4–5)
ANION GAP BLDA CALCULATED.4IONS-SCNC: 15 MMO/L (ref 10–25)
ANION GAP SERPL CALC-SCNC: 16 MMOL/L (ref 10–20)
BASE EXCESS BLDA CALC-SCNC: -7.3 MMOL/L (ref -2–3)
BASOPHILS # BLD AUTO: 0.02 X10*3/UL (ref 0–0.1)
BASOPHILS NFR BLD AUTO: 0.2 %
BODY TEMPERATURE: 37 DEGREES CELSIUS
BUN SERPL-MCNC: 188 MG/DL (ref 6–23)
CA-I BLDA-SCNC: 1.22 MMOL/L (ref 1.1–1.33)
CALCIUM SERPL-MCNC: 8.5 MG/DL (ref 8.6–10.6)
CHLORIDE BLDA-SCNC: 111 MMOL/L (ref 98–107)
CHLORIDE SERPL-SCNC: 109 MMOL/L (ref 98–107)
CO2 SERPL-SCNC: 19 MMOL/L (ref 21–32)
CREAT SERPL-MCNC: 4.23 MG/DL (ref 0.5–1.3)
EGFRCR SERPLBLD CKD-EPI 2021: 13 ML/MIN/1.73M*2
EOSINOPHIL # BLD AUTO: 0.11 X10*3/UL (ref 0–0.4)
EOSINOPHIL NFR BLD AUTO: 1.1 %
ERYTHROCYTE [DISTWIDTH] IN BLOOD BY AUTOMATED COUNT: 14.7 % (ref 11.5–14.5)
GLUCOSE BLD MANUAL STRIP-MCNC: 144 MG/DL (ref 74–99)
GLUCOSE BLD MANUAL STRIP-MCNC: 172 MG/DL (ref 74–99)
GLUCOSE BLD MANUAL STRIP-MCNC: 183 MG/DL (ref 74–99)
GLUCOSE BLD MANUAL STRIP-MCNC: 185 MG/DL (ref 74–99)
GLUCOSE BLD MANUAL STRIP-MCNC: 79 MG/DL (ref 74–99)
GLUCOSE BLDA-MCNC: 199 MG/DL (ref 74–99)
GLUCOSE SERPL-MCNC: 81 MG/DL (ref 74–99)
HCO3 BLDA-SCNC: 16.7 MMOL/L (ref 22–26)
HCT VFR BLD AUTO: 22.6 % (ref 41–52)
HCT VFR BLD EST: 21 % (ref 41–52)
HGB BLD-MCNC: 7.4 G/DL (ref 13.5–17.5)
HGB BLDA-MCNC: 6.9 G/DL (ref 13.5–17.5)
IMM GRANULOCYTES # BLD AUTO: 0.08 X10*3/UL (ref 0–0.5)
IMM GRANULOCYTES NFR BLD AUTO: 0.8 % (ref 0–0.9)
INHALED O2 CONCENTRATION: 36 %
LACTATE BLDA-SCNC: 4.1 MMOL/L (ref 0.4–2)
LYMPHOCYTES # BLD AUTO: 1.85 X10*3/UL (ref 0.8–3)
LYMPHOCYTES NFR BLD AUTO: 17.8 %
MAGNESIUM SERPL-MCNC: 2.17 MG/DL (ref 1.6–2.4)
MCH RBC QN AUTO: 30.6 PG (ref 26–34)
MCHC RBC AUTO-ENTMCNC: 32.7 G/DL (ref 32–36)
MCV RBC AUTO: 93 FL (ref 80–100)
MONOCYTES # BLD AUTO: 0.92 X10*3/UL (ref 0.05–0.8)
MONOCYTES NFR BLD AUTO: 8.9 %
NEUTROPHILS # BLD AUTO: 7.41 X10*3/UL (ref 1.6–5.5)
NEUTROPHILS NFR BLD AUTO: 71.2 %
NRBC BLD-RTO: 0.2 /100 WBCS (ref 0–0)
OXYHGB MFR BLDA: 97 % (ref 94–98)
PCO2 BLDA: 27 MM HG (ref 38–42)
PH BLDA: 7.4 PH (ref 7.38–7.42)
PHOSPHATE SERPL-MCNC: 4.7 MG/DL (ref 2.5–4.9)
PLATELET # BLD AUTO: 101 X10*3/UL (ref 150–450)
PO2 BLDA: 129 MM HG (ref 85–95)
POTASSIUM BLDA-SCNC: 4.5 MMOL/L (ref 3.5–5.3)
POTASSIUM SERPL-SCNC: 4.7 MMOL/L (ref 3.5–5.3)
RBC # BLD AUTO: 2.42 X10*6/UL (ref 4.5–5.9)
SAO2 % BLDA: 98 % (ref 94–100)
SODIUM BLDA-SCNC: 138 MMOL/L (ref 136–145)
SODIUM SERPL-SCNC: 139 MMOL/L (ref 136–145)
WBC # BLD AUTO: 10.4 X10*3/UL (ref 4.4–11.3)

## 2024-06-05 PROCEDURE — 2500000004 HC RX 250 GENERAL PHARMACY W/ HCPCS (ALT 636 FOR OP/ED)

## 2024-06-05 PROCEDURE — 1100000001 HC PRIVATE ROOM DAILY

## 2024-06-05 PROCEDURE — 87040 BLOOD CULTURE FOR BACTERIA: CPT

## 2024-06-05 PROCEDURE — 82330 ASSAY OF CALCIUM: CPT

## 2024-06-05 PROCEDURE — 36415 COLL VENOUS BLD VENIPUNCTURE: CPT

## 2024-06-05 PROCEDURE — 87205 SMEAR GRAM STAIN: CPT

## 2024-06-05 PROCEDURE — 85025 COMPLETE CBC W/AUTO DIFF WBC: CPT

## 2024-06-05 PROCEDURE — 99232 SBSQ HOSP IP/OBS MODERATE 35: CPT

## 2024-06-05 PROCEDURE — 83605 ASSAY OF LACTIC ACID: CPT | Mod: 91

## 2024-06-05 PROCEDURE — 71045 X-RAY EXAM CHEST 1 VIEW: CPT | Performed by: RADIOLOGY

## 2024-06-05 PROCEDURE — 2500000002 HC RX 250 W HCPCS SELF ADMINISTERED DRUGS (ALT 637 FOR MEDICARE OP, ALT 636 FOR OP/ED)

## 2024-06-05 PROCEDURE — 80069 RENAL FUNCTION PANEL: CPT

## 2024-06-05 PROCEDURE — 2500000004 HC RX 250 GENERAL PHARMACY W/ HCPCS (ALT 636 FOR OP/ED): Performed by: STUDENT IN AN ORGANIZED HEALTH CARE EDUCATION/TRAINING PROGRAM

## 2024-06-05 PROCEDURE — 84132 ASSAY OF SERUM POTASSIUM: CPT

## 2024-06-05 PROCEDURE — 93010 ELECTROCARDIOGRAM REPORT: CPT | Performed by: INTERNAL MEDICINE

## 2024-06-05 PROCEDURE — C9113 INJ PANTOPRAZOLE SODIUM, VIA: HCPCS | Performed by: STUDENT IN AN ORGANIZED HEALTH CARE EDUCATION/TRAINING PROGRAM

## 2024-06-05 PROCEDURE — 74018 RADEX ABDOMEN 1 VIEW: CPT | Performed by: RADIOLOGY

## 2024-06-05 PROCEDURE — 2500000001 HC RX 250 WO HCPCS SELF ADMINISTERED DRUGS (ALT 637 FOR MEDICARE OP)

## 2024-06-05 PROCEDURE — 71045 X-RAY EXAM CHEST 1 VIEW: CPT

## 2024-06-05 PROCEDURE — 82947 ASSAY GLUCOSE BLOOD QUANT: CPT | Mod: 91

## 2024-06-05 PROCEDURE — 83735 ASSAY OF MAGNESIUM: CPT

## 2024-06-05 PROCEDURE — 82947 ASSAY GLUCOSE BLOOD QUANT: CPT

## 2024-06-05 PROCEDURE — 74018 RADEX ABDOMEN 1 VIEW: CPT

## 2024-06-05 PROCEDURE — 85025 COMPLETE CBC W/AUTO DIFF WBC: CPT | Mod: 91

## 2024-06-05 RX ORDER — ONDANSETRON HYDROCHLORIDE 2 MG/ML
INJECTION, SOLUTION INTRAVENOUS
Status: COMPLETED
Start: 2024-06-05 | End: 2024-06-05

## 2024-06-05 RX ORDER — ONDANSETRON HYDROCHLORIDE 2 MG/ML
4 INJECTION, SOLUTION INTRAVENOUS ONCE
Status: COMPLETED | OUTPATIENT
Start: 2024-06-05 | End: 2024-06-05

## 2024-06-05 RX ORDER — OXYCODONE HYDROCHLORIDE 5 MG/1
2.5 TABLET ORAL ONCE AS NEEDED
Status: COMPLETED | OUTPATIENT
Start: 2024-06-05 | End: 2024-06-05

## 2024-06-05 RX ORDER — VANCOMYCIN HYDROCHLORIDE 1 G/20ML
INJECTION, POWDER, LYOPHILIZED, FOR SOLUTION INTRAVENOUS DAILY PRN
Status: DISCONTINUED | OUTPATIENT
Start: 2024-06-05 | End: 2024-06-06

## 2024-06-05 RX ADMIN — INSULIN LISPRO 1 UNITS: 100 INJECTION, SOLUTION INTRAVENOUS; SUBCUTANEOUS at 11:44

## 2024-06-05 RX ADMIN — Medication 1 APPLICATION: at 06:14

## 2024-06-05 RX ADMIN — PANTOPRAZOLE SODIUM 40 MG: 40 INJECTION, POWDER, FOR SOLUTION INTRAVENOUS at 21:15

## 2024-06-05 RX ADMIN — CARBIDOPA AND LEVODOPA 1.5 TABLET: 25; 100 TABLET ORAL at 06:12

## 2024-06-05 RX ADMIN — Medication 1 APPLICATION: at 21:15

## 2024-06-05 RX ADMIN — PIPERACILLIN SODIUM AND TAZOBACTAM SODIUM 2.25 G: 2; .25 INJECTION, SOLUTION INTRAVENOUS at 23:58

## 2024-06-05 RX ADMIN — CARBIDOPA AND LEVODOPA 1.5 TABLET: 25; 100 TABLET ORAL at 13:02

## 2024-06-05 RX ADMIN — ACETAMINOPHEN 650 MG: 325 TABLET ORAL at 13:05

## 2024-06-05 RX ADMIN — OXYCODONE HYDROCHLORIDE 2.5 MG: 5 TABLET ORAL at 11:01

## 2024-06-05 RX ADMIN — Medication 1 APPLICATION: at 13:06

## 2024-06-05 RX ADMIN — ONDANSETRON 4 MG: 2 INJECTION INTRAMUSCULAR; INTRAVENOUS at 21:39

## 2024-06-05 RX ADMIN — PREDNISONE 62.5 MG: 10 TABLET ORAL at 08:09

## 2024-06-05 RX ADMIN — FINASTERIDE 5 MG: 5 TABLET, FILM COATED ORAL at 08:09

## 2024-06-05 RX ADMIN — CARBIDOPA AND LEVODOPA 1.5 TABLET: 25; 100 TABLET ORAL at 21:14

## 2024-06-05 RX ADMIN — CARBIDOPA AND LEVODOPA 1.5 TABLET: 25; 100 TABLET ORAL at 17:56

## 2024-06-05 RX ADMIN — TAMSULOSIN HYDROCHLORIDE 0.4 MG: 0.4 CAPSULE ORAL at 08:09

## 2024-06-05 RX ADMIN — ACETAMINOPHEN 650 MG: 325 TABLET ORAL at 21:15

## 2024-06-05 RX ADMIN — ONDANSETRON HYDROCHLORIDE 4 MG: 2 INJECTION, SOLUTION INTRAVENOUS at 21:39

## 2024-06-05 RX ADMIN — ACETAMINOPHEN 650 MG: 325 TABLET ORAL at 06:12

## 2024-06-05 RX ADMIN — METOPROLOL TARTRATE 25 MG: 50 TABLET, FILM COATED ORAL at 08:09

## 2024-06-05 ASSESSMENT — PAIN SCALES - GENERAL
PAINLEVEL_OUTOF10: 10 - WORST POSSIBLE PAIN
PAINLEVEL_OUTOF10: 10 - WORST POSSIBLE PAIN
PAINLEVEL_OUTOF10: 0 - NO PAIN

## 2024-06-05 ASSESSMENT — PAIN SCALES - PAIN ASSESSMENT IN ADVANCED DEMENTIA (PAINAD)
CONSOLABILITY: NO NEED TO CONSOLE
FACIALEXPRESSION: SMILING OR INEXPRESSIVE
BREATHING: NORMAL
TOTALSCORE: 0
BODYLANGUAGE: RELAXED

## 2024-06-05 ASSESSMENT — COGNITIVE AND FUNCTIONAL STATUS - GENERAL
MOVING TO AND FROM BED TO CHAIR: TOTAL
DAILY ACTIVITIY SCORE: 6
WALKING IN HOSPITAL ROOM: TOTAL
EATING MEALS: TOTAL
TURNING FROM BACK TO SIDE WHILE IN FLAT BAD: TOTAL
HELP NEEDED FOR BATHING: TOTAL
MOBILITY SCORE: 6
MOVING FROM LYING ON BACK TO SITTING ON SIDE OF FLAT BED WITH BEDRAILS: TOTAL
TOILETING: TOTAL
PERSONAL GROOMING: TOTAL
CLIMB 3 TO 5 STEPS WITH RAILING: TOTAL
DRESSING REGULAR UPPER BODY CLOTHING: TOTAL
STANDING UP FROM CHAIR USING ARMS: TOTAL
DRESSING REGULAR LOWER BODY CLOTHING: TOTAL

## 2024-06-05 ASSESSMENT — PAIN - FUNCTIONAL ASSESSMENT: PAIN_FUNCTIONAL_ASSESSMENT: 0-10

## 2024-06-05 ASSESSMENT — PAIN DESCRIPTION - LOCATION: LOCATION: BUTTOCKS

## 2024-06-05 NOTE — PROGRESS NOTES
Markie Nobles is a 79 y.o. male on day 20 of admission presenting with Pneumonia.    Transitional Care Coordination Progress Note:  Patient discussed during interdisciplinary rounds.   Team members present: MD and TCC  Plan per Medical/Surgical team: Monitoring kidney function, low platelets and waiting on bone marrow biopsy.  Payor: [employer]  Discharge disposition: Home with [facility] and family. Waiting for DME to be delivered to house before patient comes home.   Potential Barriers: None  ADOD: 06/07/24      BRIAN BRODERICK RN

## 2024-06-05 NOTE — PROGRESS NOTES
Social Work Note:    LISW spoke with the patient's wife on this date who reported that she is interested in meeting with [facility] [employer] instead of [facility].  LISW reported understanding.  LISW explained that she would make a referral to [facility].  LISW explained that the patient's wife would need to work with [employer] on any DME needed at home.  Wife reported understanding.  Referral made. LISW will wait to hear about a meeting time.  LISW will continue to follow as needed.    HARJEET French, LISW-S

## 2024-06-05 NOTE — PROGRESS NOTES
Occupational Therapy                 Therapy Communication Note    Patient Name: Markie Nobles  MRN: [id number]  Today's Date: 6/5/2024     Discipline: Occupational Therapy    Missed Visit Reason: Missed Visit Reason:  (Signing off on OT orders, pt's wife reports they are transitioning to home hospice and yhe pt currently has no OT needs at this time.)    Missed Time: Attempt

## 2024-06-05 NOTE — PROGRESS NOTES
"Markie Nobles is a 79 y.o. male on day 20 of admission presenting with Pneumonia.    Subjective   No acute events. Denies any chest pain, shortness of breath, or other new issues.        Objective     Physical Exam  GEN: cachectic, slow to respond and confused appearing   HEENT: AT/NC  CARDIO: RRR, normal S1 and S2  PULM: clear to auscultation bilaterally  ABD: Soft, NT/ND, bowel sounds +, PEG in place  EXTR: Warm/well perfused; left wrist has cast in place  NEURO: strength grossly intact in bilateral UE and LE, Aox1 (knows Name)  PSYCH: Appropriate mood/affect    Last Recorded Vitals  Blood pressure 136/65, pulse 71, temperature 36.6 °C (97.9 °F), resp. rate 14, height 1.778 m (5' 10\"), weight 58.1 kg (128 lb), SpO2 100%.  Intake/Output last 3 Shifts:  I/O last 3 completed shifts:  In: 785 (13.5 mL/kg) [NG/GT:785]  Out: 2250 (38.8 mL/kg) [Urine:2250 (1.1 mL/kg/hr)]  Weight: 58.1 kg     Relevant Results  Results for orders placed or performed during the hospital encounter of 05/16/24 (from the past 24 hour(s))   POCT GLUCOSE   Result Value Ref Range    POCT Glucose 101 (H) 74 - 99 mg/dL   POCT GLUCOSE   Result Value Ref Range    POCT Glucose 109 (H) 74 - 99 mg/dL   POCT GLUCOSE   Result Value Ref Range    POCT Glucose 140 (H) 74 - 99 mg/dL   Renal Function Panel   Result Value Ref Range    Glucose 81 74 - 99 mg/dL    Sodium 139 136 - 145 mmol/L    Potassium 4.7 3.5 - 5.3 mmol/L    Chloride 109 (H) 98 - 107 mmol/L    Bicarbonate 19 (L) 21 - 32 mmol/L    Anion Gap 16 10 - 20 mmol/L    Urea Nitrogen 188 (HH) 6 - 23 mg/dL    Creatinine 4.23 (H) 0.50 - 1.30 mg/dL    eGFR 13 (L) >60 mL/min/1.73m*2    Calcium 8.5 (L) 8.6 - 10.6 mg/dL    Phosphorus 4.7 2.5 - 4.9 mg/dL    Albumin 2.3 (L) 3.4 - 5.0 g/dL   Magnesium   Result Value Ref Range    Magnesium 2.17 1.60 - 2.40 mg/dL   CBC and Auto Differential   Result Value Ref Range    WBC 10.4 4.4 - 11.3 x10*3/uL    nRBC 0.2 (H) 0.0 - 0.0 /100 WBCs    RBC 2.42 (L) 4.50 - 5.90 " x10*6/uL    Hemoglobin 7.4 (L) 13.5 - 17.5 g/dL    Hematocrit 22.6 (L) 41.0 - 52.0 %    MCV 93 80 - 100 fL    MCH 30.6 26.0 - 34.0 pg    MCHC 32.7 32.0 - 36.0 g/dL    RDW 14.7 (H) 11.5 - 14.5 %    Platelets 101 (L) 150 - 450 x10*3/uL    Neutrophils % 71.2 40.0 - 80.0 %    Immature Granulocytes %, Automated 0.8 0.0 - 0.9 %    Lymphocytes % 17.8 13.0 - 44.0 %    Monocytes % 8.9 2.0 - 10.0 %    Eosinophils % 1.1 0.0 - 6.0 %    Basophils % 0.2 0.0 - 2.0 %    Neutrophils Absolute 7.41 (H) 1.60 - 5.50 x10*3/uL    Immature Granulocytes Absolute, Automated 0.08 0.00 - 0.50 x10*3/uL    Lymphocytes Absolute 1.85 0.80 - 3.00 x10*3/uL    Monocytes Absolute 0.92 (H) 0.05 - 0.80 x10*3/uL    Eosinophils Absolute 0.11 0.00 - 0.40 x10*3/uL    Basophils Absolute 0.02 0.00 - 0.10 x10*3/uL   POCT GLUCOSE   Result Value Ref Range    POCT Glucose 79 74 - 99 mg/dL     Assessment and Plan:   79 year old Male w/ a PMHx MGUS, CAD (s/p CABG x3 in 2022), HTN, CKD IV, Anemia, ABEBE, hyperlipidemia, obesity, BPH, gout, NIDDM, HLD, Parkinsons (w/ dementia), sickle cell trait, dysphagia, s/p peg tube (04/2024), sacral pressure sores, s/p hospitalization for aspiration PNA (DC'd 5/1/24) initially admitted for PNA. Patient found to have lethargy and mild leukocytosis (WBC 10), likely infectious etiology. CT shows b/l lower lung findings suggestive of aspiration PNA, and given the patient's recent admission, this seems like the likely source of his infection. Additional sources include his sacral ulcer (not visualized) vs. PEG tube site (low suspicion) vs. UTI (ruled out w/ negative UA). He was found to be anemic and given 1x unit of blood transfusion with appropriate increment. BUN very elevated as well and GI was consulted for concerns of GI bleed from the PEG. 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 Patient's left wrist was swollen as well. Xray left wrist  was obtained and showed scaphoid fracture. Ortho was consulted for this. Developed worsening anemia and thrombocytopenia on 5/20 after free water flushes increased and plavix resumed. Has had persistent thrombocytopenia despite course of IVIG and steroids. Suspecting post-transfusion purpura vs drug-induced ITP 2/2 abx. Creatinine and BUN still uptrending, pt's mentation appears to be worsening, nephrology consulted 6/1.      Update 6/5:   - Heme and nephrology following  - Will not give PLT unless life-threatening bleeding. Keep Hgb > 6.   - Wife states that patient would not wish to pursue dialysis as they have discussed this in the past. She would like to talk with hospice as pt has been declining prior to this hospital admission with deteriorating cognition, strength, and overall function.   - TF switched from continuous to bolus yesterday   - Given second dose of romiplostim yesterday   - Today's notable labs:  (from 74 yesterday), Cr 4.23 (from Cr 4.28 yesterday)  - Newly resulted labs: none   - Pending lab workup: ADAMTS-13, bone marrow bx results   - T+S last obtained 6/3  - Dispo planning: PT recommending SNF         # Thrombocytopenia  :: Ddx: Post-transfusion purpura vs drug-induced ITP 2/2 abx   :: Heme does not believe this is a consumptive process. No hepatomegaly, liver disease or splenomegaly. He has not been exposed to heparin, which excludes HIT.   :: S/p 1 unit PLT on 5/18, 5/25, 5/26, 5/27  :: Lab workup (completed): Hep B/C and HIV negative for acute infection. Parvovirus IgG (+) but PCR (-). EBV nuclear antigen IgG (+), EBV early antigen IgG (+), EBV VCA IgG (+), but EBV VCA IgM (-). CMV DNA PCR (-). Immunoglobulin free LT chain kappa:lambda ratio 1.33 (same as prior 4/17/24). Drug-dependent anti-PLT Ab with presence of PLT-reactive Ab and/or immune complexes but not supportive of drug-induced ITP however cannot completely r/o per lab.   :: Spleen US with borderline splenomegaly, diffuse  heterogenicity of splenic parenchyma with areas of peripheral wedge-shaped hyperechogenicity which may represent peripheral calcifications and chronic scarring although infarcts can't be excluded per radiology  - Heme following   - Should give antigen-negative (washed, leukoreduced) blood products if required.  - S/p IVIG (5/23 - 5/27) - 5 days   - S/p Dex (5/23 - 5/26) - 4 days   - AVOID giving PLT unless life-threatening bleeding   - Received first dose of romiplostim 190 mcg on 5/29 (plan for weekly), second dose on 6/4   - Cont. Pred 62.5 mg PO/day (1 mg/kg/day) (5/27 - )  - Bone marrow bx (left iliac) obtained on 5/30 with IR, pending results     # WARREN on CKD IV  # Uremic encephalopathy   :: Cr 3.64 (eGFR 16) on admission (5/16), Cr between 3.4-3.8 during hospital stay.   :: Baseline Cr: 3.18 on 5/1/24 however fluctuated before this from 3.4 to mid 4s. Cr was upper 2s prior to 7/2023.  :: FeNa 2.6% (intrinsic)- suspect d/t CKD and recent abx usage. PVR 17 ml.   :: UPEP with marked proteinuria but no monoclonal protein detected. SPEP with no monoclonal protein detected.   :: Renal US with relative atrophic Rt kidney, signs of medical renal disease, no hydronephrosis, bilateral renal cysts, and prostamegaly.   - Nephrology following, recommending dialysis. Wife does not wish to pursue dialysis (according to pt's wishes from prior discussions).       # Normocytic anemia, acute on chronic   # Sickle Cell Trait  :: Anemia d/t anemia of chronic disease and CKD with acute portion likely d/t recent infection +/- abx usage. Also likely d/t GI bleed given melena on 5/25-5/26 (likely slow oozing in s/o very low PLT)  :: No e/o hemolysis by smear or labs. DIC is not supported per heme.   :: Heme outpatient: Rosanne Casal, APRN for MGUS and anemia of CKD - per her last evaluation the kappa light chain elevation is mild and stable and patient was continued on Procrit 10k units q2 weeks. On EPO as an outpatient.   :: Hgb and  platelets started down trending after free water flushes increased and also restarted plavix on 5/20  :: S/p 1 unit RBC in ED on 5/16 and 5/26   - Maintain two large bore IV, active T+S, daily CBC (Keep Hgb > 6)     # Upper GI bleed  :: Most likely d/t mucosal disruption of PEG tube initially; later developed melena with Hgb drop on 5/25 in setting of very low PLT<5 (likely slow oozing from low PLT per GI)   :: 2019 EGD/Colonoscopy - friable gastric mucosal atrophy, duodenal erythema, and diverticulosis  - Re-engaged GI on 5/26 for melena - do not feel strongly about scoping given high risk of further trauma and significantly low platelets; pt currently HDS; believe pt may be bleeding due to how low platelets are.   - PPI IV BID in setting of melena     # CAD (S/p CABG x3 in 2022)  # Troponinemia  :: Troponin I  -> 516 w/out EKG changes, likely Type II MI  - HOLD home Clopidogrel 75mg daily in setting of thrombocytopenia   - Continue home Metoprolol tartrate 25mg daily (unclear why patient is on this dose frequency, however it populated in prior cardiologist note)     # Severe Protein Calorie Malnutrition  # Dysphagia s/p PEG tube 4/2024  - Tube feeds with Nepro 240 ml QID      # Aspiration PNA (treated)   :: WBC 10 on admission (up from 7.9 on 5/1)  :: CT Chest: Tree-in-bud b/l lower lungs and secretions in trachea. C/f acute/chronic aspiration and/or PNA  :: Low concern for systemic infection (afebrile, HDS), urine strep/legionella (-)  :: S/p Vanc/Zosyn in ED  - S/p Vancomycin:  (5/16-5/17)  - S/p Meropenem 1g q12 (renal dosing): (5/16-5/22)  - S/p Augmentin (5/22-5/25)  - Aspiration precautions     # Steroid induced hyperglycemia  Started on steroids on 5/23 for possible ITP. Will place patient on sliding scale for as long as is on steroids.  - SSI (started 5/24) remove when patient stops steroids     # AMS (resolved)  # Parkinson's Disease w/ Dementia  :: Suspect AMS/lethargy due to PNA +/- anemia   ::  Mentation improving after blood transfusion and abx  ::  MRI brain obtained on 5/17 demonstrated old vascular lesions but also ventricular dilation possibly consistent with NPH per radiology read  - Continue home Carbidopa-Levodopa 1.5tab QID     # BPH  - Continue home Finasteride 5mg daily  - Continue home Flomax 0.4mg daily     # Scaphoid Fracture  :: Xrays of left wrist obtained  - Ortho consulted  - Splint provided for L scaphoid (moderately displaced) fracture, pt will followup with ortho on 6/11 at 3:15PM with Dr. Agudelo (appointment scheduled)  - Per ortho on 5/28: ok for platform wt bearing through elbow, will likely need to be NWB through wrist for total of 6 wks      # Sacral Ulcer  - Wound care c/s        F: PRN  E: PRN  N: Tube feeds via PEG  A: PIV     GI ppx: PPI IV BID  DVT ppx: SCDs in setting of thrombocytopenia      Code Status: DNR/DNI (per palliative care discussion on 5/17)  NOK: Bree Giuliano (Wife) - [phone]

## 2024-06-05 NOTE — PROGRESS NOTES
"Nutrition Note:   Nutrition Assessment         Nutrition History:  Food and Nutrient History: Pts wife asked to speak with dietitian - had questions about tube feed regimen. Reports pt was on continuous tube feed of Nepro at 40mL/hr PTA. Reports she was thinking about putting him back on continuous tube feed after discharge. Seen by RDN yesterday per team request for bolus feed recs. Started on bolus feeds 6/4.       Anthropometrics:  Height: 177.8 cm (5' 10\")   Weight: 58.1 kg (128 lb)   BMI (Calculated): 18.37  IBW/kg (Dietitian Calculated): 75.5 kg  Percent of IBW: 83 %       Dietary Orders (From admission, onward)       Start     Ordered    06/04/24 1917  Enteral feeding with NPO Nepro; GT (gastric tube); 240; QID 0700 1100 1600 2000; 60 (before and after bolus); Water; With each bolus  Diet effective now        Question Answer Comment   Tube feeding formula: Nepro    Feeding route: GT (gastric tube)    Tube feeding bolus (mL): 240    Tube feeding bolus frequency: QID 0700 1100 1600 2000    Tube feeding flush (mL): 60 before and after bolus   Flush type: Water    Flush frequency: With each bolus        06/04/24 1917                     Estimated Needs:   Total Energy Estimated Needs (kCal): 1800 kCal  Method for Estimating Needs: 30kcal/kg  Total Protein Estimated Needs (g):  (~65-75)  Method for Estimating Needs: ~1.0-1.2 gm/kg using acutal BW 62.6 kg  Total Fluid Estimated Needs (mL):  (1ml/kcal or per MD)                 Nutrition Interventions/Recommendations         Nutrition Interventions:   If plan would be to switch back to continuous tube feeds can provide Nepro at 40ml/hr over 24 hours: provides 960mL total volume, 1728kcal, 78g protein, ~700ml H20/d.  Additional H2O flushes per team.  If plan is to continue with bolus feeds can continue with current regimen of Nepro at 240ml bolus QID: provides 1728kcal, 78g protein, ~700ml H20/d.  Continue with 60mL H2O flush before and after each feed.  Additional " H2O flushes per team.        Time Spent/Follow-up Reminder:   Time Spent (min): 45 minutes  Last Date of Nutrition Visit: 06/05/24  Nutrition Follow-Up Needed?: Dietitian to reassess per policy

## 2024-06-05 NOTE — CARE PLAN
Vss, pt complaining of severe sacral/buttocks pain- alternating between sleeping and yelling out in pain throughout the shift. Q2 turns completed and no real change to sacral wound-dressing change completed. TF boluses/flushes tolerated well. Pt family choosing to dc home with [place] [facility]- awaiting consult. Pt remained safe and free from injury/falls.

## 2024-06-05 NOTE — CARE PLAN
The patient's goals for the shift include  getting rest    The clinical goals for the shift include pt will remain free from injury during shift      Problem: Discharge Barriers  Goal: My discharge needs are met  Outcome: Progressing     Problem: Skin  Goal: Prevent/manage excess moisture  Recent Flowsheet Documentation  Taken 6/4/2024 2318 by Rose Morton RN  Prevent/manage excess moisture:   Cleanse incontinence/protect with barrier cream   Moisturize dry skin     Problem: Skin  Goal: Prevent/manage excess moisture  Recent Flowsheet Documentation  Taken 6/4/2024 2318 by Rose Morton RN  Prevent/manage excess moisture:   Cleanse incontinence/protect with barrier cream   Moisturize dry skin     Problem: Skin  Goal: Prevent/manage excess moisture  Recent Flowsheet Documentation  Taken 6/4/2024 2318 by Rose Morton RN  Prevent/manage excess moisture:   Cleanse incontinence/protect with barrier cream   Moisturize dry skin     Problem: Skin  Goal: Prevent/manage excess moisture  Outcome: Progressing  Flowsheets (Taken 6/4/2024 2318)  Prevent/manage excess moisture:   Cleanse incontinence/protect with barrier cream   Moisturize dry skin     Problem: Pain - Adult  Goal: Verbalizes/displays adequate comfort level or baseline comfort level  Outcome: Progressing     Problem: Safety - Adult  Goal: Free from fall injury  Outcome: Progressing

## 2024-06-05 NOTE — CONSULTS
Wound Care Consult     Visit Date: 6/5/2024      Patient Name: Markie Nobles         MRN: [id number]           YOB: 1945     Reason for Consult: Sacral reassessment     Wound Assessment:  Wound 05/17/24 Sacrum (Active)   Wound Image   06/04/24 1139   Shape small scab/ open pink spots on pt sacrum no real change since last time this RN has pt 5/30 06/05/24 1100   Wound Length (cm) 1 cm 06/05/24 1710   Wound Width (cm) 1 cm 06/05/24 1710   Wound Surface Area (cm^2) 1 cm^2 06/05/24 1710   State of Healing Healing ridge 06/05/24 1710   Margins Other (Comment) 05/24/24 0851     Wound location: Sacrum    Wound type: resolving partial thickness wound  Wound bed: dry sloughing epithelial layer  Draining: none  Periwound skin: Clean, dry, and intact   Therapeutic surface: Centrella with EHOB air mattress overlay    Recommendations: DAILY and as needed       Keep clean and dry.  Wash with warm wipes as needed and pat dry.         Apply a sacral Mepilex border foam to sacrum for protection.  Check under every shift and as needed.  Change as needed and as soiled.      While in bed patient should only be on one EHOB air mattress overlay, a fitted sheet, and one EHOB repositioning sheet with appropriate white chux. Please do not use brief while patient is resting in bed. TURN PT Q2 HOURS as far onto his side as tolerated. Place one wedge high from shoulders to waist, leave a gap for sacral/coccyx, and place second wedge below level of the wound.      Wound Team Plan:  Primary provider please review the wound care consult note and pending wound care order. If you agree with orders please file in EMR.       While inpatient, Secure chat with questions or if condition changes. For urgent communications please [place] at [phone].      Hannah Cheek RN, CWON  6/5/2024  5:11 PM

## 2024-06-06 ENCOUNTER — APPOINTMENT (OUTPATIENT)
Dept: CARDIOLOGY | Facility: HOSPITAL | Age: 79
DRG: 177 | End: 2024-06-06
Payer: MEDICARE

## 2024-06-06 VITALS
WEIGHT: 128 LBS | HEART RATE: 89 BPM | SYSTOLIC BLOOD PRESSURE: 81 MMHG | DIASTOLIC BLOOD PRESSURE: 47 MMHG | TEMPERATURE: 97.5 F | HEIGHT: 70 IN | RESPIRATION RATE: 16 BRPM | OXYGEN SATURATION: 89 % | BODY MASS INDEX: 18.32 KG/M2

## 2024-06-06 LAB
ALBUMIN SERPL BCP-MCNC: 2.3 G/DL (ref 3.4–5)
ALBUMIN SERPL BCP-MCNC: 2.4 G/DL (ref 3.4–5)
ANION GAP SERPL CALC-SCNC: 18 MMOL/L (ref 10–20)
ANION GAP SERPL CALC-SCNC: 27 MMOL/L (ref 10–20)
ATRIAL RATE: 82 BPM
BASOPHILS # BLD AUTO: 0.04 X10*3/UL (ref 0–0.1)
BASOPHILS # BLD AUTO: 0.06 X10*3/UL (ref 0–0.1)
BASOPHILS NFR BLD AUTO: 0.2 %
BASOPHILS NFR BLD AUTO: 0.2 %
BUN SERPL-MCNC: 186 MG/DL (ref 6–23)
BUN SERPL-MCNC: 187 MG/DL (ref 6–23)
CALCIUM SERPL-MCNC: 8.3 MG/DL (ref 8.6–10.6)
CALCIUM SERPL-MCNC: 8.6 MG/DL (ref 8.6–10.6)
CHLORIDE SERPL-SCNC: 108 MMOL/L (ref 98–107)
CHLORIDE SERPL-SCNC: 108 MMOL/L (ref 98–107)
CO2 SERPL-SCNC: 12 MMOL/L (ref 21–32)
CO2 SERPL-SCNC: 21 MMOL/L (ref 21–32)
CREAT SERPL-MCNC: 4.52 MG/DL (ref 0.5–1.3)
CREAT SERPL-MCNC: 5.08 MG/DL (ref 0.5–1.3)
EGFRCR SERPLBLD CKD-EPI 2021: 11 ML/MIN/1.73M*2
EGFRCR SERPLBLD CKD-EPI 2021: 12 ML/MIN/1.73M*2
EOSINOPHIL # BLD AUTO: 0 X10*3/UL (ref 0–0.4)
EOSINOPHIL # BLD AUTO: 0.02 X10*3/UL (ref 0–0.4)
EOSINOPHIL NFR BLD AUTO: 0 %
EOSINOPHIL NFR BLD AUTO: 0.1 %
ERYTHROCYTE [DISTWIDTH] IN BLOOD BY AUTOMATED COUNT: 15 % (ref 11.5–14.5)
ERYTHROCYTE [DISTWIDTH] IN BLOOD BY AUTOMATED COUNT: 15.2 % (ref 11.5–14.5)
GLUCOSE BLD MANUAL STRIP-MCNC: 100 MG/DL (ref 74–99)
GLUCOSE BLD MANUAL STRIP-MCNC: 155 MG/DL (ref 74–99)
GLUCOSE SERPL-MCNC: 134 MG/DL (ref 74–99)
GLUCOSE SERPL-MCNC: 191 MG/DL (ref 74–99)
HCT VFR BLD AUTO: 20.5 % (ref 41–52)
HCT VFR BLD AUTO: 31.4 % (ref 41–52)
HGB BLD-MCNC: 6.7 G/DL (ref 13.5–17.5)
HGB BLD-MCNC: 9.4 G/DL (ref 13.5–17.5)
IMM GRANULOCYTES # BLD AUTO: 0.16 X10*3/UL (ref 0–0.5)
IMM GRANULOCYTES # BLD AUTO: 1.31 X10*3/UL (ref 0–0.5)
IMM GRANULOCYTES NFR BLD AUTO: 0.7 % (ref 0–0.9)
IMM GRANULOCYTES NFR BLD AUTO: 3.9 % (ref 0–0.9)
LACTATE SERPL-SCNC: 11.8 MMOL/L (ref 0.4–2)
LACTATE SERPL-SCNC: 3.9 MMOL/L (ref 0.4–2)
LYMPHOCYTES # BLD AUTO: 1.41 X10*3/UL (ref 0.8–3)
LYMPHOCYTES # BLD AUTO: 1.57 X10*3/UL (ref 0.8–3)
LYMPHOCYTES NFR BLD AUTO: 4.7 %
LYMPHOCYTES NFR BLD AUTO: 5.9 %
MAGNESIUM SERPL-MCNC: 2.29 MG/DL (ref 1.6–2.4)
MCH RBC QN AUTO: 30.1 PG (ref 26–34)
MCH RBC QN AUTO: 31 PG (ref 26–34)
MCHC RBC AUTO-ENTMCNC: 29.9 G/DL (ref 32–36)
MCHC RBC AUTO-ENTMCNC: 32.7 G/DL (ref 32–36)
MCV RBC AUTO: 101 FL (ref 80–100)
MCV RBC AUTO: 95 FL (ref 80–100)
MONOCYTES # BLD AUTO: 0.48 X10*3/UL (ref 0.05–0.8)
MONOCYTES # BLD AUTO: 1.04 X10*3/UL (ref 0.05–0.8)
MONOCYTES NFR BLD AUTO: 1.4 %
MONOCYTES NFR BLD AUTO: 4.4 %
NEUTROPHILS # BLD AUTO: 21.09 X10*3/UL (ref 1.6–5.5)
NEUTROPHILS # BLD AUTO: 30.09 X10*3/UL (ref 1.6–5.5)
NEUTROPHILS NFR BLD AUTO: 88.8 %
NEUTROPHILS NFR BLD AUTO: 89.7 %
NRBC BLD-RTO: 0.1 /100 WBCS (ref 0–0)
NRBC BLD-RTO: 0.2 /100 WBCS (ref 0–0)
P AXIS: 64 DEGREES
P OFFSET: 200 MS
P ONSET: 137 MS
PHOSPHATE SERPL-MCNC: 4.7 MG/DL (ref 2.5–4.9)
PHOSPHATE SERPL-MCNC: 5.7 MG/DL (ref 2.5–4.9)
PLATELET # BLD AUTO: 171 X10*3/UL (ref 150–450)
PLATELET # BLD AUTO: 182 X10*3/UL (ref 150–450)
POTASSIUM SERPL-SCNC: 4.4 MMOL/L (ref 3.5–5.3)
POTASSIUM SERPL-SCNC: 4.7 MMOL/L (ref 3.5–5.3)
PR INTERVAL: 176 MS
Q ONSET: 225 MS
QRS COUNT: 13 BEATS
QRS DURATION: 84 MS
QT INTERVAL: 394 MS
QTC CALCULATION(BAZETT): 460 MS
QTC FREDERICIA: 437 MS
R AXIS: 39 DEGREES
RBC # BLD AUTO: 2.16 X10*6/UL (ref 4.5–5.9)
RBC # BLD AUTO: 3.12 X10*6/UL (ref 4.5–5.9)
SODIUM SERPL-SCNC: 142 MMOL/L (ref 136–145)
SODIUM SERPL-SCNC: 143 MMOL/L (ref 136–145)
T AXIS: 90 DEGREES
T OFFSET: 422 MS
VENTRICULAR RATE: 82 BPM
WBC # BLD AUTO: 23.7 X10*3/UL (ref 4.4–11.3)
WBC # BLD AUTO: 33.5 X10*3/UL (ref 4.4–11.3)

## 2024-06-06 PROCEDURE — 83605 ASSAY OF LACTIC ACID: CPT

## 2024-06-06 PROCEDURE — 80069 RENAL FUNCTION PANEL: CPT

## 2024-06-06 PROCEDURE — 36415 COLL VENOUS BLD VENIPUNCTURE: CPT

## 2024-06-06 PROCEDURE — 99239 HOSP IP/OBS DSCHRG MGMT >30: CPT

## 2024-06-06 PROCEDURE — 93005 ELECTROCARDIOGRAM TRACING: CPT

## 2024-06-06 PROCEDURE — 83735 ASSAY OF MAGNESIUM: CPT

## 2024-06-06 PROCEDURE — 85025 COMPLETE CBC W/AUTO DIFF WBC: CPT

## 2024-06-06 PROCEDURE — 82947 ASSAY GLUCOSE BLOOD QUANT: CPT

## 2024-06-06 PROCEDURE — 2500000004 HC RX 250 GENERAL PHARMACY W/ HCPCS (ALT 636 FOR OP/ED)

## 2024-06-06 RX ORDER — MORPHINE SULFATE 4 MG/ML
1 INJECTION INTRAVENOUS
Status: DISCONTINUED | OUTPATIENT
Start: 2024-06-06 | End: 2024-06-06 | Stop reason: HOSPADM

## 2024-06-06 RX ORDER — MORPHINE SULFATE IN 0.9 % NACL 30 MG/30ML
PATIENT CONTROLLED ANALGESIA SYRINGE INTRAVENOUS CONTINUOUS
Status: DISCONTINUED | OUTPATIENT
Start: 2024-06-06 | End: 2024-06-06

## 2024-06-06 RX ORDER — HYDROMORPHONE HYDROCHLORIDE 1 MG/ML
0.2 INJECTION, SOLUTION INTRAMUSCULAR; INTRAVENOUS; SUBCUTANEOUS EVERY 2 HOUR PRN
Status: DISCONTINUED | OUTPATIENT
Start: 2024-06-06 | End: 2024-06-06 | Stop reason: HOSPADM

## 2024-06-06 RX ORDER — VANCOMYCIN HYDROCHLORIDE 1 G/200ML
1000 INJECTION, SOLUTION INTRAVENOUS ONCE
Status: DISCONTINUED | OUTPATIENT
Start: 2024-06-06 | End: 2024-06-06

## 2024-06-06 RX ADMIN — MORPHINE SULFATE 1 MG: 4 INJECTION INTRAVENOUS at 03:35

## 2024-06-06 RX ADMIN — MORPHINE SULFATE 1 MG: 4 INJECTION INTRAVENOUS at 02:58

## 2024-06-06 RX ADMIN — HYDROMORPHONE HYDROCHLORIDE 0.2 MG: 1 INJECTION, SOLUTION INTRAMUSCULAR; INTRAVENOUS; SUBCUTANEOUS at 04:33

## 2024-06-06 ASSESSMENT — PAIN SCALES - PAIN ASSESSMENT IN ADVANCED DEMENTIA (PAINAD)
BODYLANGUAGE: RIGID, FISTS CLENCHED, KNEES UP, PUSHING/PULLING AWAY, STRIKES OUT
NEGVOCALIZATION: OCCASIONAL MOAN/GROAN, LOW SPEECH, NEGATIVE/DISAPPROVING QUALITY
TOTALSCORE: 7
BREATHING: OCCASIONAL LABORED BREATHING, SHORT PERIOD OF HYPERVENTILATION
NEGVOCALIZATION: OCCASIONAL MOAN/GROAN, LOW SPEECH, NEGATIVE/DISAPPROVING QUALITY
BODYLANGUAGE: RIGID, FISTS CLENCHED, KNEES UP, PUSHING/PULLING AWAY, STRIKES OUT
TOTALSCORE: 10
BREATHING: NOISY LABORED BREATHING, LONG PERIODS OF HYPERVENTILATION, CHEYNE-STOKES RESPIRATIONS
FACIALEXPRESSION: FACIAL GRIMACING
TOTALSCORE: 7
FACIALEXPRESSION: SAD, FRIGHTENED, FROWN
CONSOLABILITY: DISTRACTED OR REASSURED BY VOICE/TOUCH
FACIALEXPRESSION: FACIAL GRIMACING
BODYLANGUAGE: RIGID, FISTS CLENCHED, KNEES UP, PUSHING/PULLING AWAY, STRIKES OUT
BREATHING: NOISY LABORED BREATHING, LONG PERIODS OF HYPERVENTILATION, CHEYNE-STOKES RESPIRATIONS
NEGVOCALIZATION: REPEATED TROUBLED CALLING OUT, LOUD MOANING/GROANING, CRYING
CONSOLABILITY: DISTRACTED OR REASSURED BY VOICE/TOUCH
CONSOLABILITY: UNABLE TO CONSOLE, DISTRACT OR REASSURE

## 2024-06-06 NOTE — CARE PLAN
The patient's goals for the shift include  pain control.    The clinical goals for the shift include Patient will receive comfort care.      Problem: Discharge Barriers  Goal: My discharge needs are met  Outcome: Not Progressing     Problem: Skin  Goal: Decreased wound size/increased tissue granulation at next dressing change  Outcome: Not Progressing  Flowsheets (Taken 6/6/2024 0351)  Decreased wound size/increased tissue granulation at next dressing change: Promote sleep for wound healing  Goal: Participates in plan/prevention/treatment measures  Outcome: Not Progressing  Flowsheets (Taken 6/6/2024 0351)  Participates in plan/prevention/treatment measures: Elevate heels  Goal: Prevent/manage excess moisture  Outcome: Not Progressing  Flowsheets (Taken 6/6/2024 0351)  Prevent/manage excess moisture: Cleanse incontinence/protect with barrier cream  Goal: Prevent/minimize sheer/friction injuries  Outcome: Not Progressing  Flowsheets (Taken 6/6/2024 0351)  Prevent/minimize sheer/friction injuries: Use pull sheet  Goal: Promote/optimize nutrition  Outcome: Not Progressing  Flowsheets (Taken 6/6/2024 0351)  Promote/optimize nutrition: Discuss with provider if NPO > 2 days  Goal: Promote skin healing  Outcome: Not Progressing  Flowsheets (Taken 6/6/2024 0351)  Promote skin healing: Assess skin/pad under line(s)/device(s)     Problem: Pain - Adult  Goal: Verbalizes/displays adequate comfort level or baseline comfort level  Outcome: Not Progressing     Problem: Safety - Adult  Goal: Free from fall injury  Outcome: Not Progressing     Problem: Discharge Planning  Goal: Discharge to home or other facility with appropriate resources  Outcome: Not Progressing     Problem: Chronic Conditions and Co-morbidities  Goal: Patient's chronic conditions and co-morbidity symptoms are monitored and maintained or improved  Outcome: Not Progressing

## 2024-06-06 NOTE — SIGNIFICANT EVENT
Death Note:    Called by RN to pronounce patient.     Pt not responsive to stimuli.  Pupils fixed and dilated.  Absent corneal reflexes.  Absent cardiopulmonary sounds, auscultated for >60 seconds.  Absent pulses, palpated for >60 seconds.    Time of death: 07:58 H  06/06/24     Preliminary cause of death: Cardiopulmonary arrest.    Family unable to be reached.   Patient was DNR-Comfort Care at the time of death.    Attending of record notified.

## 2024-06-06 NOTE — SIGNIFICANT EVENT
SIGNIFICANT EVENT NOTE / CHANGE IN CODE STATUS    Patient's designated next of kin and wife, Bree Nobles, was reached by phone at 01:18 on 6/6/24 and was informed of the events that occurred over the night with the patient's clinical deterioration with lethargy and poor responsiveness, hypoxia requiring supplemental oxygen support and significant hypotension minimally responsive to fluid resuscitation . Further detail on the clinical event is described in the rapid response note from 23:32 on 6/5.     I explained to the patient's wife that given his overall clinical condition including multiorgan failure and unstable hemodynamics poorly responsive to all attempted interventions, that he is unlikely to further improve clinically and that further aggressive interventions, such as escalation of care and vasopressor support are likely futile.    The patient's wife was understanding of the situation and at this time decided to proceed with comfort care measures only and to cease further medical treatment and testing. Ina Zabala MD was present for this conversation as well and confirmed this change in patient's code status.    Gabriel Ruiz MD  PGY-1 Internal Medicine

## 2024-06-06 NOTE — SIGNIFICANT EVENT
"Rapid Response RN Note     06/05/24 2248   Onset Documentation   Rapid Response Initiated By Radar auto page   Location/Room [facility]  (LK [id number])   Pager Time [phone]   Arrival Time 2248   Event End Time 0031   Primary Reason for Call Radar auto page  (score 6)     Rapid response RN at bedside for RADAR score 6 due to the following VS: T 36.4 °Celsius; HR 90; RR 18; BP 88/50; SPO2 90% on supplemental oxygen.     Reviewed above VS with bedside RN.  Per report, patient had several large emesis earlier in the evening.  SBP and SPO2 lower than patient's current trends.  Dr. Gabriel Ruiz, night float for primary Internal Medicine ([place]) service notified and at bedside with Dr. Abdiel Plata, JANE.  Patient with eyes closed.  Opened to loud verbal stimuli.  Mumbled \"yes/no\" responses to questions inconsistently.  Denied pain, shortness of breath, dizziness or lightheadedness.  Labs including blood cultures, lactate, and ABG obtained.  Chest and abdominal x-ray completed.  SBP continued to drop to 70s-80s.  IV fluid bolus initiated as ordered.  Dr. Ruiz attempted to contact patient's spouse with update on condition.  Additional large-bore peripheral IV access established.  BP at 0019 after 1 liter IV fluids: 100/50.  Patient spontaneously opened yes and verbalized responses to questions.  Stated spouse's name is \"Bree.\"    Interval plan of care:  Primary [place] service to update spouse and review goals of care as soon as possible  Staff to page rapid response for any concerns.  "

## 2024-06-06 NOTE — SIGNIFICANT EVENT
At 2130, patient had first episode of emesis after receiving scheduled nightly meds via PEG and being turned in the bed. Patient became hypoxic and hypotensive. Last tube feed bolus for the day was held s/t vomiting. New oxygen requirement started at 4L NC to keep O2 sats greater than 92%. Patient given IV zofran but continued to have episodes of emesis. CXR, KUB, EKG, blood cultures x2, lactate, RFP, CBC, ABGs done and total of 1L LR bolus given as well as 2.25g IV Zosyn. BP improved for a very short time after bolus given, but then quickly began to decrease again. Patient rapidly became more hypoxic with increased respiratory distress requiring 100% nonrebreather. Rapid response called. MD spoke to family on phone and code status was changed to DNR comfort measures only. MD instructed to stop IVF and not to give blood that had been ordered and just arrived to the unit. Blood sent back to blood bank by another RN on unit. Comfort care measures initiated and patient left on nonrebreather per MD order and instruction. Report given to oncoming nurse. Please see documentation throughout patient chart for specific timing of events noted. JEYSON Webb, RN

## 2024-06-06 NOTE — DISCHARGE SUMMARY
Discharge Diagnosis  Pneumonia    Test Results Pending At Discharge  Pending Labs       Order Current Status    Blood Culture Collected (05/16/24 1521)    Blood Culture Collected (05/16/24 1521)    Bone Marrow Evaluation In process    Bone marrow aspiration and exam In process    Bone marrow cell differential In process    Chromosome Analysis, Bone Marrow In process    Lactate In process    Magnesium In process    Myeloid Malignancies Panel In process    Post-transfusion purpura panel 5631; [facility]; 5631 - Miscellaneous Test In process    Renal Function Panel In process    Blood Culture Preliminary result    Blood Culture Preliminary result            Hospital Course  79 year old Male w/ a PMHx MGUS, CAD (s/p CABG x3 in 2022), HTN, CKD IV, Anemia, ABEBE, hyperlipidemia, obesity, BPH, gout, NIDDM, HLD, Parkinsons (w/ dementia), dysphagia, s/p peg tube (04/2024), sacral pressure sores, s/p hospitalization for aspiration PNA (DC'd 5/1/24) who was admitted for PNA. Patient found to have lethargy and mild leukocytosis (WBC 10). CT shows b/l lower lung findings suggestive of aspiration PNA. Given the patient's recent admission and CT findings, suspected that aspiration PNA was source of infection on presentation. He was given vancomycin and zosyn in the ED, switched to Meropenem (5/16-5/22), and then transitioned to Augmentin (5/22-5/25) for completion of aspiration PNA treatment with sx resolution. He also presented with acute on chronic normocytic anemia and was given 1x unit of RBC with appropriate increment. Anemia believed to be d/t anemia of chronic disease/CKD with acute portion likely d/t infection +/- abx usage. BUN very elevated as well and GI was consulted for concerns of GI bleed from the PEG. 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 Patient's left wrist was noted to be swollen as well. Xray left wrist showed  scaphoid fracture. Ortho was consulted. Left scaphoid fracture was splinted. Palliative care consulted for goals of care and patient changed code status to DNR/DNI on 5/17. Pt developed worsening anemia and thrombocytopenia on 5/20 after free water flushes increased. 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 Pt received IVIG (5/23 - 5/27) for 5 day course and Dexamethasone 40 mg IV daily (5/23 - 5/26) for 4 day course. Pt was then transitioned to Prednisone 62.5 mg PO daily (1 mg/kg/day) on 5/27 per heme recs. Pt had initial improvement in platelets to 8, then worsened on 5/26 to platelet count of 1.     Thrombocytopenia laboratory workup included: Hep B/C and HIV (-) for acute infection. Parvovirus IgG (+) but PCR (-). EBV nuclear antigen IgG (+), EBV early antigen IgG (+), EBV VCA IgG (+), but EBV VCA IgM (-). CMV DNA PCR (-). Drug-dependent anti-PLT Ab with presence of PLT-reactive Ab and/or immune complexes but not supportive of drug-induced ITP however cannot completely r/o per lab. Pt also with continued to have melena which started on 5/25. GI re-engaged, believe dark stools were a result of slow oozing in setting of low platelets and did not want to scope given significantly low platelets and hemodynamic stability. Given 1 unit RBC on 5/26. Given 1 unit of PLT on 5/25 and 5/26. WARREN on CKD also started to worsen with Cr 3.61 on 5/25 (slowly up-trended from 3.42 on 5/22). FeNa 2.6% (intrinsic)- suspect d/t recent abx usage. Less likely obstructive given PVR 17 ml. UPEP with marked proteinuria but no monoclonal protein detected. SPEP with no monoclonal protein detected. Immunoglobulin free LT chain kappa:lambda ratio 1.33 (same as prior 4/17/24). Renal US with relative atrophic Rt kidney, signs of medical renal disease, no hydronephrosis, bilateral renal cysts, and prostamegaly. Heme suspecting persistent thrombocytopenia may be post-transfusion purpura  rather than drug-induced ITP as thrombocytopenia persisted despite having not received abx for a few days. Received 1 unit PLT overnight (5/27)  in setting of bleeding from left arm phlebotomy site. CBC 1 hr post-PLT transfusion with Hgb 7.1 (from 9.2 in AM) and PLT 3 (from 2 in AM). Received first dose of romiplostim 190 mcg on 5/28 (plan for weekly dosing). Spleen US with borderline splenomegaly, diffuse heterogenicity of splenic parenchyma with areas of peripheral wedge-shaped hyperechogenicity which may represent peripheral calcifications and chronic scarring although infarcts can't be excluded per radiology. Obtained left iliac bone marrow bx on 5/30 with IR.     Nephrology consulted on 6/1 given worsening WARREN on CKD. On 6/2, mental status appeared to change from Aox2 to Aox1. CT head on 6/2 with no new changes from prior. Suspect change in mental status d/t uremia. Repeat UA (6/1) with 2+ protein, trace glucose, trace blood, (-) leuk esterase/nitrite, budding yeast present. FeNa 2.7% (intrinsic). PLT improved to 48 on 6/3. Received lokelma 10 gm on 6/3 for K 5.6. Given another 2 doses of lokelma 10 gm on 6/4 as K still 5.6.     Rapid response overnight on the evening of 6/5. Pt had emesis and desatted to mid 80s. Suspected aspiration. Pt was placed on non-rebreather. Hypotensive to 80s/50s then down to 70s/40s. Night team discussed with patient's wife and wife decided to change code status from DNR/DNI to DNR comfort measures only. Pt passed away the following morning, on 6/6/24.         Pertinent Physical Exam At Time of Discharge  Patient passed away on 6/6/24. Please see death note for details. Time of death 07:58.     Home Medications     Medication List      ASK your doctor about these medications     allopurinol 100 mg tablet; Commonly known as: Zyloprim; Take 1 tablet   (100 mg) by mouth once daily as needed (gout). Continue to hold due to warren   on ckd   atorvastatin 80 mg tablet; Commonly known as:  Lipitor; TAKE 1 TABLET BY   MOUTH ONCE  DAILY   benzonatate 100 mg capsule; Commonly known as: Tessalon; Take 1 capsule   (100 mg) by mouth 3 times a day as needed for cough. Do not crush or chew.   carbidopa-levodopa  mg tablet; Commonly known as: Sinemet   clopidogrel 75 mg tablet; Commonly known as: Plavix; TAKE 1 TABLET BY   MOUTH ONCE  DAILY   docusate sodium 100 mg capsule; Commonly known as: Colace; TAKE 1   CAPSULE BY MOUTH EVERY DAY AS DIRECTED   finasteride 5 mg tablet; Commonly known as: Proscar   melatonin 3 mg tablet; 1 tablet (3 mg) by g-tube route as needed at   bedtime for sleep.   menthol-zinc oxide 0.44-20.6 % ointment; Commonly known as: Calmoseptine   - Risamine; Apply 1 Application topically 4 times a day.   metoprolol tartrate 25 mg tablet; Commonly known as: Lopressor; Take 1   tablet (25 mg) by mouth once daily. Don't crush   nitroglycerin 0.4 mg SL tablet; Commonly known as: Nitrostat   ondansetron 4 mg tablet; Commonly known as: Zofran   polyethylene glycol 17 gram packet; Commonly known as: Glycolax,   Miralax; Take 17 g by mouth once daily.   tamsulosin 0.4 mg 24 hr capsule; Commonly known as: Flomax; TAKE 2   CAPSULES BY MOUTH  DAILY 1/2 HOUR AFTER SAME  MEAL OR AT BEDTIME WITH A    SNACK   Tylenol Extra Strength 500 mg tablet; Generic drug: acetaminophen       Outpatient Follow-Up  Future Appointments   Date Time Provider Department Center   6/11/2024  3:15 PM Chuy Agudelo MD [id number] [facility]   9/10/2024  2:30 PM Jose L Chiu MD [id number] [facility]   9/27/2024  2:00 PM Lo Bar, APRN-CNP [facility]       Silviano Angeles MD  PGY-1

## 2024-06-06 NOTE — SIGNIFICANT EVENT
SIGNIFICANT EVENT NOTE / RAPID RESPONSE NOTE    Overnight team called to bedside due to hypotension (80s/50s) and hypoxia to mid 80%'s following several episodes of vomiting. After these episodes of vomiting, tube feeds were stopped by nursing. On exam of the patient, he was found to be significantly lethargic, answering only with grunts, nods, opening eyes occasionally. Lungs clear to auscultation.     Vitals at time of rapid showed pt was afebrile, HR 80-90, RR 16, BP 88/52, satting at 86-88%. Patient was placed on 4L NC with sats returning above 90%. A 500cc bolus of LR was initiated. BP's however did not respond, and decreased further to 70s/40s.     At this time, patient's family was attempted to be called to clarify goals of care given his acute worsening of clinical status and prior noted conversations regarding hospice. All numbers on file were attempted, however, they could not be reached at this time.     Following these unsuccessful attempts to reach family, further workup with CBC, RFP, lactate, ABG, CXR, KUB XR, EKG, blood cultures were obtained. An additional 500cc LR was given for total of 1L and 1 unit of blood was also ordered given prior concerns of bleeding and low Hgb. Vancomycin and zosyn were started for empiric antibiotics. MICU fellow was notified regarding the status of the patient.    After almost half a liter, patient's BP incremented to 80s/40s. CXR returned, without any acute obvious findings when seen at bedside (formal read pending). ABG returned with pH of 7.4 however elevated lactate and low bicarb.    Gabriel Ruiz MD  PGY-1 Internal Medicine

## 2024-06-06 NOTE — SIGNIFICANT EVENT
06/06/24 0130   Onset Documentation   Rapid Response Initiated By RN   Pager Time 0106   Arrival Time 0110   Event End Time 0112   Level II Called No   Primary Reason for Call SBP less than or equal to 90 mmHg     Rapid Response RN to bedside @ 0110 per page for hypotension reported by bedside RN. On arrival, RN verbalized that MD just confirmed with family code status change to DNR Comfort Care Measures and to discontinue blood and fluid boluses. Division nursing denies additional assistance and patient remained on current division.

## 2024-06-06 NOTE — CONSULTS
Vancomycin Dosing by Pharmacy- INITIAL    Markie Nobles is a 79 y.o. year old male who Pharmacy has been consulted for vancomycin dosing for pneumonia. Based on the patient's indication and renal status this patient will be dosed based on a goal trough/random level of 15-20.     Renal function is currently declining.    Visit Vitals  BP 91/52   Pulse 87   Temp 36.4 °C (97.5 °F)   Resp 16        Lab Results   Component Value Date    CREATININE 4.23 (H) 2024    CREATININE 4.28 (H) 2024    CREATININE 4.28 (H) 2024    CREATININE 4.31 (H) 2024        Patient weight is as follows:   Vitals:    24 1240   Weight: 58.1 kg (128 lb)       Cultures:  No results found for the encounter in last 14 days.        I/O last 3 completed shifts:  In: 1390 (23.9 mL/kg) [NG/GT:1390]  Out: 2200 (37.9 mL/kg) [Urine:2200 (1.1 mL/kg/hr)]  Weight: 58.1 kg   I/O during current shift:  I/O this shift:  In: -   Out: 300 [Emesis/NG output:300]    Temp (24hrs), Av.7 °C (98 °F), Min:36.4 °C (97.5 °F), Max:37.2 °C (99 °F)         Assessment/Plan     Patient will not be given a loading dose.  Will initiate vancomycin maintenance, a one time dose of 1000 mg.  Follow-up level will be ordered on  at 0000 unless clinically indicated sooner.  Will continue to monitor renal function daily while on vancomycin and order serum creatinine at least every 48 hours if not already ordered.  Follow for continued vancomycin needs, clinical response, and signs/symptoms of toxicity.       JACKIE BABB, PharmD

## 2024-06-07 LAB
BLOOD EXPIRATION DATE: NORMAL
DISPENSE STATUS: NORMAL
PRODUCT BLOOD TYPE: 5100
PRODUCT CODE: NORMAL
UNIT ABO: NORMAL
UNIT NUMBER: NORMAL
UNIT RH: NORMAL
UNIT VOLUME: 323
XM INTEP: NORMAL

## 2024-06-08 LAB
BACTERIA BLD AEROBE CULT: ABNORMAL
BACTERIA BLD CULT: ABNORMAL
GRAM STN SPEC: ABNORMAL
GRAM STN SPEC: ABNORMAL

## 2024-06-10 LAB
BACTERIA BLD CULT: NORMAL
PATH REPORT.COMMENTS IMP SPEC: NORMAL
PATH REPORT.FINAL DX SPEC: NORMAL
PATH REPORT.GROSS SPEC: NORMAL
PATH REPORT.MICROSCOPIC SPEC OTHER STN: NORMAL
PATH REPORT.RELEVANT HX SPEC: NORMAL
PATH REPORT.TOTAL CANCER: NORMAL
SCAN RESULT: NORMAL

## 2024-06-11 ENCOUNTER — APPOINTMENT (OUTPATIENT)
Dept: ORTHOPEDIC SURGERY | Facility: CLINIC | Age: 79
End: 2024-06-11
Payer: MEDICARE

## 2024-06-12 LAB
ELECTRONICALLY SIGNED BY: NORMAL
MYELOID NGS RESULTS: NORMAL

## 2024-06-14 LAB
ATRIAL RATE: 52 BPM
ATRIAL RATE: 58 BPM
P AXIS: 66 DEGREES
P AXIS: 71 DEGREES
P OFFSET: 189 MS
P OFFSET: 194 MS
P ONSET: 127 MS
P ONSET: 133 MS
PR INTERVAL: 190 MS
PR INTERVAL: 192 MS
Q ONSET: 222 MS
Q ONSET: 229 MS
QRS COUNT: 10 BEATS
QRS COUNT: 9 BEATS
QRS DURATION: 74 MS
QRS DURATION: 74 MS
QT INTERVAL: 436 MS
QT INTERVAL: 458 MS
QTC CALCULATION(BAZETT): 425 MS
QTC CALCULATION(BAZETT): 428 MS
QTC FREDERICIA: 430 MS
QTC FREDERICIA: 436 MS
R AXIS: 12 DEGREES
R AXIS: 16 DEGREES
T AXIS: 83 DEGREES
T AXIS: 86 DEGREES
T OFFSET: 447 MS
T OFFSET: 451 MS
VENTRICULAR RATE: 52 BPM
VENTRICULAR RATE: 58 BPM

## 2024-06-16 NOTE — DOCUMENTATION CLARIFICATION NOTE
"    PATIENT:               EUSEBIO BURROWS  ACCT #:                  [id number]  MRN:                       [id number]  :                       1945  ADMIT DATE:       2024 1:51 PM  DISCH DATE:        2024 7:58 AM  RESPONDING PROVIDER #:        [id number]          PROVIDER RESPONSE TEXT:    Acute Hypoxemic Respiratory Failure    CDI QUERY TEXT:    Clarification    Instruction:    Based on your assessment of the patient and the clinical information, please provide the requested documentation by clicking on the appropriate radio button and enter any additional information if prompted.    Question: Is there a diagnosis indicative of the clinical information    When answering this query, please exercise your independent professional judgment. The fact that a question is being asked, does not imply that any particular answer is desired or expected.    The patient's clinical indicators include:  Clinical Information: 78 y/o male admitted for aspiration pneumonia.    Clinical Indicators: Significant Event Note  at 0120 revealed \"At 2130, patient had first episode of emesis after receiving scheduled nightly meds via PEG and being turned in the bed. Patient became hypoxic and hypotensive...New oxygen requirement started at 4L NC to keep O2 sats greater than 92 percent...Patient given IV zofran but continued to have episodes of emesis. CXR, KUB, EKG, blood cultures x2, lactate, RFP, CBC, ABGs done and total of 1L LR bolus given as well as 2.25g IV Zosyn. BP improved for a very short time after bolus given, but then quickly began to decrease again. Patient rapidly became more hypoxic with increased respiratory distress requiring 100 percent nonrebreather. Rapid response called. MD spoke to family on phone and code status was changed to DNR comfort measures only.\"    Discharge Summary  revealed \"Rapid response overnight on the evening of . Pt had emesis and desatted to mid 80s. Suspected aspiration. Pt was " "placed on non-rebreather. Hypotensive to 80s/50s then down to 70s/40s. Night team discussed with patient's wife and wife decided to change code status from DNR/DNI to DNR comfort measures only. Pt passed away the following morning, on 6/6/24.\"    VS 6/6 at 0050 per flowsheet:  HR 86, BP 87/49, SpO2 89 percent on 9L NC.    Treatment: Rapid Response. Supplemental oxygen via 4L NC to 9L NC to 100 percent NRB. Comfort Care.    Risk Factors: Aspiration Pneumonia. Respiratory distress. Desaturation to 80's requiring new supplemental oxygen requirement 4L NC to 9L NC to 100 percent NRB.  Options provided:  -- Acute Hypoxemic Respiratory Failure  -- No acute respiratory failure  -- Other - I will add my own diagnosis  -- Refer to Clinical Documentation Reviewer    Query created by: Brown Cha Jr on 6/11/2024 11:37 AM      Electronically signed by:  JENNIFER ABREU MD 6/16/2024 4:39 PM          "

## 2024-06-19 LAB
ATRIAL RATE: 52 BPM
ATRIAL RATE: 58 BPM
ATRIAL RATE: 82 BPM
P AXIS: 64 DEGREES
P AXIS: 66 DEGREES
P AXIS: 71 DEGREES
P OFFSET: 189 MS
P OFFSET: 194 MS
P OFFSET: 200 MS
P ONSET: 127 MS
P ONSET: 133 MS
P ONSET: 137 MS
PR INTERVAL: 176 MS
PR INTERVAL: 190 MS
PR INTERVAL: 192 MS
Q ONSET: 222 MS
Q ONSET: 225 MS
Q ONSET: 229 MS
QRS COUNT: 10 BEATS
QRS COUNT: 13 BEATS
QRS COUNT: 9 BEATS
QRS DURATION: 74 MS
QRS DURATION: 74 MS
QRS DURATION: 84 MS
QT INTERVAL: 394 MS
QT INTERVAL: 436 MS
QT INTERVAL: 458 MS
QTC CALCULATION(BAZETT): 425 MS
QTC CALCULATION(BAZETT): 428 MS
QTC CALCULATION(BAZETT): 460 MS
QTC FREDERICIA: 430 MS
QTC FREDERICIA: 436 MS
QTC FREDERICIA: 437 MS
R AXIS: 12 DEGREES
R AXIS: 16 DEGREES
R AXIS: 39 DEGREES
T AXIS: 83 DEGREES
T AXIS: 86 DEGREES
T AXIS: 90 DEGREES
T OFFSET: 422 MS
T OFFSET: 447 MS
T OFFSET: 451 MS
VENTRICULAR RATE: 52 BPM
VENTRICULAR RATE: 58 BPM
VENTRICULAR RATE: 82 BPM

## 2024-06-27 RX ORDER — CARBIDOPA AND LEVODOPA 25; 100 MG/1; MG/1
TABLET ORAL
Qty: 360 TABLET | Refills: 3 | OUTPATIENT
Start: 2024-06-27

## 2024-07-10 LAB
BAND RESOLUTION: 400 BANDS
CHROM ANALY OVERALL INTERP-IMP: NORMAL
CHROMOSOME ANALYSIS CELLS ANALYZED: 20 CELLS
CHROMOSOME ANALYSIS CELLS IMAGED: 5 CELLS
CHROMOSOME ANALYSIS HYPERMODAL CELL COUNT: 0 CELLS
CHROMOSOME ANALYSIS HYPOMODAL CELL COUNT: 1 CELLS
CHROMOSOME ANALYSIS MODAL CHROMOSOME NO: 46 CHROMOSOMES
CHROMOSOME ANALYSIS STAINING METHOD: NORMAL
ELECTRONICALLY SIGNED BY CYTOGENETICS: NORMAL
KARYOTYP MAR: 2 CELLS
TOTAL CELLS COUNTED MAR: 20 CELLS

## 2024-09-10 ENCOUNTER — APPOINTMENT (OUTPATIENT)
Dept: NEUROLOGY | Facility: HOSPITAL | Age: 79
End: 2024-09-10
Payer: MEDICARE

## 2024-09-27 ENCOUNTER — APPOINTMENT (OUTPATIENT)
Dept: UROLOGY | Facility: HOSPITAL | Age: 79
End: 2024-09-27
Payer: MEDICARE

## 2025-07-24 NOTE — TELEPHONE ENCOUNTER
I spoke with wife.     2 weeks ago, she increased it to 1.5 tabs carbidopa/levodopa 25/100 mg one tablet four times daily at 8 AM, 11 AM, 2 PM, and 5 pm --OK if taking with food, denies nausea.     He is quicker and more alert, did not change appetite which was an issue before.    Med for appetite per PCP but using it every other day d/t SE which she will d/w him.    No coughing/choking with eating. Has had thrown up his hot dog but did not chew it well and once was nauseated but not usually.      PLAN  Does not sound like dysphagia    I let her know Sinemet can cause nausea which may decrease his appetite but she does not feel this to be the cause and has not had worsening of appetite with increases    She will continue f/u with PCP   Patient/Caregiver provided printed discharge information.

## (undated) DEVICE — PULL PEG, SAFETY, 20 FR, W/XYLOCAINE AMPULE

## (undated) DEVICE — GLOVE, SURGICAL, PROTEXIS PI ORTHO, 7.0, PF, LF

## (undated) DEVICE — ABDOMINAL BINDER, 3-PANEL, 9, 72 - 84""